# Patient Record
Sex: FEMALE | Race: WHITE | NOT HISPANIC OR LATINO | Employment: OTHER | URBAN - NONMETROPOLITAN AREA
[De-identification: names, ages, dates, MRNs, and addresses within clinical notes are randomized per-mention and may not be internally consistent; named-entity substitution may affect disease eponyms.]

---

## 2017-01-08 ENCOUNTER — APPOINTMENT (OUTPATIENT)
Dept: CARDIOLOGY | Facility: HOSPITAL | Age: 62
End: 2017-01-08
Attending: INTERNAL MEDICINE

## 2017-01-08 ENCOUNTER — HOSPITAL ENCOUNTER (INPATIENT)
Facility: HOSPITAL | Age: 62
LOS: 2 days | Discharge: HOME | End: 2017-01-10
Attending: INTERNAL MEDICINE | Admitting: INTERNAL MEDICINE

## 2017-01-08 DIAGNOSIS — I21.4 NON-STEMI (NON-ST ELEVATED MYOCARDIAL INFARCTION) (HCC): Primary | ICD-10-CM

## 2017-01-08 LAB
ALBUMIN SERPL-MCNC: 3.6 G/DL (ref 3.4–4.8)
ALBUMIN SERPL-MCNC: 3.6 G/DL (ref 3.4–4.8)
ALBUMIN/GLOB SERPL: 1.2 G/DL (ref 1.5–2.5)
ALBUMIN/GLOB SERPL: 1.3 G/DL (ref 1.5–2.5)
ALP SERPL-CCNC: 75 U/L (ref 46–116)
ALP SERPL-CCNC: 78 U/L (ref 46–116)
ALT SERPL W P-5'-P-CCNC: 16 U/L (ref 10–36)
ALT SERPL W P-5'-P-CCNC: 20 U/L (ref 10–36)
ANION GAP SERPL CALCULATED.3IONS-SCNC: 1.8 MMOL/L (ref 3.6–11.2)
ANION GAP SERPL CALCULATED.3IONS-SCNC: ABNORMAL MMOL/L (ref 3.6–11.2)
APTT PPP: 36.1 SECONDS (ref 24.4–31)
APTT PPP: 39.5 SECONDS (ref 24.4–31)
APTT PPP: 84.9 SECONDS (ref 24.4–31)
APTT PPP: 92.4 SECONDS (ref 24.4–31)
AST SERPL-CCNC: 24 U/L (ref 10–30)
AST SERPL-CCNC: 28 U/L (ref 10–30)
BASOPHILS # BLD AUTO: 0.03 10*3/MM3 (ref 0–0.3)
BASOPHILS NFR BLD AUTO: 0.3 % (ref 0–2)
BH CV ECHO MEAS - % IVS THICK: 34.6 %
BH CV ECHO MEAS - % LVPW THICK: 25 %
BH CV ECHO MEAS - ACS: 1.9 CM
BH CV ECHO MEAS - AO ROOT AREA (BSA CORRECTED): 1.7
BH CV ECHO MEAS - AO ROOT AREA: 7.9 CM^2
BH CV ECHO MEAS - AO ROOT DIAM: 3.2 CM
BH CV ECHO MEAS - BSA(HAYCOCK): 1.9 M^2
BH CV ECHO MEAS - BSA: 1.8 M^2
BH CV ECHO MEAS - BZI_BMI: 31.5 KILOGRAMS/M^2
BH CV ECHO MEAS - BZI_METRIC_HEIGHT: 160 CM
BH CV ECHO MEAS - BZI_METRIC_WEIGHT: 80.7 KG
BH CV ECHO MEAS - CONTRAST EF 4CH: 62 ML/M^2
BH CV ECHO MEAS - EDV(CUBED): 111.9 ML
BH CV ECHO MEAS - EDV(MOD-SP4): 50 ML
BH CV ECHO MEAS - EDV(TEICH): 108.5 ML
BH CV ECHO MEAS - EF(CUBED): 81.7 %
BH CV ECHO MEAS - EF(MOD-SP4): 62 %
BH CV ECHO MEAS - EF(TEICH): 74.3 %
BH CV ECHO MEAS - ESV(CUBED): 20.5 ML
BH CV ECHO MEAS - ESV(MOD-SP4): 19 ML
BH CV ECHO MEAS - ESV(TEICH): 27.9 ML
BH CV ECHO MEAS - FS: 43.2 %
BH CV ECHO MEAS - IVS/LVPW: 0.93
BH CV ECHO MEAS - IVSD: 1.1 CM
BH CV ECHO MEAS - IVSS: 1.5 CM
BH CV ECHO MEAS - LV DIASTOLIC VOL/BSA (35-75): 27.2 ML/M^2
BH CV ECHO MEAS - LV MASS(C)D: 213.3 GRAMS
BH CV ECHO MEAS - LV MASS(C)DI: 115.9 GRAMS/M^2
BH CV ECHO MEAS - LV MASS(C)S: 143.7 GRAMS
BH CV ECHO MEAS - LV MASS(C)SI: 78.1 GRAMS/M^2
BH CV ECHO MEAS - LV SYSTOLIC VOL/BSA (12-30): 10.3 ML/M^2
BH CV ECHO MEAS - LVIDD: 4.8 CM
BH CV ECHO MEAS - LVIDS: 2.7 CM
BH CV ECHO MEAS - LVLD AP4: 8.1 CM
BH CV ECHO MEAS - LVLS AP4: 7.7 CM
BH CV ECHO MEAS - LVOT AREA (M): 2.5 CM^2
BH CV ECHO MEAS - LVOT AREA: 2.6 CM^2
BH CV ECHO MEAS - LVOT DIAM: 1.8 CM
BH CV ECHO MEAS - LVPWD: 1.2 CM
BH CV ECHO MEAS - LVPWS: 1.5 CM
BH CV ECHO MEAS - MV A MAX VEL: 89.8 CM/SEC
BH CV ECHO MEAS - MV DEC SLOPE: 239.9 CM/SEC^2
BH CV ECHO MEAS - MV E MAX VEL: 72.6 CM/SEC
BH CV ECHO MEAS - MV E/A: 0.81
BH CV ECHO MEAS - MV P1/2T MAX VEL: 77 CM/SEC
BH CV ECHO MEAS - MV P1/2T: 94 MSEC
BH CV ECHO MEAS - MVA P1/2T LCG: 2.9 CM^2
BH CV ECHO MEAS - MVA(P1/2T): 2.3 CM^2
BH CV ECHO MEAS - RVDD: 0.87 CM
BH CV ECHO MEAS - SI(CUBED): 49.7 ML/M^2
BH CV ECHO MEAS - SI(MOD-SP4): 16.8 ML/M^2
BH CV ECHO MEAS - SI(TEICH): 43.8 ML/M^2
BH CV ECHO MEAS - SV(CUBED): 91.4 ML
BH CV ECHO MEAS - SV(MOD-SP4): 31 ML
BH CV ECHO MEAS - SV(TEICH): 80.6 ML
BILIRUB SERPL-MCNC: 0.2 MG/DL (ref 0.2–1.8)
BILIRUB SERPL-MCNC: 0.2 MG/DL (ref 0.2–1.8)
BNP SERPL-MCNC: 164 PG/ML (ref 0–100)
BUN BLD-MCNC: 14 MG/DL (ref 7–21)
BUN BLD-MCNC: 14 MG/DL (ref 7–21)
BUN/CREAT SERPL: 19.7 (ref 7–25)
BUN/CREAT SERPL: 20.3 (ref 7–25)
CALCIUM SPEC-SCNC: 9 MG/DL (ref 7.7–10)
CALCIUM SPEC-SCNC: 9 MG/DL (ref 7.7–10)
CHLORIDE SERPL-SCNC: 102 MMOL/L (ref 99–112)
CHLORIDE SERPL-SCNC: 104 MMOL/L (ref 99–112)
CHOLEST SERPL-MCNC: 251 MG/DL (ref 0–200)
CK MB SERPL-CCNC: 9.5 NG/ML (ref 0–5)
CK MB SERPL-RTO: 5.8 % (ref 0–3)
CK SERPL-CCNC: 163 U/L (ref 24–173)
CO2 SERPL-SCNC: 35.2 MMOL/L (ref 24.3–31.9)
CO2 SERPL-SCNC: >40 MMOL/L (ref 24.3–31.9)
CREAT BLD-MCNC: 0.69 MG/DL (ref 0.43–1.29)
CREAT BLD-MCNC: 0.71 MG/DL (ref 0.43–1.29)
DEPRECATED RDW RBC AUTO: 46 FL (ref 37–54)
EOSINOPHIL # BLD AUTO: 0.47 10*3/MM3 (ref 0–0.7)
EOSINOPHIL NFR BLD AUTO: 4.9 % (ref 0–5)
ERYTHROCYTE [DISTWIDTH] IN BLOOD BY AUTOMATED COUNT: 13.9 % (ref 11.5–14.5)
GFR SERPL CREATININE-BSD FRML MDRD: 84 ML/MIN/1.73
GFR SERPL CREATININE-BSD FRML MDRD: 86 ML/MIN/1.73
GLOBULIN UR ELPH-MCNC: 2.8 GM/DL
GLOBULIN UR ELPH-MCNC: 2.9 GM/DL
GLUCOSE BLD-MCNC: 298 MG/DL (ref 70–110)
GLUCOSE BLD-MCNC: 302 MG/DL (ref 70–110)
GLUCOSE BLDC GLUCOMTR-MCNC: 206 MG/DL (ref 70–130)
GLUCOSE BLDC GLUCOMTR-MCNC: 244 MG/DL (ref 70–130)
GLUCOSE BLDC GLUCOMTR-MCNC: 266 MG/DL (ref 70–130)
GLUCOSE BLDC GLUCOMTR-MCNC: 277 MG/DL (ref 70–130)
GLUCOSE BLDC GLUCOMTR-MCNC: 332 MG/DL (ref 70–130)
HCT VFR BLD AUTO: 40 % (ref 37–47)
HDLC SERPL-MCNC: 22 MG/DL (ref 60–100)
HGB BLD-MCNC: 13.2 G/DL (ref 12–16)
IMM GRANULOCYTES # BLD: 0.03 10*3/MM3 (ref 0–0.03)
IMM GRANULOCYTES NFR BLD: 0.3 % (ref 0–0.5)
INR PPP: 1.04 (ref 0.8–1.1)
LDLC SERPL CALC-MCNC: ABNORMAL MG/DL (ref 0–100)
LDLC/HDLC SERPL: ABNORMAL {RATIO}
LYMPHOCYTES # BLD AUTO: 4.15 10*3/MM3 (ref 1–3)
LYMPHOCYTES NFR BLD AUTO: 43.1 % (ref 21–51)
MCH RBC QN AUTO: 30.8 PG (ref 27–33)
MCHC RBC AUTO-ENTMCNC: 33 G/DL (ref 33–37)
MCV RBC AUTO: 93.2 FL (ref 80–94)
MONOCYTES # BLD AUTO: 0.6 10*3/MM3 (ref 0.1–0.9)
MONOCYTES NFR BLD AUTO: 6.2 % (ref 0–10)
MYOGLOBIN SERPL-MCNC: 47 NG/ML (ref 0–109)
NEUTROPHILS # BLD AUTO: 4.34 10*3/MM3 (ref 1.4–6.5)
NEUTROPHILS NFR BLD AUTO: 45.2 % (ref 30–70)
OSMOLALITY SERPL CALC.SUM OF ELEC: 292.8 MOSM/KG (ref 273–305)
OSMOLALITY SERPL CALC.SUM OF ELEC: 293 MOSM/KG (ref 273–305)
PLATELET # BLD AUTO: 265 10*3/MM3 (ref 130–400)
PMV BLD AUTO: 9.7 FL (ref 6–10)
POTASSIUM BLD-SCNC: 3.3 MMOL/L (ref 3.5–5.3)
POTASSIUM BLD-SCNC: 3.4 MMOL/L (ref 3.5–5.3)
PROT SERPL-MCNC: 6.4 G/DL (ref 6–8)
PROT SERPL-MCNC: 6.5 G/DL (ref 6–8)
PROTHROMBIN TIME: 11.7 SECONDS (ref 9.8–11.9)
RBC # BLD AUTO: 4.29 10*6/MM3 (ref 4.2–5.4)
SODIUM BLD-SCNC: 141 MMOL/L (ref 135–153)
SODIUM BLD-SCNC: 141 MMOL/L (ref 135–153)
TRIGL SERPL-MCNC: 475 MG/DL (ref 0–150)
TROPONIN I SERPL-MCNC: 1.94 NG/ML
TROPONIN I SERPL-MCNC: 2.74 NG/ML
TROPONIN I SERPL-MCNC: 5.42 NG/ML
VLDLC SERPL-MCNC: ABNORMAL MG/DL
WBC NRBC COR # BLD: 9.62 10*3/MM3 (ref 4.5–12.5)

## 2017-01-08 PROCEDURE — 83880 ASSAY OF NATRIURETIC PEPTIDE: CPT | Performed by: INTERNAL MEDICINE

## 2017-01-08 PROCEDURE — 84484 ASSAY OF TROPONIN QUANT: CPT | Performed by: INTERNAL MEDICINE

## 2017-01-08 PROCEDURE — 85025 COMPLETE CBC W/AUTO DIFF WBC: CPT | Performed by: INTERNAL MEDICINE

## 2017-01-08 PROCEDURE — 80061 LIPID PANEL: CPT | Performed by: INTERNAL MEDICINE

## 2017-01-08 PROCEDURE — 99222 1ST HOSP IP/OBS MODERATE 55: CPT | Performed by: INTERNAL MEDICINE

## 2017-01-08 PROCEDURE — 86141 C-REACTIVE PROTEIN HS: CPT | Performed by: INTERNAL MEDICINE

## 2017-01-08 PROCEDURE — 93005 ELECTROCARDIOGRAM TRACING: CPT | Performed by: INTERNAL MEDICINE

## 2017-01-08 PROCEDURE — 82962 GLUCOSE BLOOD TEST: CPT

## 2017-01-08 PROCEDURE — 83874 ASSAY OF MYOGLOBIN: CPT | Performed by: INTERNAL MEDICINE

## 2017-01-08 PROCEDURE — 85730 THROMBOPLASTIN TIME PARTIAL: CPT | Performed by: INTERNAL MEDICINE

## 2017-01-08 PROCEDURE — 82553 CREATINE MB FRACTION: CPT | Performed by: INTERNAL MEDICINE

## 2017-01-08 PROCEDURE — 82550 ASSAY OF CK (CPK): CPT | Performed by: INTERNAL MEDICINE

## 2017-01-08 PROCEDURE — 93306 TTE W/DOPPLER COMPLETE: CPT

## 2017-01-08 PROCEDURE — 25010000002 HEPARIN (PORCINE) PER 1000 UNITS: Performed by: INTERNAL MEDICINE

## 2017-01-08 PROCEDURE — 63710000001 INSULIN ASPART PER 5 UNITS: Performed by: INTERNAL MEDICINE

## 2017-01-08 PROCEDURE — 80053 COMPREHEN METABOLIC PANEL: CPT | Performed by: INTERNAL MEDICINE

## 2017-01-08 PROCEDURE — 85610 PROTHROMBIN TIME: CPT | Performed by: INTERNAL MEDICINE

## 2017-01-08 RX ORDER — FAMOTIDINE 20 MG/1
40 TABLET, FILM COATED ORAL NIGHTLY
Status: DISCONTINUED | OUTPATIENT
Start: 2017-01-08 | End: 2017-01-10 | Stop reason: HOSPADM

## 2017-01-08 RX ORDER — CLONIDINE HYDROCHLORIDE 0.2 MG/1
0.2 TABLET ORAL EVERY 6 HOURS SCHEDULED
Status: DISCONTINUED | OUTPATIENT
Start: 2017-01-08 | End: 2017-01-08 | Stop reason: ALTCHOICE

## 2017-01-08 RX ORDER — GLIMEPIRIDE 4 MG/1
4 TABLET ORAL NIGHTLY
COMMUNITY

## 2017-01-08 RX ORDER — HYDROXYZINE PAMOATE 25 MG/1
25 CAPSULE ORAL EVERY 6 HOURS PRN
COMMUNITY
End: 2017-01-10 | Stop reason: HOSPADM

## 2017-01-08 RX ORDER — LOSARTAN POTASSIUM 50 MG/1
100 TABLET ORAL DAILY
Status: DISCONTINUED | OUTPATIENT
Start: 2017-01-08 | End: 2017-01-10 | Stop reason: HOSPADM

## 2017-01-08 RX ORDER — GABAPENTIN 300 MG/1
300 CAPSULE ORAL 3 TIMES DAILY
COMMUNITY
End: 2020-09-10

## 2017-01-08 RX ORDER — GABAPENTIN 300 MG/1
300 CAPSULE ORAL 3 TIMES DAILY
Status: CANCELLED | OUTPATIENT
Start: 2017-01-08

## 2017-01-08 RX ORDER — ASPIRIN 325 MG
325 TABLET ORAL ONCE
Status: COMPLETED | OUTPATIENT
Start: 2017-01-09 | End: 2017-01-09

## 2017-01-08 RX ORDER — HEPARIN SODIUM 5000 [USP'U]/ML
30 INJECTION, SOLUTION INTRAVENOUS; SUBCUTANEOUS AS NEEDED
Status: DISCONTINUED | OUTPATIENT
Start: 2017-01-08 | End: 2017-01-09

## 2017-01-08 RX ORDER — CLOPIDOGREL BISULFATE 75 MG/1
600 TABLET ORAL ONCE
Status: COMPLETED | OUTPATIENT
Start: 2017-01-09 | End: 2017-01-09

## 2017-01-08 RX ORDER — ASPIRIN 81 MG/1
81 TABLET ORAL DAILY
Status: DISCONTINUED | OUTPATIENT
Start: 2017-01-08 | End: 2017-01-08 | Stop reason: SDUPTHER

## 2017-01-08 RX ORDER — POTASSIUM CHLORIDE 1.5 G/1.77G
40 POWDER, FOR SOLUTION ORAL AS NEEDED
Status: DISCONTINUED | OUTPATIENT
Start: 2017-01-08 | End: 2017-01-10 | Stop reason: HOSPADM

## 2017-01-08 RX ORDER — SODIUM CHLORIDE 0.9 % (FLUSH) 0.9 %
1-10 SYRINGE (ML) INJECTION AS NEEDED
Status: DISCONTINUED | OUTPATIENT
Start: 2017-01-08 | End: 2017-01-09 | Stop reason: HOSPADM

## 2017-01-08 RX ORDER — NICOTINE POLACRILEX 4 MG
15 LOZENGE BUCCAL AS NEEDED
Status: DISCONTINUED | OUTPATIENT
Start: 2017-01-08 | End: 2017-01-10 | Stop reason: HOSPADM

## 2017-01-08 RX ORDER — ATORVASTATIN CALCIUM 40 MG/1
40 TABLET, FILM COATED ORAL NIGHTLY
Status: CANCELLED | OUTPATIENT
Start: 2017-01-08

## 2017-01-08 RX ORDER — CLONIDINE HYDROCHLORIDE 0.1 MG/1
0.2 TABLET ORAL EVERY 6 HOURS PRN
Status: DISCONTINUED | OUTPATIENT
Start: 2017-01-08 | End: 2017-01-10 | Stop reason: HOSPADM

## 2017-01-08 RX ORDER — FAMOTIDINE 20 MG/1
20 TABLET, FILM COATED ORAL 2 TIMES DAILY
COMMUNITY
End: 2017-01-10 | Stop reason: HOSPADM

## 2017-01-08 RX ORDER — DEXTROSE MONOHYDRATE 25 G/50ML
25 INJECTION, SOLUTION INTRAVENOUS AS NEEDED
Status: DISCONTINUED | OUTPATIENT
Start: 2017-01-08 | End: 2017-01-10 | Stop reason: HOSPADM

## 2017-01-08 RX ORDER — GABAPENTIN 300 MG/1
300 CAPSULE ORAL EVERY 8 HOURS SCHEDULED
Status: DISCONTINUED | OUTPATIENT
Start: 2017-01-08 | End: 2017-01-08

## 2017-01-08 RX ORDER — POTASSIUM CHLORIDE 750 MG/1
40 CAPSULE, EXTENDED RELEASE ORAL AS NEEDED
Status: DISCONTINUED | OUTPATIENT
Start: 2017-01-08 | End: 2017-01-10 | Stop reason: HOSPADM

## 2017-01-08 RX ORDER — ROSUVASTATIN CALCIUM 20 MG/1
20 TABLET, COATED ORAL DAILY
COMMUNITY

## 2017-01-08 RX ORDER — GLIPIZIDE 5 MG/1
10 TABLET ORAL NIGHTLY
Status: DISCONTINUED | OUTPATIENT
Start: 2017-01-08 | End: 2017-01-10 | Stop reason: HOSPADM

## 2017-01-08 RX ORDER — AMLODIPINE BESYLATE 5 MG/1
5 TABLET ORAL
Status: DISCONTINUED | OUTPATIENT
Start: 2017-01-08 | End: 2017-01-10 | Stop reason: HOSPADM

## 2017-01-08 RX ORDER — ASPIRIN 325 MG
325 TABLET, DELAYED RELEASE (ENTERIC COATED) ORAL DAILY
Status: DISCONTINUED | OUTPATIENT
Start: 2017-01-09 | End: 2017-01-08

## 2017-01-08 RX ORDER — ATORVASTATIN CALCIUM 40 MG/1
40 TABLET, FILM COATED ORAL NIGHTLY
Status: DISCONTINUED | OUTPATIENT
Start: 2017-01-08 | End: 2017-01-10 | Stop reason: HOSPADM

## 2017-01-08 RX ORDER — POTASSIUM CHLORIDE 20 MEQ/1
40 TABLET, EXTENDED RELEASE ORAL EVERY 4 HOURS
Status: COMPLETED | OUTPATIENT
Start: 2017-01-08 | End: 2017-01-08

## 2017-01-08 RX ORDER — HEPARIN SODIUM 5000 [USP'U]/ML
60 INJECTION, SOLUTION INTRAVENOUS; SUBCUTANEOUS AS NEEDED
Status: DISCONTINUED | OUTPATIENT
Start: 2017-01-08 | End: 2017-01-09

## 2017-01-08 RX ORDER — HYDROXYZINE HYDROCHLORIDE 25 MG/1
25 TABLET, FILM COATED ORAL EVERY 6 HOURS PRN
Status: CANCELLED | OUTPATIENT
Start: 2017-01-08

## 2017-01-08 RX ORDER — CLOPIDOGREL BISULFATE 75 MG/1
75 TABLET ORAL DAILY
Status: DISCONTINUED | OUTPATIENT
Start: 2017-01-09 | End: 2017-01-08

## 2017-01-08 RX ORDER — SODIUM CHLORIDE 9 MG/ML
1-3 INJECTION, SOLUTION INTRAVENOUS CONTINUOUS
Status: DISCONTINUED | OUTPATIENT
Start: 2017-01-09 | End: 2017-01-10 | Stop reason: HOSPADM

## 2017-01-08 RX ORDER — HYDROXYZINE HYDROCHLORIDE 25 MG/1
25 TABLET, FILM COATED ORAL EVERY 6 HOURS PRN
Status: DISCONTINUED | OUTPATIENT
Start: 2017-01-08 | End: 2017-01-10 | Stop reason: HOSPADM

## 2017-01-08 RX ORDER — ASPIRIN 325 MG
325 TABLET ORAL ONCE
Status: DISCONTINUED | OUTPATIENT
Start: 2017-01-08 | End: 2017-01-08

## 2017-01-08 RX ORDER — LOSARTAN POTASSIUM 50 MG/1
100 TABLET ORAL DAILY
COMMUNITY
End: 2020-09-10

## 2017-01-08 RX ORDER — FENOFIBRIC ACID 135 MG/1
135 CAPSULE, DELAYED RELEASE ORAL DAILY
COMMUNITY
End: 2020-09-10

## 2017-01-08 RX ORDER — NICOTINE 21 MG/24HR
1 PATCH, TRANSDERMAL 24 HOURS TRANSDERMAL DAILY
Status: DISCONTINUED | OUTPATIENT
Start: 2017-01-08 | End: 2017-01-10 | Stop reason: SDUPTHER

## 2017-01-08 RX ORDER — CLOPIDOGREL BISULFATE 75 MG/1
75 TABLET ORAL DAILY
Status: DISCONTINUED | OUTPATIENT
Start: 2017-01-08 | End: 2017-01-08 | Stop reason: ALTCHOICE

## 2017-01-08 RX ORDER — CLOPIDOGREL BISULFATE 75 MG/1
600 TABLET ORAL ONCE
Status: DISCONTINUED | OUTPATIENT
Start: 2017-01-08 | End: 2017-01-08

## 2017-01-08 RX ORDER — DIPHENHYDRAMINE HCL 25 MG
25 CAPSULE ORAL ONCE
Status: COMPLETED | OUTPATIENT
Start: 2017-01-09 | End: 2017-01-09

## 2017-01-08 RX ORDER — GABAPENTIN 300 MG/1
300 CAPSULE ORAL EVERY 8 HOURS SCHEDULED
Status: DISCONTINUED | OUTPATIENT
Start: 2017-01-08 | End: 2017-01-10 | Stop reason: HOSPADM

## 2017-01-08 RX ORDER — HEPARIN SODIUM 5000 [USP'U]/ML
60 INJECTION, SOLUTION INTRAVENOUS; SUBCUTANEOUS ONCE
Status: COMPLETED | OUTPATIENT
Start: 2017-01-08 | End: 2017-01-08

## 2017-01-08 RX ORDER — CLOPIDOGREL BISULFATE 75 MG/1
75 TABLET ORAL DAILY
Status: DISCONTINUED | OUTPATIENT
Start: 2017-01-10 | End: 2017-01-09 | Stop reason: HOSPADM

## 2017-01-08 RX ORDER — ASPIRIN 325 MG
325 TABLET, DELAYED RELEASE (ENTERIC COATED) ORAL DAILY
Status: DISCONTINUED | OUTPATIENT
Start: 2017-01-10 | End: 2017-01-09 | Stop reason: HOSPADM

## 2017-01-08 RX ORDER — DIAZEPAM 5 MG/1
5 TABLET ORAL ONCE
Status: COMPLETED | OUTPATIENT
Start: 2017-01-09 | End: 2017-01-09

## 2017-01-08 RX ADMIN — CLONIDINE HYDROCHLORIDE 0.2 MG: 0.2 TABLET ORAL at 18:08

## 2017-01-08 RX ADMIN — NICOTINE 1 PATCH: 21 PATCH TRANSDERMAL at 12:53

## 2017-01-08 RX ADMIN — GABAPENTIN 300 MG: 300 CAPSULE ORAL at 13:00

## 2017-01-08 RX ADMIN — GABAPENTIN 300 MG: 300 CAPSULE ORAL at 04:05

## 2017-01-08 RX ADMIN — INSULIN ASPART 4 UNITS: 100 INJECTION, SOLUTION INTRAVENOUS; SUBCUTANEOUS at 20:24

## 2017-01-08 RX ADMIN — POTASSIUM CHLORIDE 40 MEQ: 1500 TABLET, EXTENDED RELEASE ORAL at 08:07

## 2017-01-08 RX ADMIN — HEPARIN SODIUM 4850 UNITS: 5000 INJECTION, SOLUTION INTRAVENOUS; SUBCUTANEOUS at 18:47

## 2017-01-08 RX ADMIN — INSULIN ASPART 6 UNITS: 100 INJECTION, SOLUTION INTRAVENOUS; SUBCUTANEOUS at 11:08

## 2017-01-08 RX ADMIN — CLOPIDOGREL BISULFATE 75 MG: 75 TABLET, FILM COATED ORAL at 08:07

## 2017-01-08 RX ADMIN — LINAGLIPTIN 5 MG: 5 TABLET, FILM COATED ORAL at 12:53

## 2017-01-08 RX ADMIN — ATORVASTATIN CALCIUM 40 MG: 40 TABLET, FILM COATED ORAL at 04:05

## 2017-01-08 RX ADMIN — FAMOTIDINE 40 MG: 20 TABLET, FILM COATED ORAL at 20:24

## 2017-01-08 RX ADMIN — HEPARIN SODIUM 12 UNITS/KG/HR: 10000 INJECTION, SOLUTION INTRAVENOUS at 02:48

## 2017-01-08 RX ADMIN — NITROGLYCERIN 1 INCH: 20 OINTMENT TOPICAL at 18:09

## 2017-01-08 RX ADMIN — ATORVASTATIN CALCIUM 40 MG: 40 TABLET, FILM COATED ORAL at 20:24

## 2017-01-08 RX ADMIN — INSULIN ASPART 4 UNITS: 100 INJECTION, SOLUTION INTRAVENOUS; SUBCUTANEOUS at 07:54

## 2017-01-08 RX ADMIN — CLONIDINE HYDROCHLORIDE 0.2 MG: 0.2 TABLET ORAL at 06:00

## 2017-01-08 RX ADMIN — ASPIRIN 81 MG: 81 TABLET ORAL at 08:07

## 2017-01-08 RX ADMIN — INSULIN ASPART 7 UNITS: 100 INJECTION, SOLUTION INTRAVENOUS; SUBCUTANEOUS at 18:05

## 2017-01-08 RX ADMIN — INSULIN ASPART 6 UNITS: 100 INJECTION, SOLUTION INTRAVENOUS; SUBCUTANEOUS at 02:47

## 2017-01-08 RX ADMIN — POTASSIUM CHLORIDE 40 MEQ: 1500 TABLET, EXTENDED RELEASE ORAL at 12:53

## 2017-01-08 RX ADMIN — HYDROXYZINE 25 MG: 25 TABLET, FILM COATED ORAL at 04:05

## 2017-01-08 RX ADMIN — AMLODIPINE BESYLATE 5 MG: 5 TABLET ORAL at 04:05

## 2017-01-08 RX ADMIN — GABAPENTIN 300 MG: 300 CAPSULE ORAL at 21:04

## 2017-01-08 RX ADMIN — NITROGLYCERIN 1 INCH: 20 OINTMENT TOPICAL at 06:00

## 2017-01-08 RX ADMIN — HEPARIN SODIUM 4850 UNITS: 5000 INJECTION, SOLUTION INTRAVENOUS; SUBCUTANEOUS at 02:48

## 2017-01-08 NOTE — Clinical Note
Third inflation of balloon - Max pressure = 8 raj. 3rd Inflation of balloon - Duration = 3 seconds.

## 2017-01-08 NOTE — NURSING NOTE
Heparin gtt started at 0248, PTT drawn at 0315. Level noted at 92. Pharmacist notified of short time between initial bolus and lab, instructed to follow protocol.

## 2017-01-08 NOTE — Clinical Note
Suture was used to secure the sheath post procedure. Transparent Dressing was used to secure the sheath post procedure.  Sheath Left Intact after the procedure.  Pressure Bag was used to stabalize the sheath post procedure.

## 2017-01-08 NOTE — PLAN OF CARE
Problem: Hyperglycemia, Persistent (Adult)  Goal: Signs and Symptoms of Listed Potential Problems Will be Absent or Manageable (Hyperglycemia, Persistent)  Outcome: Ongoing (interventions implemented as appropriate)    Problem: Patient Care Overview (Adult)  Goal: Plan of Care Review  Outcome: Ongoing (interventions implemented as appropriate)  Goal: Adult Individualization and Mutuality  Outcome: Ongoing (interventions implemented as appropriate)    Problem: Respiratory Insufficiency (Adult)  Goal: Identify Related Risk Factors and Signs and Symptoms  Outcome: Ongoing (interventions implemented as appropriate)  Goal: Acid/Base Balance  Outcome: Ongoing (interventions implemented as appropriate)  Goal: Effective Ventilation  Outcome: Ongoing (interventions implemented as appropriate)

## 2017-01-08 NOTE — Clinical Note
Second inflation of balloon - Max pressure = 14 raj. 2nd Inflation of balloon - Duration = 8 seconds.

## 2017-01-08 NOTE — Clinical Note
Prepped: groin. Prepped with: ChloraPrep. The site was clipped. The patient was draped in a sterile fashion.

## 2017-01-08 NOTE — CONSULTS
Chief complaint:  Chest pain    History of present illness:  Monae is a very pleasant 61-year-old woman who presents with a history of sudden onset of chest pain.  Her chest discomfort was initially in a stuttering pattern however became somewhat more persistent and she eventually presented to the hospital for further evaluation.  She was noted to have an initial normal troponin that subsequently increased into the abnormal range.  She is also noted to have a history of diabetes, hypertension, hyperlipidemia, a history of tobacco consumption, a family history of coronary artery disease, and history of peripheral vascular disease.  Given all of these findings interventional cardiology consultation was requested.    Her 14 point review of systems is negative as was otherwise mentioned in the history of present illness.    Past medical history:  Obesity  COPD  Non-ST elevation myocardial infarction  Peripheral vascular disease  Hypertension  Hyperlipidemia  Diabetes mellitus type 2  Tobacco abuse    Current medications:  Norvasc 5 mg doing  Aspirin 81 mg daily  Lipitor 40 mg daily  Clonidine 0.2 mg 4 times a day  Plavix 75 mg daily  Neurontin 300 mg 3 times a day  Glipizide 10 mg daily at bedtime  Insulin as directed  Losartan 100 mg daily  Linagliptin 5 mg daily    She has allergies to amoxicillin    Social history is notable for a 50-pack-year history of tobacco consumption which is active she does not consume excessive alcohol or use illicit drugs    Family history is notable for father with premature coronary artery disease    Current physical examination:  Blood pressure is 127/74 with a heart rate of 51 and an oxygen saturation of 92%  In general she is a obese him on no apparent distress, alert and oriented ×3.  HEENT exam reveals her oral mucosa to be normal.  She has no significant JVD or hepatojugular reflux.  She has no carotid bruits noted.  Chest is symmetric  Lungs reveal decreased air movement  bilaterally.  There is a prolonged expiratory phase.  There is mild diffuse wheezing noted.  There are no crackles noted.  Cardiac exam reveals an intact PMI with a regular rate and rhythm.  There is a normal S1 and S2.  There is no S3 or S4.  There are no murmurs rubs or bruits.  Abdominal exam reveals a soft belly which is nontender with normal bowel sounds and no hepatosplenomegaly.  Her extremities show no clubbing cyanosis or edema or peripheral pulses are diminished  Neurologic exam is intact  Muscle skeletal exam is normal.    Currently.  Data:  Serial cardiac enzymes reveal a peak troponin of 5 now 1.9  CMP is notable for an elevated glucose of 300 with a bicarbonate of 40.  She has a BUN of 14 and creatinine of 0.7  CBC is normal  EKG reveals sinus bradycardia with no acute or chronic ischemic changes noted.    Final impression and plan:  Overall it is my impression that Monae presents with multiple risk factors and risk equivalents for coronary artery disease.  Furthermore she has a relatively typical history of chest discomfort and now has abnormal cardiac enzymes.  Given all of the above she is at very high risk for having significant coronary artery disease and as such I feel that the best course of action is to proceed with cardiac catheterization.  I discussed the risks and benefits of doing so to include the risk of heart attack, stroke and death.  She understands all of these risks and agrees to proceed.

## 2017-01-08 NOTE — Clinical Note
Second inflation of balloon - Max pressure = 12 raj. 2nd Inflation of balloon - Duration = 5 seconds.

## 2017-01-08 NOTE — PLAN OF CARE
Problem: Cardiac Output, Decreased (Adult)  Goal: Identify Related Risk Factors and Signs and Symptoms  Outcome: Ongoing (interventions implemented as appropriate)  Goal: Adequate Cardiac Output/Effective Tissue Perfusion  Outcome: Ongoing (interventions implemented as appropriate)    Problem: Hyperglycemia, Persistent (Adult)  Goal: Signs and Symptoms of Listed Potential Problems Will be Absent or Manageable (Hyperglycemia, Persistent)  Outcome: Ongoing (interventions implemented as appropriate)    Problem: Patient Care Overview (Adult)  Goal: Plan of Care Review  Outcome: Ongoing (interventions implemented as appropriate)  Goal: Adult Individualization and Mutuality  Outcome: Ongoing (interventions implemented as appropriate)  Goal: Discharge Needs Assessment  Outcome: Ongoing (interventions implemented as appropriate)    Problem: Respiratory Insufficiency (Adult)  Goal: Identify Related Risk Factors and Signs and Symptoms  Outcome: Ongoing (interventions implemented as appropriate)  Goal: Acid/Base Balance  Outcome: Ongoing (interventions implemented as appropriate)  Goal: Effective Ventilation  Outcome: Ongoing (interventions implemented as appropriate)

## 2017-01-08 NOTE — PROGRESS NOTES
Discharge Planning Assessment   [facility]     Patient Name: Monae Chauhan  MRN: [id number]  Today's Date: 1/8/2017    Admit Date: 1/8/2017          Discharge Needs Assessment       01/08/17 1500    Living Environment    Lives With alone    Living Arrangements house    Provides Primary Care For no one    Able to Return to Prior Living Arrangements yes    Discharge Needs Assessment    Concerns To Be Addressed no discharge needs identified;denies needs/concerns at this time    Readmission Within The Last 30 Days no previous admission in last 30 days    Anticipated Changes Related to Illness none    Equipment Currently Used at Home none    Equipment Needed After Discharge none    Transportation Available car;family or friend will provide    Discharge Disposition still a patient            Discharge Plan       01/08/17 1501    Case Management/Social Work Plan    Plan Return home alone she is indep with ADLs and has no needs at this time.  CM will follow.    Patient/Family In Agreement With Plan yes        Discharge Placement     No information found                Demographic Summary       01/08/17 1456    Referral Information    Admission Type inpatient    Arrived From admitted as an inpatient;home or self-care   Admit in CCU with NSTEMI she is on heparin gtt nitrostat, lipitor, asa, plavix she is planned for cardiac cath    Referral Source admission list    Primary Care Physician Information    Name Zachary Sullivan            Functional Status       01/08/17 1457    Functional Status Current    Change in Functional Status Since Onset of Current Illness/Injury no    Functional Status Prior    Ambulation 0-->independent    Transferring 0-->independent    Toileting 0-->independent    Bathing 0-->independent    Dressing 0-->independent    Eating 0-->independent    Communication 0-->understands/communicates without difficulty    Swallowing 0-->swallows foods/liquids without difficulty    IADL    Medications independent    [facility] deneid any issues paying for medications.    Meal Preparation independent    Housekeeping independent    Laundry independent    Shopping independent    Oral Care independent    Activity Tolerance    Current Activity Limitations bed rest    Employment/Financial    Financial Concerns none            Psychosocial     None            Abuse/Neglect     None            Legal       01/08/17 1500    Legal    Legal Comments Denied having or need for POA/AD.            Substance Abuse     None            Patient Forms     None          Ela Neville RN

## 2017-01-08 NOTE — IP AVS SNAPSHOT
AFTER VISIT SUMMARY             Monae Chauhan           About your hospitalization     You were admitted on:  January 8, 2017 You last received care in the:  [facility]       Procedures & Surgeries      Procedure(s) (LRB):  Left Heart Cath (N/A)     1/8/2017 - 1/9/2017     Surgeon(s):  David Diane MD  -------------------      Medications    If you or your caregiver advised us that you are currently taking a medication and that medication is marked below as “Resume”, this simply indicates that we have reviewed those medications to make sure our new therapy recommendations do not interfere.  If you have concerns about medications other than those new ones which we are prescribing today, please consult the physician who prescribed them (or your primary physician).  Our review of your home medications is not meant to indicate that we are directing their use.             Your Medications      START taking these medications     aspirin 325 MG EC tablet   Take 1 tablet by mouth Daily.   Last time this was given:  1/10/2017  8:32 AM           aspirin  MG tablet   Take 1 tablet by mouth Daily.   Last time this was given:  1/10/2017  8:32 AM           atorvastatin 40 MG tablet   Take 1 tablet by mouth Daily.   Last time this was given:  1/9/2017  8:32 PM   Commonly known as:  LIPITOR           clopidogrel 75 MG tablet   Take 1 tablet by mouth Daily.   Last time this was given:  1/10/2017  8:31 AM   Commonly known as:  PLAVIX           clopidogrel 75 MG tablet   Take 1 tablet by mouth Daily.   Last time this was given:  1/10/2017  8:31 AM   Commonly known as:  PLAVIX           metoprolol tartrate 25 MG tablet   Take 0.5 tablets by mouth Every 12 (Twelve) Hours.   Last time this was given:  1/10/2017  8:32 AM   Commonly known as:  LOPRESSOR           metoprolol tartrate 25 MG tablet   Take 1/2 tablet by mouth 2 times daily.   Last time this was given:  1/10/2017  8:32 AM   Commonly  known as:  LOPRESSOR             CONTINUE taking these medications     fenofibric acid 135 MG capsule delayed-release delayed release capsule   Take 135 mg by mouth Daily.   Commonly known as:  TRILIPIX           gabapentin 300 MG capsule   Take 300 mg by mouth 3 (Three) Times a Day. Pt takes all doses of this med at once at hs   Last time this was given:  1/10/2017  6:18 AM   Commonly known as:  NEURONTIN           glimepiride 4 MG tablet   Take 4 mg by mouth Every Night.   Commonly known as:  AMARYL           JANUMET XR  MG tablet sustained-release 24 hour   Take 1 tablet by mouth 2 (Two) Times a Day. Pt takes all doses of this med at once at hs   Generic drug:  SITagliptin-MetFORMIN HCl ER           losartan 50 MG tablet   Take 100 mg by mouth Daily. Pt takes all doses of this med at once at hs   Last time this was given:  1/10/2017  8:30 AM   Commonly known as:  COZAAR           rosuvastatin 20 MG tablet   Take 20 mg by mouth Daily.   Commonly known as:  CRESTOR             STOP taking these medications     famotidine 20 MG tablet   Commonly known as:  PEPCID           hydrOXYzine 25 MG capsule   Commonly known as:  VISTARIL                Where to Get Your Medications      These medications were sent to [facility]  [address] [place] [id number]    Hours:  8AM-6PM Mon-Fri Phone:  [phone]     aspirin  MG tablet    atorvastatin 40 MG tablet    clopidogrel 75 MG tablet    metoprolol tartrate 25 MG tablet         You can get these medications from any pharmacy     Bring a paper prescription for each of these medications     aspirin 325 MG EC tablet    clopidogrel 75 MG tablet    metoprolol tartrate 25 MG tablet                  Your Medications      Your Medication List           Morning Noon Evening Bedtime As Needed    * aspirin 325 MG EC tablet   Take 1 tablet by mouth Daily.                                * aspirin  MG tablet   Take 1 tablet by mouth Daily.                                 atorvastatin 40 MG tablet   Take 1 tablet by mouth Daily.   Commonly known as:  LIPITOR                                   * clopidogrel 75 MG tablet   Take 1 tablet by mouth Daily.   Commonly known as:  PLAVIX                                * clopidogrel 75 MG tablet   Take 1 tablet by mouth Daily.   Commonly known as:  PLAVIX                                fenofibric acid 135 MG capsule delayed-release delayed release capsule   Take 135 mg by mouth Daily.   Commonly known as:  TRILIPIX                                   gabapentin 300 MG capsule   Take 300 mg by mouth 3 (Three) Times a Day. Pt takes all doses of this med at once at hs   Commonly known as:  NEURONTIN                                   glimepiride 4 MG tablet   Take 4 mg by mouth Every Night.   Commonly known as:  AMARYL                                   JANUMET XR  MG tablet sustained-release 24 hour   Take 1 tablet by mouth 2 (Two) Times a Day. Pt takes all doses of this med at once at hs   Generic drug:  SITagliptin-MetFORMIN HCl ER                                   losartan 50 MG tablet   Take 100 mg by mouth Daily. Pt takes all doses of this med at once at hs   Commonly known as:  COZAAR                                   * metoprolol tartrate 25 MG tablet   Take 0.5 tablets by mouth Every 12 (Twelve) Hours.   Commonly known as:  LOPRESSOR                                * metoprolol tartrate 25 MG tablet   Take 1/2 tablet by mouth 2 times daily.   Commonly known as:  LOPRESSOR                                rosuvastatin 20 MG tablet   Take 20 mg by mouth Daily.   Commonly known as:  CRESTOR                                   * Notice:  This list has 6 medication(s) that are the same as other medications prescribed for you. Read the directions carefully, and ask your doctor or other care provider to review them with you.             Instructions for After Discharge        Activity Instructions     Activity as Tolerated                  Diet Instructions     Diet: Regular, Cardiac; Thin Liquids, No Restrictions       Discharge Diet:   Regular  Cardiac      Fluid Consistency:  Thin Liquids, No Restrictions             Discharge References/Attachments     CARDIAC REHABILITATION (ENGLISH)    CORONARY ANGIOGRAM WITH STENT, CARE AFTER  (ENGLISH)    CARDIAC DIET (ENGLISH)       Follow-ups for After Discharge        Follow-up Information     Follow up with No Known Provider .    Contact information:    [facility] [id number]          Follow up with Cayla Serrano MD Follow up in 2 week(s).    Specialty:  Cardiology    Why:  Also DR Ward in 3 wks    Contact information:    [address]  [address]  [facility] [id number] [phone]        Referrals and Follow-ups to Schedule     Follow-Up    As directed    Dr Serrano in 2 wks    Dr Ward in 3 wks   Follow Up Details:  Dr Serrano           Additional information on Labs and Follow-ups:      Dr SERRANO  JAN. 24 @ 1:00 P.M. AND DR. WARD ON Tuesday JAN. 31, @ 8:50 A.M.              MyChart Signup     Our records indicate that you have declined [facility] [employer] signup. If you would like to sign up for [employer], please email [email or web address] or call [phone] to obtain an activation code.         Summary of Your Hospitalization        Reason for Hospitalization     Your primary diagnosis was:  Not on File    Your diagnoses also included:  Heart Attack      Care Providers     Provider Service Role Specialty    Cayla Serrano MD Cardiology Attending Provider Cardiology    David Diane MD Cardiology Consulting Physician  Cardiology      Your Allergies  Date Reviewed: 1/10/2017    Allergen Reactions    Amoxicillin Rash      Patient Belongings Returned     Document Return of Belongings Flowsheet     Were the patient bedside belongings sent home?   N/A   Belongings Retrieved from Security & Sent Home   N/A    Belongings Sent to Safe   --   Medications  "Retrieved from Pharmacy & Sent Home   N/A              More Information      Cardiac Rehabilitation  Cardiac rehabilitation is a medically supervised program that helps improve the health and well-being of people with heart problems. Cardiac rehabilitation includes exercise training, education, and counseling to help you get stronger and return to an active lifestyle. People who participate in cardiac rehabilitation programs get better faster and reduce future hospital stays.    [facility]  [address] [facility] [address]  [phone]    Cardiac rehabilitation programs can help when you have had the following conditions:  · Heart attack.  · Heart failure.  · Peripheral artery disease.  · Coronary artery disease.  · Angina.  · Lung or breathing problems.  Cardiac rehabilitation programs are also used when you have the following procedures:  · Coronary artery bypass graft surgery.  · Heart valve replacement.  · Heart stent placement.  · Heart transplant.  · Aneurysm repair.  CARDIAC REHABILITATION MAY HELP YOU:  · Reduce problems like chest pain and trouble breathing.  · Change risk factors that contribute to heart disease, such as:    Smoking.    High blood pressure.    High cholesterol.    Diabetes.    Being out of shape or not active.    Weighing more than 30% over your ideal weight.    Diet.  · Improve your mental outlook so you feel:    Less depressed or \"blue.\"    More hopeful.    Better about yourself.    More confident about taking care of yourself.  · Get support from health experts as well as other people with similar problems.  · Learn how to manage and understand your medicines.  · Teach your family about your condition and how to participate in your recovery.  WHAT HAPPENS IN CARDIAC REHABILITATION?  You will be assessed by a cardiac rehabilitation team. They will check your health history and do a physical exam. You may need blood tests, stress tests, and other evaluations. You may not " "start a cardiac rehabilitation program if:  · You develop angina with exercise or while at rest.  · You have severe heart failure that limits your activity.  · You have an abnormal heart rhythm at rest.  · You develop heart rhythm problems during exercise.  · You have high blood pressure that is not controlled.  The cardiac rehabilitation team works with you to make a plan based on your health and goals. Everyone is unique, so each program is customized and your program may change as you progress. Members of a typical cardiac rehabilitation team may include such health professionals as:  · Doctors.  · Nurses.  · Dietitians.  · Psychologists.  · Exercise specialists.  · Physical and occupational therapists.  A typical cardiac rehabilitation program is divided into phases. You advance from one phase to the next. Most cardiac rehabilitation sessions last for 60 minutes, 3 times a week.   Phase One starts while you are still in the hospital. You may start by walking in your room and then in the mishra. You may start some simple exercises with a therapist. Health care team members will give you information and ask you many questions. You may not be able to remember details, so have a family member or an advocate with you to help keep track of information.   Phase Two begins when you go home or to another facility. This phase may last 8 to 12 weeks. You will travel to a cardiac rehabilitation center or a place where it is offered. Typically, you gradually increase your activity while being closely watched by a nurse or therapist. Exercises may be a combination of strength or resistance training and \"cardio\" or aerobic movement on a treadmill or other machines. Your condition will determine how often and how long these sessions will last.   In phase two, you may learn how to cook healthy meals, control your blood sugar, and manage your medicines. You may need help with scheduling or planning how and when to take your " medicines. Use a timer, divided pill box, or follow a form to make taking your medicines easier. Use the method that works best for you. Some medicines should not be taken with certain foods. If you take more than one blood pressure medicine, you may need to stagger the times you take them. Taking all your blood pressure medicine at the same time may lower your blood pressure too much. If you have questions about your medicines, ask your health care provider questions until you understand.   Phase Three continues for the rest of your life. There will be less supervision. You may still participate in cardiac rehabilitation activities or become part of a group in your community. You may benefit from talking to other people about your experience if they are facing similar challenges.  How soon you drive, have sex, or return to work will depend on your condition. These decisions should be made by you and your health care provider. If you need help, ask for it. Find out where you can get the help you need. Ask questions until you get answers and understand.  SEEK IMMEDIATE MEDICAL CARE IF:   Get medical help at once if you experience any of the following symptoms:  · Severe chest discomfort, especially if the pain is crushing or pressure-like and spreads to the arms, back, neck, or jaw. Do not wait to see if the pain will go away.  · Weakness or numbness in your face, arms, or legs, especially on one side of the body; slurred speech; confusion; sudden severe headache or loss of vision (all symptoms of stroke).  · You have shortness of breath.  · You are sweating and feel sick to your stomach (nausea).  · You feel dizzy or faint.  · You experience profound tiredness (fatigue).  Call your local emergency service (911 in the [place] Do not drive yourself to the hospital.     This information is not intended to replace advice given to you by your health care provider. Make sure you discuss any questions you have with your  health care provider.     Document Released: 09/26/2009 Document Revised: 01/08/2016 Document Reviewed: 03/23/2012  [employer] Interactive Patient Education ©2016 [employer]          Coronary Angiogram With Stent, Care After  Refer to this sheet in the next few weeks. These instructions provide you with information about caring for yourself after your procedure. Your health care provider may also give you more specific instructions. Your treatment has been planned according to current medical practices, but problems sometimes occur. Call your health care provider if you have any problems or questions after your procedure.  WHAT TO EXPECT AFTER THE PROCEDURE   After your procedure, it is typical to have the following:  · Bruising at the catheter insertion site that usually fades within 1-2 weeks.  · Blood collecting in the tissue (hematoma) that may be painful to the touch. It should usually decrease in size and tenderness within 1-2 weeks.  HOME CARE INSTRUCTIONS  · Take medicines only as directed by your health care provider. Blood thinners may be prescribed after your procedure to improve blood flow through the stent.  · You may shower 24-48 hours after the procedure or as directed by your health care provider. Remove the bandage (dressing) and gently wash the catheter insertion site with plain soap and water. Pat the area dry with a clean towel. Do not rub the site, because this may cause bleeding.  · Do not take baths, swim, or use a hot tub until your health care provider approves.  · Check your catheter insertion site every day for redness, swelling, or drainage.  · Do not apply powder or lotion to the site.  · Do not lift over 10 lb (4.5 kg) for 5 days after your procedure or as directed by your health care provider.  · Ask your health care provider when it is okay to:    Return to work or school.    Resume usual physical activities or sports.    Resume sexual activity.  · Eat a heart-healthy diet. This  should include plenty of fresh fruits and vegetables. Meat should be lean cuts. Avoid the following types of food:    Food that is high in salt.    Canned or highly processed food.    Food that is high in saturated fat or sugar.    Fried food.  · Make any other lifestyle changes as recommended by your health care provider. These may include:    Not using any tobacco products, including cigarettes, chewing tobacco, or electronic cigarettes. If you need help quitting, ask your health care provider.    Managing your weight.    Getting regular exercise.    Managing your blood pressure.    Limiting your alcohol intake.    Managing other health problems, such as diabetes.  · If you need an MRI after your heart stent has been placed, be sure to tell the health care provider who orders the MRI that you have a heart stent.  · Keep all follow-up visits as directed by your health care provider. This is important.  SEEK MEDICAL CARE IF:  · You have a fever.  · You have chills.  · You have increased bleeding from the catheter insertion site. Hold pressure on the site.  SEEK IMMEDIATE MEDICAL CARE IF:  · You develop chest pain or shortness of breath, feel faint, or pass out.  · You have unusual pain at the catheter insertion site.  · You have redness, warmth, or swelling at the catheter insertion site.  · You have drainage (other than a small amount of blood on the dressing) from the catheter insertion site.  · The catheter insertion site is bleeding, and the bleeding does not stop after 30 minutes of holding steady pressure on the site.  · You develop bleeding from any other place, such as from your rectum. There may be bright red blood in your urine or stool, or it may appear as black, tarry stool.     This information is not intended to replace advice given to you by your health care provider. Make sure you discuss any questions you have with your health care provider.     Document Released: 07/07/2006 Document Revised:  01/08/2016 Document Reviewed: 05/12/2014  [employer] Interactive Patient Education ©2016 [employer]          Heart-Healthy Eating Plan  Many factors influence your heart health, including eating and exercise habits. Heart (coronary) risk increases with abnormal blood fat (lipid) levels. Heart-healthy meal planning includes limiting unhealthy fats, increasing healthy fats, and making other small dietary changes. This includes maintaining a healthy body weight to help keep lipid levels within a normal range.  WHAT IS MY PLAN?   Your health care provider recommends that you:  · Get no more than _________% of the total calories in your daily diet from fat.  · Limit your intake of saturated fat to less than _________% of your total calories each day.  · Limit the amount of cholesterol in your diet to less than _________ mg per day.  WHAT TYPES OF FAT SHOULD I CHOOSE?  · Choose healthy fats more often. Choose monounsaturated and polyunsaturated fats, such as olive oil and canola oil, flaxseeds, walnuts, almonds, and seeds.  · Eat more omega-3 fats. Good choices include salmon, mackerel, sardines, tuna, flaxseed oil, and ground flaxseeds. Aim to eat fish at least two times each week.  · Limit saturated fats. Saturated fats are primarily found in animal products, such as meats, butter, and cream. Plant sources of saturated fats include palm oil, palm kernel oil, and coconut oil.  · Avoid foods with partially hydrogenated oils in them. These contain trans fats. Examples of foods that contain trans fats are stick margarine, some tub margarines, cookies, crackers, and other baked goods.  WHAT GENERAL GUIDELINES DO I NEED TO FOLLOW?  · Check food labels carefully to identify foods with trans fats or high amounts of saturated fat.  · Fill one half of your plate with vegetables and green salads. Eat 4-5 servings of vegetables per day. A serving of vegetables equals 1 cup of raw leafy vegetables, ½ cup of raw or cooked cut-up  "vegetables, or ½ cup of vegetable juice.  · Fill one fourth of your plate with whole grains. Look for the word \"whole\" as the first word in the ingredient list.  · Fill one fourth of your plate with lean protein foods.  · Eat 4-5 servings of fruit per day. A serving of fruit equals one medium whole fruit, ¼ cup of dried fruit, ½ cup of fresh, frozen, or canned fruit, or ½ cup of 100% fruit juice.  · Eat more foods that contain soluble fiber. Examples of foods that contain this type of fiber are apples, broccoli, carrots, beans, peas, and barley. Aim to get 20-30 g of fiber per day.  · Eat more home-cooked food and less restaurant, buffet, and fast food.  · Limit or avoid alcohol.  · Limit foods that are high in starch and sugar.  · Avoid fried foods.  · Cook foods by using methods other than frying. Baking, boiling, grilling, and broiling are all great options. Other fat-reducing suggestions include:    Removing the skin from poultry.    Removing all visible fats from meats.    Skimming the fat off of stews, soups, and gravies before serving them.    Steaming vegetables in water or broth.  · Lose weight if you are overweight. Losing just 5-10% of your initial body weight can help your overall health and prevent diseases such as diabetes and heart disease.  · Increase your consumption of nuts, legumes, and seeds to 4-5 servings per week. One serving of dried beans or legumes equals ½ cup after being cooked, one serving of nuts equals 1½ ounces, and one serving of seeds equals ½ ounce or 1 tablespoon.  · You may need to monitor your salt (sodium) intake, especially if you have high blood pressure. Talk with your health care provider or dietitian to get more information about reducing sodium.  WHAT FOODS CAN I EAT?  Grains  Breads, including American, white, nolan, wheat, raisin, rye, oatmeal, and Italian. Tortillas that are neither fried nor made with lard or trans fat. Low-fat rolls, including hotdog and hamburger buns " and English muffins. Biscuits. Muffins. Waffles. Pancakes. Light popcorn. Whole-grain cereals. Flatbread. Sharon toast. Pretzels. Breadsticks. Rusks. Low-fat snacks and crackers, including oyster, saltine, matzo, edwin, animal, and rye. Rice and pasta, including brown rice and those that are made with whole wheat.  Vegetables  All vegetables.  Fruits  All fruits, but limit coconut.  Meats and Other Protein Sources  Lean, well-trimmed beef, veal, pork, and lamb. Chicken and turkey without skin. All fish and shellfish. Wild duck, rabbit, pheasant, and venison. Egg whites or low-cholesterol egg substitutes. Dried beans, peas, lentils, and tofu. Seeds and most nuts.  Dairy  Low-fat or nonfat cheeses, including ricotta, string, and mozzarella. Skim or 1% milk that is liquid, powdered, or evaporated. Buttermilk that is made with low-fat milk. Nonfat or low-fat yogurt.  Beverages  Mineral water. Diet carbonated beverages.  Sweets and Desserts  Sherbets and fruit ices. Honey, jam, marmalade, jelly, and syrups. Meringues and gelatins. Pure sugar candy, such as hard candy, jelly beans, gumdrops, mints, marshmallows, and small amounts of dark chocolate. Alo food cake.  Eat all sweets and desserts in moderation.  Fats and Oils  Nonhydrogenated (trans-free) margarines. Vegetable oils, including soybean, sesame, sunflower, olive, peanut, safflower, corn, canola, and cottonseed. Salad dressings or mayonnaise that are made with a vegetable oil. Limit added fats and oils that you use for cooking, baking, salads, and as spreads.  Other  Cocoa powder. Coffee and tea. All seasonings and condiments.  The items listed above may not be a complete list of recommended foods or beverages. Contact your dietitian for more options.  WHAT FOODS ARE NOT RECOMMENDED?  Grains  Breads that are made with saturated or trans fats, oils, or whole milk. Croissants. Butter rolls. Cheese breads. Sweet rolls. Donuts. Buttered popcorn. Chow mein noodles.  High-fat crackers, such as cheese or butter crackers.  Meats and Other Protein Sources  Fatty meats, such as hotdogs, short ribs, sausage, spareribs, shaikh, ribeye roast or steak, and mutton. High-fat deli meats, such as salami and bologna. Caviar. Domestic duck and goose. Organ meats, such as kidney, liver, sweetbreads, brains, gizzard, chitterlings, and heart.  Dairy  Cream, sour cream, cream cheese, and creamed cottage cheese. Whole milk cheeses, including blue (gonzalo), [place], Brie, Abe[place], American, Havarti, Swiss, cheddar, Camembert, and [place].  Whole or 2% milk that is liquid, evaporated, or condensed. Whole buttermilk. Cream sauce or high-fat cheese sauce. Yogurt that is made from whole milk.  Beverages  Regular sodas and drinks with added sugar.  Sweets and Desserts  Frosting. Pudding. Cookies. Cakes other than [place]ilene food cake. Candy that has milk chocolate or white chocolate, hydrogenated fat, butter, coconut, or unknown ingredients. Buttered syrups. Full-fat ice cream or ice cream drinks.  Fats and Oils  Gravy that has suet, meat fat, or shortening. Cocoa butter, hydrogenated oils, palm oil, coconut oil, palm kernel oil. These can often be found in baked products, candy, fried foods, nondairy creamers, and whipped toppings. Solid fats and shortenings, including shaikh fat, salt pork, lard, and butter. Nondairy cream substitutes, such as coffee creamers and sour cream substitutes. Salad dressings that are made of unknown oils, cheese, or sour cream.  The items listed above may not be a complete list of foods and beverages to avoid. Contact your dietitian for more information.     This information is not intended to replace advice given to you by your health care provider. Make sure you discuss any questions you have with your health care provider.     Document Released: 09/26/2009 Document Revised: 01/08/2016 Document Reviewed: 06/11/2015  [employer] Interactive Patient Education ©2016 [employer]         PREVENTING SURGICAL SITE INFECTIONS     Surgical Site Infections FAQs  What is a Surgical Site Infection (SSI)?  A surgical site infection is an infection that occurs after surgery in the part of the body where the surgery took place. Most patients who have surgery do not develop an infection. However, infections develop in about 1 to 3 out of every 100 patients who have surgery.  Some of the common symptoms of a surgical site infection are:  · Redness and pain around the area where you had surgery  · Drainage of cloudy fluid from your surgical wound  · Fever  Can SSIs be treated?  Yes. Most surgical site infections can be treated with antibiotics. The antibiotic given to you depends on the bacteria (germs) causing the infection. Sometimes patients with SSIs also need another surgery to treat the infection.  What are some of the things that hospitals are doing to prevent SSIs?  To prevent SSIs, doctors, nurses, and other healthcare providers:  · Clean their hands and arms up to their elbows with an antiseptic agent just before the surgery.  · Clean their hands with soap and water or an alcohol-based hand rub before and after caring for each patient.  · May remove some of your hair immediately before your surgery using electric clippers if the hair is in the same area where the procedure will occur. They should not shave you with a razor.  · Wear special hair covers, masks, gowns, and gloves during surgery to keep the surgery area clean.  · Give you antibiotics before your surgery starts. In most cases, you should get antibiotics within 60 minutes before the surgery starts and the antibiotics should be stopped within 24 hours after surgery.  · Clean the skin at the site of your surgery with a special soap that kills germs.  What can I do to help prevent SSIs?  Before your surgery:  · Tell your doctor about other medical problems you may have. Health problems such as allergies, diabetes, and obesity could  affect your surgery and your treatment.  · Quit smoking. Patients who smoke get more infections. Talk to your doctor about how you can quit before your surgery.  · Do not shave near where you will have surgery. Shaving with a razor can irritate your skin and make it easier to develop an infection.  At the time of your surgery:  · Speak up if someone tries to shave you with a razor before surgery. Ask why you need to be shaved and talk with your surgeon if you have any concerns.  · Ask if you will get antibiotics before surgery.  After your surgery:  · Make sure that your healthcare providers clean their hands before examining you, either with soap and water or an alcohol-based hand rub.    If you do not see your providers clean their hands, please ask them to do so.  · Family and friends who visit you should not touch the surgical wound or dressings.  · Family and friends should clean their hands with soap and water or an alcohol-based hand rub before and after visiting you. If you do not see them clean their hands, ask them to clean their hands.  What do I need to do when I go home from the hospital?  · Before you go home, your doctor or nurse should explain everything you need to know about taking care of your wound. Make sure you understand how to care for your wound before you leave the hospital.  · Always clean your hands before and after caring for your wound.  · Before you go home, make sure you know who to contact if you have questions or problems after you get home.  · If you have any symptoms of an infection, such as redness and pain at the surgery site, drainage, or fever, call your doctor immediately.  If you have additional questions, please ask your doctor or nurse.  Developed and co-sponsored by The Society for Healthcare Epidemiology of [place] (SHEA); Infectious Diseases Society of [place] (IDSA); American Hospital Association; Association for Professionals in Infection Control and Epidemiology  ([facility]); Centers for Disease Control and Prevention (CDC); and The Joint Commission.     This information is not intended to replace advice given to you by your health care provider. Make sure you discuss any questions you have with your health care provider.     Document Released: 12/23/2014 Document Revised: 01/08/2016 Document Reviewed: 03/02/2016  [employer] Interactive Patient Education ©2016 [employer]             SYMPTOMS OF A STROKE    Call 911 or have someone take you to the Emergency Department if you have any of the following:    · Sudden numbness or weakness of your face, arm or leg especially on one side of the body  · Sudden confusion, diffiiculty speaking or trouble understanding   · Changes in your vision or loss of sight in one eye  · Sudden severe headache with no known cause  · sudden dizziness, trouble walking, loss of balance or coordination    It is important to seek emergency care right away if you have further stroke symptoms. If you get emergency help quickly, the powerful clot-dissolving medicines can reduce the disabilities caused by a stroke.     For more information:    American Stroke Association  1-868-8-STROKE  www.strokeassociation.org           IF YOU SMOKE OR USE TOBACCO PLEASE READ THE FOLLOWING:    Why is smoking bad for me?  Smoking increases the risk of heart disease, lung disease, vascular disease, stroke, and cancer.     If you smoke, STOP!    If you would like more information on quitting smoking, please visit the [employer] website: [email or web address]   This link will provide additional resources including the QUIT line and the Beat the Pack support groups.     For more information:    American Cancer Society  [phone]    American Heart Association  9-033-420-2881               YOU ARE THE MOST IMPORTANT FACTOR IN YOUR RECOVERY.     Follow all instructions carefully.     I have reviewed my discharge instructions with  my nurse, including the following information, if applicable:     Information about my illness and diagnosis   Follow up appointments (including lab draws)   Wound Care   Equipment Needs   Medications (new and continuing) along with side effects   Preventative information such as vaccines and smoking cessations   Diet   Pain   I know when to contact my Doctor's office or seek emergency care      I want my nurse to describe the side effects of my medications: YES NO   If the answer is no, I understand the side effects of my medications: YES NO   My nurse described the side effects of my medications in a way that I could understand: YES NO   I have taken my personal belongings and my own medications with me at discharge: YES NO            I have received this information and my questions have been answered. I have discussed any concerns I see with this plan with the nurse or physician. I understand these instructions.    Signature of Patient or Responsible Person: _____________________________________    Date: _________________  Time: __________________    Signature of Healthcare Provider: _______________________________________  Date: _________________  Time: __________________

## 2017-01-08 NOTE — Clinical Note
H&P note has been confirmed for the patient. Procedural consent has been signed.  Staff has reviewed the patients labs. The patient has denied she is pregnant.

## 2017-01-08 NOTE — Clinical Note
Heparin flush 2u/mL, two 500 mL bags, opened and placed on back table  Heparin flush 2u/mL, 500 mL bag, opened for maniford setup.  Isovue 50 mL opened and placed on back table  Nitroglycerin 100 mcg/mL opened and placed on back table

## 2017-01-08 NOTE — Clinical Note
PATIENT CONTINUES TO COMPLAIN OF CHEST PAIN. MD AWARE AND IS SCRUBBING BACK IN TO TAKE EXTRA IMAGES.

## 2017-01-08 NOTE — H&P
Primary care provider Dr. Zachary Sullivan    Reason for admission'acute non-ST elevation MI    Chief complaint  chest pain      History of present illness:      61-year-old woman with no significant past cardiac illness and multiple cardiac risk factors presented to the emergency department at [facility] with history of chest pain of 2 days' duration.    Her chest pain is retrosternal, anterior, intense, intermittent and lasted longer than usual yesterday and it was more intense with radiation to the left shoulder and not associated with diaphoresis but associated with dyspnea.  ECG showed sinus bradycardia with no evidence of ischemia.  First set of troponin was 0.4 and the second set was 3.5.  She was transferred to this facility for further care.  Her troponin level increased to 5.2.    She has history of dyspnea on exertion functional class II.  No history of PND, orthopnea or leg swelling.  No history of palpitation, dizziness or syncope.    Cardiac risk factors-DM type II, hypertension, hyperlipidemia, smoking and family history of heart disease    History of peripheral arterial disease status post stent in left lower extremity done 2 years ago at [place], [place].  She denied history of claudication.      Review of Systems     Constitutional-tiredness  ENT-none  Cardiovascular-as above  Respiratory-shortness of breath  Endocrine-lipid disorder and diabetes  GI-reflux disease  Musculoskeletal-pain syndrome  Detailed review of father organ systems were done    Past medical history-DM type II, hypertension, hyperlipidemia, GERD    Past surgical history-gallbladder surgery, appendix surgery and stent to PAD in left lower extremity.    History  No past medical history on file., No past surgical history on file., No family history on file., Social History   Substance Use Topics   • Smoking status: Current Every Day Smoker     Packs/day: 2.00     Years: 40.00     Types:  "Cigarettes   • Smokeless tobacco: Never Used   • Alcohol use No   ,     Medication Review:      amLODIPine 5 mg Oral Q24H   aspirin 81 mg Oral Daily   atorvastatin 40 mg Oral Nightly   CloNIDine 0.2 mg Oral Q6H   clopidogrel 75 mg Oral Daily   famotidine 40 mg Oral Nightly   gabapentin 300 mg Oral Q8H   glipiZIDE 10 mg Oral Nightly   insulin aspart 0-9 Units Subcutaneous 4x Daily AC & at Bedtime   linagliptin 5 mg Oral Daily   losartan 100 mg Oral Daily   metoprolol tartrate 12.5 mg Oral Q12H   nicotine 1 patch Transdermal Q24H   nitroglycerin 1 inch Topical Q6H   potassium chloride 40 mEq Oral Q4H        Allergies:  Amoxicillin    Objective     Vital Sign Min/Max for last 24 hours  Temp  Min: 98.1 °F (36.7 °C)  Max: 98.5 °F (36.9 °C)   BP  Min: 95/51  Max: 180/93   Pulse  Min: 50  Max: 53   Resp  Min: 13  Max: 20   SpO2  Min: 93 %  Max: 100 %   Flow (L/min)  Min: 2  Max: 2   Weight  Min: 178 lb 12.8 oz (81.1 kg)  Max: 178 lb 12.8 oz (81.1 kg)     Flowsheet Rows         First Filed Value    Admission Height  63\" (160 cm) Documented at 01/08/2017 0120    Admission Weight  178 lb 12.8 oz (81.1 kg) Documented at 01/08/2017 0120             Physical Exam:     General Appearance:    Alert, cooperative, in no acute distress   Head:    Normocephalic, without obvious abnormality, atraumatic   Eyes:            Lids and lashes normal, conjunctivae and sclerae normal, no   icterus, no pallor, corneas clear, PERRLA   Ears:    Ears appear intact with no abnormalities noted   Throat:   No oral lesions, no thrush, oral mucosa moist   Neck:   No adenopathy, supple, trachea midline, no thyromegaly, no   carotid bruit, no JVD   Back:     No kyphosis present, no scoliosis present, no skin lesions,      erythema or scars, no tenderness to percussion or                   palpation,   range of motion normal   Lungs:     Clear to auscultation,respirations regular, even and                  unlabored    Heart:    Regular rhythm and " bradycardia, normal S1 and S2, no            murmur, no gallop, no rub, no click   Chest Wall:    No abnormalities observed   Abdomen:     Normal bowel sounds, no masses, no organomegaly, soft        non-tender, non-distended, no guarding, no rebound                tenderness   Rectal:     Deferred   Extremities:   Moves all extremities well, no edema, no cyanosis, no             redness   Pulses:   Pulses palpable and equal bilaterally   Skin:   No bleeding, bruising or rash   Lymph nodes:   No palpable adenopathy   Neurologic:   Cranial nerves 2 - 12 grossly intact, sensation intact, DTR       present and equal bilaterally       Telemetry  Sinus bradycardia.      ECG  Sinus bradycardia.  No evidence of ischemia or infarct      Labs    Results from last 7 days  Lab Units 01/08/17  0315   WBC 10*3/mm3 9.62   HEMOGLOBIN g/dL 13.2   HEMATOCRIT % 40.0   PLATELETS 10*3/mm3 265       Results from last 7 days  Lab Units 01/08/17  0315   SODIUM mmol/L 141  141   POTASSIUM mmol/L 3.3*  3.4*   CHLORIDE mmol/L 102  104   TOTAL CO2 mmol/L >40.0*  35.2*   BUN mg/dL 14  14   CREATININE mg/dL 0.69  0.71   CALCIUM mg/dL 9.0  9.0   GLUCOSE mg/dL 302*  298*       Results from last 7 days  Lab Units 01/08/17  0315   BILIRUBIN mg/dL 0.2  0.2   ALK PHOS U/L 75  78   AST (SGOT) U/L 28  24   ALT (SGPT) U/L 20  16           Results from last 7 days  Lab Units 01/08/17  0315   INR  1.04           Results from last 7 days  Lab Units 01/08/17  0757 01/08/17  0315   CK TOTAL U/L  --  163   TROPONIN I ng/mL 2.742* 5.415*   CK MB INDEX %  --  5.8*   MYOGLOBIN ng/mL  --  47.0             Imaging  Imaging Results (last 24 hours)     ** No results found for the last 24 hours. **            Assessment     1.  Acute non-ST elevation MI.  Patient presented with typical chest pain and elevated troponin I.  Peak troponin I level is 5.1.  ECG showed no evidence of ischemia.    2.  History of peripheral arterial disease.  Status post stent in  left lower extremity.  No history of claudication.    3.  DM type II    4.  Hypertension    5.  Hyperlipidemia    6.  Current every day smoker      Plan    Patient has been admitted to CCU.  Patient is being treated with aspirin plus Plavix, heparin per protocol, metoprolol, transdermal nitrate and atorvastatin.   Resume home medications and hold metformin.     Echocardiogram to evaluate left ventricular function and wall motion  consult Dr. Diane, intervention cardiologist for cardiac catheterization and possible PCI    cardiac risk factors modifications were discussed and patient was strongly advised to stop smoking.      I discussed the patients findings and my recommendations with patient       Cayla Serrano MD  01/08/17  11:10 AM       Cc: Dr Zachary Sullivan

## 2017-01-08 NOTE — Clinical Note
Fourth inflation of balloon - Max pressure = 12 raj. 4th Inflation of balloon - Duration = 10 seconds.

## 2017-01-08 NOTE — Clinical Note
Second inflation of balloon - Max pressure = 12 raj. 2nd Inflation of balloon - Duration = 8 seconds.

## 2017-01-08 NOTE — Clinical Note
Fourth inflation of balloon - Max pressure = 8 raj. 4th Inflation of balloon - Duration = 13 seconds.

## 2017-01-09 LAB
ACT BLD: 157 SECONDS (ref 82–152)
ANION GAP SERPL CALCULATED.3IONS-SCNC: 2.6 MMOL/L (ref 3.6–11.2)
APTT PPP: 58.8 SECONDS (ref 24.4–31)
APTT PPP: 65.8 SECONDS (ref 24.4–31)
BUN BLD-MCNC: 15 MG/DL (ref 7–21)
BUN/CREAT SERPL: 21.1 (ref 7–25)
CALCIUM SPEC-SCNC: 9.1 MG/DL (ref 7.7–10)
CHLORIDE SERPL-SCNC: 105 MMOL/L (ref 99–112)
CO2 SERPL-SCNC: 31.4 MMOL/L (ref 24.3–31.9)
CREAT BLD-MCNC: 0.71 MG/DL (ref 0.43–1.29)
CRP SERPL HS-MCNC: 2.73 MG/L (ref 0–3)
GFR SERPL CREATININE-BSD FRML MDRD: 84 ML/MIN/1.73
GLUCOSE BLD-MCNC: 304 MG/DL (ref 70–110)
GLUCOSE BLDC GLUCOMTR-MCNC: 186 MG/DL (ref 70–130)
GLUCOSE BLDC GLUCOMTR-MCNC: 229 MG/DL (ref 70–130)
GLUCOSE BLDC GLUCOMTR-MCNC: 249 MG/DL (ref 70–130)
GLUCOSE BLDC GLUCOMTR-MCNC: 255 MG/DL (ref 70–130)
OSMOLALITY SERPL CALC.SUM OF ELEC: 289.8 MOSM/KG (ref 273–305)
POTASSIUM BLD-SCNC: 4 MMOL/L (ref 3.5–5.3)
SODIUM BLD-SCNC: 139 MMOL/L (ref 135–153)

## 2017-01-09 PROCEDURE — 0 IOPAMIDOL PER 1 ML: Performed by: INTERNAL MEDICINE

## 2017-01-09 PROCEDURE — 25010000002 HYDRALAZINE PER 20 MG: Performed by: INTERNAL MEDICINE

## 2017-01-09 PROCEDURE — 25010000002 HEPARIN (PORCINE) PER 1000 UNITS: Performed by: INTERNAL MEDICINE

## 2017-01-09 PROCEDURE — 93454 CORONARY ARTERY ANGIO S&I: CPT | Performed by: INTERNAL MEDICINE

## 2017-01-09 PROCEDURE — B2101ZZ FLUOROSCOPY OF SINGLE CORONARY ARTERY USING LOW OSMOLAR CONTRAST: ICD-10-PCS | Performed by: INTERNAL MEDICINE

## 2017-01-09 PROCEDURE — C1769 GUIDE WIRE: HCPCS | Performed by: INTERNAL MEDICINE

## 2017-01-09 PROCEDURE — C1725 CATH, TRANSLUMIN NON-LASER: HCPCS | Performed by: INTERNAL MEDICINE

## 2017-01-09 PROCEDURE — 80048 BASIC METABOLIC PNL TOTAL CA: CPT | Performed by: INTERNAL MEDICINE

## 2017-01-09 PROCEDURE — 92928 PRQ TCAT PLMT NTRAC ST 1 LES: CPT | Performed by: INTERNAL MEDICINE

## 2017-01-09 PROCEDURE — 63710000001 INSULIN ASPART PER 5 UNITS: Performed by: INTERNAL MEDICINE

## 2017-01-09 PROCEDURE — 93005 ELECTROCARDIOGRAM TRACING: CPT | Performed by: INTERNAL MEDICINE

## 2017-01-09 PROCEDURE — C1894 INTRO/SHEATH, NON-LASER: HCPCS | Performed by: INTERNAL MEDICINE

## 2017-01-09 PROCEDURE — 85347 COAGULATION TIME ACTIVATED: CPT

## 2017-01-09 PROCEDURE — 85730 THROMBOPLASTIN TIME PARTIAL: CPT | Performed by: INTERNAL MEDICINE

## 2017-01-09 PROCEDURE — C1887 CATHETER, GUIDING: HCPCS | Performed by: INTERNAL MEDICINE

## 2017-01-09 PROCEDURE — 4A023N7 MEASUREMENT OF CARDIAC SAMPLING AND PRESSURE, LEFT HEART, PERCUTANEOUS APPROACH: ICD-10-PCS | Performed by: INTERNAL MEDICINE

## 2017-01-09 PROCEDURE — 94799 UNLISTED PULMONARY SVC/PX: CPT

## 2017-01-09 PROCEDURE — 63710000001 DIPHENHYDRAMINE PER 50 MG: Performed by: INTERNAL MEDICINE

## 2017-01-09 PROCEDURE — B2151ZZ FLUOROSCOPY OF LEFT HEART USING LOW OSMOLAR CONTRAST: ICD-10-PCS | Performed by: INTERNAL MEDICINE

## 2017-01-09 PROCEDURE — 25010000002 DIPHENHYDRAMINE PER 50 MG: Performed by: INTERNAL MEDICINE

## 2017-01-09 PROCEDURE — 027034Z DILATION OF CORONARY ARTERY, ONE ARTERY WITH DRUG-ELUTING INTRALUMINAL DEVICE, PERCUTANEOUS APPROACH: ICD-10-PCS | Performed by: INTERNAL MEDICINE

## 2017-01-09 PROCEDURE — C9600 PERC DRUG-EL COR STENT SING: HCPCS | Performed by: INTERNAL MEDICINE

## 2017-01-09 PROCEDURE — 82962 GLUCOSE BLOOD TEST: CPT

## 2017-01-09 PROCEDURE — 25010000002 MIDAZOLAM PER 1 MG: Performed by: INTERNAL MEDICINE

## 2017-01-09 PROCEDURE — C1874 STENT, COATED/COV W/DEL SYS: HCPCS | Performed by: INTERNAL MEDICINE

## 2017-01-09 PROCEDURE — 25010000002 FENTANYL CITRATE (PF) 100 MCG/2ML SOLUTION: Performed by: INTERNAL MEDICINE

## 2017-01-09 DEVICE — XIENCE ALPINE EVEROLIMUS ELUTING CORONARY STENT SYSTEM 2.25 MM X 23 MM / RAPID-EXCHANGE
Type: IMPLANTABLE DEVICE | Status: FUNCTIONAL
Brand: XIENCE ALPINE

## 2017-01-09 RX ORDER — NICOTINE 21 MG/24HR
1 PATCH, TRANSDERMAL 24 HOURS TRANSDERMAL DAILY
Status: DISCONTINUED | OUTPATIENT
Start: 2017-01-09 | End: 2017-01-10 | Stop reason: HOSPADM

## 2017-01-09 RX ORDER — HYDROCODONE BITARTRATE AND ACETAMINOPHEN 5; 325 MG/1; MG/1
1 TABLET ORAL EVERY 6 HOURS PRN
Status: DISCONTINUED | OUTPATIENT
Start: 2017-01-09 | End: 2017-01-10 | Stop reason: HOSPADM

## 2017-01-09 RX ORDER — HEPARIN SODIUM 1000 [USP'U]/ML
INJECTION, SOLUTION INTRAVENOUS; SUBCUTANEOUS AS NEEDED
Status: DISCONTINUED | OUTPATIENT
Start: 2017-01-09 | End: 2017-01-09 | Stop reason: HOSPADM

## 2017-01-09 RX ORDER — HYDROCODONE BITARTRATE AND ACETAMINOPHEN 5; 325 MG/1; MG/1
2 TABLET ORAL EVERY 6 HOURS PRN
Status: COMPLETED | OUTPATIENT
Start: 2017-01-09 | End: 2017-01-09

## 2017-01-09 RX ORDER — HYDRALAZINE HYDROCHLORIDE 20 MG/ML
10 INJECTION INTRAMUSCULAR; INTRAVENOUS ONCE
Status: COMPLETED | OUTPATIENT
Start: 2017-01-09 | End: 2017-01-09

## 2017-01-09 RX ORDER — DIPHENHYDRAMINE HYDROCHLORIDE 50 MG/ML
INJECTION INTRAMUSCULAR; INTRAVENOUS AS NEEDED
Status: DISCONTINUED | OUTPATIENT
Start: 2017-01-09 | End: 2017-01-09 | Stop reason: HOSPADM

## 2017-01-09 RX ORDER — FENTANYL CITRATE 50 UG/ML
INJECTION, SOLUTION INTRAMUSCULAR; INTRAVENOUS AS NEEDED
Status: DISCONTINUED | OUTPATIENT
Start: 2017-01-09 | End: 2017-01-09 | Stop reason: HOSPADM

## 2017-01-09 RX ORDER — SODIUM CHLORIDE 9 MG/ML
100 INJECTION, SOLUTION INTRAVENOUS CONTINUOUS
Status: DISCONTINUED | OUTPATIENT
Start: 2017-01-09 | End: 2017-01-10 | Stop reason: HOSPADM

## 2017-01-09 RX ORDER — NITROGLYCERIN 5 MG/ML
INJECTION, SOLUTION INTRAVENOUS AS NEEDED
Status: DISCONTINUED | OUTPATIENT
Start: 2017-01-09 | End: 2017-01-09 | Stop reason: HOSPADM

## 2017-01-09 RX ORDER — CLOPIDOGREL BISULFATE 75 MG/1
75 TABLET ORAL DAILY
Status: DISCONTINUED | OUTPATIENT
Start: 2017-01-09 | End: 2017-01-10 | Stop reason: HOSPADM

## 2017-01-09 RX ORDER — ASPIRIN 325 MG
325 TABLET, DELAYED RELEASE (ENTERIC COATED) ORAL DAILY
Status: DISCONTINUED | OUTPATIENT
Start: 2017-01-09 | End: 2017-01-10 | Stop reason: HOSPADM

## 2017-01-09 RX ORDER — MIDAZOLAM HYDROCHLORIDE 1 MG/ML
INJECTION INTRAMUSCULAR; INTRAVENOUS AS NEEDED
Status: DISCONTINUED | OUTPATIENT
Start: 2017-01-09 | End: 2017-01-09 | Stop reason: HOSPADM

## 2017-01-09 RX ORDER — LIDOCAINE HYDROCHLORIDE 20 MG/ML
INJECTION, SOLUTION INFILTRATION; PERINEURAL AS NEEDED
Status: DISCONTINUED | OUTPATIENT
Start: 2017-01-09 | End: 2017-01-09 | Stop reason: HOSPADM

## 2017-01-09 RX ORDER — TEMAZEPAM 15 MG/1
15 CAPSULE ORAL NIGHTLY PRN
Status: DISCONTINUED | OUTPATIENT
Start: 2017-01-09 | End: 2017-01-10 | Stop reason: HOSPADM

## 2017-01-09 RX ORDER — SODIUM CHLORIDE 9 MG/ML
INJECTION, SOLUTION INTRAVENOUS CONTINUOUS PRN
Status: DISCONTINUED | OUTPATIENT
Start: 2017-01-09 | End: 2017-01-09 | Stop reason: HOSPADM

## 2017-01-09 RX ADMIN — CLOPIDOGREL BISULFATE 600 MG: 75 TABLET, FILM COATED ORAL at 05:57

## 2017-01-09 RX ADMIN — GLIPIZIDE 10 MG: 5 TABLET ORAL at 20:33

## 2017-01-09 RX ADMIN — HYDROCODONE BITARTRATE AND ACETAMINOPHEN 2 TABLET: 5; 325 TABLET ORAL at 14:10

## 2017-01-09 RX ADMIN — NITROGLYCERIN 1 INCH: 20 OINTMENT TOPICAL at 23:43

## 2017-01-09 RX ADMIN — FAMOTIDINE 40 MG: 20 TABLET, FILM COATED ORAL at 22:22

## 2017-01-09 RX ADMIN — INSULIN ASPART 4 UNITS: 100 INJECTION, SOLUTION INTRAVENOUS; SUBCUTANEOUS at 17:56

## 2017-01-09 RX ADMIN — NITROGLYCERIN 1 INCH: 20 OINTMENT TOPICAL at 05:57

## 2017-01-09 RX ADMIN — ATORVASTATIN CALCIUM 40 MG: 40 TABLET, FILM COATED ORAL at 20:32

## 2017-01-09 RX ADMIN — INSULIN ASPART 6 UNITS: 100 INJECTION, SOLUTION INTRAVENOUS; SUBCUTANEOUS at 20:36

## 2017-01-09 RX ADMIN — NICOTINE 1 PATCH: 21 PATCH TRANSDERMAL at 14:05

## 2017-01-09 RX ADMIN — INSULIN ASPART 4 UNITS: 100 INJECTION, SOLUTION INTRAVENOUS; SUBCUTANEOUS at 07:53

## 2017-01-09 RX ADMIN — AMLODIPINE BESYLATE 5 MG: 5 TABLET ORAL at 13:55

## 2017-01-09 RX ADMIN — GABAPENTIN 300 MG: 300 CAPSULE ORAL at 05:57

## 2017-01-09 RX ADMIN — HYDRALAZINE HYDROCHLORIDE 10 MG: 20 INJECTION INTRAMUSCULAR; INTRAVENOUS at 15:40

## 2017-01-09 RX ADMIN — METOPROLOL TARTRATE 12.5 MG: 25 TABLET, FILM COATED ORAL at 20:32

## 2017-01-09 RX ADMIN — DIAZEPAM 5 MG: 5 TABLET ORAL at 11:48

## 2017-01-09 RX ADMIN — SODIUM CHLORIDE 100 ML/HR: 9 INJECTION, SOLUTION INTRAVENOUS at 13:58

## 2017-01-09 RX ADMIN — GABAPENTIN 300 MG: 300 CAPSULE ORAL at 22:22

## 2017-01-09 RX ADMIN — TEMAZEPAM 15 MG: 15 CAPSULE ORAL at 22:25

## 2017-01-09 RX ADMIN — NITROGLYCERIN 1 INCH: 20 OINTMENT TOPICAL at 17:56

## 2017-01-09 RX ADMIN — DIPHENHYDRAMINE HYDROCHLORIDE 25 MG: 25 CAPSULE ORAL at 11:47

## 2017-01-09 RX ADMIN — LOSARTAN POTASSIUM 100 MG: 50 TABLET, FILM COATED ORAL at 13:57

## 2017-01-09 RX ADMIN — HYDROCODONE BITARTRATE AND ACETAMINOPHEN 1 TABLET: 5; 325 TABLET ORAL at 20:33

## 2017-01-09 RX ADMIN — HEPARIN SODIUM 16 UNITS/KG/HR: 10000 INJECTION, SOLUTION INTRAVENOUS at 03:14

## 2017-01-09 RX ADMIN — ASPIRIN 325 MG: 325 TABLET ORAL at 05:58

## 2017-01-09 NOTE — PROGRESS NOTES
LOS: 1 day   Patient Care Team:  No Known Provider as PCP - General    Chief Complaint:Monae zimmer 61 y.o. female presents with a non-ST elevation myocardial infarction.       Interval History: She has not had further chest pain.      Objective   Vital Signs  Temp:  [97.7 °F (36.5 °C)-98.5 °F (36.9 °C)] 98.2 °F (36.8 °C)  Heart Rate:  [47-66] 66  Resp:  [12-21] 20  BP: (113-182)/(58-93) 147/74  Arterial Line BP: (177-186)/(64-70) 178/64    Intake/Output Summary (Last 24 hours) at 01/09/17 1730  Last data filed at 01/09/17 1305   Gross per 24 hour   Intake 251.67 ml   Output    300 ml   Net -48.33 ml       Comfortable NAD  PERRL, conjunctiva clear  Neck supple, no JVD or thyromegaly appreciated  S1/S2 RRR, no m/r/g  Lungs CTA B, normal effort  Abdomen S/NT/ND (+) BS, no HSM appreciated  Extremities warm, no clubbing, cyanosis, or edema  Normal gait  No visible or palpable skin lesions  A/Ox4, mood and affect appropriate  Site of cardiac catheterization is well-healed.    Results Review:        Results from last 7 days  Lab Units 01/09/17  0246 01/08/17  0315   SODIUM mmol/L 139 141  141   POTASSIUM mmol/L 4.0 3.3*  3.4*   CHLORIDE mmol/L 105 102  104   TOTAL CO2 mmol/L 31.4 >40.0*  35.2*   BUN mg/dL 15 14  14   CREATININE mg/dL 0.71 0.69  0.71   GLUCOSE mg/dL 304* 302*  298*   CALCIUM mg/dL 9.1 9.0  9.0       Results from last 7 days  Lab Units 01/08/17  1318 01/08/17  0757 01/08/17  0315   CK TOTAL U/L  --   --  163   TROPONIN I ng/mL 1.944* 2.742* 5.415*   CK MB INDEX %  --   --  5.8*       Results from last 7 days  Lab Units 01/08/17  0315   WBC 10*3/mm3 9.62   HEMOGLOBIN g/dL 13.2   HEMATOCRIT % 40.0   PLATELETS 10*3/mm3 265       Results from last 7 days  Lab Units 01/09/17  1002 01/09/17  0408 01/08/17  2219  01/08/17  0315   INR   --   --   --   --  1.04   APTT seconds 58.8* 65.8* 84.9*  < > 92.4*   < > = values in this interval not displayed.    Results from last 7 days  Lab Units  01/08/17  0315   CHOLESTEROL mg/dL 251*           Results from last 7 days  Lab Units 01/08/17  0315   CHOLESTEROL mg/dL 251*   TRIGLYCERIDES mg/dL 475*   HDL CHOL mg/dL 22*       I reviewed the patient's new clinical results.  I personally viewed and interpreted the patient's EKG/Telemetry data        Medication Review:     amLODIPine 5 mg Oral Q24H   aspirin 325 mg Oral Daily   atorvastatin 40 mg Oral Nightly   clopidogrel 75 mg Oral Daily   famotidine 40 mg Oral Nightly   gabapentin 300 mg Oral Q8H   glipiZIDE 10 mg Oral Nightly   insulin aspart 0-9 Units Subcutaneous 4x Daily AC & at Bedtime   linagliptin 5 mg Oral Daily   losartan 100 mg Oral Daily   metoprolol tartrate 25 mg Oral Q12H   nicotine 1 patch Transdermal Daily   nicotine 1 patch Transdermal Daily   nitroglycerin 1 inch Topical Q6H         sodium chloride 1-3 mL/kg/hr    sodium chloride 100 mL/hr Last Rate: 100 mL/hr (01/09/17 1358)       Active Problems:    Non-STEMI (non-ST elevated myocardial infarction)      Assessment/Plan   Coronary artery disease  Monae underwent cardiac catheterization today which demonstrated critical disease involving a large obtuse marginal system and moderate to severe disease involving the posterior descending artery without much distal territory.  As such, she underwent interventional therapy to the obtuse marginal system which was uncomplicated.  It is felt best at this time that her PDA territory be treated medically.  If she continues to have angina as an outpatient it would not be unreasonable to consider percutaneous revascularization to this vessel.    David Diane MD  01/09/17  5:30 PM

## 2017-01-09 NOTE — PROGRESS NOTES
"Reason for follow-up:  61-year-old woman has been admitted with acute non-ST elevation MI    Subjective:  Patient is chest pain-free now.  Shortness of breath-stable.  No other complaints.      Medication Review:     amLODIPine 5 mg Oral Q24H   [START ON 1/10/2017] aspirin 325 mg Oral Daily   atorvastatin 40 mg Oral Nightly   [START ON 1/10/2017] clopidogrel 75 mg Oral Daily   diazePAM 5 mg Oral Once   diphenhydrAMINE 25 mg Oral Once   famotidine 40 mg Oral Nightly   gabapentin 300 mg Oral Q8H   glipiZIDE 10 mg Oral Nightly   insulin aspart 0-9 Units Subcutaneous 4x Daily AC & at Bedtime   linagliptin 5 mg Oral Daily   losartan 100 mg Oral Daily   metoprolol tartrate 12.5 mg Oral Q12H   nicotine 1 patch Transdermal Daily   nitroglycerin 1 inch Topical Q6H         Vital Sign Min/Max for last 24 hours  Temp  Min: 97.8 °F (36.6 °C)  Max: 98.5 °F (36.9 °C)   BP  Min: 101/51  Max: 158/80   Pulse  Min: 47  Max: 63   Resp  Min: 13  Max: 21   SpO2  Min: 93 %  Max: 98 %   Flow (L/min)  Min: 2  Max: 2   No Data Recorded     Flowsheet Rows         First Filed Value    Admission Height  63\" (160 cm) Documented at 01/08/2017 0120    Admission Weight  178 lb 12.8 oz (81.1 kg) Documented at 01/08/2017 0120          Physical Exam:     Gen.-alert and oriented.  No distresss  HEENT-normal  Neck-no JVD  Cardia-normal heart sounds are heard.  Regular rhythm.  No murmurs  Lungs-clear    Abdomen-normal  Extremity-no pedal edema  neuro--no deficit       Telemetry-sinus bradycardia.  Heart rate 45-50 bpm     ECG-sinus bradycardia.  No evidence of ischemia.      Labs    Results from last 7 days  Lab Units 01/08/17  0315   WBC 10*3/mm3 9.62   HEMOGLOBIN g/dL 13.2   HEMATOCRIT % 40.0   PLATELETS 10*3/mm3 265       Results from last 7 days  Lab Units 01/09/17  0246 01/08/17  0315   SODIUM mmol/L 139 141  141   POTASSIUM mmol/L 4.0 3.3*  3.4*   CHLORIDE mmol/L 105 102  104   TOTAL CO2 mmol/L 31.4 >40.0*  35.2*   BUN mg/dL 15 14  14 "   CREATININE mg/dL 0.71 0.69  0.71   CALCIUM mg/dL 9.1 9.0  9.0   GLUCOSE mg/dL 304* 302*  298*       Results from last 7 days  Lab Units 01/08/17  0315   BILIRUBIN mg/dL 0.2  0.2   ALK PHOS U/L 75  78   AST (SGOT) U/L 28  24   ALT (SGPT) U/L 20  16           Results from last 7 days  Lab Units 01/08/17  0315   INR  1.04           Results from last 7 days  Lab Units 01/08/17  1318 01/08/17  0757 01/08/17  0315   CK TOTAL U/L  --   --  163   TROPONIN I ng/mL 1.944* 2.742* 5.415*   CK MB INDEX %  --   --  5.8*   MYOGLOBIN ng/mL  --   --  47.0       Echocardiogram-    Left ventricular wall thickness is consistent with mild concentric hypertrophy.  · Left ventricular function is normal.  · Estimated EF appears to be in the range of 66 - 70%  · All left ventricular wall segments contract normally.  · Left ventricular diastolic dysfunction (grade I) consistent with impaired relaxation.  · All valves appear normal in structure and function.  · Pericardium: There is no evidence of pericardial effusion.  ·       Assessment  1.  Acute non-ST elevation MI.  Now chest pain-free.  Peak troponin I level was 5.  ECG showed no evidence of ischemia.  LVEF and wall motion are normal.  2.  DM type II  3.  Hypertension-controlled  4.  Sinus bradycardia.  Possibly due to metoprolol.  Asymptomatic  5.  Hyperlipidemia  6.  Current every day smoker    Plan    Continue aspirin plus Plavix, heparin IV, atorvastatin and metoprolol if heart rate is more than 60 bpm  Scheduled for cardiac catheterization and possible intervention today by Dr. Diane.  Appreciated Dr. Diane's input.  DM type II is mayonnaise with insulin Januvia.  Metformin is on hold because of plan cardiac catheterization.  Discussed with the patient about her condition and plan of care.    .    Cayla Serrano MD  01/09/17  9:27 AM

## 2017-01-09 NOTE — NURSING NOTE
Time In 1415 Time Out 1420      Order received for Cardiac Rehab Consultation.     CR staff will follow up with patient      Information discussed with: Patient        Educated on: Benefits of Exercise,  Educated on Cardiac Rehab and Program Protocol, Brochure and/or educational material provided, Contact information given and Teach Back Verified      Comments: Patient still groggy. We discussed the program and I left information.. I will also try to get back over there today if possible.  I am leaving a note for Lucrecia Sepulveda RN to go see her tomorrow or before discharge if possible. We will do follow up after discharge with her also.      Thank you for the referral. Please contact the Cardiac Rehab Dept. (ext. [phone]) with any further questions or concerns.

## 2017-01-10 VITALS
RESPIRATION RATE: 21 BRPM | DIASTOLIC BLOOD PRESSURE: 83 MMHG | BODY MASS INDEX: 31.65 KG/M2 | HEART RATE: 57 BPM | SYSTOLIC BLOOD PRESSURE: 136 MMHG | WEIGHT: 178.63 LBS | OXYGEN SATURATION: 93 % | TEMPERATURE: 98.4 F | HEIGHT: 63 IN

## 2017-01-10 LAB
ACT BLD: 173 SECONDS (ref 82–152)
ACT BLD: 193 SECONDS (ref 82–152)
ACT BLD: 224 SECONDS (ref 82–152)
GLUCOSE BLDC GLUCOMTR-MCNC: 173 MG/DL (ref 70–130)
GLUCOSE BLDC GLUCOMTR-MCNC: 272 MG/DL (ref 70–130)

## 2017-01-10 PROCEDURE — 99232 SBSQ HOSP IP/OBS MODERATE 35: CPT | Performed by: INTERNAL MEDICINE

## 2017-01-10 PROCEDURE — 82962 GLUCOSE BLOOD TEST: CPT

## 2017-01-10 PROCEDURE — 63710000001 INSULIN ASPART PER 5 UNITS: Performed by: INTERNAL MEDICINE

## 2017-01-10 PROCEDURE — 94799 UNLISTED PULMONARY SVC/PX: CPT

## 2017-01-10 RX ORDER — CLOPIDOGREL BISULFATE 75 MG/1
75 TABLET ORAL DAILY
Qty: 30 TABLET | Refills: 2 | Status: SHIPPED | OUTPATIENT
Start: 2017-01-10 | End: 2020-09-10 | Stop reason: SDUPTHER

## 2017-01-10 RX ADMIN — METOPROLOL TARTRATE 12.5 MG: 25 TABLET, FILM COATED ORAL at 08:32

## 2017-01-10 RX ADMIN — LINAGLIPTIN 5 MG: 5 TABLET, FILM COATED ORAL at 08:32

## 2017-01-10 RX ADMIN — HYDROCODONE BITARTRATE AND ACETAMINOPHEN 1 TABLET: 5; 325 TABLET ORAL at 08:45

## 2017-01-10 RX ADMIN — LOSARTAN POTASSIUM 100 MG: 50 TABLET, FILM COATED ORAL at 08:30

## 2017-01-10 RX ADMIN — ASPIRIN 325 MG: 325 TABLET, COATED ORAL at 08:32

## 2017-01-10 RX ADMIN — GABAPENTIN 300 MG: 300 CAPSULE ORAL at 06:18

## 2017-01-10 RX ADMIN — NITROGLYCERIN 1 INCH: 20 OINTMENT TOPICAL at 06:18

## 2017-01-10 RX ADMIN — AMLODIPINE BESYLATE 5 MG: 5 TABLET ORAL at 08:32

## 2017-01-10 RX ADMIN — NICOTINE 1 PATCH: 21 PATCH TRANSDERMAL at 09:00

## 2017-01-10 RX ADMIN — CLOPIDOGREL BISULFATE 75 MG: 75 TABLET, FILM COATED ORAL at 08:31

## 2017-01-10 RX ADMIN — CLONIDINE HYDROCHLORIDE 0.2 MG: 0.2 TABLET ORAL at 08:31

## 2017-01-10 RX ADMIN — INSULIN ASPART 2 UNITS: 100 INJECTION, SOLUTION INTRAVENOUS; SUBCUTANEOUS at 07:42

## 2017-01-10 NOTE — PLAN OF CARE
Problem: Cardiac Output, Decreased (Adult)  Goal: Identify Related Risk Factors and Signs and Symptoms    01/08/17 0137   Cardiac Output, Decreased   Cardiac Output, Decreased: Related Risk Factors disease process   Signs and Symptoms (Cardiac Output Decreased) angina       Goal: Adequate Cardiac Output/Effective Tissue Perfusion  Outcome: Ongoing (interventions implemented as appropriate)    01/08/17 0137   Cardiac Output, Decreased (Adult)   Adequate Cardiac Output/Effective Tissue Perfusion making progress toward outcome         Problem: Hyperglycemia, Persistent (Adult)  Goal: Signs and Symptoms of Listed Potential Problems Will be Absent or Manageable (Hyperglycemia, Persistent)  Outcome: Ongoing (interventions implemented as appropriate)    01/08/17 0137 01/08/17 0917   Hyperglycemia, Persistent   Problems Assessed (Hyperglycemia) --  all   Problems Present (Hyperglycemia) none --          Problem: Patient Care Overview (Adult)  Goal: Plan of Care Review  Outcome: Ongoing (interventions implemented as appropriate)    01/08/17 0137 01/09/17 1900   Coping/Psychosocial Response Interventions   Plan Of Care Reviewed With --  patient   Patient Care Overview   Progress progress toward functional goals as expected --        Goal: Adult Individualization and Mutuality  Outcome: Ongoing (interventions implemented as appropriate)  Goal: Discharge Needs Assessment  Outcome: Ongoing (interventions implemented as appropriate)    01/08/17 1500 01/09/17 0847   Discharge Needs Assessment   Concerns To Be Addressed --  basic needs concerns   Readmission Within The Last 30 Days no previous admission in last 30 days --    Equipment Needed After Discharge none --    Discharge Disposition still a patient --    Self-Care   Equipment Currently Used at Home none --    Living Environment   Transportation Available car;family or friend will provide --    Current Health   Anticipated Changes Related to Illness none --          Problem:  Respiratory Insufficiency (Adult)  Goal: Identify Related Risk Factors and Signs and Symptoms  Outcome: Ongoing (interventions implemented as appropriate)    01/08/17 0137   Respiratory Insufficiency   Related Risk Factors (Respiratory Insufficiency) smoking       Goal: Acid/Base Balance  Outcome: Ongoing (interventions implemented as appropriate)    01/08/17 0137   Respiratory Insufficiency (Adult)   Acid/Base Balance making progress toward outcome       Goal: Effective Ventilation  Outcome: Ongoing (interventions implemented as appropriate)    01/08/17 0137   Respiratory Insufficiency (Adult)   Effective Ventilation making progress toward outcome

## 2017-01-10 NOTE — PROGRESS NOTES
Discharge Planning Assessment   [facility]     Patient Name: Monae Chauhan  MRN: [id number]  Today's Date: 1/10/2017    Admit Date: 1/8/2017          Discharge Needs Assessment     None            Discharge Plan       01/10/17 1246    Final Note    Final Note Being discharged to home today she denies any needs at this time.        Discharge Placement     No information found        Expected Discharge Date and Time     Expected Discharge Date Expected Discharge Time    [place] 10, 2017               Demographic Summary     None            Functional Status     None            Psychosocial     None            Abuse/Neglect     None            Legal     None            Substance Abuse     None            Patient Forms     None          Ela Neville RN

## 2017-01-10 NOTE — PROGRESS NOTES
LOS: 2 days   Patient Care Team:  No Known Provider as PCP - General    Chief Complaint:Monae zimmer 61 y.o. female presents with a non-ST elevation myocardial infarction.       Interval History: She has not had further chest pain.      Objective   Vital Signs  Temp:  [97.7 °F (36.5 °C)-98.5 °F (36.9 °C)] 98.5 °F (36.9 °C)  Heart Rate:  [48-75] 59  Resp:  [12-22] 21  BP: (124-182)/(61-99) 139/80  Arterial Line BP: (177-186)/(64-70) 178/64    Intake/Output Summary (Last 24 hours) at 01/10/17 1154  Last data filed at 01/10/17 0820   Gross per 24 hour   Intake 1566.67 ml   Output   2775 ml   Net -1208.33 ml       Comfortable NAD  PERRL, conjunctiva clear  Neck supple, no JVD or thyromegaly appreciated  S1/S2 RRR, no m/r/g  Lungs CTA B, normal effort  Abdomen S/NT/ND (+) BS, no HSM appreciated  Extremities warm, no clubbing, cyanosis, or edema  Normal gait  No visible or palpable skin lesions  A/Ox4, mood and affect appropriate  Site of cardiac catheterization is well-healed.    Results Review:        Results from last 7 days  Lab Units 01/09/17  0246 01/08/17  0315   SODIUM mmol/L 139 141  141   POTASSIUM mmol/L 4.0 3.3*  3.4*   CHLORIDE mmol/L 105 102  104   TOTAL CO2 mmol/L 31.4 >40.0*  35.2*   BUN mg/dL 15 14  14   CREATININE mg/dL 0.71 0.69  0.71   GLUCOSE mg/dL 304* 302*  298*   CALCIUM mg/dL 9.1 9.0  9.0       Results from last 7 days  Lab Units 01/08/17  1318 01/08/17  0757 01/08/17  0315   CK TOTAL U/L  --   --  163   TROPONIN I ng/mL 1.944* 2.742* 5.415*   CK MB INDEX %  --   --  5.8*       Results from last 7 days  Lab Units 01/08/17  0315   WBC 10*3/mm3 9.62   HEMOGLOBIN g/dL 13.2   HEMATOCRIT % 40.0   PLATELETS 10*3/mm3 265       Results from last 7 days  Lab Units 01/09/17  1002 01/09/17  0408 01/08/17  2219  01/08/17  0315   INR   --   --   --   --  1.04   APTT seconds 58.8* 65.8* 84.9*  < > 92.4*   < > = values in this interval not displayed.    Results from last 7 days  Lab Units  01/08/17  0315   CHOLESTEROL mg/dL 251*           Results from last 7 days  Lab Units 01/08/17  0315   CHOLESTEROL mg/dL 251*   TRIGLYCERIDES mg/dL 475*   HDL CHOL mg/dL 22*       I reviewed the patient's new clinical results.  I personally viewed and interpreted the patient's EKG/Telemetry data        Medication Review:     amLODIPine 5 mg Oral Q24H   aspirin 325 mg Oral Daily   atorvastatin 40 mg Oral Nightly   clopidogrel 75 mg Oral Daily   famotidine 40 mg Oral Nightly   gabapentin 300 mg Oral Q8H   glipiZIDE 10 mg Oral Nightly   insulin aspart 0-9 Units Subcutaneous 4x Daily AC & at Bedtime   linagliptin 5 mg Oral Daily   losartan 100 mg Oral Daily   metoprolol tartrate 12.5 mg Oral Q12H   nicotine 1 patch Transdermal Daily   nitroglycerin 1 inch Topical Q6H         sodium chloride 1-3 mL/kg/hr Last Rate: Stopped (01/09/17 2346)   sodium chloride 100 mL/hr Last Rate: Stopped (01/09/17 2345)       Active Problems:    Non-STEMI (non-ST elevated myocardial infarction)      Assessment/Plan   Coronary artery disease  Monae underwent cardiac catheterization today which demonstrated critical disease involving a large obtuse marginal system and moderate to severe disease involving the posterior descending artery without much distal territory.  As such, she underwent interventional therapy to the obtuse marginal system which was uncomplicated.  It is felt best at this time that her PDA territory be treated medically.  If she continues to have angina as an outpatient it would not be unreasonable to consider percutaneous revascularization to this vessel.  She appears to be stable for discharge today.    David Diane MD  01/10/17  11:54 AM

## 2018-09-27 ENCOUNTER — TRANSCRIBE ORDERS (OUTPATIENT)
Dept: ADMINISTRATIVE | Facility: HOSPITAL | Age: 63
End: 2018-09-27

## 2018-09-27 DIAGNOSIS — G44.52 NEW DAILY PERSISTENT HEADACHE: Primary | ICD-10-CM

## 2018-10-29 PROBLEM — E66.811 CLASS 1 OBESITY IN ADULT: Status: ACTIVE | Noted: 2018-10-29

## 2018-10-29 PROBLEM — I73.9 PAD (PERIPHERAL ARTERY DISEASE) (HCC): Status: ACTIVE | Noted: 2018-10-29

## 2018-10-29 PROBLEM — I21.4 NON-STEMI (NON-ST ELEVATED MYOCARDIAL INFARCTION) (HCC): Status: RESOLVED | Noted: 2017-01-08 | Resolved: 2018-10-29

## 2018-10-29 PROBLEM — E66.9 CLASS 1 OBESITY IN ADULT: Status: ACTIVE | Noted: 2018-10-29

## 2018-10-29 PROBLEM — E78.2 MIXED HYPERLIPIDEMIA: Status: ACTIVE | Noted: 2018-10-29

## 2018-10-29 PROBLEM — E11.9 TYPE 2 DIABETES MELLITUS (HCC): Status: ACTIVE | Noted: 2018-10-29

## 2018-10-29 PROBLEM — R06.09 DOE (DYSPNEA ON EXERTION): Status: ACTIVE | Noted: 2018-10-29

## 2018-10-29 PROBLEM — I10 ESSENTIAL HYPERTENSION: Status: ACTIVE | Noted: 2018-10-29

## 2018-10-29 PROBLEM — Z72.0 TOBACCO ABUSE: Status: ACTIVE | Noted: 2018-10-29

## 2018-10-29 PROBLEM — I25.10 CAD (CORONARY ARTERY DISEASE): Status: ACTIVE | Noted: 2018-10-29

## 2019-02-18 ENCOUNTER — HOSPITAL ENCOUNTER (OUTPATIENT)
Dept: MAMMOGRAPHY | Facility: HOSPITAL | Age: 64
Discharge: HOME | End: 2019-02-18
Admitting: FAMILY MEDICINE

## 2019-02-18 ENCOUNTER — HOSPITAL ENCOUNTER (OUTPATIENT)
Dept: BONE DENSITY | Facility: HOSPITAL | Age: 64
Discharge: HOME | End: 2019-02-18

## 2019-02-18 ENCOUNTER — APPOINTMENT (OUTPATIENT)
Dept: MAMMOGRAPHY | Facility: HOSPITAL | Age: 64
End: 2019-02-18

## 2019-02-18 DIAGNOSIS — N95.9 MENOPAUSAL PROBLEM: ICD-10-CM

## 2019-02-18 DIAGNOSIS — Z12.39 SCREENING BREAST EXAMINATION: ICD-10-CM

## 2019-02-18 PROCEDURE — 77063 BREAST TOMOSYNTHESIS BI: CPT | Performed by: RADIOLOGY

## 2019-02-18 PROCEDURE — 77080 DXA BONE DENSITY AXIAL: CPT | Performed by: RADIOLOGY

## 2019-02-18 PROCEDURE — 77067 SCR MAMMO BI INCL CAD: CPT

## 2019-02-18 PROCEDURE — 77067 SCR MAMMO BI INCL CAD: CPT | Performed by: RADIOLOGY

## 2019-02-18 PROCEDURE — 77063 BREAST TOMOSYNTHESIS BI: CPT

## 2019-02-18 PROCEDURE — 77080 DXA BONE DENSITY AXIAL: CPT

## 2019-02-28 ENCOUNTER — HOSPITAL ENCOUNTER (OUTPATIENT)
Dept: MAMMOGRAPHY | Facility: HOSPITAL | Age: 64
Discharge: HOME | End: 2019-02-28
Admitting: RADIOLOGY

## 2019-02-28 DIAGNOSIS — R92.8 ABNORMAL MAMMOGRAM: ICD-10-CM

## 2019-02-28 PROCEDURE — 77065 DX MAMMO INCL CAD UNI: CPT

## 2019-02-28 PROCEDURE — 77065 DX MAMMO INCL CAD UNI: CPT | Performed by: RADIOLOGY

## 2019-07-17 ENCOUNTER — TELEPHONE (OUTPATIENT)
Dept: MAMMOGRAPHY | Facility: HOSPITAL | Age: 64
End: 2019-07-17

## 2020-05-11 ENCOUNTER — TRANSCRIBE ORDERS (OUTPATIENT)
Dept: ADMINISTRATIVE | Facility: HOSPITAL | Age: 65
End: 2020-05-11

## 2020-05-11 DIAGNOSIS — R10.33 UMBILICAL PAIN: Primary | ICD-10-CM

## 2020-05-12 ENCOUNTER — HOSPITAL ENCOUNTER (OUTPATIENT)
Dept: CT IMAGING | Facility: HOSPITAL | Age: 65
Discharge: HOME | End: 2020-05-12
Admitting: FAMILY MEDICINE

## 2020-05-12 DIAGNOSIS — R10.33 UMBILICAL PAIN: ICD-10-CM

## 2020-05-12 LAB — CREAT BLDA-MCNC: 0.9 MG/DL (ref 0.6–1.3)

## 2020-05-12 PROCEDURE — 74178 CT ABD&PLV WO CNTR FLWD CNTR: CPT | Performed by: RADIOLOGY

## 2020-05-12 PROCEDURE — 0 IOVERSOL 68 % SOLUTION: Performed by: FAMILY MEDICINE

## 2020-05-12 PROCEDURE — 82565 ASSAY OF CREATININE: CPT

## 2020-05-12 PROCEDURE — 74178 CT ABD&PLV WO CNTR FLWD CNTR: CPT

## 2020-05-12 RX ADMIN — IOVERSOL 100 ML: 678 INJECTION INTRA-ARTERIAL; INTRAVENOUS at 08:49

## 2020-05-13 ENCOUNTER — OFFICE VISIT (OUTPATIENT)
Dept: CARDIAC SURGERY | Facility: CLINIC | Age: 65
End: 2020-05-13

## 2020-05-13 VITALS
HEIGHT: 63 IN | BODY MASS INDEX: 30.83 KG/M2 | WEIGHT: 174 LBS | HEART RATE: 62 BPM | TEMPERATURE: 97.1 F | SYSTOLIC BLOOD PRESSURE: 150 MMHG | DIASTOLIC BLOOD PRESSURE: 80 MMHG | OXYGEN SATURATION: 97 %

## 2020-05-13 DIAGNOSIS — I73.9 PAD (PERIPHERAL ARTERY DISEASE) (HCC): Primary | ICD-10-CM

## 2020-05-13 DIAGNOSIS — I70.223 ATHEROSCLEROSIS OF NATIVE ARTERIES OF EXTREMITIES WITH REST PAIN, BILATERAL LEGS (HCC): ICD-10-CM

## 2020-05-13 PROBLEM — I99.8 ISCHEMIC REST PAIN OF LOWER EXTREMITY: Status: ACTIVE | Noted: 2020-05-13

## 2020-05-13 PROBLEM — M79.606 ISCHEMIC REST PAIN OF LOWER EXTREMITY: Status: ACTIVE | Noted: 2020-05-13

## 2020-05-13 PROCEDURE — 93923 UPR/LXTR ART STDY 3+ LVLS: CPT | Performed by: THORACIC SURGERY (CARDIOTHORACIC VASCULAR SURGERY)

## 2020-05-13 PROCEDURE — 99203 OFFICE O/P NEW LOW 30 MIN: CPT | Performed by: THORACIC SURGERY (CARDIOTHORACIC VASCULAR SURGERY)

## 2020-05-13 RX ORDER — TIZANIDINE 4 MG/1
4 TABLET ORAL NIGHTLY PRN
COMMUNITY

## 2020-05-13 RX ORDER — HYDROCODONE BITARTRATE AND ACETAMINOPHEN 7.5; 325 MG/1; MG/1
TABLET ORAL AS NEEDED
COMMUNITY
Start: 2020-05-08

## 2020-05-13 RX ORDER — EMPAGLIFLOZIN 25 MG/1
25 TABLET, FILM COATED ORAL DAILY
COMMUNITY
Start: 2020-05-07

## 2020-05-13 RX ORDER — HYDROXYZINE PAMOATE 25 MG/1
CAPSULE ORAL AS NEEDED
COMMUNITY
Start: 2020-05-07

## 2020-05-13 RX ORDER — GABAPENTIN 800 MG/1
TABLET ORAL 3 TIMES DAILY
COMMUNITY
Start: 2020-05-07

## 2020-05-13 NOTE — PROGRESS NOTES
05/13/2020  Patient Information  Monae ANAYA  [facility] [id number]   1955  'PCP/Referring Physician'  Zachary Sullivan MD  [phone]  Zachary Sullivan MD  [phone]  Chief Complaint   Patient presents with   • Consult     NP per Dr. Zachary Sullivan for PVD-Complains of Abdominal Pain Above Umbilical Area       History of Present Illness: 64-year-old  female with a history of hypertension, hyperlipidemia, diabetes mellitus, coronary artery disease status post stenting and peripheral vascular disease status post stenting by Dr. Lester in [place] who presents with bilateral lower extremity pain.  For approximately 5 years, the patient notes worsening left greater than right lower extremity pain from the hip down to the feet.  This pain is a constant aching sensation that is exacerbated with ambulating approximately 100 feet and alleviated with rest.  She denies any nocturnal pain or lower extremity wounds.  The patient had a previous left lower extremity stent placed by Dr. Lester in [place] approximately 5 years ago.  She also notes 2 weeks of waxing and waning dull epigastric abdominal pain that has no alleviating or exacerbating factors.  She denies any pain with oral intake, weight loss, melena or hematochezia.  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       Patient Active Problem List   Diagnosis   • CAD (coronary artery disease)   • Essential hypertension   • Mixed hyperlipidemia   • Type 2 diabetes mellitus (CMS/HCC)   • Class 1 obesity in adult   • PAD (peripheral artery disease) (CMS/HCC)   • Tobacco abuse   • BRIGGS (dyspnea on exertion)     Past Medical History:   Diagnosis Date   • Arthritis    • Coronary artery disease    • Depression    • Diabetes mellitus (CMS/HCC)    • GERD (gastroesophageal reflux disease)    • Hyperlipidemia     • Hypertension    • Myocardial infarction (CMS/HCC)    • Peripheral vascular disease (CMS/HCC)    • Psoriasis      Past Surgical History:   Procedure Laterality Date   • APPENDECTOMY     • CARDIAC CATHETERIZATION N/A 1/9/2017    Procedure: Left Heart Cath;  Surgeon: David Diane MD;  Location: [facility] CATH INVASIVE LOCATION;  Service:    • CORONARY STENT PLACEMENT     • GALLBLADDER SURGERY     • TUBAL ABDOMINAL LIGATION     • VASCULAR SURGERY Left     Lower Extremity [facility]ascular Stent-[facility]       Current Outpatient Medications:   •  aspirin  MG EC tablet, Take 1 tablet by mouth Daily., Disp: 30 tablet, Rfl: 2  •  aspirin  MG tablet, Take 1 tablet by mouth Daily., Disp: 30 tablet, Rfl: 11  •  clopidogrel (PLAVIX) 75 MG tablet, Take 1 tablet by mouth Daily., Disp: 30 tablet, Rfl: 11  •  gabapentin (NEURONTIN) 800 MG tablet, 3 (Three) Times a Day., Disp: , Rfl:   •  HYDROcodone-acetaminophen (NORCO) 7.5-325 MG per tablet, As Needed., Disp: , Rfl:   •  hydrOXYzine pamoate (VISTARIL) 25 MG capsule, As Needed., Disp: , Rfl:   •  JARDIANCE 25 MG tablet, Daily., Disp: , Rfl:   •  metoprolol tartrate (LOPRESSOR) 25 MG tablet, Take 1/2 tablet by mouth 2 times daily., Disp: 30 tablet, Rfl: 11  •  rosuvastatin (CRESTOR) 20 MG tablet, Take 20 mg by mouth Daily., Disp: , Rfl:   •  tiZANidine (ZANAFLEX) 4 MG tablet, Take 4 mg by mouth At Night As Needed for Muscle Spasms., Disp: , Rfl:   •  atorvastatin (LIPITOR) 40 MG tablet, Take 1 tablet by mouth Daily., Disp: 30 tablet, Rfl: 11  •  clopidogrel (PLAVIX) 75 MG tablet, Take 1 tablet by mouth Daily., Disp: 30 tablet, Rfl: 2  •  fenofibric acid (TRILIPIX) 135 MG capsule delayed-release delayed release capsule, Take 135 mg by mouth Daily., Disp: , Rfl:   •  gabapentin (NEURONTIN) 300 MG capsule, Take 300 mg by mouth 3 (Three) Times a Day. Pt takes all doses of this med at once at hs, Disp: , Rfl:   •  glimepiride (AMARYL) 4 MG tablet, Take 4 mg by mouth Every Night.,  Disp: , Rfl:   •  losartan (COZAAR) 50 MG tablet, Take 100 mg by mouth Daily. Pt takes all doses of this med at once at hs, Disp: , Rfl:   •  metoprolol tartrate (LOPRESSOR) 25 MG tablet, Take 0.5 tablets by mouth Every 12 (Twelve) Hours., Disp: 30 tablet, Rfl: 2  •  SITagliptin-MetFORMIN HCl ER (JANUMET XR)  MG tablet sustained-release 24 hour, Take 1 tablet by mouth 2 (Two) Times a Day. Pt takes all doses of this med at once at hs, Disp: , Rfl:   No current facility-administered medications for this visit.   Allergies   Allergen Reactions   • Amoxicillin Rash   • Penicillins Rash     Social History     Socioeconomic History   • Marital status:      Spouse name: Not on file   • Number of children: 2   • Years of education: Not on file   • Highest education level: Not on file   Occupational History   • Occupation:      Comment: Disabled-Psoriasis   Tobacco Use   • Smoking status: Current Every Day Smoker     Packs/day: 3.00     Years: 40.00     Pack years: 120.00     Types: Cigarettes   • Smokeless tobacco: Never Used   • Tobacco comment: 2-4 PPD   Substance and Sexual Activity   • Alcohol use: No   • Drug use: No   Social History Narrative    Lives in [place].      Family History   Problem Relation Age of Onset   • Breast cancer Maternal Grandmother 70   • Cancer Mother    • Diabetes Mother    • Cancer Father    • Diabetes Father      Review of Systems   Constitution: Negative for chills, fever, malaise/fatigue, night sweats and weight loss.   HENT: Negative for hearing loss, odynophagia and sore throat.    Cardiovascular: Positive for claudication. Negative for chest pain, dyspnea on exertion, leg swelling, orthopnea and palpitations.   Respiratory: Positive for wheezing. Negative for cough and hemoptysis.    Endocrine: Negative for cold intolerance, heat intolerance, polydipsia, polyphagia and polyuria.   Hematologic/Lymphatic: Bruises/bleeds easily.   Skin: Negative for  "itching and rash.   Musculoskeletal: Positive for arthritis, back pain and joint pain. Negative for joint swelling and myalgias.   Gastrointestinal: Positive for abdominal pain. Negative for constipation, diarrhea, hematemesis, hematochezia, melena, nausea and vomiting.   Genitourinary: Negative for dysuria, frequency and hematuria.   Neurological: Positive for light-headedness and loss of balance. Negative for focal weakness, headaches, numbness and seizures.   Psychiatric/Behavioral: Positive for depression. Negative for suicidal ideas.   All other systems reviewed and are negative.    Vitals:    05/13/20 0834   BP: 150/80   BP Location: Left arm   Patient Position: Sitting   Pulse: 62   Temp: 97.1 °F (36.2 °C)   SpO2: 97%   Weight: 78.9 kg (174 lb)   Height: 160 cm (63\")      Physical Exam   Constitutional: She is oriented to person, place, and time. She appears well-developed and well-nourished. No distress.    female who appears stated age.  She is present with a mask and has the smell of tobacco.   HENT:   Head: Normocephalic and atraumatic.   Eyes: Conjunctivae are normal. No scleral icterus.   Neck: Normal range of motion. No JVD present. Carotid bruit is not present. No tracheal deviation present.   Cardiovascular: Normal rate, regular rhythm and normal heart sounds. Exam reveals no gallop and no friction rub.   No murmur heard.  Pulses:       Femoral pulses are 0 on the right side, and 0 on the left side.       Popliteal pulses are 0 on the right side, and 0 on the left side.        Dorsalis pedis pulses are 0 on the right side, and 0 on the left side.        Posterior tibial pulses are 0 on the right side, and 0 on the left side.   Pulmonary/Chest: Effort normal and breath sounds normal. No stridor. No respiratory distress. She has no wheezes. She has no rales.   Abdominal: Soft. She exhibits no distension and no mass. There is no tenderness. There is no rebound and no guarding. "   Musculoskeletal: Normal range of motion. She exhibits no edema.   Neurological: She is alert and oriented to person, place, and time.   Intact pedal motor function and sensation.   Skin: Skin is warm and dry. No rash noted. She is not diaphoretic. No erythema.   No pedal erythema or ulcerations.   Psychiatric: She has a normal mood and affect. Her behavior is normal. Judgment and thought content normal.       Labs/Imaging:  -CT of the abdomen and pelvis with and without contrast performed 5/12/2020, personally reviewed, demonstrates severe bilateral common femoral artery stenosis.  There is scattered aortic and iliac artery calcifications.  Sigmoid diverticulosis is present along with a distended fluid-filled small bowel near the bladder.  Calcifications are seen in the proximal celiac and superior mesenteric artery ostium.  The superior mesenteric artery has mid vessel calcifications and moderate stenosis.    Assessment/Plan:  64-year-old  female with a history of hypertension, hyperlipidemia, diabetes mellitus, coronary artery disease status post stenting and peripheral vascular disease status post stenting by Dr. Lester in [place] who presents with bilateral lower extremity pain.  The patient has ischemic rest pain secondary to known peripheral vascular disease.  An JUNIOR was performed in the office today that revealed right JUNIOR of 0.67 a left JUNIOR of 0.47.  I recommended a CTA of the aorta with lower extremity runoff to completely evaluate her vasculature and aid in operative planning.  I will obtain her operative reports from [place] to know the extent of stenting that was previously performed.  I discussed with the patient the importance of smoking cessation in the setting of her extensive vascular disease.  I will have the patient follow-up in clinic after this study has been performed to discuss these results.  Continue lifelong aspirin, plavix, statin and beta blocker.      Patient Active Problem  List   Diagnosis   • CAD (coronary artery disease)   • Essential hypertension   • Mixed hyperlipidemia   • Type 2 diabetes mellitus (CMS/HCC)   • Class 1 obesity in adult   • PAD (peripheral artery disease) (CMS/[facility])   • Tobacco abuse   • BRIGGS (dyspnea on exertion)

## 2020-06-08 ENCOUNTER — HOSPITAL ENCOUNTER (OUTPATIENT)
Dept: CT IMAGING | Facility: HOSPITAL | Age: 65
Discharge: HOME | End: 2020-06-08
Admitting: THORACIC SURGERY (CARDIOTHORACIC VASCULAR SURGERY)

## 2020-06-08 PROCEDURE — 0 IOVERSOL 68 % SOLUTION: Performed by: THORACIC SURGERY (CARDIOTHORACIC VASCULAR SURGERY)

## 2020-06-08 PROCEDURE — 75635 CT ANGIO ABDOMINAL ARTERIES: CPT

## 2020-06-08 PROCEDURE — 75635 CT ANGIO ABDOMINAL ARTERIES: CPT | Performed by: RADIOLOGY

## 2020-06-08 RX ADMIN — IOVERSOL 100 ML: 678 INJECTION INTRA-ARTERIAL; INTRAVENOUS at 08:46

## 2020-06-17 ENCOUNTER — OFFICE VISIT (OUTPATIENT)
Dept: CARDIAC SURGERY | Facility: CLINIC | Age: 65
End: 2020-06-17

## 2020-06-17 VITALS
WEIGHT: 170 LBS | HEART RATE: 65 BPM | TEMPERATURE: 98.6 F | HEIGHT: 63 IN | BODY MASS INDEX: 30.12 KG/M2 | SYSTOLIC BLOOD PRESSURE: 124 MMHG | OXYGEN SATURATION: 99 % | DIASTOLIC BLOOD PRESSURE: 62 MMHG

## 2020-06-17 DIAGNOSIS — I73.9 PAD (PERIPHERAL ARTERY DISEASE) (HCC): Primary | ICD-10-CM

## 2020-06-17 PROCEDURE — 99212 OFFICE O/P EST SF 10 MIN: CPT | Performed by: PHYSICIAN ASSISTANT

## 2020-06-17 NOTE — PROGRESS NOTES
06/17/2020  Patient Information  Monae Braun S ARI ANAYA  [facility] [address]   1955  'PCP/Referring Physician'  Zachary Sullivan MD  [phone]  No ref. provider found    Chief Complaint   Patient presents with   • Follow-up     Follow up after CTA  ABD/Aorta. Pt states she has bilateral leg pain when she walks. They feel heavy and achy.       History of Present Illness:  Patient is a 64-year-old  female with a history of hypertension, dyslipidemia, diabetes mellitus and peripheral arterial disease status post previous percutaneous intervention performed by Dr. Lester in 2015.  Patient was most recently evaluated in our office on 5/13/2020 and reported bilateral lower extremity pain worse in the left lower extremity.  Currently, the patient is complaining of bilateral claudication however denies rest pain and states that has not changed since her most recent visit.  Patient did undergo a CT angios abdominal aorta with runoff which revealed a right common femoral artery door to the right SFA which was occluded with single-vessel runoff to the ankle and the left common femoral and left superficial femoral arteries with several areas of occlusion.  Today in office the patient is very aggravated at the current state of affairs.  She feels she has been waiting in the office too long to be evaluated and she did not recognize Dr. Baker as he entered the office thinking she had seen another provider in [place].  She was adamant that she had seen some without glasses and was much shorter in stature.  She also repeatedly voiced her displeasure regarding her care provided by Dr. Lester.  She indicates that she was informed she had a previous bypass procedure performed on her left lower extremity however the operative note revealed catheter-based intervention with atherectomy and angioplasty.    Patient  Active Problem List   Diagnosis   • CAD (coronary artery disease)   • Essential hypertension   • Mixed hyperlipidemia   • Type 2 diabetes mellitus (CMS/HCC)   • Class 1 obesity in adult   • PAD (peripheral artery disease) (CMS/HCC)   • Tobacco abuse   • BRIGGS (dyspnea on exertion)   • Atherosclerosis of native arteries of extremities with rest pain, bilateral legs (CMS/HCC)      Past Medical History:   Diagnosis Date   • Arthritis    • Coronary artery disease    • Depression    • Diabetes mellitus (CMS/HCC)    • GERD (gastroesophageal reflux disease)    • Hyperlipidemia    • Hypertension    • Myocardial infarction (CMS/HCC)    • Peripheral vascular disease (CMS/HCC)    • Psoriasis      Past Surgical History:   Procedure Laterality Date   • APPENDECTOMY     • CARDIAC CATHETERIZATION N/A 1/9/2017    Procedure: Left Heart Cath;  Surgeon: David Diane MD;  Location: [facility] INVASIVE LOCATION;  Service:    • CORONARY STENT PLACEMENT     • GALLBLADDER SURGERY     • TUBAL ABDOMINAL LIGATION     • VASCULAR SURGERY Left     Lower Extremity Vascular Stent-[facility]       Current Outpatient Medications:   •  aspirin  MG EC tablet, Take 1 tablet by mouth Daily., Disp: 30 tablet, Rfl: 2  •  aspirin  MG tablet, Take 1 tablet by mouth Daily., Disp: 30 tablet, Rfl: 11  •  atorvastatin (LIPITOR) 40 MG tablet, Take 1 tablet by mouth Daily., Disp: 30 tablet, Rfl: 11  •  clopidogrel (PLAVIX) 75 MG tablet, Take 1 tablet by mouth Daily., Disp: 30 tablet, Rfl: 2  •  clopidogrel (PLAVIX) 75 MG tablet, Take 1 tablet by mouth Daily., Disp: 30 tablet, Rfl: 11  •  fenofibric acid (TRILIPIX) 135 MG capsule delayed-release delayed release capsule, Take 135 mg by mouth Daily., Disp: , Rfl:   •  gabapentin (NEURONTIN) 300 MG capsule, Take 300 mg by mouth 3 (Three) Times a Day. Pt takes all doses of this med at once at hs, Disp: , Rfl:   •  gabapentin (NEURONTIN) 800 MG tablet, 3 (Three) Times a Day., Disp: , Rfl:   •  glimepiride  (AMARYL) 4 MG tablet, Take 4 mg by mouth Every Night., Disp: , Rfl:   •  HYDROcodone-acetaminophen (NORCO) 7.5-325 MG per tablet, As Needed., Disp: , Rfl:   •  hydrOXYzine pamoate (VISTARIL) 25 MG capsule, As Needed., Disp: , Rfl:   •  JARDIANCE 25 MG tablet, Daily., Disp: , Rfl:   •  losartan (COZAAR) 50 MG tablet, Take 100 mg by mouth Daily. Pt takes all doses of this med at once at hs, Disp: , Rfl:   •  metoprolol tartrate (LOPRESSOR) 25 MG tablet, Take 0.5 tablets by mouth Every 12 (Twelve) Hours., Disp: 30 tablet, Rfl: 2  •  metoprolol tartrate (LOPRESSOR) 25 MG tablet, Take 1/2 tablet by mouth 2 times daily., Disp: 30 tablet, Rfl: 11  •  rosuvastatin (CRESTOR) 20 MG tablet, Take 20 mg by mouth Daily., Disp: , Rfl:   •  SITagliptin-MetFORMIN HCl ER (JANUMET XR)  MG tablet sustained-release 24 hour, Take 1 tablet by mouth 2 (Two) Times a Day. Pt takes all doses of this med at once at hs, Disp: , Rfl:   •  tiZANidine (ZANAFLEX) 4 MG tablet, Take 4 mg by mouth At Night As Needed for Muscle Spasms., Disp: , Rfl:   Allergies   Allergen Reactions   • Amoxicillin Rash   • Penicillins Rash     Social History     Socioeconomic History   • Marital status:      Spouse name: Not on file   • Number of children: 2   • Years of education: Not on file   • Highest education level: Not on file   Occupational History   • Occupation:      Comment: Disabled-Psoriasis   Tobacco Use   • Smoking status: Current Every Day Smoker     Packs/day: 4.00     Years: 40.00     Pack years: 160.00     Types: Cigarettes   • Smokeless tobacco: Never Used   • Tobacco comment: 2-4 PPD   Substance and Sexual Activity   • Alcohol use: No   • Drug use: No   Social History Narrative    Lives in [place].      Family History   Problem Relation Age of Onset   • Breast cancer Maternal Grandmother 70   • Cancer Mother    • Diabetes Mother    • Cancer Father    • Diabetes Father      Review of Systems   Constitution:  "Negative for chills, fever, malaise/fatigue, night sweats and weight loss.   HENT: Negative for congestion, hearing loss, nosebleeds and odynophagia.    Cardiovascular: Positive for claudication. Negative for chest pain, dyspnea on exertion, leg swelling, orthopnea, palpitations and syncope.   Respiratory: Negative for cough, hemoptysis, shortness of breath and wheezing.    Endocrine: Negative for cold intolerance, heat intolerance, polydipsia, polyphagia and polyuria.   Hematologic/Lymphatic: Bruises/bleeds easily.   Skin: Positive for color change. Negative for itching, poor wound healing and rash.   Musculoskeletal: Positive for back pain, falls and joint pain. Negative for arthritis, joint swelling and myalgias.   Gastrointestinal: Negative for abdominal pain, constipation, diarrhea, hematemesis, melena, nausea and vomiting.   Genitourinary: Negative for dysuria, frequency, hematuria, nocturia and urgency.   Neurological: Positive for loss of balance. Negative for dizziness, light-headedness and numbness.   Psychiatric/Behavioral: Negative for depression and suicidal ideas. The patient is not nervous/anxious.    Allergic/Immunologic: Negative for environmental allergies and HIV exposure.     Vitals:    20 1136   BP: 124/62   Pulse: 65   Temp: 98.6 °F (37 °C)   TempSrc: Temporal   SpO2: 99%   Weight: 77.1 kg (170 lb)   Height: 160 cm (63\")      Physical Exam  Gen: Well-developed well-nourished no acute distress  Pulm: Clear to auscultation bilaterally no wheezes, rales or rhonchi.  Respirations even and unlabored.  CV: Regular rate and rhythm no murmurs, rubs or gallops.  Abd: Soft, nondistended with mild periumbilical discomfort  Ext: Warm good color perfused with no bilateral lower extremity edema 1+ dorsalis pedis pulses bilaterally  Int: No clubbing or cyanosis no lesions or rashes appreciated    Labs/Imagin2020 CT Angio abdominal aorta with runoff:  IMPRESSION:  1. Occlusion of the right common " femoral and most of the right  superficial femoral artery with reconstitution of the popliteal artery  which is narrowed. Single vessel runoff to the right ankle.  2. Multifocal plaque throughout the left common femoral and superficial  femoral arteries with segmental areas of occlusion. The popliteal artery  is patent but narrowed and there is 3-vessel runoff to the left ankle.      Assessment/Plan:  In regards to the patient's severe peripheral arterial disease, she would benefit from a right femoral-popliteal bypass with reverse saphenous vein graft and possible angioplasty.  This procedure was offered to be performed for the patient in [place] however she voiced her displeasure that it could not be performed here in [place].  She stated she would like to discuss this with her primary care provider and will notify us if she would allow us to perform the aforementioned surgery.  Regarding her abdominal pain, the patient needs to be evaluated by a gastroenterologist for work-up of her abdominal pain and possible colonoscopy. Efforts were made to arrange a referral to gastroenterology however she declined this gesture and indicated she would once again rely upon the services of her primary care provider.  We will see her on an as needed basis.      Patient Active Problem List   Diagnosis   • CAD (coronary artery disease)   • Essential hypertension   • Mixed hyperlipidemia   • Type 2 diabetes mellitus (CMS/HCC)   • Class 1 obesity in adult   • PAD (peripheral artery disease) (CMS/HCC)   • Tobacco abuse   • BRIGGS (dyspnea on exertion)   • Atherosclerosis of native arteries of extremities with rest pain, bilateral legs (CMS/HCC)

## 2020-06-23 DIAGNOSIS — I73.9 PAD (PERIPHERAL ARTERY DISEASE) (HCC): Primary | ICD-10-CM

## 2020-06-30 ENCOUNTER — TELEPHONE (OUTPATIENT)
Dept: CARDIAC SURGERY | Facility: CLINIC | Age: 65
End: 2020-06-30

## 2020-06-30 NOTE — TELEPHONE ENCOUNTER
"Monae was seen by Dr. Baker and our PA, Oliver Tellez on 06/17 for PAD. Monae had some confusion at that appointment-please refer to that office note for more detail. Per Dr. Baker-he wanted to see her back in the office with a Family member prior to surgery, this is to make sure that everyone understood exactly what procedure he is performing. I called Monae this morning to relay this information. She became very aggravated and wanted an explanation on why he needed to see her again. I explained the reasoning listed above. She stated that she is \"Fully Aware\" of what she is having done but when I asked her to tell me what Dr. Baker will be performing, she stated \"well you are the  you already know.\" I politely told her \"yes I do know but we are trying to make sure that you understand this procedure.\" She repeated that about 2 times but would not give me any information on the procedure. I offered her an apt for 07/01 and reiterated that a Family member would need to come with her to that apt. She stated that no she could not come to an apt on 07/01 due to transportation and that her family members work. She went on to say how big of an \"inconvenience\" we are causing and this was \"riducouls\" of Dr. Baker to ask of her. She then stated that she has already arranged transportation for her PAT & Covid apt on 07/13 and could see Dr. Baker then. Unfortunately Dr. Baker will be in surgery that day so he would not be able to see her, this frustrated her more and again stated that we are making this an \"inconvenience\" for her. After speaking with my , I offered her an apt on 07/08 and again reiterated that a Family member would need to come with her and if no one comes with her Dr. Baker would have to cancel her surgery on 07/16. She agreed to see Dr. Baker on 07/08 and then stated (exact words) \"I will be there with a family member even if I have to adopt someone and this will be " "the last surgery Dr. Baker does on me or anyone else in my Family.\" she then hung up the phone. I have replayed this information to Dr. Baker and my .   "

## 2020-07-08 ENCOUNTER — PREP FOR SURGERY (OUTPATIENT)
Dept: OTHER | Facility: HOSPITAL | Age: 65
End: 2020-07-08

## 2020-07-08 ENCOUNTER — OFFICE VISIT (OUTPATIENT)
Dept: CARDIAC SURGERY | Facility: CLINIC | Age: 65
End: 2020-07-08

## 2020-07-08 VITALS
HEART RATE: 64 BPM | BODY MASS INDEX: 30.65 KG/M2 | DIASTOLIC BLOOD PRESSURE: 92 MMHG | TEMPERATURE: 97.9 F | SYSTOLIC BLOOD PRESSURE: 146 MMHG | OXYGEN SATURATION: 96 % | HEIGHT: 63 IN | WEIGHT: 173 LBS

## 2020-07-08 DIAGNOSIS — I73.9 PAD (PERIPHERAL ARTERY DISEASE) (HCC): Primary | ICD-10-CM

## 2020-07-08 PROCEDURE — 99214 OFFICE O/P EST MOD 30 MIN: CPT | Performed by: THORACIC SURGERY (CARDIOTHORACIC VASCULAR SURGERY)

## 2020-07-08 RX ORDER — CHLORHEXIDINE GLUCONATE 500 MG/1
1 CLOTH TOPICAL EVERY 12 HOURS PRN
Status: CANCELLED | OUTPATIENT
Start: 2020-07-13

## 2020-07-08 NOTE — PROGRESS NOTES
07/08/2020  Patient Information  Monae Braun S ARI ANAYA  [facility] [address]   1955  'PCP/Referring Physician'  Zachary Sullivan MD  [phone]  No ref. provider found    Chief Complaint   Patient presents with   • Follow-up     F/u for Peripheral vascular disease to discuss surgery. Pt states that she has bilateral lower leg pain, even short walking distance hurts her legs, also complains of middle to lowerback pain.        History of Present Illness: 64-year-old  female with a history of hypertension, hyperlipidemia, diabetes mellitus, active tobacco abuse, coronary artery disease status post stenting and peripheral vascular disease status post stenting by Dr. Lester in [place] who presents with bilateral lower extremity pain.  The patient notes left greater than right pain that is present from the hips to the feet.  This pain is exacerbated with ambulating short distances of approximately 100 feet and alleviated with rest.  She denies any nocturnal pain or lower extremity wounds.  During this encounter, the patient is with her son and agrees that I am the same provider as her last visit, but not her visit on 5/13/2020.        Patient Active Problem List   Diagnosis   • CAD (coronary artery disease)   • Essential hypertension   • Mixed hyperlipidemia   • Type 2 diabetes mellitus (CMS/HCC)   • Class 1 obesity in adult   • PAD (peripheral artery disease) (CMS/HCC)   • Tobacco abuse   • BRIGGS (dyspnea on exertion)   • Atherosclerosis of native arteries of extremities with rest pain, bilateral legs (CMS/HCC)      Past Medical History:   Diagnosis Date   • Arthritis    • Coronary artery disease    • Depression    • Diabetes mellitus (CMS/HCC)    • GERD (gastroesophageal reflux disease)    • Hyperlipidemia    • Hypertension    • Myocardial infarction (CMS/HCC)    • Peripheral vascular disease  (CMS/HCC)    • Psoriasis      Past Surgical History:   Procedure Laterality Date   • APPENDECTOMY     • CARDIAC CATHETERIZATION N/A 1/9/2017    Procedure: Left Heart Cath;  Surgeon: David Diane MD;  Location: [facility] CATH INVASIVE LOCATION;  Service:    • CORONARY STENT PLACEMENT     • GALLBLADDER SURGERY     • TUBAL ABDOMINAL LIGATION     • VASCULAR SURGERY Left     [facility] Extremity [facility]       Current Outpatient Medications:   •  aspirin  MG EC tablet, Take 1 tablet by mouth Daily., Disp: 30 tablet, Rfl: 2  •  aspirin  MG tablet, Take 1 tablet by mouth Daily., Disp: 30 tablet, Rfl: 11  •  atorvastatin (LIPITOR) 40 MG tablet, Take 1 tablet by mouth Daily., Disp: 30 tablet, Rfl: 11  •  clopidogrel (PLAVIX) 75 MG tablet, Take 1 tablet by mouth Daily., Disp: 30 tablet, Rfl: 2  •  clopidogrel (PLAVIX) 75 MG tablet, Take 1 tablet by mouth Daily., Disp: 30 tablet, Rfl: 11  •  fenofibric acid (TRILIPIX) 135 MG capsule delayed-release delayed release capsule, Take 135 mg by mouth Daily., Disp: , Rfl:   •  gabapentin (NEURONTIN) 300 MG capsule, Take 300 mg by mouth 3 (Three) Times a Day. Pt takes all doses of this med at once at hs, Disp: , Rfl:   •  gabapentin (NEURONTIN) 800 MG tablet, 3 (Three) Times a Day., Disp: , Rfl:   •  glimepiride (AMARYL) 4 MG tablet, Take 4 mg by mouth Every Night., Disp: , Rfl:   •  HYDROcodone-acetaminophen (NORCO) 7.5-325 MG per tablet, As Needed., Disp: , Rfl:   •  hydrOXYzine pamoate (VISTARIL) 25 MG capsule, As Needed., Disp: , Rfl:   •  JARDIANCE 25 MG tablet, Daily., Disp: , Rfl:   •  losartan (COZAAR) 50 MG tablet, Take 100 mg by mouth Daily. Pt takes all doses of this med at once at hs, Disp: , Rfl:   •  metoprolol tartrate (LOPRESSOR) 25 MG tablet, Take 0.5 tablets by mouth Every 12 (Twelve) Hours., Disp: 30 tablet, Rfl: 2  •  metoprolol tartrate (LOPRESSOR) 25 MG tablet, Take 1/2 tablet by mouth 2 times daily., Disp: 30 tablet, Rfl: 11  •  rosuvastatin  (CRESTOR) 20 MG tablet, Take 20 mg by mouth Daily., Disp: , Rfl:   •  SITagliptin-MetFORMIN HCl ER (JANUMET XR)  MG tablet sustained-release 24 hour, Take 1 tablet by mouth 2 (Two) Times a Day. Pt takes all doses of this med at once at hs, Disp: , Rfl:   •  tiZANidine (ZANAFLEX) 4 MG tablet, Take 4 mg by mouth At Night As Needed for Muscle Spasms., Disp: , Rfl:   Allergies   Allergen Reactions   • Amoxicillin Rash   • Penicillins Rash     Social History     Socioeconomic History   • Marital status:      Spouse name: Not on file   • Number of children: 2   • Years of education: Not on file   • Highest education level: Not on file   Occupational History   • Occupation:      Comment: Disabled-Psoriasis   Tobacco Use   • Smoking status: Current Every Day Smoker     Packs/day: 4.00     Years: 40.00     Pack years: 160.00     Types: Cigarettes   • Smokeless tobacco: Never Used   • Tobacco comment: 2-4 PPD   Substance and Sexual Activity   • Alcohol use: No   • Drug use: No   Social History Narrative    Lives in [place].      Family History   Problem Relation Age of Onset   • Breast cancer Maternal Grandmother 70   • Cancer Mother    • Diabetes Mother    • Cancer Father    • Diabetes Father      Review of Systems   Constitution: Negative for chills, fever, malaise/fatigue, night sweats and weight loss.   HENT: Negative for congestion, hearing loss, nosebleeds and odynophagia.    Cardiovascular: Positive for claudication. Negative for chest pain, dyspnea on exertion, leg swelling, orthopnea, palpitations and syncope.   Respiratory: Negative for cough, hemoptysis, shortness of breath and wheezing.    Endocrine: Negative for cold intolerance, heat intolerance, polydipsia, polyphagia and polyuria.   Hematologic/Lymphatic: Bruises/bleeds easily.   Skin: Positive for color change (bilateral toes on both feet). Negative for itching, poor wound healing and rash.   Musculoskeletal: Positive for  "falls. Negative for arthritis, back pain, joint pain, joint swelling and myalgias.   Gastrointestinal: Negative for abdominal pain, constipation, diarrhea, hematemesis, melena, nausea and vomiting.   Genitourinary: Negative for dysuria, frequency, hematuria, nocturia and urgency.   Neurological: Positive for loss of balance. Negative for dizziness, light-headedness and numbness.   Psychiatric/Behavioral: Positive for depression. Negative for suicidal ideas. The patient is not nervous/anxious.    Allergic/Immunologic: Negative for environmental allergies and HIV exposure.     Vitals:    07/08/20 0906   BP: 146/92   Pulse: 64   Temp: 97.9 °F (36.6 °C)   SpO2: 96%   Weight: 78.5 kg (173 lb)   Height: 160 cm (63\")      Physical Exam   Constitutional: She is oriented to person, place, and time. She appears well-developed and well-nourished. No distress.    female who appears stated age and is present with her son   HENT:   Head: Normocephalic and atraumatic.   Eyes: Conjunctivae are normal. No scleral icterus.   Neck: Normal range of motion. No JVD present. Carotid bruit is not present. No tracheal deviation present.   Cardiovascular: Normal rate, regular rhythm and normal heart sounds. Exam reveals no gallop and no friction rub.   No murmur heard.  Pulses:       Femoral pulses are 0 on the right side, and 0 on the left side.       Popliteal pulses are 0 on the right side, and 0 on the left side.        Dorsalis pedis pulses are 0 on the right side, and 0 on the left side.        Posterior tibial pulses are 0 on the right side, and 0 on the left side.   Bilateral dorsalis pedis and posterior tibial doppler signals are present.   Pulmonary/Chest: Effort normal and breath sounds normal. No stridor. No respiratory distress. She has no wheezes. She has no rales.   Abdominal: Soft. She exhibits no distension and no mass. There is no tenderness. There is no rebound and no guarding.   Musculoskeletal: Normal range of " motion. She exhibits no edema.   Neurological: She is alert and oriented to person, place, and time.   Intact pedal motor function and sensation.   Skin: Skin is warm and dry. No rash noted. She is not diaphoretic. No erythema.   No pedal erythema or ulcerations.   Psychiatric: She has a normal mood and affect. Her behavior is normal. Judgment and thought content normal.       Labs/Imaging:  -CTA of the aorta with lower extremity runoff performed 6/8/2020, personally reviewed, demonstrates occlusion of the right common femoral, right superficial femoral artery and proximal popliteal artery.  The popliteal artery reconstitutes and there is single-vessel runoff to the right ankle.  The left lower extremity has common femoral, superficial femoral and popliteal artery disease with scattered areas of occlusions.  The right tibial vessels have scattered disease.    Assessment/Plan:  64-year-old  female with a history of hypertension, hyperlipidemia, diabetes mellitus, active tobacco abuse, coronary artery disease status post stenting and peripheral vascular disease status post stenting by Dr. Lester in [place] who presents with bilateral lower extremity pain.  I discussed options with the patient including a continued structured walking program versus infrainguinal bypass.  The patient did not wish to continue with simple ambulation given that her activity is significantly limited.  She wished to proceed with a left femoral to below-knee popliteal bypass and endoscopic vein harvest.  The risks and benefits of surgery were discussed with the patient including pain, bleeding, infection, graft thrombosis, loss of lower extremity, myocardial infarction and death.  The patient understood these risks and wished to proceed with surgery.  The patient has not been evaluated by cardiology since her cardiac stent was placed by Dr. Diane.  I recommended a cardiology evaluation prior to surgery for clearance.  My office  will arrange this evaluation.  We discussed the importance of smoking cessation in the setting of peripheral vascular disease and she is currently decreasing her tobacco use and transitioning to vapor.      Patient Active Problem List   Diagnosis   • CAD (coronary artery disease)   • Essential hypertension   • Mixed hyperlipidemia   • Type 2 diabetes mellitus (CMS/HCC)   • Class 1 obesity in adult   • PAD (peripheral artery disease) (CMS/HCC)   • Tobacco abuse   • BRIGGS (dyspnea on exertion)   • Atherosclerosis of native arteries of extremities with rest pain, bilateral legs (CMS/HCC)

## 2020-07-13 ENCOUNTER — APPOINTMENT (OUTPATIENT)
Dept: PREADMISSION TESTING | Facility: HOSPITAL | Age: 65
End: 2020-07-13

## 2020-07-22 ENCOUNTER — OFFICE VISIT (OUTPATIENT)
Dept: CARDIOLOGY | Facility: CLINIC | Age: 65
End: 2020-07-22

## 2020-07-22 VITALS
DIASTOLIC BLOOD PRESSURE: 70 MMHG | HEART RATE: 71 BPM | BODY MASS INDEX: 30.41 KG/M2 | RESPIRATION RATE: 16 BRPM | HEIGHT: 63 IN | SYSTOLIC BLOOD PRESSURE: 104 MMHG | TEMPERATURE: 99 F | WEIGHT: 171.6 LBS

## 2020-07-22 DIAGNOSIS — E11.9 TYPE 2 DIABETES MELLITUS WITHOUT COMPLICATION, WITHOUT LONG-TERM CURRENT USE OF INSULIN (HCC): ICD-10-CM

## 2020-07-22 DIAGNOSIS — E78.5 DYSLIPIDEMIA: ICD-10-CM

## 2020-07-22 DIAGNOSIS — I10 ESSENTIAL HYPERTENSION: ICD-10-CM

## 2020-07-22 DIAGNOSIS — I25.10 ASCVD (ARTERIOSCLEROTIC CARDIOVASCULAR DISEASE): ICD-10-CM

## 2020-07-22 DIAGNOSIS — I73.9 PERIPHERAL ARTERIAL DISEASE (HCC): ICD-10-CM

## 2020-07-22 DIAGNOSIS — Z01.810 PREOPERATIVE CARDIOVASCULAR EXAMINATION: Primary | ICD-10-CM

## 2020-07-22 DIAGNOSIS — Z72.0 TOBACCO ABUSE: ICD-10-CM

## 2020-07-22 PROCEDURE — 99204 OFFICE O/P NEW MOD 45 MIN: CPT | Performed by: INTERNAL MEDICINE

## 2020-07-22 PROCEDURE — 93000 ELECTROCARDIOGRAM COMPLETE: CPT | Performed by: INTERNAL MEDICINE

## 2020-07-22 NOTE — PROGRESS NOTES
Juan Baker MD  Monae Chauhan  1955 07/22/2020    Patient Active Problem List   Diagnosis   • CAD (coronary artery disease)   • Essential hypertension   • Mixed hyperlipidemia   • Type 2 diabetes mellitus (CMS/HCC)   • Class 1 obesity in adult   • PAD (peripheral artery disease) (CMS/HCC)   • Tobacco abuse   • BRIGGS (dyspnea on exertion)   • Atherosclerosis of native arteries of extremities with rest pain, bilateral legs (CMS/[facility])        Dear Juan Baker MD:    Subjective     Monae Chauhan is a 64 y.o. female with the problems as listed above, presents    Chief complaint: Preoperative cardiac evaluation and cardiac clearance prior to undergoing left femoropopliteal bypass graft surgery for her PAD.    History of Present Illness: Ms. Chauhan is a pleasant 64-year-old  female with history of non-ST elevation myocardial infarction, followed by stenting of the obtuse marginal branch of the left circumflex coronary artery in January 2017.  She apparently has done well since then from cardiac standpoint.  However she has developed significant peripheral arterial disease involving both lower extremities associated with significant claudication with ambulation for short distances of 100 feet.  He was recently evaluated by Dr. Baker for this and was noted to have occlusions involving both femoral arteries.  She is expected to undergo left femoropopliteal bypass grafting by Dr. Baker and has been referred to us for further cardiac evaluation and cardiac clearance prior to undergoing the surgery.  On further questioning Ms. Chauhan denies any complaints of chest pains recently.  She has chronic dyspnea with moderate exertion with no PND or orthopnea.  She denies any pedal edema.  She has long history of smoking and has been smoking 4 packs a day until recently.    Allergies   Allergen Reactions   • Amoxicillin Rash   • Penicillins Rash   :      Current Outpatient Medications:   •  aspirin   MG EC tablet, Take 1 tablet by mouth Daily., Disp: 30 tablet, Rfl: 2  •  clopidogrel (PLAVIX) 75 MG tablet, Take 1 tablet by mouth Daily., Disp: 30 tablet, Rfl: 11  •  gabapentin (NEURONTIN) 800 MG tablet, 3 (Three) Times a Day., Disp: , Rfl:   •  glimepiride (AMARYL) 4 MG tablet, Take 4 mg by mouth Every Night., Disp: , Rfl:   •  HYDROcodone-acetaminophen (NORCO) 7.5-325 MG per tablet, As Needed., Disp: , Rfl:   •  hydrOXYzine pamoate (VISTARIL) 25 MG capsule, As Needed., Disp: , Rfl:   •  JARDIANCE 25 MG tablet, Daily., Disp: , Rfl:   •  metoprolol tartrate (LOPRESSOR) 25 MG tablet, Take 0.5 tablets by mouth Every 12 (Twelve) Hours., Disp: 30 tablet, Rfl: 2  •  rosuvastatin (CRESTOR) 20 MG tablet, Take 20 mg by mouth Daily., Disp: , Rfl:   •  SITagliptin-MetFORMIN HCl ER (JANUMET XR)  MG tablet sustained-release 24 hour, Take 1 tablet by mouth 2 (Two) Times a Day. Pt takes all doses of this med at once at hs, Disp: , Rfl:   •  tiZANidine (ZANAFLEX) 4 MG tablet, Take 4 mg by mouth At Night As Needed for Muscle Spasms., Disp: , Rfl:   •  aspirin  MG tablet, Take 1 tablet by mouth Daily., Disp: 30 tablet, Rfl: 11  •  atorvastatin (LIPITOR) 40 MG tablet, Take 1 tablet by mouth Daily., Disp: 30 tablet, Rfl: 11  •  clopidogrel (PLAVIX) 75 MG tablet, Take 1 tablet by mouth Daily., Disp: 30 tablet, Rfl: 2  •  fenofibric acid (TRILIPIX) 135 MG capsule delayed-release delayed release capsule, Take 135 mg by mouth Daily., Disp: , Rfl:   •  gabapentin (NEURONTIN) 300 MG capsule, Take 300 mg by mouth 3 (Three) Times a Day. Pt takes all doses of this med at once at hs, Disp: , Rfl:   •  losartan (COZAAR) 50 MG tablet, Take 100 mg by mouth Daily. Pt takes all doses of this med at once at hs, Disp: , Rfl:   •  metoprolol tartrate (LOPRESSOR) 25 MG tablet, Take 1/2 tablet by mouth 2 times daily., Disp: 30 tablet, Rfl: 11    Past Medical History:   Diagnosis Date   • Arthritis    • Coronary artery disease    •  Depression    • Diabetes mellitus (CMS/HCC)    • GERD (gastroesophageal reflux disease)    • Hyperlipidemia    • Hypertension    • Myocardial infarction (CMS/HCC)    • Peripheral vascular disease (CMS/HCC)    • Psoriasis      Past Surgical History:   Procedure Laterality Date   • APPENDECTOMY     • CARDIAC CATHETERIZATION N/A 1/9/2017    Procedure: Left Heart Cath;  Surgeon: David Diane MD;  Location: [facility] CATH INVASIVE LOCATION;  Service:    • CORONARY STENT PLACEMENT     • GALLBLADDER SURGERY     • TUBAL ABDOMINAL LIGATION     • VASCULAR SURGERY Left     Lower Extremity Vascular Stent-[facility]     Family History   Problem Relation Age of Onset   • Breast cancer Maternal Grandmother 70   • Cancer Mother    • Diabetes Mother    • Cancer Father    • Diabetes Father      Social History     Tobacco Use   • Smoking status: Current Every Day Smoker     Packs/day: 4.00     Years: 40.00     Pack years: 160.00     Types: Cigarettes   • Smokeless tobacco: Never Used   • Tobacco comment: 2-4 PPD   Substance Use Topics   • Alcohol use: No   • Drug use: No       Review of Systems   Constitution: Negative for chills, diaphoresis and fever.   Cardiovascular: Negative for chest pain, leg swelling, orthopnea, palpitations and paroxysmal nocturnal dyspnea.   Respiratory: Negative for cough, hemoptysis and shortness of breath.    Endocrine: Positive for cold intolerance and heat intolerance.        Diabetes  Skin becoming dryer   Hematologic/Lymphatic: Does not bruise/bleed easily.   Skin: Negative for rash.        Slow to heal  Bruising tendency   Musculoskeletal: Positive for back pain. Negative for myalgias.        Diff walking   Gastrointestinal: Negative for abdominal pain, constipation, diarrhea, nausea and vomiting.   Genitourinary: Negative for dysuria and hematuria.   Neurological: Positive for light-headedness. Negative for dizziness, focal weakness and numbness.       Objective   Blood pressure 104/70, pulse 71,  "temperature 99 °F (37.2 °C), temperature source Temporal, resp. rate 16, height 160 cm (62.99\"), weight 77.8 kg (171 lb 9.6 oz).  Body mass index is 30.41 kg/m².    Physical Exam   Constitutional: She is oriented to person, place, and time. She appears well-developed and well-nourished.   HENT:   Mouth/Throat: Oropharynx is clear and moist.   Eyes: Pupils are equal, round, and reactive to light. EOM are normal.   Neck: Neck supple. No JVD present. No tracheal deviation present. No thyromegaly present.   Cardiovascular: Normal rate, regular rhythm, S1 normal and S2 normal. Exam reveals no gallop and no friction rub.   No murmur heard.  Pulses:       Carotid pulses are 2+ on the right side, and 2+ on the left side.       Dorsalis pedis pulses are 1+ on the right side, and 1+ on the left side.        Posterior tibial pulses are 1+ on the right side, and 1+ on the left side.   Pulmonary/Chest: Effort normal and breath sounds normal.   Abdominal: Soft. Bowel sounds are normal. She exhibits no mass. There is no tenderness.   Musculoskeletal: Normal range of motion. She exhibits no edema.   Lymphadenopathy:     She has no cervical adenopathy.   Neurological: She is alert and oriented to person, place, and time.   Skin: Skin is warm and dry. No rash noted.   Psychiatric: She has a normal mood and affect.     Cardiac catheterization and coronary intervention in January 2017:  Findings in detail:  Left Main coronary artery:  The left main coronary artery is a large vessel which trifurcates into the LAD and ramus intermedius branch and the circumflex artery.  The left main coronary artery is free of atherosclerotic disease.     Left anterior descending artery:  Left anterior descending artery is a large vessel which is noted to have significant calcification in his proximal and midsegment.  It gives rise to 3 small to moderate size diagonal branches.  Just distal to the second diagonal branch there is a hazy area of " approximately 50% stenosis however on orthogonal views this does not appear to be hemodynamically significant.     Circumflex artery:  The circumflex artery is a large vessel which gives rise to one large branching obtuse marginal vessel and then 2 smaller posterior lateral branches.  The first obtuse marginal branch is noted to have a diffuse 80% lesion in its proximal segment.  This is felt to be the culprit lesion.  Postintervention this is reduced to 0% residual stenosis without evidence of dissection and TIANA-3 flow in the distal vascular bed.  There is TIANA-3 flow preintervention.  The lesion length is approximately 23 mm.  The vessel diameter is approximately 2.5 mm     Right coronary artery:  The right coronary artery is large vessel which gives rise to the posterior descending artery and several posterior lateral branches.  There is a 70% lesion noted in the proximal portion of the PDA.  There is a subsequent 50% lesion noted in the distal portion of the PDA.     Final impression and plan:  Overall it is my impression at Monae is undergone successful percutaneous revascularization of the obtuse marginal system she will continue risk factor modification as appropriate.      Monae Chauhan   Echo Complete w/Doppler and Color Flow   Order# [id number]   Reading physician:   Cayla Serrano MD Ordering physician:   Cayla Serrano MD Study date: 17   Patient Information     Patient Name  Monae Chauhan MRN  [id number] Sex  Female  (Age)  1955 (64 y.o.)   Interpretation Summary     · Left ventricular wall thickness is consistent with mild concentric hypertrophy.  · Left ventricular function is normal.  · Estimated EF appears to be in the range of 66 - 70%  · All left ventricular wall segments contract normally.  · Left ventricular diastolic dysfunction (grade I) consistent with impaired relaxation.  · All valves appear normal in structure and function.  · Pericardium: There is no evidence  of pericardial effusion.       Lab Results   Component Value Date     01/09/2017    K 4.0 01/09/2017     01/09/2017    CO2 31.4 01/09/2017    BUN 15 01/09/2017    CREATININE 0.90 05/12/2020    GLUCOSE 304 (H) 01/09/2017    CALCIUM 9.1 01/09/2017    AST 28 01/08/2017    AST 24 01/08/2017    ALT 20 01/08/2017    ALT 16 01/08/2017    ALKPHOS 75 01/08/2017    ALKPHOS 78 01/08/2017     Lab Results   Component Value Date    CKTOTAL 163 01/08/2017     Lab Results   Component Value Date    WBC 9.62 01/08/2017    HGB 13.2 01/08/2017    HCT 40.0 01/08/2017     01/08/2017     Lab Results   Component Value Date    INR 1.04 01/08/2017     No results found for: MG  Lab Results   Component Value Date    TRIG 475 (H) 01/08/2017    HDL 22 (L) 01/08/2017    LDL  01/08/2017      Comment:      Unable to calculate      Lab Results   Component Value Date    .0 (H) 01/08/2017       During this visit the following were done:  Labs Reviewed [x]    Radiology Reports Reviewed [x]    Referring Provider Records Reviewed [x]           ECG 12 Lead  Date/Time: 7/22/2020 3:07 PM  Performed by: Yaniv Johnson MD  Authorized by: Yaniv Johnson MD   Comparison: compared with previous ECG from 1/9/2017  Comparison to previous ECG: Patient had some ST-T wave changes suspicious for lateral myocardial ischemia on the previous EKG which were not seen on this EKG.  Rhythm: sinus rhythm  Conduction: conduction normal  ST Segments: ST segments normal  T Waves: T waves normal    Clinical impression: normal ECG            Assessment/Plan :   Diagnosis Plan   1. Preoperative cardiovascular examination  ECG 12 Lead    Stress Test With Myocardial Perfusion (1 Day)   2. Peripheral arterial disease involving both lower extremities.  Stress Test With Myocardial Perfusion (1 Day)   3. ASCVD (arteriosclerotic cardiovascular disease), status post PCI in January 2017.     4. Type 2 diabetes mellitus without complication, without long-term  current use of insulin (CMS/[facility])     5. Essential hypertension     6. Dyslipidemia     7. Tobacco abuse          Recommendations:  Orders Placed This Encounter   Procedures   • Stress Test With Myocardial Perfusion (1 Day)   • ECG 12 Lead      1. We will evaluate her further with a Lexiscan sestamibi study and if this reveals no evidence of significant myocardial ischemia then will clear her for the vascular surgery as needed.  2. I have also encouraged her strongly to quit smoking.  She said she has tried nicotine patches and Chantix in the past without any success.  She said she is going to try to do it on her own.  3. Continue with aspirin, clopidogrel and rosuvastatin at current doses.    Return in about 2 weeks (around 8/5/2020).    As always, I appreciate very much the opportunity to participate in the cardiovascular care of your patients.      With Best Regards,    Yaniv Johnson MD, [facility]    Dragon disclaimer:  Much of this encounter note is an electronic transcription/translation of spoken language to printed text. The electronic translation of spoken language may permit erroneous, or at times, nonsensical words or phrases to be inadvertently transcribed; Although I have reviewed the note for such errors, some may still exist.

## 2020-07-31 ENCOUNTER — TELEPHONE (OUTPATIENT)
Dept: CARDIAC SURGERY | Facility: CLINIC | Age: 65
End: 2020-07-31

## 2020-07-31 NOTE — TELEPHONE ENCOUNTER
Pt needs cardiac clearance prior to her surgery with Dr. Baker. She saw Olivia on 07/22/2020 and is scheduled for a stress test on 08/20 & then has a f/u apt with SHERYL Hill on 08/26. After we have obtained cardiac clearance, I will contact her to schedule her surgery.

## 2020-08-20 ENCOUNTER — HOSPITAL ENCOUNTER (OUTPATIENT)
Dept: NUCLEAR MEDICINE | Facility: HOSPITAL | Age: 65
Discharge: HOME | End: 2020-08-20

## 2020-08-20 ENCOUNTER — HOSPITAL ENCOUNTER (OUTPATIENT)
Dept: CARDIOLOGY | Facility: HOSPITAL | Age: 65
Discharge: HOME | End: 2020-08-20

## 2020-08-20 DIAGNOSIS — I73.9 PERIPHERAL ARTERIAL DISEASE (HCC): ICD-10-CM

## 2020-08-20 DIAGNOSIS — I25.10 ASCVD (ARTERIOSCLEROTIC CARDIOVASCULAR DISEASE): ICD-10-CM

## 2020-08-20 DIAGNOSIS — Z01.810 PREOPERATIVE CARDIOVASCULAR EXAMINATION: ICD-10-CM

## 2020-08-20 LAB
BH CV NUCLEAR PRIOR STUDY: 3
BH CV STRESS BP STAGE 1: NORMAL
BH CV STRESS BP STAGE 2: NORMAL
BH CV STRESS COMMENTS STAGE 1: NORMAL
BH CV STRESS COMMENTS STAGE 2: NORMAL
BH CV STRESS DOSE REGADENOSON STAGE 1: 0.4
BH CV STRESS DURATION MIN STAGE 1: 0
BH CV STRESS DURATION MIN STAGE 2: 4
BH CV STRESS DURATION SEC STAGE 1: 10
BH CV STRESS DURATION SEC STAGE 2: 0
BH CV STRESS HR STAGE 1: 73
BH CV STRESS HR STAGE 2: 77
BH CV STRESS PROTOCOL 1: NORMAL
BH CV STRESS RECOVERY BP: NORMAL MMHG
BH CV STRESS RECOVERY HR: 76 BPM
BH CV STRESS STAGE 1: 1
BH CV STRESS STAGE 2: 2
MAXIMAL PREDICTED HEART RATE: 156 BPM
PERCENT MAX PREDICTED HR: 49.36 %
STRESS BASELINE BP: NORMAL MMHG
STRESS BASELINE HR: 52 BPM
STRESS PERCENT HR: 58 %
STRESS POST PEAK BP: NORMAL MMHG
STRESS POST PEAK HR: 77 BPM
STRESS TARGET HR: 133 BPM

## 2020-08-20 PROCEDURE — 0 TECHNETIUM SESTAMIBI: Performed by: INTERNAL MEDICINE

## 2020-08-20 PROCEDURE — A9500 TC99M SESTAMIBI: HCPCS | Performed by: INTERNAL MEDICINE

## 2020-08-20 PROCEDURE — 78452 HT MUSCLE IMAGE SPECT MULT: CPT

## 2020-08-20 PROCEDURE — 93017 CV STRESS TEST TRACING ONLY: CPT

## 2020-08-20 PROCEDURE — 25010000002 REGADENOSON 0.4 MG/5ML SOLUTION: Performed by: INTERNAL MEDICINE

## 2020-08-20 PROCEDURE — 93018 CV STRESS TEST I&R ONLY: CPT | Performed by: INTERNAL MEDICINE

## 2020-08-20 PROCEDURE — 78452 HT MUSCLE IMAGE SPECT MULT: CPT | Performed by: INTERNAL MEDICINE

## 2020-08-20 RX ADMIN — TECHNETIUM TC 99M SESTAMIBI 1 DOSE: 1 INJECTION INTRAVENOUS at 07:50

## 2020-08-20 RX ADMIN — TECHNETIUM TC 99M SESTAMIBI 1 DOSE: 1 INJECTION INTRAVENOUS at 09:16

## 2020-08-20 RX ADMIN — REGADENOSON 0.4 MG: 0.08 INJECTION, SOLUTION INTRAVENOUS at 09:16

## 2020-09-14 ENCOUNTER — TELEPHONE (OUTPATIENT)
Dept: CARDIAC SURGERY | Facility: CLINIC | Age: 65
End: 2020-09-14

## 2020-09-14 NOTE — TELEPHONE ENCOUNTER
She did see Cardiology for clearance and had her stress test, but has not been able to FU with Cardiology in the office.  They have R/S her several times, and when she did go in for the appointment, he was running late, and she was not able to wait.  She is not scheduled to get back in with him until the end of October.  Can you look at her stress results and proceed with surgery or does she need to see the cardiologist back first?

## 2020-09-15 NOTE — TELEPHONE ENCOUNTER
9/15/2020-Jone communicating with patient and [facility] regarding clearance and surgery.

## 2020-09-22 ENCOUNTER — TELEPHONE (OUTPATIENT)
Dept: CARDIAC SURGERY | Facility: CLINIC | Age: 65
End: 2020-09-22

## 2020-09-22 NOTE — TELEPHONE ENCOUNTER
"I relayed this information to Monae-she stated that she \"was not going to have surgery with Dr. Baker then\".  She refuses to go back to Dr. Johnson office and we can not obtain cardiac clearance any other way. She knows to call our office if she changes her mind.       ----- Message from Juan Baker MD sent at 9/16/2020  5:42 PM EDT -----  Not sure what the patient wants here.  We are trying to be safe and ensure she is cleared from a cardiac standpoint.  I cannot control other offices that are creating her frustrations.    ----- Message -----  From: Jone Starks  Sent: 9/15/2020   9:45 AM EDT  To: MD Dr. Olivia Phillips, in regards to the message that Alva sent you yesterday on this pt for the cardiac clearance. I called Dr. Johnson office this morning to see if we could get the clearance prior to her apt on 10/15. The lady I s/w told me \"No, that Dr. Johnson would not give clearance on her till she is seen at her apt.\" I called the pt and relayed this information to her and she stated that she will not be returning to see Dr. Johnson, \"they have CX her apt 3 times now and she is not wasting her gas back to his office.\" Her exact words before she hung up the phone was \"if Dr. Baker can not corrdinate this with Dr. Johnson without her having to go back to her f/u on 09/15 to get the clearance, then she was not having surgery.\" Please Advise.          "

## 2021-02-11 ENCOUNTER — IMMUNIZATION (OUTPATIENT)
Dept: VACCINE CLINIC | Facility: HOSPITAL | Age: 66
End: 2021-02-11

## 2021-02-11 PROCEDURE — 91300 HC SARSCOV02 VAC 30MCG/0.3ML IM: CPT | Performed by: INTERNAL MEDICINE

## 2021-02-11 PROCEDURE — 0001A: CPT | Performed by: INTERNAL MEDICINE

## 2021-03-04 ENCOUNTER — IMMUNIZATION (OUTPATIENT)
Dept: VACCINE CLINIC | Facility: HOSPITAL | Age: 66
End: 2021-03-04

## 2021-03-04 PROCEDURE — 0002A: CPT | Performed by: INTERNAL MEDICINE

## 2021-03-04 PROCEDURE — 91300 HC SARSCOV02 VAC 30MCG/0.3ML IM: CPT | Performed by: INTERNAL MEDICINE

## 2021-03-25 ENCOUNTER — OFFICE VISIT (OUTPATIENT)
Dept: UROLOGY | Facility: CLINIC | Age: 66
End: 2021-03-25

## 2021-03-25 VITALS — WEIGHT: 169.31 LBS | BODY MASS INDEX: 30 KG/M2 | HEIGHT: 63 IN | TEMPERATURE: 98.1 F

## 2021-03-25 DIAGNOSIS — R33.9 INCOMPLETE BLADDER EMPTYING: ICD-10-CM

## 2021-03-25 DIAGNOSIS — N32.81 DETRUSOR INSTABILITY: ICD-10-CM

## 2021-03-25 DIAGNOSIS — R32 URINARY INCONTINENCE, UNSPECIFIED TYPE: Primary | ICD-10-CM

## 2021-03-25 LAB
BILIRUB BLD-MCNC: NEGATIVE MG/DL
CLARITY, POC: CLEAR
COLOR UR: YELLOW
GLUCOSE UR STRIP-MCNC: ABNORMAL MG/DL
KETONES UR QL: NEGATIVE
LEUKOCYTE EST, POC: NEGATIVE
NITRITE UR-MCNC: NEGATIVE MG/ML
PH UR: 6 [PH] (ref 5–8)
PROT UR STRIP-MCNC: NEGATIVE MG/DL
RBC # UR STRIP: NEGATIVE /UL
SP GR UR: 1.01 (ref 1–1.03)
UROBILINOGEN UR QL: NORMAL

## 2021-03-25 PROCEDURE — 81003 URINALYSIS AUTO W/O SCOPE: CPT | Performed by: UROLOGY

## 2021-03-25 PROCEDURE — 99204 OFFICE O/P NEW MOD 45 MIN: CPT | Performed by: UROLOGY

## 2021-03-25 PROCEDURE — 51798 US URINE CAPACITY MEASURE: CPT | Performed by: UROLOGY

## 2021-03-25 NOTE — PROGRESS NOTES
Chief Complaint:          Chief Complaint   Patient presents with   • Urinary Incontinence       HPI:   65 y.o. female is a new patient referred with frequency and urgency no stress incontinence does not wear pads postvoid residuals 59 cc urine 3+ positive for sugar never been treated has a mild cystocele normal bowel movements no menses  2 para 2 not sexually active.  I Juliana initiate therapy with an anticholinergic and see her back in 1 month    Past Medical History:        Past Medical History:   Diagnosis Date   • Arthritis    • Coronary artery disease    • Depression    • Diabetes mellitus (CMS/[facility])    • GERD (gastroesophageal reflux disease)    • Hyperlipidemia    • Hypertension    • Myocardial infarction (CMS/[facility])    • Peripheral vascular disease (CMS/[facility])    • Psoriasis          Current Meds:     Current Outpatient Medications   Medication Sig Dispense Refill   • albuterol (PROVENTIL) (2.5 MG/3ML) 0.083% nebulizer solution Take 2.5 mg by nebulization Every 4 (Four) Hours As Needed for Wheezing.     • aspirin  MG tablet Take 1 tablet by mouth Daily. 30 tablet 11   • clopidogrel (PLAVIX) 75 MG tablet Take 1 tablet by mouth Daily. 30 tablet 11   • gabapentin (NEURONTIN) 800 MG tablet 3 (Three) Times a Day.     • glimepiride (AMARYL) 4 MG tablet Take 4 mg by mouth Every Night.     • HYDROcodone-acetaminophen (NORCO) 7.5-325 MG per tablet As Needed.     • hydrOXYzine pamoate (VISTARIL) 25 MG capsule As Needed.     • JARDIANCE 25 MG tablet Daily.     • metoprolol tartrate (LOPRESSOR) 25 MG tablet Take 0.5 tablets by mouth Every 12 (Twelve) Hours. 30 tablet 2   • rosuvastatin (CRESTOR) 20 MG tablet Take 20 mg by mouth Daily.     • SITagliptin-MetFORMIN HCl ER (JANUMET XR)  MG tablet sustained-release 24 hour Take 1 tablet by mouth 2 (Two) Times a Day. Pt takes all doses of this med at once at hs     • tiZANidine (ZANAFLEX) 4 MG tablet Take 4 mg by mouth At Night As Needed for Muscle Spasms.        No current facility-administered medications for this visit.        Allergies:      Allergies   Allergen Reactions   • Amoxicillin Rash   • Penicillins Rash        Past Surgical History:     Past Surgical History:   Procedure Laterality Date   • APPENDECTOMY     • CARDIAC CATHETERIZATION N/A 1/9/2017    Procedure: Left Heart Cath;  Surgeon: David Diane MD;  Location: [facility] CATH INVASIVE LOCATION;  Service:    • CORONARY STENT PLACEMENT     • GALLBLADDER SURGERY     • TUBAL ABDOMINAL LIGATION     • VASCULAR SURGERY Left     Lower Extremity Vascular Stent-[facility]         Social History:     Social History     Socioeconomic History   • Marital status:      Spouse name: Not on file   • Number of children: 2   • Years of education: Not on file   • Highest education level: Not on file   Tobacco Use   • Smoking status: Current Every Day Smoker     Packs/day: 4.00     Years: 40.00     Pack years: 160.00     Types: Cigarettes   • Smokeless tobacco: Never Used   • Tobacco comment: 2-4 PPD   Substance and Sexual Activity   • Alcohol use: No   • Drug use: No       Family History:     Family History   Problem Relation Age of Onset   • Breast cancer Maternal Grandmother 70   • Cancer Mother    • Diabetes Mother    • Cancer Father    • Diabetes Father        Review of Systems:     Review of Systems   Constitutional: Negative.  Negative for activity change, appetite change, chills, diaphoresis, fatigue and unexpected weight change.   HENT: Negative for congestion, dental problem, drooling, ear discharge, ear pain, facial swelling, hearing loss, mouth sores, nosebleeds, postnasal drip, rhinorrhea, sinus pressure, sneezing, sore throat, tinnitus, trouble swallowing and voice change.    Eyes: Negative.  Negative for photophobia, pain, discharge, redness, itching and visual disturbance.   Respiratory: Negative.  Negative for apnea, cough, choking, chest tightness, shortness of breath, wheezing and stridor.     Cardiovascular: Negative.  Negative for chest pain, palpitations and leg swelling.   Gastrointestinal: Negative.  Negative for abdominal distention, abdominal pain, anal bleeding, blood in stool, constipation, diarrhea, nausea, rectal pain and vomiting.   Endocrine: Negative.  Negative for cold intolerance, heat intolerance, polydipsia, polyphagia and polyuria.   Musculoskeletal: Negative.  Negative for arthralgias, back pain, gait problem, joint swelling, myalgias, neck pain and neck stiffness.   Skin: Negative.  Negative for color change, pallor, rash and wound.   Allergic/Immunologic: Negative.  Negative for environmental allergies, food allergies and immunocompromised state.   Neurological: Negative.  Negative for dizziness, tremors, seizures, syncope, facial asymmetry, speech difficulty, weakness, light-headedness, numbness and headaches.   Hematological: Negative.  Negative for adenopathy. Does not bruise/bleed easily.   Psychiatric/Behavioral: Negative for agitation, behavioral problems, confusion, decreased concentration, dysphoric mood, hallucinations, self-injury, sleep disturbance and suicidal ideas. The patient is not nervous/anxious and is not hyperactive.    All other systems reviewed and are negative.      Physical Exam:     Physical Exam  Constitutional:       Appearance: She is well-developed.   HENT:      Head: Normocephalic and atraumatic.      Right Ear: External ear normal.      Left Ear: External ear normal.   Eyes:      Conjunctiva/sclera: Conjunctivae normal.      Pupils: Pupils are equal, round, and reactive to light.   Cardiovascular:      Rate and Rhythm: Normal rate and regular rhythm.      Heart sounds: Normal heart sounds.   Pulmonary:      Effort: Pulmonary effort is normal.      Breath sounds: Normal breath sounds.   Abdominal:      General: Bowel sounds are normal. There is no distension.      Palpations: Abdomen is soft. There is no mass.      Tenderness: There is no abdominal  tenderness. There is no guarding or rebound.   Genitourinary:     Vagina: No vaginal discharge.      Comments: Soft nontender abdomen with no organomegaly, rigidity, guarding or tenderness.  Normal vaginal orifice.  No evidence of prolapse no palpable masses.  No significant perineal body abnormalities and a normal external anus.  Neurologic exam is nonfocal.    Musculoskeletal:         General: Normal range of motion.   Skin:     General: Skin is warm and dry.   Neurological:      Mental Status: She is alert.      Deep Tendon Reflexes: Reflexes are normal and symmetric.   Psychiatric:         Behavior: Behavior normal.         Thought Content: Thought content normal.         Judgment: Judgment normal.         I have reviewed the following portions of the patient's history: allergies, current medications, past family history, past medical history, past social history, past surgical history, problem list and ROS and confirm it's accurate.      Procedure:       Assessment/Plan:   Detrusor instability-patient has been diagnosed with detrusor instability which is in irritative bladder symptomatology most likely related to factors such as intake of bladder irritants, postinfectious irritation, prolapse, with a very large differential diagnosis.  The mainstay of treatment has been tight cholinergics which basically caused the bladder to have decreased contractility.  We have discussed the side effects of these treatments including dry mouth, double vision, and increasing constipation.  Urinary incontinence:  Patient was diagnosed with urinary incontinence.  We discussed treatable and non-treatable causes of both stress and urge urinary incontinence.  With regards to stress urinary incontinence we discussed its relationship to childbirth and pelvic health.  We discussed the grading of stress incontinence with trying to quantitate the number of pads used.  We talked about leaking urine with laughing, lifting, coughing, and  sexual intercourse.  Talked about the urge component and the concept of mixed incontinence where upon the stress treatable at the urge may exist and that 50% of the time the urge will resolve with treatment of the stress incontinence.  We talked with the diagnostic workup including a postvoid residual urine, urine and even a simple cystometrogram.  I discussed the findings that may be neurologically related including commonly seen with multiple sclerosis, Parkinson's disease, and stroke.  I talked about the various therapeutic options including anticholinergics, beta 3 agonists, and alpha blockade if there is a component of obstruction.  I discussed the side effects of anti-cholinergic including dry mouth, double vision etc.  Anticholinergic medication-we are recommending the use of an anticholinergic.  We discussed the class of drugs.  We discussed the older drug such as oxybutynin with his increased spectrum of side effects such as dry mouth, dry eyes constipation versus the newer medications with lower side effects but more difficult to obtain via insurance and much more expensive finally the newest class of drugs which is Myrbetriq which has a very favorable side effect profile but unfortunately is prohibitively expensive and oftentimes requires precertification of which may be unsuccessful in many other cases      Patient reports that she is  currently experiencing any symptoms of urinary incontinence.      Patient's Body mass index is 30 kg/m². BMI is above normal parameters. Recommendations include: educational material.              This document has been electronically signed by MARCK XAVIER MD March 25, 2021 09:55 EDT

## 2021-03-26 PROBLEM — N32.81 DETRUSOR INSTABILITY: Status: ACTIVE | Noted: 2021-03-26

## 2021-05-07 DIAGNOSIS — I70.213 ATHEROSCLEROSIS OF NATIVE ARTERY OF BOTH LOWER EXTREMITIES WITH INTERMITTENT CLAUDICATION (HCC): Primary | ICD-10-CM

## 2021-05-24 ENCOUNTER — HOSPITAL ENCOUNTER (OUTPATIENT)
Dept: CT IMAGING | Facility: HOSPITAL | Age: 66
Discharge: HOME | End: 2021-05-24
Admitting: NURSE PRACTITIONER

## 2021-05-24 DIAGNOSIS — I70.213 ATHEROSCLEROSIS OF NATIVE ARTERY OF BOTH LOWER EXTREMITIES WITH INTERMITTENT CLAUDICATION (HCC): ICD-10-CM

## 2021-05-24 LAB — CREAT BLDA-MCNC: 0.8 MG/DL (ref 0.6–1.3)

## 2021-05-24 PROCEDURE — 75635 CT ANGIO ABDOMINAL ARTERIES: CPT | Performed by: RADIOLOGY

## 2021-05-24 PROCEDURE — 82565 ASSAY OF CREATININE: CPT

## 2021-05-24 PROCEDURE — 75635 CT ANGIO ABDOMINAL ARTERIES: CPT

## 2021-05-24 PROCEDURE — 25010000002 IOPAMIDOL 61 % SOLUTION: Performed by: NURSE PRACTITIONER

## 2021-05-24 RX ADMIN — IOPAMIDOL 100 ML: 612 INJECTION, SOLUTION INTRAVENOUS at 11:05

## 2021-07-01 ENCOUNTER — OFFICE VISIT (OUTPATIENT)
Dept: UROLOGY | Facility: CLINIC | Age: 66
End: 2021-07-01

## 2021-07-01 VITALS — BODY MASS INDEX: 30 KG/M2 | WEIGHT: 169.31 LBS | HEIGHT: 63 IN

## 2021-07-01 DIAGNOSIS — R32 URINARY INCONTINENCE, UNSPECIFIED TYPE: Primary | ICD-10-CM

## 2021-07-01 DIAGNOSIS — N32.81 DETRUSOR INSTABILITY: ICD-10-CM

## 2021-07-01 LAB
BILIRUB BLD-MCNC: NEGATIVE MG/DL
CLARITY, POC: CLEAR
COLOR UR: YELLOW
GLUCOSE UR STRIP-MCNC: ABNORMAL MG/DL
KETONES UR QL: NEGATIVE
LEUKOCYTE EST, POC: NEGATIVE
NITRITE UR-MCNC: NEGATIVE MG/ML
PH UR: 6 [PH] (ref 5–8)
PROT UR STRIP-MCNC: ABNORMAL MG/DL
RBC # UR STRIP: NEGATIVE /UL
SP GR UR: 1.02 (ref 1–1.03)
UROBILINOGEN UR QL: NORMAL

## 2021-07-01 PROCEDURE — 99213 OFFICE O/P EST LOW 20 MIN: CPT | Performed by: UROLOGY

## 2021-07-01 PROCEDURE — 81003 URINALYSIS AUTO W/O SCOPE: CPT | Performed by: UROLOGY

## 2021-07-01 NOTE — PROGRESS NOTES
Chief Complaint:          Chief Complaint   Patient presents with   • Urinary Incontinence       HPI:   65 y.o. female returns today with urinary incontinence.  Her physician took her off the Myrbetriq secondary to presumed dizziness.  She continues with leakage she is desirous of more definitive solution such as Botox I will set her up for lower tract investigation      Past Medical History:        Past Medical History:   Diagnosis Date   • Arthritis    • Coronary artery disease    • Depression    • Diabetes mellitus (CMS/[facility])    • GERD (gastroesophageal reflux disease)    • Hyperlipidemia    • Hypertension    • Myocardial infarction (CMS/[facility])    • Peripheral vascular disease (CMS/[facility])    • Psoriasis          Current Meds:     Current Outpatient Medications   Medication Sig Dispense Refill   • albuterol (PROVENTIL) (2.5 MG/3ML) 0.083% nebulizer solution Take 2.5 mg by nebulization Every 4 (Four) Hours As Needed for Wheezing.     • aspirin  MG tablet Take 1 tablet by mouth Daily. 30 tablet 11   • clopidogrel (PLAVIX) 75 MG tablet Take 1 tablet by mouth Daily. 30 tablet 11   • gabapentin (NEURONTIN) 800 MG tablet 3 (Three) Times a Day.     • glimepiride (AMARYL) 4 MG tablet Take 4 mg by mouth Every Night.     • HYDROcodone-acetaminophen (NORCO) 7.5-325 MG per tablet As Needed.     • hydrOXYzine pamoate (VISTARIL) 25 MG capsule As Needed.     • JARDIANCE 25 MG tablet Daily.     • metoprolol tartrate (LOPRESSOR) 25 MG tablet Take 0.5 tablets by mouth Every 12 (Twelve) Hours. 30 tablet 2   • rosuvastatin (CRESTOR) 20 MG tablet Take 20 mg by mouth Daily.     • SITagliptin-MetFORMIN HCl ER (JANUMET XR)  MG tablet sustained-release 24 hour Take 1 tablet by mouth 2 (Two) Times a Day. Pt takes all doses of this med at once at hs     • tiZANidine (ZANAFLEX) 4 MG tablet Take 4 mg by mouth At Night As Needed for Muscle Spasms.       No current facility-administered medications for this visit.        Allergies:       Allergies   Allergen Reactions   • Amoxicillin Rash   • Penicillins Rash        Past Surgical History:     Past Surgical History:   Procedure Laterality Date   • APPENDECTOMY     • CARDIAC CATHETERIZATION N/A 1/9/2017    Procedure: Left Heart Cath;  Surgeon: David Diane MD;  Location: [facility] CATH INVASIVE LOCATION;  Service:    • CORONARY STENT PLACEMENT     • GALLBLADDER SURGERY     • TUBAL ABDOMINAL LIGATION     • VASCULAR SURGERY Left     Lower Extremity V[facility] Stent-[facility]         Social History:     Social History     Socioeconomic History   • Marital status:      Spouse name: Not on file   • Number of children: 2   • Years of education: Not on file   • Highest education level: Not on file   Tobacco Use   • Smoking status: Current Every Day Smoker     Packs/day: 4.00     Years: 40.00     Pack years: 160.00     Types: Cigarettes   • Smokeless tobacco: Never Used   • Tobacco comment: 2-4 PPD   Substance and Sexual Activity   • Alcohol use: No   • Drug use: No       Family History:     Family History   Problem Relation Age of Onset   • Breast cancer Maternal Grandmother 70   • Cancer Mother    • Diabetes Mother    • Cancer Father    • Diabetes Father        Review of Systems:     Review of Systems   Constitutional: Negative.  Negative for activity change, appetite change, chills, diaphoresis, fatigue and unexpected weight change.   HENT: Negative for congestion, dental problem, drooling, ear discharge, ear pain, facial swelling, hearing loss, mouth sores, nosebleeds, postnasal drip, rhinorrhea, sinus pressure, sneezing, sore throat, tinnitus, trouble swallowing and voice change.    Eyes: Negative.  Negative for photophobia, pain, discharge, redness, itching and visual disturbance.   Respiratory: Negative.  Negative for apnea, cough, choking, chest tightness, shortness of breath, wheezing and stridor.    Cardiovascular: Negative.  Negative for chest pain, palpitations and leg swelling.    Gastrointestinal: Negative.  Negative for abdominal distention, abdominal pain, anal bleeding, blood in stool, constipation, diarrhea, nausea, rectal pain and vomiting.   Endocrine: Negative.  Negative for cold intolerance, heat intolerance, polydipsia, polyphagia and polyuria.   Genitourinary: Positive for frequency and urgency.   Musculoskeletal: Negative.  Negative for arthralgias, back pain, gait problem, joint swelling, myalgias, neck pain and neck stiffness.   Skin: Negative.  Negative for color change, pallor, rash and wound.   Allergic/Immunologic: Negative.  Negative for environmental allergies, food allergies and immunocompromised state.   Neurological: Negative.  Negative for dizziness, tremors, seizures, syncope, facial asymmetry, speech difficulty, weakness, light-headedness, numbness and headaches.   Hematological: Negative.  Negative for adenopathy. Does not bruise/bleed easily.   Psychiatric/Behavioral: Negative for agitation, behavioral problems, confusion, decreased concentration, dysphoric mood, hallucinations, self-injury, sleep disturbance and suicidal ideas. The patient is not nervous/anxious and is not hyperactive.    All other systems reviewed and are negative.      Physical Exam:     Physical Exam  Constitutional:       Appearance: She is well-developed.   HENT:      Head: Normocephalic and atraumatic.      Right Ear: External ear normal.      Left Ear: External ear normal.   Eyes:      Conjunctiva/sclera: Conjunctivae normal.      Pupils: Pupils are equal, round, and reactive to light.   Cardiovascular:      Rate and Rhythm: Normal rate and regular rhythm.      Heart sounds: Normal heart sounds.   Pulmonary:      Effort: Pulmonary effort is normal.      Breath sounds: Normal breath sounds.   Abdominal:      General: Bowel sounds are normal. There is no distension.      Palpations: Abdomen is soft. There is no mass.      Tenderness: There is no abdominal tenderness. There is no guarding or  rebound.   Genitourinary:     Vagina: No vaginal discharge.   Musculoskeletal:         General: Normal range of motion.   Skin:     General: Skin is warm and dry.   Neurological:      Mental Status: She is alert.      Deep Tendon Reflexes: Reflexes are normal and symmetric.   Psychiatric:         Behavior: Behavior normal.         Thought Content: Thought content normal.         Judgment: Judgment normal.         I have reviewed the following portions of the patient's history: allergies, current medications, past family history, past medical history, past social history, past surgical history, problem list and ROS and confirm it's accurate.      Procedure:       Assessment/Plan:   Detrusor instability-patient has been diagnosed with detrusor instability which is in irritative bladder symptomatology most likely related to factors such as intake of bladder irritants, postinfectious irritation, prolapse, with a very large differential diagnosis.  The mainstay of treatment has been tight cholinergics which basically caused the bladder to have decreased contractility.  We have discussed the side effects of these treatments including dry mouth, double vision, and increasing constipation.  She has failed treatment with Myrbetriq I will set her up for lower tract investigation      Patient reports that she is not currently experiencing any symptoms of urinary incontinence.                    This document has been electronically signed by MARCK XAVIER MD July 1, 2021 08:09 EDT

## 2021-07-14 ENCOUNTER — OFFICE VISIT (OUTPATIENT)
Dept: CARDIAC SURGERY | Facility: CLINIC | Age: 66
End: 2021-07-14

## 2021-07-14 VITALS
HEIGHT: 63 IN | DIASTOLIC BLOOD PRESSURE: 74 MMHG | BODY MASS INDEX: 30.83 KG/M2 | OXYGEN SATURATION: 97 % | TEMPERATURE: 98.3 F | HEART RATE: 65 BPM | WEIGHT: 174 LBS | SYSTOLIC BLOOD PRESSURE: 124 MMHG

## 2021-07-14 DIAGNOSIS — I73.9 PAD (PERIPHERAL ARTERY DISEASE) (HCC): Primary | ICD-10-CM

## 2021-07-14 PROCEDURE — 99214 OFFICE O/P EST MOD 30 MIN: CPT | Performed by: NURSE PRACTITIONER

## 2021-07-14 RX ORDER — LINAGLIPTIN 5 MG/1
5 TABLET, FILM COATED ORAL DAILY
COMMUNITY
Start: 2021-06-21

## 2021-07-14 NOTE — PROGRESS NOTES
[facility] Follow Up Note     Date of Encounter: 07/14/2021     MRN Number: [id number]  Name: Monae Chauhan  Phone Number: [phone]     Referred By: Zachary Sullivan MD  PCP: Zachary Sullivan MD    Chief Complaint:    Chief Complaint   Patient presents with   • Follow-up     Referred back by Dr. Zachary Sullivan for Femoral artery stenosis. Pt states that she can't walk any further then 30 feet without having to stop and rest. Severe bilateral  pain in the upper buttocks and the calves ache as well. Denies SOB and fatigue       Subjective      History of Present Illness:    Monae Chauhan is a 65 y.o. female current smoker with hx of HTN, HLD on statin therapy, non-insulin dependent DM, CAD s/p stenting, PAD s/p remote stenting by a Dr Lester who was seen by Dr Jackson in 2020 for intermittent claudication symptoms with CTA demonstrating significant BLE disease. During her last visit with Dr Baker 7/2020 infrainguinal bypass/left femoral to below-knee poplitela bypass with EVH was discussed but required cardiac clearance. She had Lexiscan nuclear stress test with Dr Johnson but failed to follow-up with provider on three separate occasion. She saw a different cardiologist Dr Briggs in [place] on 3/11/21 and obtained clearance. She also saw another vascular surgeon in [place] Dr Kwok who suggested moving forward with intervention. It is unclear why she has not scheduled anything. She is here today because she is ready to move forward with a surgery. She states her leg claudication symptoms have progressed since she was seen last and she can now barely make it from one room in her house to another without her legs and hips locking up. No one side worse than the other. She has no hx of delayed wound healing or BLE wounds/ulcers. Is compliant on DAPT. She declined walking down the hallway to her exam room and requested a wheelchair.     Review of  Systems:  Review of Systems   Constitutional: Positive for malaise/fatigue.   Cardiovascular: Positive for dyspnea on exertion.   Hematologic/Lymphatic: Bruises/bleeds easily.   Musculoskeletal: Positive for joint pain and muscle cramps (calf muscles ache and cramp when walking greater then 30m feet).       I have reviewed the following portions of the patient's history: allergies, current medications, past family history, past medical history, past social history, past surgical history and problem list and confirm it's accurate.    Allergies:  Allergies   Allergen Reactions   • Amoxicillin Rash   • Penicillins Rash       Medications:      Current Outpatient Medications:   •  albuterol (PROVENTIL) (2.5 MG/3ML) 0.083% nebulizer solution, Take 2.5 mg by nebulization Every 4 (Four) Hours As Needed for Wheezing., Disp: , Rfl:   •  aspirin  MG tablet, Take 1 tablet by mouth Daily., Disp: 30 tablet, Rfl: 11  •  clopidogrel (PLAVIX) 75 MG tablet, Take 1 tablet by mouth Daily., Disp: 30 tablet, Rfl: 11  •  gabapentin (NEURONTIN) 800 MG tablet, 3 (Three) Times a Day., Disp: , Rfl:   •  glimepiride (AMARYL) 4 MG tablet, Take 4 mg by mouth Every Night., Disp: , Rfl:   •  HYDROcodone-acetaminophen (NORCO) 7.5-325 MG per tablet, As Needed., Disp: , Rfl:   •  hydrOXYzine pamoate (VISTARIL) 25 MG capsule, As Needed., Disp: , Rfl:   •  JARDIANCE 25 MG tablet, Daily., Disp: , Rfl:   •  metoprolol tartrate (LOPRESSOR) 25 MG tablet, Take 0.5 tablets by mouth Every 12 (Twelve) Hours., Disp: 30 tablet, Rfl: 2  •  rosuvastatin (CRESTOR) 20 MG tablet, Take 20 mg by mouth Daily., Disp: , Rfl:   •  tiZANidine (ZANAFLEX) 4 MG tablet, Take 4 mg by mouth At Night As Needed for Muscle Spasms., Disp: , Rfl:   •  Tradjenta 5 MG tablet tablet, Take 5 mg by mouth Daily., Disp: , Rfl:     History:   Past Medical History:   Diagnosis Date   • Arthritis    • Coronary artery disease    • Depression    • Diabetes mellitus (CMS/HCC)    • GERD  "(gastroesophageal reflux disease)    • Hyperlipidemia    • Hypertension    • Myocardial infarction (CMS/HCC)    • Peripheral vascular disease (CMS/HCC)    • Psoriasis        Past Surgical History:   Procedure Laterality Date   • APPENDECTOMY     • CARDIAC CATHETERIZATION N/A 2017    Procedure: Left Heart Cath;  Surgeon: David Diane MD;  Location: [facility] CATH INVASIVE LOCATION;  Service:    • CORONARY STENT PLACEMENT     • GALLBLADDER SURGERY     • TUBAL ABDOMINAL LIGATION     • VASCULAR SURGERY Left     Lower Extremity [facility] St[facility]       Social History     Socioeconomic History   • Marital status:      Spouse name: Not on file   • Number of children: 2   • Years of education: Not on file   • Highest education level: Not on file   Tobacco Use   • Smoking status: Former Smoker     Packs/day: 3.00     Years: 40.00     Pack years: 120.00     Types: Cigarettes     Quit date: 2021     Years since quittin.0   • Smokeless tobacco: Never Used   • Tobacco comment: 2-4 PPD off and on for the last 40 years   Vaping Use   • Vaping Use: Every day   • Substances: Nicotine, Flavoring   • Devices: Refillable tank   Substance and Sexual Activity   • Alcohol use: No   • Drug use: No        Family History   Problem Relation Age of Onset   • Breast cancer Maternal Grandmother 70   • Cancer Mother    • Diabetes Mother    • Cancer Father    • Diabetes Father        Objective     Physical Exam:  Vitals:    21 1154   BP: 124/74   Pulse: 65   Temp: 98.3 °F (36.8 °C)   SpO2: 97%   Weight: 78.9 kg (174 lb)   Height: 160 cm (63\")      Body mass index is 30.82 kg/m².    Physical Exam  Vitals and nursing note reviewed.   Constitutional:       Appearance: Normal appearance.   HENT:      Head: Normocephalic and atraumatic.   Eyes:      Pupils: Pupils are equal, round, and reactive to light.   Cardiovascular:      Rate and Rhythm: Normal rate and regular rhythm.      Pulses: No decreased pulses.           " Dorsalis pedis pulses are detected w/ Doppler on the right side and detected w/ Doppler on the left side.        Posterior tibial pulses are detected w/ Doppler on the right side and detected w/ Doppler on the left side.      Heart sounds: Normal heart sounds, S1 normal and S2 normal. No murmur heard.     Pulmonary:      Effort: Pulmonary effort is normal.      Breath sounds: Normal breath sounds.   Abdominal:      Palpations: Abdomen is soft.   Musculoskeletal:         General: Normal range of motion.      Cervical back: Neck supple.      Right lower leg: No edema.      Left lower leg: No edema.   Feet:      Right foot:      Skin integrity: No ulcer, skin breakdown, callus or dry skin.      Left foot:      Skin integrity: No ulcer, skin breakdown, callus or dry skin.   Skin:     General: Skin is warm and dry.      Capillary Refill: Capillary refill takes less than 2 seconds.      Comments: Toes: pink, warm, without delayed cap refill   Neurological:      General: No focal deficit present.      Mental Status: She is alert and oriented to person, place, and time. Mental status is at baseline.   Psychiatric:         Mood and Affect: Mood normal.         Behavior: Behavior normal.         Imaging/Labs:  CT Angio Abdominal Aorta Bilateral Iliofem Runoff-Result Date: 5/24/2021  1.  Again there is occlusion of the right common femoral and right superficial femoral with some reconstitution noted at level of the popliteal artery. 2.  The left superficial femoral artery shows multifocal severe stenosis and persistent left distal superficial femoral artery occlusion. 3.  Popliteal artery on the left shows reconstitution.  This report was finalized on 5/24/2021 10:40 AM by Dr. Shaun Haile MD.      Imaging personally reviewed with Dr. Bonner with significant bilateral disease.    Assessment / Plan      Assessment / Plan:  Diagnoses and all orders for this visit:    1. PAD (peripheral artery disease) (CMS/[facility])  (Primary)    Monae Chauhan is a 65 y.o. female current smoker with hx of HTN, HLD on statin therapy, non-insulin dependent DM, CAD s/p stenting, PAD s/p remote stenting by a Dr Lester who was seen by Dr Jackson in 2020 for intermittent claudication symptoms with CTA demonstrating significant BLE disease. During her last visit with Dr Baker 7/2020 infrainguinal bypass/left femoral to below-knee poplitela bypass with EVH was discussed but required cardiac clearance. She had Lexiscan nuclear stress test with Dr Johnson but failed to follow-up with provider on three separate occasion. She saw a different cardiologist Dr Briggs in [place] on 3/11/21 and obtained clearance. She also saw another vascular surgeon in [place] Dr Kwok who suggested moving forward with intervention. It is unclear why she has not scheduled anything. She is here today because she is ready to move forward with a surgery. She states her leg claudication symptoms have progressed since she was seen last and she can now barely make it from one room in her house to another without her legs and hips locking up. No one side worse than the other. She has no hx of delayed wound healing or BLE wounds/ulcers. Is compliant on DAPT. She declined walking down the hallway to her exam room and requested a wheelchair. She has bilateral distal dopplerable pulses with toes pink, warm, and dry. New CT imaging reviewed in detail with Dr Baker. There is known occlusive disease to her right femoral as well as scattered disease in her left. She has extensive tibial disease which would cause concern for out flow and continued graft patency if she were to move forward with bypass. At this time Dr Baker is not recommending surgical intervention for fear of early graft failure. We would recommend strict medical management with smoking cessation, continued DAPT, and a structured walking regiment. We discussed this in detail and Dr Baker was very honest with  patient regarding the severity of her leg disease and risk for limb loss without compliance. Education provided in regards to safe foot care and wear. Patient verbalized understanding and has no further questions. We will plan to see her back in 6 months.     Patient Education:  A structured walking regiment is used to improve the circulation or blood flow in your legs. Recognize that walking may hurt at first - and that is good. In fact, the goal is to walk at a pace that causes mild or moderate pain or tightness in your legs. Pace yourself, stop to rest for a few minutes, but then resume walking. This discomfort triggers your body to improve your circulation. Repeat several times: Walk at a pace that causes mild or moderate leg pain, then rest, and keep going. Over time, you may find you are able to walk longer with less pain. That is a sign that your blood vessels are recovering. It may take months, so be patient and persistent.     Follow Up:   Return in about 6 months (around 1/14/2022).   Or sooner for any further concerns or worsening sign and symptoms. If unable to reach us in the office please dial 911 or go to the nearest emergency department.      Sonia Kasper APRHEMANT  [facility] Cardi[facility]    Time Spent: I spent 45+ minutes caring for Monae on this date of service. This time includes time spent by me in the following activities: preparing for the visit, reviewing tests, obtaining and/or reviewing a separately obtained history, performing a medically appropriate examination and/or evaluation, counseling and educating the patient/family/caregiver, referring and communicating with other health care professionals, documenting information in the medical record and independently interpreting results and communicating that information with the patient/family/caregiver.

## 2021-07-23 DIAGNOSIS — Z00.6 EXAMINATION FOR NORMAL COMPARISON FOR CLINICAL RESEARCH: Primary | ICD-10-CM

## 2021-09-09 ENCOUNTER — PROCEDURE VISIT (OUTPATIENT)
Dept: UROLOGY | Facility: CLINIC | Age: 66
End: 2021-09-09

## 2021-09-09 VITALS — HEIGHT: 63 IN | WEIGHT: 174 LBS | BODY MASS INDEX: 30.83 KG/M2

## 2021-09-09 DIAGNOSIS — N32.81 DETRUSOR INSTABILITY: ICD-10-CM

## 2021-09-09 DIAGNOSIS — Z48.816 AFTERCARE FOLLOWING SURGERY OF THE GENITOURINARY SYSTEM: Primary | ICD-10-CM

## 2021-09-09 PROCEDURE — 99214 OFFICE O/P EST MOD 30 MIN: CPT | Performed by: UROLOGY

## 2021-09-09 PROCEDURE — 96372 THER/PROPH/DIAG INJ SC/IM: CPT | Performed by: UROLOGY

## 2021-09-09 PROCEDURE — 51725 SIMPLE CYSTOMETROGRAM: CPT | Performed by: UROLOGY

## 2021-09-09 PROCEDURE — 52285 CYSTOSCOPY AND TREATMENT: CPT | Performed by: UROLOGY

## 2021-09-09 RX ORDER — GENTAMICIN SULFATE 40 MG/ML
80 INJECTION, SOLUTION INTRAMUSCULAR; INTRAVENOUS ONCE
Status: COMPLETED | OUTPATIENT
Start: 2021-09-09 | End: 2021-09-09

## 2021-09-09 RX ADMIN — GENTAMICIN SULFATE 80 MG: 40 INJECTION, SOLUTION INTRAMUSCULAR; INTRAVENOUS at 11:06

## 2021-09-09 NOTE — PROGRESS NOTES
Chief Complaint:          Chief Complaint   Patient presents with   • Urinary Incontinence     cysto        HPI:   66 y.o. female referred for evaluation of incontinence.  He has severe frequency and urgency.  She had a reaction to Myrbetriq.  She is here for cystoscopy and a limited urodynamic investigation to see if she is a candidate for Botox.      Past Medical History:        Past Medical History:   Diagnosis Date   • Arthritis    • Coronary artery disease    • Depression    • Diabetes mellitus (CMS/[facility])    • GERD (gastroesophageal reflux disease)    • Hyperlipidemia    • Hypertension    • Myocardial infarction (CMS/[facility])    • Peripheral vascular disease (CMS/HCC)    • Psoriasis          Current Meds:     Current Outpatient Medications   Medication Sig Dispense Refill   • albuterol (PROVENTIL) (2.5 MG/3ML) 0.083% nebulizer solution Take 2.5 mg by nebulization Every 4 (Four) Hours As Needed for Wheezing.     • aspirin  MG tablet Take 1 tablet by mouth Daily. 30 tablet 11   • clopidogrel (PLAVIX) 75 MG tablet Take 1 tablet by mouth Daily. 30 tablet 11   • gabapentin (NEURONTIN) 800 MG tablet 3 (Three) Times a Day.     • glimepiride (AMARYL) 4 MG tablet Take 4 mg by mouth Every Night.     • HYDROcodone-acetaminophen (NORCO) 7.5-325 MG per tablet As Needed.     • hydrOXYzine pamoate (VISTARIL) 25 MG capsule As Needed.     • JARDIANCE 25 MG tablet Daily.     • metoprolol tartrate (LOPRESSOR) 25 MG tablet Take 0.5 tablets by mouth Every 12 (Twelve) Hours. 30 tablet 2   • rosuvastatin (CRESTOR) 20 MG tablet Take 20 mg by mouth Daily.     • tiZANidine (ZANAFLEX) 4 MG tablet Take 4 mg by mouth At Night As Needed for Muscle Spasms.     • Tradjenta 5 MG tablet tablet Take 5 mg by mouth Daily.       No current facility-administered medications for this visit.        Allergies:      Allergies   Allergen Reactions   • Amoxicillin Rash   • Penicillins Rash        Past Surgical History:     Past Surgical History:    Procedure Laterality Date   • APPENDECTOMY     • CARDIAC CATHETERIZATION N/A 2017    Procedure: Left Heart Cath;  Surgeon: David Diane MD;  Location: [facility] CATH INVASIVE LOCATION;  Service:    • CORONARY STENT PLACEMENT     • GALLBLADDER SURGERY     • TUBAL ABDOMINAL LIGATION     • VASCULAR SURGERY Left     Lower Extrem[facility]         Social History:     Social History     Socioeconomic History   • Marital status:      Spouse name: Not on file   • Number of children: 2   • Years of education: Not on file   • Highest education level: Not on file   Tobacco Use   • Smoking status: Former Smoker     Packs/day: 3.00     Years: 40.00     Pack years: 120.00     Types: Cigarettes     Quit date: 2021     Years since quittin.2   • Smokeless tobacco: Never Used   • Tobacco comment: 2-4 PPD off and on for the last 40 years   Vaping Use   • Vaping Use: Every day   • Substances: Nicotine, Flavoring   • Devices: Refillable tank   Substance and Sexual Activity   • Alcohol use: No   • Drug use: No       Family History:     Family History   Problem Relation Age of Onset   • Breast cancer Maternal Grandmother 70   • Cancer Mother    • Diabetes Mother    • Cancer Father    • Diabetes Father        Review of Systems:     Review of Systems   Constitutional: Negative.  Negative for activity change, appetite change, chills, diaphoresis, fatigue and unexpected weight change.   HENT: Negative for congestion, dental problem, drooling, ear discharge, ear pain, facial swelling, hearing loss, mouth sores, nosebleeds, postnasal drip, rhinorrhea, sinus pressure, sneezing, sore throat, tinnitus, trouble swallowing and voice change.    Eyes: Negative.  Negative for photophobia, pain, discharge, redness, itching and visual disturbance.   Respiratory: Negative.  Negative for apnea, cough, choking, chest tightness, shortness of breath, wheezing and stridor.    Cardiovascular: Negative.  Negative for chest  pain, palpitations and leg swelling.   Gastrointestinal: Negative.  Negative for abdominal distention, abdominal pain, anal bleeding, blood in stool, constipation, diarrhea, nausea, rectal pain and vomiting.   Endocrine: Negative.  Negative for cold intolerance, heat intolerance, polydipsia, polyphagia and polyuria.   Genitourinary: Positive for difficulty urinating, frequency, pelvic pain and urgency.   Musculoskeletal: Negative.  Negative for arthralgias, back pain, gait problem, joint swelling, myalgias, neck pain and neck stiffness.   Skin: Negative.  Negative for color change, pallor, rash and wound.   Allergic/Immunologic: Negative.  Negative for environmental allergies, food allergies and immunocompromised state.   Neurological: Negative.  Negative for dizziness, tremors, seizures, syncope, facial asymmetry, speech difficulty, weakness, light-headedness, numbness and headaches.   Hematological: Negative.  Negative for adenopathy. Does not bruise/bleed easily.   Psychiatric/Behavioral: Negative for agitation, behavioral problems, confusion, decreased concentration, dysphoric mood, hallucinations, self-injury, sleep disturbance and suicidal ideas. The patient is not nervous/anxious and is not hyperactive.    All other systems reviewed and are negative.      Physical Exam:     Physical Exam  Constitutional:       Appearance: She is well-developed.   HENT:      Head: Normocephalic and atraumatic.      Right Ear: External ear normal.      Left Ear: External ear normal.   Eyes:      Conjunctiva/sclera: Conjunctivae normal.      Pupils: Pupils are equal, round, and reactive to light.   Cardiovascular:      Rate and Rhythm: Normal rate and regular rhythm.      Heart sounds: Normal heart sounds.   Pulmonary:      Effort: Pulmonary effort is normal.      Breath sounds: Normal breath sounds.   Abdominal:      General: Bowel sounds are normal. There is no distension.      Palpations: Abdomen is soft. There is no mass.       Tenderness: There is no abdominal tenderness. There is no guarding or rebound.   Genitourinary:     General: Normal vulva.      Vagina: No vaginal discharge.      Comments: Very tight meatus no prolapse no stress incontinence with Valsalva  Musculoskeletal:         General: Normal range of motion.   Skin:     General: Skin is warm and dry.   Neurological:      Mental Status: She is alert.      Deep Tendon Reflexes: Reflexes are normal and symmetric.   Psychiatric:         Behavior: Behavior normal.         Thought Content: Thought content normal.         Judgment: Judgment normal.         I have reviewed the following portions of the patient's history: allergies, current medications, past family history, past medical history, past social history, past surgical history, problem list and ROS and confirm it's accurate.      Procedure:   Cystoscopy and urethral dilation-after an appropriate informed consent the patient was brought to the procedure suite.  The urethra was gently anesthetized with 10 cc of 2% viscous Xylocaine jelly.  After an adequate period of topical anesthesia I went ahead and advanced the cystoscope.  There was an obvious stenosis present.  The bladder was unremarkable there were no stones, tumors, foreign bodies or abnormalities.  The ureteric orifice easily normal in position and configuration.  The scope was removed and the urethra was gently anesthetized and dilated with [place] sounds from 16-20 Polish sequentially without complication the patient was given gentamicin as prophylaxis with 80 mg.  Simple urodynamics-following a careful discussion of the risks and benefits of simple urodynamics.  This includes a 2% risk of a urinary tract infection.  The patient's brought the operative suite after appropriate endoscopy and prep and drape in a sterile fashion using the spinal manometric technique for simple cystometrogram at 50 cc/m first sensation was noted at 25  cc.  Patient developed  fullness at 300  cc.  There were no  detrusor contractions.   There is no evidence of other abnormalities during this investigation.    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  The mainstay of treatment has been tight cholinergics which basically caused the bladder to have decreased contractility.  We have discussed the side effects of these treatments including dry mouth, double vision, and increasing constipation.  She has failed treatment with anticholinergics because of cardiovascular side effects.  Botox-patient wishes to proceed with a Botox injection in the bladder.  We discussed the efficacy of this treatment.  We discussed the effects on voiding.  I explained that there is an approximate 6% risk of retention with 100 units of Botox injected in the posterior bladder in a submucosal fashion.  We talked about the need for intermittent clean catheterization if urinary retention were to follow.  We discussed the fact that it lasts up to 6 months.  The, we discussed the increasing risk of retention with higher doses.  We discussed the efficacy of this treatment and neurogenic bladders particularly detrusor hyperreflexia and reviewed the appropriate literature in this regard.  Patient wishes to proceed.      Patient reports that she is  currently experiencing any symptoms of urinary incontinence.            This document has been electronically signed by MARCK XAVIER MD September 9, 2021 10:31 EDT

## 2021-10-01 DIAGNOSIS — N32.81 DETRUSOR INSTABILITY: Primary | ICD-10-CM

## 2021-10-01 RX ORDER — GENTAMICIN SULFATE 80 MG/100ML
80 INJECTION, SOLUTION INTRAVENOUS ONCE
Status: CANCELLED | OUTPATIENT
Start: 2021-10-01 | End: 2021-10-01

## 2021-10-08 ENCOUNTER — APPOINTMENT (OUTPATIENT)
Dept: PREADMISSION TESTING | Facility: HOSPITAL | Age: 66
End: 2021-10-08

## 2021-10-13 DIAGNOSIS — N32.81 DETRUSOR INSTABILITY: Primary | ICD-10-CM

## 2021-10-18 ENCOUNTER — LAB (OUTPATIENT)
Dept: LAB | Facility: HOSPITAL | Age: 66
End: 2021-10-18

## 2021-10-18 ENCOUNTER — PRE-ADMISSION TESTING (OUTPATIENT)
Dept: PREADMISSION TESTING | Facility: HOSPITAL | Age: 66
End: 2021-10-18

## 2021-10-18 DIAGNOSIS — N32.81 DETRUSOR INSTABILITY: ICD-10-CM

## 2021-10-18 LAB
ANION GAP SERPL CALCULATED.3IONS-SCNC: 10.7 MMOL/L (ref 5–15)
BUN SERPL-MCNC: 11 MG/DL (ref 8–23)
BUN/CREAT SERPL: 13.9 (ref 7–25)
CALCIUM SPEC-SCNC: 9.2 MG/DL (ref 8.6–10.5)
CHLORIDE SERPL-SCNC: 98 MMOL/L (ref 98–107)
CO2 SERPL-SCNC: 27.3 MMOL/L (ref 22–29)
CREAT SERPL-MCNC: 0.79 MG/DL (ref 0.57–1)
DEPRECATED RDW RBC AUTO: 47.8 FL (ref 37–54)
ERYTHROCYTE [DISTWIDTH] IN BLOOD BY AUTOMATED COUNT: 14 % (ref 12.3–15.4)
GFR SERPL CREATININE-BSD FRML MDRD: 73 ML/MIN/1.73
GLUCOSE SERPL-MCNC: 422 MG/DL (ref 65–99)
HCT VFR BLD AUTO: 45.9 % (ref 34–46.6)
HGB BLD-MCNC: 15 G/DL (ref 12–15.9)
MCH RBC QN AUTO: 30.4 PG (ref 26.6–33)
MCHC RBC AUTO-ENTMCNC: 32.7 G/DL (ref 31.5–35.7)
MCV RBC AUTO: 93.1 FL (ref 79–97)
PLATELET # BLD AUTO: 241 10*3/MM3 (ref 140–450)
PMV BLD AUTO: 10.1 FL (ref 6–12)
POTASSIUM SERPL-SCNC: 3.8 MMOL/L (ref 3.5–5.2)
QT INTERVAL: 448 MS
QTC INTERVAL: 462 MS
RBC # BLD AUTO: 4.93 10*6/MM3 (ref 3.77–5.28)
SARS-COV-2 RNA PNL SPEC NAA+PROBE: NOT DETECTED
SODIUM SERPL-SCNC: 136 MMOL/L (ref 136–145)
WBC # BLD AUTO: 7.17 10*3/MM3 (ref 3.4–10.8)

## 2021-10-18 PROCEDURE — 85027 COMPLETE CBC AUTOMATED: CPT

## 2021-10-18 PROCEDURE — 36415 COLL VENOUS BLD VENIPUNCTURE: CPT

## 2021-10-18 PROCEDURE — 80048 BASIC METABOLIC PNL TOTAL CA: CPT

## 2021-10-18 PROCEDURE — U0004 COV-19 TEST NON-CDC HGH THRU: HCPCS | Performed by: UROLOGY

## 2021-10-18 PROCEDURE — 93010 ELECTROCARDIOGRAM REPORT: CPT | Performed by: INTERNAL MEDICINE

## 2021-10-18 PROCEDURE — 93005 ELECTROCARDIOGRAM TRACING: CPT

## 2021-10-18 PROCEDURE — C9803 HOPD COVID-19 SPEC COLLECT: HCPCS | Performed by: UROLOGY

## 2021-10-18 NOTE — DISCHARGE INSTRUCTIONS
TAKE the following medications the morning of surgery:    All heart or blood pressure medications    Please discontinue all blood thinners and anticoagulants (except aspirin) prior to surgery as per your surgeon and cardiologist instructions.  Aspirin may be continued up to the day prior to surgery.    HOLD all diabetic medications the morning of surgery as order by physician.    Arrival time for surgery on 10/20/21 is 0830 am per Dr. Robles's office.    A RESPONSIBLE PERSON MUST REMAIN IN THE WAITING ROOM DURING YOUR PROCEDURE AND A RESPONSIBLE  MUST BE AVAILABLE UPON YOUR DISCHARGE.    General Instructions:  • Do NOT eat or drink after midnight 10/19/21 which includes water, mints, or gum.  • You may brush your teeth. Dental appliances that are removable must be taken out day of surgery.  • Do NOT smoke, chew tobacco, or drink alcohol within 24 hours prior to surgery.  • Bring medications in original bottles, any inhalers and if applicable your C-PAP/BI-PAP machine  • Bring any papers given to you in the doctor’s office  • Wear clean, comfortable clothes and socks  • Do NOT wear contact lenses or make-up or dark nail polish.  Bring a case for your glasses if applicable.  • Bring crutches or walker if applicable  • Leave all other valuables and jewelry at home  • If you were given a blood bank armband, continue to wear it until discharged.    Preventing a Surgical Site Infection:  • Shower the night before surgery (unless instructed otherwise) using a fresh bar of anti-bacterial soap (such as Dial) and clean washcloth.  Dry with a clean towel and dress in clean clothing.  • For 2 to 3 days before surgery, avoid shaving with a razor near where you will have surgery because the razor can irritate skin and make it easier to develop an infection.  Ask your surgeon if you will be receiving antibiotics prior to surgery.  • Make sure you, your family, and all healthcare providers clean their hands with soap and  water or an alcohol-based hand  before caring for you or your wound.  • If at all possible, quit smoking as many days before surgery as you can.    Day of Surgery:  Upon arrival, a pre-op nurse and anesthesiologist will review your health history, obtain vital signs, and answer questions you may have.  The only belongings needed at this time will be your home medications and if applicable you C-PAP/BI-PAP machine.  If you are staying overnight, your family can leave the rest of your belongings in the car and bring them to your room later.  A pre-op nurse will start an IV and you may receive medication in preparation for surgery.  Due to patient privacy and limited space, only one member of your family will be able to accompany you in the pre-op area.  While you are in surgery your family should notify the waiting room  if they leave the waiting room area and provide a contact number.  Please be aware that surgery does come with discomfort.  We want to make every effort to control your discomfort so please discuss any uncontrolled symptoms with your nurse.  Your doctor will most likely have prescribed pain medications.  If you are going home after surgery you will receive individualized written care instructions before being discharged.  A responsible adult must drive you to and from the hospital on the day of surgery and stay with you for 24 hours.  If you are staying overnight following surgery, you will be transported to your hospital room following the recovery period.

## 2021-10-20 ENCOUNTER — APPOINTMENT (OUTPATIENT)
Dept: GENERAL RADIOLOGY | Facility: HOSPITAL | Age: 66
End: 2021-10-20

## 2021-10-20 ENCOUNTER — ANESTHESIA (OUTPATIENT)
Dept: PERIOP | Facility: HOSPITAL | Age: 66
End: 2021-10-20

## 2021-10-20 ENCOUNTER — HOSPITAL ENCOUNTER (OUTPATIENT)
Facility: HOSPITAL | Age: 66
Setting detail: HOSPITAL OUTPATIENT SURGERY
Discharge: HOME | End: 2021-10-20
Attending: UROLOGY | Admitting: UROLOGY

## 2021-10-20 ENCOUNTER — ANESTHESIA EVENT (OUTPATIENT)
Dept: PERIOP | Facility: HOSPITAL | Age: 66
End: 2021-10-20

## 2021-10-20 VITALS
SYSTOLIC BLOOD PRESSURE: 121 MMHG | TEMPERATURE: 97.9 F | RESPIRATION RATE: 18 BRPM | BODY MASS INDEX: 30.69 KG/M2 | OXYGEN SATURATION: 96 % | DIASTOLIC BLOOD PRESSURE: 65 MMHG | HEIGHT: 63 IN | WEIGHT: 173.2 LBS | HEART RATE: 62 BPM

## 2021-10-20 DIAGNOSIS — N32.81 DETRUSOR INSTABILITY: ICD-10-CM

## 2021-10-20 PROCEDURE — 25010000002 GENTAMICIN PER 80 MG: Performed by: UROLOGY

## 2021-10-20 PROCEDURE — 25010000002 ONABOTULINUMTOXINA 100 UNITS RECONSTITUTED SOLUTION: Performed by: UROLOGY

## 2021-10-20 PROCEDURE — 25010000002 FENTANYL CITRATE (PF) 50 MCG/ML SOLUTION: Performed by: NURSE ANESTHETIST, CERTIFIED REGISTERED

## 2021-10-20 PROCEDURE — 25010000002 ONDANSETRON PER 1 MG: Performed by: NURSE ANESTHETIST, CERTIFIED REGISTERED

## 2021-10-20 PROCEDURE — 25010000002 MIDAZOLAM PER 1 MG: Performed by: NURSE ANESTHETIST, CERTIFIED REGISTERED

## 2021-10-20 PROCEDURE — 25010000002 PROPOFOL 10 MG/ML EMULSION: Performed by: NURSE ANESTHETIST, CERTIFIED REGISTERED

## 2021-10-20 PROCEDURE — S0260 H&P FOR SURGERY: HCPCS | Performed by: UROLOGY

## 2021-10-20 PROCEDURE — 52287 CYSTOSCOPY CHEMODENERVATION: CPT | Performed by: UROLOGY

## 2021-10-20 RX ORDER — FENTANYL CITRATE 50 UG/ML
50 INJECTION, SOLUTION INTRAMUSCULAR; INTRAVENOUS
Status: DISCONTINUED | OUTPATIENT
Start: 2021-10-20 | End: 2021-10-20 | Stop reason: HOSPADM

## 2021-10-20 RX ORDER — GENTAMICIN SULFATE 80 MG/100ML
80 INJECTION, SOLUTION INTRAVENOUS ONCE
Status: COMPLETED | OUTPATIENT
Start: 2021-10-20 | End: 2021-10-20

## 2021-10-20 RX ORDER — FAMOTIDINE 10 MG/ML
INJECTION, SOLUTION INTRAVENOUS AS NEEDED
Status: DISCONTINUED | OUTPATIENT
Start: 2021-10-20 | End: 2021-10-20 | Stop reason: SURG

## 2021-10-20 RX ORDER — IPRATROPIUM BROMIDE AND ALBUTEROL SULFATE 2.5; .5 MG/3ML; MG/3ML
3 SOLUTION RESPIRATORY (INHALATION) ONCE AS NEEDED
Status: DISCONTINUED | OUTPATIENT
Start: 2021-10-20 | End: 2021-10-20 | Stop reason: HOSPADM

## 2021-10-20 RX ORDER — MIDAZOLAM HYDROCHLORIDE 1 MG/ML
0.5 INJECTION INTRAMUSCULAR; INTRAVENOUS
Status: DISCONTINUED | OUTPATIENT
Start: 2021-10-20 | End: 2021-10-20 | Stop reason: HOSPADM

## 2021-10-20 RX ORDER — SODIUM CHLORIDE 0.9 % (FLUSH) 0.9 %
10 SYRINGE (ML) INJECTION AS NEEDED
Status: DISCONTINUED | OUTPATIENT
Start: 2021-10-20 | End: 2021-10-20 | Stop reason: HOSPADM

## 2021-10-20 RX ORDER — PROPOFOL 10 MG/ML
VIAL (ML) INTRAVENOUS CONTINUOUS PRN
Status: DISCONTINUED | OUTPATIENT
Start: 2021-10-20 | End: 2021-10-20 | Stop reason: SURG

## 2021-10-20 RX ORDER — MIDAZOLAM HYDROCHLORIDE 1 MG/ML
INJECTION INTRAMUSCULAR; INTRAVENOUS AS NEEDED
Status: DISCONTINUED | OUTPATIENT
Start: 2021-10-20 | End: 2021-10-20 | Stop reason: SURG

## 2021-10-20 RX ORDER — SODIUM CHLORIDE, SODIUM LACTATE, POTASSIUM CHLORIDE, CALCIUM CHLORIDE 600; 310; 30; 20 MG/100ML; MG/100ML; MG/100ML; MG/100ML
INJECTION, SOLUTION INTRAVENOUS CONTINUOUS PRN
Status: DISCONTINUED | OUTPATIENT
Start: 2021-10-20 | End: 2021-10-20 | Stop reason: SURG

## 2021-10-20 RX ORDER — MAGNESIUM HYDROXIDE 1200 MG/15ML
LIQUID ORAL AS NEEDED
Status: DISCONTINUED | OUTPATIENT
Start: 2021-10-20 | End: 2021-10-20 | Stop reason: HOSPADM

## 2021-10-20 RX ORDER — ONDANSETRON 2 MG/ML
INJECTION INTRAMUSCULAR; INTRAVENOUS AS NEEDED
Status: DISCONTINUED | OUTPATIENT
Start: 2021-10-20 | End: 2021-10-20 | Stop reason: SURG

## 2021-10-20 RX ORDER — FENTANYL CITRATE 50 UG/ML
INJECTION, SOLUTION INTRAMUSCULAR; INTRAVENOUS AS NEEDED
Status: DISCONTINUED | OUTPATIENT
Start: 2021-10-20 | End: 2021-10-20 | Stop reason: SURG

## 2021-10-20 RX ORDER — SODIUM CHLORIDE 0.9 % (FLUSH) 0.9 %
10 SYRINGE (ML) INJECTION EVERY 12 HOURS SCHEDULED
Status: DISCONTINUED | OUTPATIENT
Start: 2021-10-20 | End: 2021-10-20 | Stop reason: HOSPADM

## 2021-10-20 RX ORDER — SODIUM CHLORIDE, SODIUM LACTATE, POTASSIUM CHLORIDE, CALCIUM CHLORIDE 600; 310; 30; 20 MG/100ML; MG/100ML; MG/100ML; MG/100ML
125 INJECTION, SOLUTION INTRAVENOUS ONCE
Status: COMPLETED | OUTPATIENT
Start: 2021-10-20 | End: 2021-10-20

## 2021-10-20 RX ORDER — ASPIRIN 325 MG
325 TABLET, DELAYED RELEASE (ENTERIC COATED) ORAL EVERY 6 HOURS PRN
COMMUNITY

## 2021-10-20 RX ORDER — ONDANSETRON 2 MG/ML
4 INJECTION INTRAMUSCULAR; INTRAVENOUS AS NEEDED
Status: DISCONTINUED | OUTPATIENT
Start: 2021-10-20 | End: 2021-10-20 | Stop reason: HOSPADM

## 2021-10-20 RX ORDER — MEPERIDINE HYDROCHLORIDE 25 MG/ML
12.5 INJECTION INTRAMUSCULAR; INTRAVENOUS; SUBCUTANEOUS
Status: DISCONTINUED | OUTPATIENT
Start: 2021-10-20 | End: 2021-10-20 | Stop reason: HOSPADM

## 2021-10-20 RX ADMIN — ONDANSETRON 4 MG: 2 INJECTION INTRAMUSCULAR; INTRAVENOUS at 09:39

## 2021-10-20 RX ADMIN — PROPOFOL 100 MCG/KG/MIN: 10 INJECTION, EMULSION INTRAVENOUS at 09:44

## 2021-10-20 RX ADMIN — SODIUM CHLORIDE, POTASSIUM CHLORIDE, SODIUM LACTATE AND CALCIUM CHLORIDE: 600; 310; 30; 20 INJECTION, SOLUTION INTRAVENOUS at 09:39

## 2021-10-20 RX ADMIN — FAMOTIDINE 20 MG: 10 INJECTION INTRAVENOUS at 09:39

## 2021-10-20 RX ADMIN — FENTANYL CITRATE 100 MCG: 50 INJECTION INTRAMUSCULAR; INTRAVENOUS at 09:39

## 2021-10-20 RX ADMIN — GENTAMICIN SULFATE 80 MG: 80 INJECTION, SOLUTION INTRAVENOUS at 09:45

## 2021-10-20 RX ADMIN — MIDAZOLAM 2 MG: 1 INJECTION INTRAMUSCULAR; INTRAVENOUS at 09:39

## 2021-10-20 RX ADMIN — SODIUM CHLORIDE, POTASSIUM CHLORIDE, SODIUM LACTATE AND CALCIUM CHLORIDE 125 ML/HR: 600; 310; 30; 20 INJECTION, SOLUTION INTRAVENOUS at 09:04

## 2021-10-20 NOTE — ANESTHESIA PREPROCEDURE EVALUATION
Anesthesia Evaluation     Patient summary reviewed and Nursing notes reviewed                Airway   Mallampati: II  TM distance: >3 FB  Neck ROM: full  No difficulty expected  Dental    (+) upper dentures, edentulous and lower dentures    Pulmonary     breath sounds clear to auscultation  (+) pneumonia , shortness of breath,   Cardiovascular   Exercise tolerance: good (4-7 METS)    Rhythm: regular  Rate: normal    (+) hypertension, past MI , CAD, cardiac stents (january 2017) more than 12 months ago BRIGGS, PVD, hyperlipidemia,       Neuro/Psych  (+) psychiatric history,     GI/Hepatic/Renal/Endo    (+) obesity, morbid obesity, GERD,  diabetes mellitus,     Musculoskeletal     Abdominal     Abdomen: soft.   Substance History      OB/GYN          Other   arthritis,                    Anesthesia Plan    ASA 3     general     intravenous induction     Anesthetic plan, all risks, benefits, and alternatives have been provided, discussed and informed consent has been obtained with: patient.    Plan discussed with CRNA.

## 2021-10-20 NOTE — OP NOTE
CYSTOSCOPY BOTOX INJECTION OF BLADDER  Procedure Note    Monae Chauhan  10/20/2021    Pre-op Diagnosis:   Detrusor instability [N32.81]    Post-op Diagnosis:     Post-Op Diagnosis Codes:     * Detrusor instability [N32.81]    Procedure/CPT® Codes:  65-year-old white female with refractory detrusor instability for Botox.  Botox-patient wishes to proceed with a Botox injection in the bladder.  We discussed the efficacy of this treatment.  We discussed the effects on voiding.  I explained that there is an approximate 6% risk of retention with 100 units of Botox injected in the posterior bladder in a submucosal fashion.  We talked about the need for intermittent clean catheterization if urinary retention were to follow.  We discussed the fact that it lasts up to 6 months.  The, we discussed the increasing risk of retention with higher doses.  We discussed the efficacy of this treatment and neurogenic bladders particularly detrusor hyperreflexia and reviewed the appropriate literature in this regard.  Patient wishes to proceed.  Following an informed consent prepped and draped in a low dorsolithotomy position use the 21 [place] cystoscope she had a normal bladder injected a total of 100 units of Botox diluted in 10 cc posterior long 10 different locations and submucosal blebs this was accomplished without difficulty she was awake alert and returned to recovery    Procedure(s):  Cystoscopy Botox injection of bladder-100 units    Surgeon(s):  Say Robles MD    Anesthesia: see anesthesia record    Staff:   Circulator: Taylor Goodwin RN  Scrub Person: Carol Evans Kathy  Documenter: Martin Liriano RN    Estimated Blood Loss: none  Urine Voided: * No values recorded between 10/20/2021  9:38 AM and 10/20/2021  9:56 AM *    Specimens:                None      Drains: None    Findings: Cystoscopically normal bladder    Blood: N/A    Complications: None    Grafts and Implants:  None    Say Robles MD     Date: 10/20/2021  Time: 10:06 EDT

## 2021-10-20 NOTE — ANESTHESIA POSTPROCEDURE EVALUATION
Patient: Monae Chauhan    Procedure Summary     Date: 10/20/21 Room / Location: [facility] OR  /  [facility] OR    Anesthesia Start: 0938 Anesthesia Stop: 0956    Procedure: Cystoscopy Botox injection of bladder (N/A ) Diagnosis:       Detrusor instability      (Detrusor instability [N32.81])    Surgeons: Say Robles MD Provider: Orlin Moe MD    Anesthesia Type: general ASA Status: 3          Anesthesia Type: general    Vitals  Vitals Value Taken Time   /62 10/20/21 1012   Temp 98.5 °F (36.9 °C) 10/20/21 0957   Pulse 57 10/20/21 1012   Resp 16 10/20/21 1012   SpO2 97 % 10/20/21 1012           Post Anesthesia Care and Evaluation    Patient location during evaluation: PACU  Patient participation: complete - patient participated  Level of consciousness: awake  Pain score: 0  Pain management: adequate  Airway patency: patent  Anesthetic complications: No anesthetic complications  PONV Status: none  Cardiovascular status: hemodynamically stable  Respiratory status: nasal cannula  Hydration status: acceptable

## 2021-10-20 NOTE — H&P
Chief Complaint   Patient presents with    Urinary Incontinence       cysto          HPI:   66 y.o. female referred for evaluation of incontinence.  He has severe frequency and urgency.  She had a reaction to Myrbetriq.  She is here for cystoscopy and a limited urodynamic investigation to see if she is a candidate for Botox.        Past Medical History:         Medical History        Past Medical History:   Diagnosis Date    Arthritis      Coronary artery disease      Depression      Diabetes mellitus (CMS/HCC)      GERD (gastroesophageal reflux disease)      Hyperlipidemia      Hypertension      Myocardial infarction (CMS/HCC)      Peripheral vascular disease (CMS/HCC)      Psoriasis                 Current Meds:      Current Medications          Current Outpatient Medications   Medication Sig Dispense Refill    albuterol (PROVENTIL) (2.5 MG/3ML) 0.083% nebulizer solution Take 2.5 mg by nebulization Every 4 (Four) Hours As Needed for Wheezing.        aspirin  MG tablet Take 1 tablet by mouth Daily. 30 tablet 11    clopidogrel (PLAVIX) 75 MG tablet Take 1 tablet by mouth Daily. 30 tablet 11    gabapentin (NEURONTIN) 800 MG tablet 3 (Three) Times a Day.        glimepiride (AMARYL) 4 MG tablet Take 4 mg by mouth Every Night.        HYDROcodone-acetaminophen (NORCO) 7.5-325 MG per tablet As Needed.        hydrOXYzine pamoate (VISTARIL) 25 MG capsule As Needed.        JARDIANCE 25 MG tablet Daily.        metoprolol tartrate (LOPRESSOR) 25 MG tablet Take 0.5 tablets by mouth Every 12 (Twelve) Hours. 30 tablet 2    rosuvastatin (CRESTOR) 20 MG tablet Take 20 mg by mouth Daily.        tiZANidine (ZANAFLEX) 4 MG tablet Take 4 mg by mouth At Night As Needed for Muscle Spasms.        Tradjenta 5 MG tablet tablet Take 5 mg by mouth Daily.          No current facility-administered medications for this visit.            Allergies:            Allergies   Allergen Reactions    Amoxicillin Rash    Penicillins Rash          Past Surgical History:      Surgical History         Past Surgical History:   Procedure Laterality Date    APPENDECTOMY        CARDIAC CATHETERIZATION N/A 2017     Procedure: Left Heart Cath;  Surgeon: David Diane MD;  Location: [facility] CATH INVASIVE LOCATION;  Service:     CORONARY STENT PLACEMENT        GALLBLADDER SURGERY        TUBAL ABDOMINAL LIGATION        VASCULAR SURGERY Left       Lower Extremity Vascular Stent-[facility]               Social History:      Social History   Social History            Socioeconomic History    Marital status:        Spouse name: Not on file    Number of children: 2    Years of education: Not on file    Highest education level: Not on file   Tobacco Use    Smoking status: Former Smoker       Packs/day: 3.00       Years: 40.00       Pack years: 120.00       Types: Cigarettes       Quit date: 2021       Years since quittin.2    Smokeless tobacco: Never Used    Tobacco comment: 2-4 PPD off and on for the last 40 years   Vaping Use    Vaping Use: Every day    Substances: Nicotine, Flavoring    Devices: Refillable tank   Substance and Sexual Activity    Alcohol use: No    Drug use: No            Family History:            Family History   Problem Relation Age of Onset    Breast cancer Maternal Grandmother 70    Cancer Mother      Diabetes Mother      Cancer Father      Diabetes Father           Review of Systems:      Review of Systems   Constitutional: Negative.  Negative for activity change, appetite change, chills, diaphoresis, fatigue and unexpected weight change.   HENT: Negative for congestion, dental problem, drooling, ear discharge, ear pain, facial swelling, hearing loss, mouth sores, nosebleeds, postnasal drip, rhinorrhea, sinus pressure, sneezing, sore throat, tinnitus, trouble swallowing and voice change.    Eyes: Negative.  Negative for photophobia, pain, discharge, redness, itching and visual disturbance.   Respiratory: Negative.  Negative for  apnea, cough, choking, chest tightness, shortness of breath, wheezing and stridor.    Cardiovascular: Negative.  Negative for chest pain, palpitations and leg swelling.   Gastrointestinal: Negative.  Negative for abdominal distention, abdominal pain, anal bleeding, blood in stool, constipation, diarrhea, nausea, rectal pain and vomiting.   Endocrine: Negative.  Negative for cold intolerance, heat intolerance, polydipsia, polyphagia and polyuria.   Genitourinary: Positive for difficulty urinating, frequency, pelvic pain and urgency.   Musculoskeletal: Negative.  Negative for arthralgias, back pain, gait problem, joint swelling, myalgias, neck pain and neck stiffness.   Skin: Negative.  Negative for color change, pallor, rash and wound.   Allergic/Immunologic: Negative.  Negative for environmental allergies, food allergies and immunocompromised state.   Neurological: Negative.  Negative for dizziness, tremors, seizures, syncope, facial asymmetry, speech difficulty, weakness, light-headedness, numbness and headaches.   Hematological: Negative.  Negative for adenopathy. Does not bruise/bleed easily.   Psychiatric/Behavioral: Negative for agitation, behavioral problems, confusion, decreased concentration, dysphoric mood, hallucinations, self-injury, sleep disturbance and suicidal ideas. The patient is not nervous/anxious and is not hyperactive.    All other systems reviewed and are negative.        Physical Exam:      Physical Exam  Constitutional:       Appearance: She is well-developed.   HENT:      Head: Normocephalic and atraumatic.      Right Ear: External ear normal.      Left Ear: External ear normal.   Eyes:      Conjunctiva/sclera: Conjunctivae normal.      Pupils: Pupils are equal, round, and reactive to light.   Cardiovascular:      Rate and Rhythm: Normal rate and regular rhythm.      Heart sounds: Normal heart sounds.   Pulmonary:      Effort: Pulmonary effort is normal.      Breath sounds: Normal breath  sounds.   Abdominal:      General: Bowel sounds are normal. There is no distension.      Palpations: Abdomen is soft. There is no mass.      Tenderness: There is no abdominal tenderness. There is no guarding or rebound.   Genitourinary:     General: Normal vulva.      Vagina: No vaginal discharge.      Comments: Very tight meatus no prolapse no stress incontinence with Valsalva  Musculoskeletal:         General: Normal range of motion.   Skin:     General: Skin is warm and dry.   Neurological:      Mental Status: She is alert.      Deep Tendon Reflexes: Reflexes are normal and symmetric.   Psychiatric:         Behavior: Behavior normal.         Thought Content: Thought content normal.         Judgment: Judgment normal.            I have reviewed the following portions of the patient's history: allergies, current medications, past family history, past medical history, past social history, past surgical history, problem list and ROS and confirm it's accurate.        Procedure:   Cystoscopy and urethral dilation-after an appropriate informed consent the patient was brought to the procedure suite.  The urethra was gently anesthetized with 10 cc of 2% viscous Xylocaine jelly.  After an adequate period of topical anesthesia I went ahead and advanced the cystoscope.  There was an obvious stenosis present.  The bladder was unremarkable there were no stones, tumors, foreign bodies or abnormalities.  The ureteric orifice easily normal in position and configuration.  The scope was removed and the urethra was gently anesthetized and dilated with [place] sounds from 16-20 Swedish sequentially without complication the patient was given gentamicin as prophylaxis with 80 mg.  Simple urodynamics-following a careful discussion of the risks and benefits of simple urodynamics.  This includes a 2% risk of a urinary tract infection.  The patient's brought the operative suite after appropriate endoscopy and prep and drape in a sterile  fashion using the spinal manometric technique for simple cystometrogram at 50 cc/m first sensation was noted at 25  cc.  Patient developed fullness at 300  cc.  There were no  detrusor contractions.   There is no evidence of other abnormalities during this investigation.     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  The mainstay of treatment has been anticholinergics which basically caused the bladder to have decreased contractility.  We have discussed the side effects of these treatments including dry mouth, double vision, and increasing constipation.  She has failed treatment with anticholinergics because of cardiovascular side effects.  Botox-patient wishes to proceed with a Botox injection in the bladder.  We discussed the efficacy of this treatment.  We discussed the effects on voiding.  I explained that there is an approximate 6% risk of retention with 100 units of Botox injected in the posterior bladder in a submucosal fashion.  We talked about the need for intermittent clean catheterization if urinary retention were to follow.  We discussed the fact that it lasts up to 6 months.  The, we discussed the increasing risk of retention with higher doses.  We discussed the efficacy of this treatment and neurogenic bladders particularly detrusor hyperreflexia and reviewed the appropriate literature in this regard.  Patient wishes to proceed.        Patient reports that she is  currently experiencing symptoms of urinary incontinence.

## 2021-10-26 ENCOUNTER — IMMUNIZATION (OUTPATIENT)
Dept: VACCINE CLINIC | Facility: HOSPITAL | Age: 66
End: 2021-10-26

## 2021-10-26 PROCEDURE — 0004A ADM SARSCOV2 30MCG/0.3ML BOOSTER: CPT | Performed by: INTERNAL MEDICINE

## 2021-10-26 PROCEDURE — 91300 HC SARSCOV02 VAC 30MCG/0.3ML IM: CPT | Performed by: INTERNAL MEDICINE

## 2022-02-16 ENCOUNTER — TELEPHONE (OUTPATIENT)
Dept: CARDIAC SURGERY | Facility: CLINIC | Age: 67
End: 2022-02-16

## 2022-02-16 NOTE — TELEPHONE ENCOUNTER
S/W PT ABOUT MISSED APPT.  SHE REFUSED TO R/S STATING THAT DR BERMEO WASN'T GOING TO DO SURGERY SO SHE WASN'T GOING TO COME IN TO SEE HIM

## 2022-06-09 ENCOUNTER — OFFICE VISIT (OUTPATIENT)
Dept: UROLOGY | Facility: CLINIC | Age: 67
End: 2022-06-09

## 2022-06-09 VITALS — BODY MASS INDEX: 30.83 KG/M2 | WEIGHT: 174 LBS | HEIGHT: 63 IN

## 2022-06-09 DIAGNOSIS — R32 URINARY INCONTINENCE, UNSPECIFIED TYPE: Primary | ICD-10-CM

## 2022-06-09 DIAGNOSIS — R33.9 INCOMPLETE EMPTYING OF BLADDER: ICD-10-CM

## 2022-06-09 LAB
BILIRUB BLD-MCNC: ABNORMAL MG/DL
CLARITY, POC: ABNORMAL
COLOR UR: YELLOW
EXPIRATION DATE: ABNORMAL
GLUCOSE UR STRIP-MCNC: ABNORMAL MG/DL
KETONES UR QL: NEGATIVE
LEUKOCYTE EST, POC: ABNORMAL
Lab: ABNORMAL
NITRITE UR-MCNC: NEGATIVE MG/ML
PH UR: 5.5 [PH] (ref 5–8)
PROT UR STRIP-MCNC: ABNORMAL MG/DL
RBC # UR STRIP: ABNORMAL /UL
SP GR UR: 1.02 (ref 1–1.03)
UROBILINOGEN UR QL: NORMAL

## 2022-06-09 PROCEDURE — 81003 URINALYSIS AUTO W/O SCOPE: CPT | Performed by: UROLOGY

## 2022-06-09 PROCEDURE — 99213 OFFICE O/P EST LOW 20 MIN: CPT | Performed by: UROLOGY

## 2022-06-09 RX ORDER — NITROFURANTOIN 25; 75 MG/1; MG/1
100 CAPSULE ORAL 2 TIMES DAILY
Qty: 42 CAPSULE | Refills: 3 | Status: SHIPPED | OUTPATIENT
Start: 2022-06-09

## 2022-06-09 NOTE — PROGRESS NOTES
Chief Complaint:          Chief Complaint   Patient presents with   • Groin Pain     Follow up        HPI:   66 y.o. female that follows up with dysuria and pain in her vagina when she urinates she has a positive urine.  Her exam is unremarkable she gets up 1 time at night Botox helped a lot I have a culture pending and a follow-up with her based on this.    Past Medical History:        Past Medical History:   Diagnosis Date   • Arthritis    • Coronary artery disease    • Depression    • Diabetes mellitus (HCC)    • Elevated cholesterol    • Full dentures    • GERD (gastroesophageal reflux disease)    • Hyperlipidemia    • Hypertension    • Myocardial infarction (HCC)    • Peripheral vascular disease (HCC)    • Psoriasis          Current Meds:     Current Outpatient Medications   Medication Sig Dispense Refill   • albuterol (PROVENTIL) (2.5 MG/3ML) 0.083% nebulizer solution Take 2.5 mg by nebulization Every 4 (Four) Hours As Needed for Wheezing.     • aspirin  MG tablet Take 325 mg by mouth Every 6 (Six) Hours As Needed.     • clopidogrel (PLAVIX) 75 MG tablet Take 1 tablet by mouth Daily. (Patient taking differently: Take 75 mg by mouth Daily. OFFICE TOLD PATIENT NOT TO HOLD) 30 tablet 11   • gabapentin (NEURONTIN) 800 MG tablet 3 (Three) Times a Day.     • glimepiride (AMARYL) 4 MG tablet Take 4 mg by mouth Every Night.     • HYDROcodone-acetaminophen (NORCO) 7.5-325 MG per tablet As Needed.     • hydrOXYzine pamoate (VISTARIL) 25 MG capsule As Needed.     • JARDIANCE 25 MG tablet 25 mg Daily.     • metoprolol tartrate (LOPRESSOR) 25 MG tablet Take 0.5 tablets by mouth Every 12 (Twelve) Hours. 30 tablet 2   • rosuvastatin (CRESTOR) 20 MG tablet Take 20 mg by mouth Daily.     • tiZANidine (ZANAFLEX) 4 MG tablet Take 4 mg by mouth At Night As Needed for Muscle Spasms.     • Tradjenta 5 MG tablet tablet Take 5 mg by mouth Daily.       No current facility-administered medications for this visit.         Allergies:      Allergies   Allergen Reactions   • Amoxicillin Rash   • Penicillins Rash        Past Surgical History:     Past Surgical History:   Procedure Laterality Date   • APPENDECTOMY     • CARDIAC CATHETERIZATION N/A 2017    Procedure: Left Heart Cath;  Surgeon: David Diane MD;  Location: [facility] CATH INVASIVE LOCATION;  Service:    • CORONARY STENT PLACEMENT     • CYSTOSCOPY BOTOX INJECTION OF BLADDER N/A 10/20/2021    Procedure: Cystoscopy Botox injection of bladder;  Surgeon: Say Robles MD;  Location: [facility] OR;  Service: Urology;  Laterality: N/A;   • GALLBLADDER SURGERY     • TUBAL ABDOMINAL LIGATION     • VASCULAR SURGERY Left     Lower Extremity Vascular Stent-[facility]         Social History:     Social History     Socioeconomic History   • Marital status:    • Number of children: 2   Tobacco Use   • Smoking status: Current Every Day Smoker     Packs/day: 1.00     Years: 40.00     Pack years: 40.00     Types: Cigarettes     Last attempt to quit: 2021     Years since quittin.9   • Smokeless tobacco: Never Used   • Tobacco comment: 2-4 PPD off and on for the last 40 years   Vaping Use   • Vaping Use: Former   • Substances: Nicotine, Flavoring   • Devices: Refillable tank   Substance and Sexual Activity   • Alcohol use: No   • Drug use: No       Family History:     Family History   Problem Relation Age of Onset   • Breast cancer Maternal Grandmother 70   • Cancer Mother    • Diabetes Mother    • Cancer Father    • Diabetes Father        Review of Systems:     Review of Systems   Constitutional: Negative.  Negative for activity change, appetite change, chills, diaphoresis, fatigue and unexpected weight change.   HENT: Negative for congestion, dental problem, drooling, ear discharge, ear pain, facial swelling, hearing loss, mouth sores, nosebleeds, postnasal drip, rhinorrhea, sinus pressure, sneezing, sore throat, tinnitus, trouble swallowing and voice change.    Eyes:  Negative.  Negative for photophobia, pain, discharge, redness, itching and visual disturbance.   Respiratory: Negative.  Negative for apnea, cough, choking, chest tightness, shortness of breath, wheezing and stridor.    Cardiovascular: Negative.  Negative for chest pain, palpitations and leg swelling.   Gastrointestinal: Negative.  Negative for abdominal distention, abdominal pain, anal bleeding, blood in stool, constipation, diarrhea, nausea, rectal pain and vomiting.   Endocrine: Negative.  Negative for cold intolerance, heat intolerance, polydipsia, polyphagia and polyuria.   Musculoskeletal: Negative.  Negative for arthralgias, back pain, gait problem, joint swelling, myalgias, neck pain and neck stiffness.   Skin: Negative.  Negative for color change, pallor, rash and wound.   Allergic/Immunologic: Negative.  Negative for environmental allergies, food allergies and immunocompromised state.   Neurological: Negative.  Negative for dizziness, tremors, seizures, syncope, facial asymmetry, speech difficulty, weakness, light-headedness, numbness and headaches.   Hematological: Negative.  Negative for adenopathy. Does not bruise/bleed easily.   Psychiatric/Behavioral: Negative for agitation, behavioral problems, confusion, decreased concentration, dysphoric mood, hallucinations, self-injury, sleep disturbance and suicidal ideas. The patient is not nervous/anxious and is not hyperactive.    All other systems reviewed and are negative.      Physical Exam:     Physical Exam  Constitutional:       Appearance: She is well-developed.   HENT:      Head: Normocephalic and atraumatic.      Right Ear: External ear normal.      Left Ear: External ear normal.   Eyes:      Conjunctiva/sclera: Conjunctivae normal.      Pupils: Pupils are equal, round, and reactive to light.   Cardiovascular:      Rate and Rhythm: Normal rate and regular rhythm.      Heart sounds: Normal heart sounds.   Pulmonary:      Effort: Pulmonary effort is  normal.      Breath sounds: Normal breath sounds.   Abdominal:      General: Bowel sounds are normal. There is no distension.      Palpations: Abdomen is soft. There is no mass.      Tenderness: There is no abdominal tenderness. There is no guarding or rebound.   Genitourinary:     General: Normal vulva.      Vagina: No vaginal discharge.      Rectum: Normal.   Musculoskeletal:         General: Normal range of motion.   Skin:     General: Skin is warm and dry.   Neurological:      Mental Status: She is alert.      Deep Tendon Reflexes: Reflexes are normal and symmetric.   Psychiatric:         Behavior: Behavior normal.         Thought Content: Thought content normal.         Judgment: Judgment normal.         I have reviewed the following portions of the patient's history: Allergies, current medications, past family history, past medical history, past social history, past surgical history, problem list, and ROS and confirm it is accurate.      Procedure:       Assessment/Plan:   Recurrent urinary tract infections-patient has been referred and diagnosed with recurrent urinary tract infections.  We discussed the types of organisms that are found in the urinary tract indicating that the vast majority are results of the patient's own gastrointestinal vandana.  We discussed how many of the antibiotics that are utilized can actually exacerbate these infections by creating resistant organisms and there is only very few antibiotics that are concentrated in the urine and do not affect the rectal reservoir nor cause recurrent yeast vaginitis.  We discussed the risk factors for recurrent infections being intercourse in younger patients and atrophic changes in older patients.  We discussed the symptoms that are found including pain, pressure, burning, frequency, urgency, suprapubic pain, and painful intercourse.  I discussed upper tract symptoms including fevers and chills and indicated the workup would be much more aggressive if  the patient were to present with recurrent infections in the face of upper tract symptomatology such as fever.  I discussed the history of vesicoureteral reflux in young patients and finally chronic renal scarring as a result of such.  I recommend concomitant probiotics with treatment with antibiotics to protect the rectal reservoir including over-the-counter yogurt preparations to cecile oral pills containing the appropriate probiotics.      Patient reports that she is not currently experiencing any symptoms of urinary incontinence.      's              This document has been electronically signed by MARCK XAVIER MD June 9, 2022 10:17 EDT

## 2022-06-13 PROBLEM — R32 URINARY INCONTINENCE: Status: ACTIVE | Noted: 2022-06-13

## 2022-06-13 RX ORDER — FLUCONAZOLE 100 MG/1
100 TABLET ORAL DAILY
Qty: 3 TABLET | Refills: 0 | Status: SHIPPED | OUTPATIENT
Start: 2022-06-13 | End: 2022-06-16

## 2022-06-16 ENCOUNTER — TELEPHONE (OUTPATIENT)
Dept: UROLOGY | Facility: CLINIC | Age: 67
End: 2022-06-16

## 2022-06-16 NOTE — TELEPHONE ENCOUNTER
Routing to Maurice with results on her desk.    Dr Robles sent her over some diflucan and I tried to call her but her vm was no set up yet.    I let Maurice know that the patient was leaving the practice that she was unhappy that I did not reach her but she does not have voicemail and it is documented that I called her on 6/16

## 2022-06-16 NOTE — TELEPHONE ENCOUNTER
Patient called wanting someone to call her back with urine results and the culture and medications, patient wants someone to call her today and she is still having symptoms

## 2022-06-20 ENCOUNTER — TELEPHONE (OUTPATIENT)
Dept: UROLOGY | Facility: CLINIC | Age: 67
End: 2022-06-20

## 2022-06-20 NOTE — TELEPHONE ENCOUNTER
I called the pt and let her know that our manager was not here today and that I would be happy to help her. I told her I did try to call her on the 16th with the results fo the special UTI test and that all she had was vaginitis and we sent her some diflucan in and I could not reach her and couldn't leave a vm because it was not set up. She said that she went to another doctor to get this taken care of.

## 2022-06-20 NOTE — TELEPHONE ENCOUNTER
Hub staff attempted to follow warm transfer process and was unsuccessful     Caller: PT        Best call back number: [phone]    Patient is needing: RECEIVED A CALL FROM PT. SHE IS VERY UPSET AND NEEDS THE PRACTICE MANAGER TO CALL HER BACK

## 2022-06-21 ENCOUNTER — TELEPHONE (OUTPATIENT)
Dept: UROLOGY | Facility: CLINIC | Age: 67
End: 2022-06-21

## 2022-06-21 DIAGNOSIS — N76.0 GARDNERELLA VAGINALIS INFECTION: Primary | ICD-10-CM

## 2022-06-21 DIAGNOSIS — N76.0 BACTERIAL VAGINOSIS: ICD-10-CM

## 2022-06-21 DIAGNOSIS — B96.89 BACTERIAL VAGINOSIS: ICD-10-CM

## 2022-06-21 DIAGNOSIS — B37.9 CANDIDA GLABRATA INFECTION: ICD-10-CM

## 2022-06-21 DIAGNOSIS — B96.89 GARDNERELLA VAGINALIS INFECTION: Primary | ICD-10-CM

## 2022-06-21 RX ORDER — DOXYCYCLINE HYCLATE 100 MG/1
100 CAPSULE ORAL DAILY
Qty: 7 CAPSULE | Refills: 0 | Status: SHIPPED | OUTPATIENT
Start: 2022-06-21 | End: 2022-06-28

## 2022-06-21 RX ORDER — METRONIDAZOLE 7.5 MG/G
GEL VAGINAL NIGHTLY
Qty: 70 G | Refills: 0 | Status: SHIPPED | OUTPATIENT
Start: 2022-06-21

## 2022-06-21 NOTE — TELEPHONE ENCOUNTER
"Called patient to check on her post clinic visit, and to discuss urine culture results which are positive for \"    Gardnerella vaginalis greater than 100,000 cells/mL, and Candida glabrata greater than 100,000 cells/mL at this time.  Unable to leave any message, patient's voicemail not set up.      Sent doxycycline 100 mg p.o. twice daily x7 days to her notes professional pharmacy [place], and metronidazole intravagina cream also use x1 nightly as directed.    Follow-up in clinic as discussed Dr. Robles on 07/07/2022  "

## 2022-09-29 ENCOUNTER — TRANSCRIBE ORDERS (OUTPATIENT)
Dept: ADMINISTRATIVE | Facility: HOSPITAL | Age: 67
End: 2022-09-29

## 2022-09-29 DIAGNOSIS — I73.9 CLAUDICATION, INTERMITTENT: ICD-10-CM

## 2022-09-29 DIAGNOSIS — I73.9 PERIPHERAL VASCULAR DISEASE, UNSPECIFIED: Primary | ICD-10-CM

## 2022-11-18 ENCOUNTER — HOSPITAL ENCOUNTER (EMERGENCY)
Facility: HOSPITAL | Age: 67
Discharge: HOME | End: 2022-11-18
Attending: EMERGENCY MEDICINE | Admitting: EMERGENCY MEDICINE

## 2022-11-18 ENCOUNTER — APPOINTMENT (OUTPATIENT)
Dept: ULTRASOUND IMAGING | Facility: HOSPITAL | Age: 67
End: 2022-11-18

## 2022-11-18 VITALS
BODY MASS INDEX: 30.65 KG/M2 | HEART RATE: 81 BPM | OXYGEN SATURATION: 98 % | RESPIRATION RATE: 19 BRPM | SYSTOLIC BLOOD PRESSURE: 144 MMHG | WEIGHT: 173 LBS | HEIGHT: 63 IN | DIASTOLIC BLOOD PRESSURE: 75 MMHG | TEMPERATURE: 98.8 F

## 2022-11-18 DIAGNOSIS — T14.8XXA HEMATOMA: Primary | ICD-10-CM

## 2022-11-18 LAB
ALBUMIN SERPL-MCNC: 2.38 G/DL (ref 3.5–5.2)
ALBUMIN/GLOB SERPL: 0.7 G/DL
ALP SERPL-CCNC: 104 U/L (ref 39–117)
ALT SERPL W P-5'-P-CCNC: 27 U/L (ref 1–33)
ANION GAP SERPL CALCULATED.3IONS-SCNC: 9 MMOL/L (ref 5–15)
ANISOCYTOSIS BLD QL: ABNORMAL
AST SERPL-CCNC: 21 U/L (ref 1–32)
BILIRUB SERPL-MCNC: 0.3 MG/DL (ref 0–1.2)
BUN SERPL-MCNC: 6 MG/DL (ref 8–23)
BUN/CREAT SERPL: 7.6 (ref 7–25)
CALCIUM SPEC-SCNC: 8.2 MG/DL (ref 8.6–10.5)
CHLORIDE SERPL-SCNC: 99 MMOL/L (ref 98–107)
CO2 SERPL-SCNC: 27 MMOL/L (ref 22–29)
CREAT SERPL-MCNC: 0.79 MG/DL (ref 0.57–1)
DEPRECATED RDW RBC AUTO: 57.8 FL (ref 37–54)
EGFRCR SERPLBLD CKD-EPI 2021: 82.1 ML/MIN/1.73
EOSINOPHIL # BLD MANUAL: 0.31 10*3/MM3 (ref 0–0.4)
EOSINOPHIL NFR BLD MANUAL: 2 % (ref 0.3–6.2)
ERYTHROCYTE [DISTWIDTH] IN BLOOD BY AUTOMATED COUNT: 17.5 % (ref 12.3–15.4)
GLOBULIN UR ELPH-MCNC: 3.4 GM/DL
GLUCOSE SERPL-MCNC: 363 MG/DL (ref 65–99)
HCT VFR BLD AUTO: 25 % (ref 34–46.6)
HGB BLD-MCNC: 7.8 G/DL (ref 12–15.9)
HOLD SPECIMEN: NORMAL
HOLD SPECIMEN: NORMAL
HYPOCHROMIA BLD QL: ABNORMAL
LYMPHOCYTES # BLD MANUAL: 1.89 10*3/MM3 (ref 0.7–3.1)
LYMPHOCYTES NFR BLD MANUAL: 4 % (ref 5–12)
MCH RBC QN AUTO: 30.1 PG (ref 26.6–33)
MCHC RBC AUTO-ENTMCNC: 31.2 G/DL (ref 31.5–35.7)
MCV RBC AUTO: 96.5 FL (ref 79–97)
MONOCYTES # BLD: 0.63 10*3/MM3 (ref 0.1–0.9)
NEUTROPHILS # BLD AUTO: 12.89 10*3/MM3 (ref 1.7–7)
NEUTROPHILS NFR BLD MANUAL: 79 % (ref 42.7–76)
NEUTS BAND NFR BLD MANUAL: 3 % (ref 0–5)
PLAT MORPH BLD: NORMAL
PLATELET # BLD AUTO: 345 10*3/MM3 (ref 140–450)
PMV BLD AUTO: 9.4 FL (ref 6–12)
POLYCHROMASIA BLD QL SMEAR: ABNORMAL
POTASSIUM SERPL-SCNC: 3.9 MMOL/L (ref 3.5–5.2)
PROT SERPL-MCNC: 5.8 G/DL (ref 6–8.5)
RBC # BLD AUTO: 2.59 10*6/MM3 (ref 3.77–5.28)
SCAN SLIDE: NORMAL
SODIUM SERPL-SCNC: 135 MMOL/L (ref 136–145)
VARIANT LYMPHS NFR BLD MANUAL: 12 % (ref 19.6–45.3)
WBC NRBC COR # BLD: 15.72 10*3/MM3 (ref 3.4–10.8)
WHOLE BLOOD HOLD COAG: NORMAL
WHOLE BLOOD HOLD SPECIMEN: NORMAL

## 2022-11-18 PROCEDURE — 85007 BL SMEAR W/DIFF WBC COUNT: CPT | Performed by: PHYSICIAN ASSISTANT

## 2022-11-18 PROCEDURE — 93926 LOWER EXTREMITY STUDY: CPT | Performed by: RADIOLOGY

## 2022-11-18 PROCEDURE — 36415 COLL VENOUS BLD VENIPUNCTURE: CPT

## 2022-11-18 PROCEDURE — 80053 COMPREHEN METABOLIC PANEL: CPT | Performed by: PHYSICIAN ASSISTANT

## 2022-11-18 PROCEDURE — 99282 EMERGENCY DEPT VISIT SF MDM: CPT

## 2022-11-18 PROCEDURE — 85025 COMPLETE CBC W/AUTO DIFF WBC: CPT | Performed by: PHYSICIAN ASSISTANT

## 2022-11-18 PROCEDURE — 93926 LOWER EXTREMITY STUDY: CPT

## 2022-11-18 NOTE — ED PROVIDER NOTES
"Subjective   History of Present Illness  66 yo female patient presents to the ED for evaluation of a post op complication. Patient had vascular surgery yesterday at  with Dr. Hardin. When changing dressings this morning it was noted that there was a \"knot\" at the left incision site. No fevers. They contacted the office and recommended she come to the ED to have arterial doppler to rule out a pseudoaneurysm.      History provided by:  Patient   used: No        Review of Systems   Constitutional: Negative.    HENT: Negative.    Eyes: Negative.    Respiratory: Negative.    Cardiovascular: Negative.    Gastrointestinal: Negative.    Endocrine: Negative.    Genitourinary: Negative.    Musculoskeletal: Negative.    Skin: Positive for wound.   Allergic/Immunologic: Negative.    Neurological: Negative.    Hematological: Negative.    Psychiatric/Behavioral: Negative.        Past Medical History:   Diagnosis Date   • Arthritis    • Coronary artery disease    • Depression    • Diabetes mellitus (HCC)    • Elevated cholesterol    • Full dentures    • GERD (gastroesophageal reflux disease)    • Hyperlipidemia    • Hypertension    • Myocardial infarction (HCC)    • Peripheral vascular disease (HCC)    • Psoriasis        Allergies   Allergen Reactions   • Amoxicillin Rash   • Penicillins Rash       Past Surgical History:   Procedure Laterality Date   • APPENDECTOMY     • CARDIAC CATHETERIZATION N/A 1/9/2017    Procedure: Left Heart Cath;  Surgeon: David Diane MD;  Location: [facility] CATH INVASIVE LOCATION;  Service:    • CORONARY STENT PLACEMENT     • CYSTOSCOPY BOTOX INJECTION OF BLADDER N/A 10/20/2021    Procedure: Cystoscopy Botox injection of bladder;  Surgeon: Say Robles MD;  Location: [facility] OR;  Service: Urology;  Laterality: N/A;   • GALLBLADDER SURGERY     • TUBAL ABDOMINAL LIGATION     • VASCULAR SURGERY Left     Lower Extremity Vascular Stent-[facility]       Family History   Problem " Relation Age of Onset   • Breast cancer Maternal Grandmother 70   • Cancer Mother    • Diabetes Mother    • Cancer Father    • Diabetes Father        Social History     Socioeconomic History   • Marital status:    • Number of children: 2   Tobacco Use   • Smoking status: Every Day     Packs/day: 1.00     Years: 40.00     Pack years: 40.00     Types: Cigarettes     Last attempt to quit: 2021     Years since quittin.4   • Smokeless tobacco: Never   • Tobacco comments:     2-4 PPD off and on for the last 40 years   Vaping Use   • Vaping Use: Former   • Substances: Nicotine, Flavoring   • Devices: Refillable tank   Substance and Sexual Activity   • Alcohol use: No   • Drug use: No           Objective   Physical Exam  Vitals and nursing note reviewed.   Constitutional:       Appearance: Normal appearance. She is normal weight.   HENT:      Head: Normocephalic and atraumatic.      Right Ear: External ear normal.      Left Ear: External ear normal.      Nose: Nose normal.      Mouth/Throat:      Mouth: Mucous membranes are moist.      Pharynx: Oropharynx is clear.   Eyes:      Extraocular Movements: Extraocular movements intact.      Conjunctiva/sclera: Conjunctivae normal.      Pupils: Pupils are equal, round, and reactive to light.   Cardiovascular:      Rate and Rhythm: Normal rate and regular rhythm.      Pulses: Normal pulses.      Heart sounds: Normal heart sounds.   Pulmonary:      Effort: Pulmonary effort is normal.      Breath sounds: Normal breath sounds.   Abdominal:      General: Abdomen is flat. Bowel sounds are normal.      Palpations: Abdomen is soft.   Musculoskeletal:         General: Normal range of motion.      Cervical back: Normal range of motion and neck supple.   Skin:     General: Skin is warm and dry.      Capillary Refill: Capillary refill takes less than 2 seconds.          Neurological:      General: No focal deficit present.      Mental Status: She is alert and oriented to  person, place, and time. Mental status is at baseline.   Psychiatric:         Mood and Affect: Mood normal.         Behavior: Behavior normal.         Thought Content: Thought content normal.         Judgment: Judgment normal.         Procedures           ED Course  ED Course as of 11/18/22 1357   Fri Nov 18, 2022   1352 US Arterial Doppler Lower Extremity Left     IMPRESSION:  Soft tissue swelling in region of recent left inguinal incision. There  is hypoechoic collection measuring about 2.7 cm that could represent  hematoma. Impression.     This report was finalized on 11/18/2022 1:32 PM by Dr. Shaun Haile MD.           Specimen Collected: 11/18/22 13:30 EST Last Resulted: 11/18/22 13:32 EST           [ML]   1354 Patient will f/u with vascular surgeon. Discussed sx and red flags that would warrant return to the ED.  Vital stable and WNL.  [ML]      ED Course User Index  [ML] Bertha Becerril PA                                           MDM  Number of Diagnoses or Management Options  Risk of Complications, Morbidity, and/or Mortality  Presenting problems: low  Diagnostic procedures: low  Management options: low    Patient Progress  Patient progress: improved      Final diagnoses:   Hematoma       ED Disposition  ED Disposition     ED Disposition   Discharge    Condition   Stable    Comment   --             Zachary Sullivan MD  [address] [facility] [address] [phone]    Schedule an appointment as soon as possible for a visit in 1 day           Medication List      Changed    clopidogrel 75 MG tablet  Commonly known as: PLAVIX  Take 1 tablet by mouth Daily.  What changed: additional instructions             Bertha Becerril PA  11/18/22 [id number]

## 2023-02-28 ENCOUNTER — HOSPITAL ENCOUNTER (OUTPATIENT)
Dept: WOUND CARE | Facility: HOSPITAL | Age: 68
Discharge: HOME | End: 2023-02-28
Admitting: NURSE PRACTITIONER
Payer: MEDICARE

## 2023-02-28 VITALS
BODY MASS INDEX: 28.17 KG/M2 | WEIGHT: 159 LBS | RESPIRATION RATE: 18 BRPM | DIASTOLIC BLOOD PRESSURE: 81 MMHG | HEART RATE: 62 BPM | SYSTOLIC BLOOD PRESSURE: 188 MMHG | TEMPERATURE: 98.3 F

## 2023-02-28 DIAGNOSIS — T81.89XA NONHEALING SURGICAL WOUND, INITIAL ENCOUNTER: ICD-10-CM

## 2023-02-28 DIAGNOSIS — T14.8XXA OPEN WOUND: Primary | ICD-10-CM

## 2023-02-28 LAB
ALBUMIN SERPL-MCNC: 3.4 G/DL (ref 3.5–5.2)
ALBUMIN/GLOB SERPL: 0.9 G/DL
ALP SERPL-CCNC: 72 U/L (ref 39–117)
ALT SERPL W P-5'-P-CCNC: 10 U/L (ref 1–33)
ANION GAP SERPL CALCULATED.3IONS-SCNC: 6.7 MMOL/L (ref 5–15)
AST SERPL-CCNC: 16 U/L (ref 1–32)
BASOPHILS # BLD AUTO: 0.04 10*3/MM3 (ref 0–0.2)
BASOPHILS NFR BLD AUTO: 0.7 % (ref 0–1.5)
BILIRUB SERPL-MCNC: 0.2 MG/DL (ref 0–1.2)
BUN SERPL-MCNC: 9 MG/DL (ref 8–23)
BUN/CREAT SERPL: 12.2 (ref 7–25)
CALCIUM SPEC-SCNC: 9.4 MG/DL (ref 8.6–10.5)
CHLORIDE SERPL-SCNC: 103 MMOL/L (ref 98–107)
CO2 SERPL-SCNC: 29.3 MMOL/L (ref 22–29)
CREAT SERPL-MCNC: 0.74 MG/DL (ref 0.57–1)
CRP SERPL-MCNC: <0.3 MG/DL (ref 0–0.5)
DEPRECATED RDW RBC AUTO: 47.7 FL (ref 37–54)
EGFRCR SERPLBLD CKD-EPI 2021: 88.8 ML/MIN/1.73
EOSINOPHIL # BLD AUTO: 0.17 10*3/MM3 (ref 0–0.4)
EOSINOPHIL NFR BLD AUTO: 3.2 % (ref 0.3–6.2)
ERYTHROCYTE [DISTWIDTH] IN BLOOD BY AUTOMATED COUNT: 14 % (ref 12.3–15.4)
ERYTHROCYTE [SEDIMENTATION RATE] IN BLOOD: 37 MM/HR (ref 0–30)
GLOBULIN UR ELPH-MCNC: 3.8 GM/DL
GLUCOSE SERPL-MCNC: 114 MG/DL (ref 65–99)
HCT VFR BLD AUTO: 41 % (ref 34–46.6)
HGB BLD-MCNC: 12.7 G/DL (ref 12–15.9)
IMM GRANULOCYTES # BLD AUTO: 0 10*3/MM3 (ref 0–0.05)
IMM GRANULOCYTES NFR BLD AUTO: 0 % (ref 0–0.5)
LYMPHOCYTES # BLD AUTO: 2.75 10*3/MM3 (ref 0.7–3.1)
LYMPHOCYTES NFR BLD AUTO: 51.2 % (ref 19.6–45.3)
MCH RBC QN AUTO: 28.7 PG (ref 26.6–33)
MCHC RBC AUTO-ENTMCNC: 31 G/DL (ref 31.5–35.7)
MCV RBC AUTO: 92.6 FL (ref 79–97)
MONOCYTES # BLD AUTO: 0.32 10*3/MM3 (ref 0.1–0.9)
MONOCYTES NFR BLD AUTO: 6 % (ref 5–12)
NEUTROPHILS NFR BLD AUTO: 2.09 10*3/MM3 (ref 1.7–7)
NEUTROPHILS NFR BLD AUTO: 38.9 % (ref 42.7–76)
NRBC BLD AUTO-RTO: 0 /100 WBC (ref 0–0.2)
PLATELET # BLD AUTO: 318 10*3/MM3 (ref 140–450)
PMV BLD AUTO: 8.6 FL (ref 6–12)
POTASSIUM SERPL-SCNC: 4.2 MMOL/L (ref 3.5–5.2)
PROT SERPL-MCNC: 7.2 G/DL (ref 6–8.5)
RBC # BLD AUTO: 4.43 10*6/MM3 (ref 3.77–5.28)
SODIUM SERPL-SCNC: 139 MMOL/L (ref 136–145)
WBC NRBC COR # BLD: 5.37 10*3/MM3 (ref 3.4–10.8)

## 2023-02-28 PROCEDURE — 97602 WOUND(S) CARE NON-SELECTIVE: CPT

## 2023-02-28 PROCEDURE — 86140 C-REACTIVE PROTEIN: CPT | Performed by: NURSE PRACTITIONER

## 2023-02-28 PROCEDURE — 36415 COLL VENOUS BLD VENIPUNCTURE: CPT

## 2023-02-28 PROCEDURE — 80053 COMPREHEN METABOLIC PANEL: CPT | Performed by: NURSE PRACTITIONER

## 2023-02-28 PROCEDURE — 85652 RBC SED RATE AUTOMATED: CPT | Performed by: NURSE PRACTITIONER

## 2023-02-28 PROCEDURE — 85025 COMPLETE CBC W/AUTO DIFF WBC: CPT | Performed by: NURSE PRACTITIONER

## 2023-02-28 PROCEDURE — 84134 ASSAY OF PREALBUMIN: CPT | Performed by: NURSE PRACTITIONER

## 2023-02-28 PROCEDURE — G0463 HOSPITAL OUTPT CLINIC VISIT: HCPCS

## 2023-02-28 RX ORDER — NYSTATIN 100000 [USP'U]/G
1 POWDER TOPICAL ONCE
Status: CANCELLED | OUTPATIENT
Start: 2023-02-28 | End: 2023-02-28

## 2023-02-28 RX ORDER — SODIUM HYPOCHLORITE 2.5 MG/ML
1 SOLUTION TOPICAL AS NEEDED
OUTPATIENT
Start: 2023-02-28

## 2023-02-28 RX ORDER — LIDOCAINE HYDROCHLORIDE 20 MG/ML
10 INJECTION, SOLUTION INFILTRATION; PERINEURAL ONCE
Status: CANCELLED | OUTPATIENT
Start: 2023-02-28 | End: 2023-02-28

## 2023-02-28 RX ORDER — SODIUM HYPOCHLORITE 1.25 MG/ML
1 SOLUTION TOPICAL AS NEEDED
Status: CANCELLED | OUTPATIENT
Start: 2023-02-28

## 2023-02-28 RX ORDER — SODIUM HYPOCHLORITE 2.5 MG/ML
1 SOLUTION TOPICAL AS NEEDED
Status: CANCELLED | OUTPATIENT
Start: 2023-02-28

## 2023-02-28 RX ORDER — DIAPER,BRIEF,INFANT-TODD,DISP
1 EACH MISCELLANEOUS ONCE
Status: CANCELLED | OUTPATIENT
Start: 2023-02-28 | End: 2023-02-28

## 2023-02-28 RX ORDER — LIDOCAINE HYDROCHLORIDE 20 MG/ML
6 JELLY TOPICAL ONCE
Status: CANCELLED | OUTPATIENT
Start: 2023-02-28 | End: 2023-02-28

## 2023-02-28 RX ORDER — CASTOR OIL AND BALSAM, PERU 788; 87 MG/G; MG/G
1 OINTMENT TOPICAL AS NEEDED
Status: CANCELLED | OUTPATIENT
Start: 2023-02-28

## 2023-02-28 RX ORDER — LIDOCAINE HYDROCHLORIDE 20 MG/ML
10 INJECTION, SOLUTION INFILTRATION; PERINEURAL ONCE
OUTPATIENT
Start: 2023-02-28 | End: 2023-02-28

## 2023-02-28 RX ORDER — LIDOCAINE HYDROCHLORIDE AND EPINEPHRINE BITARTRATE 20; .01 MG/ML; MG/ML
10 INJECTION, SOLUTION SUBCUTANEOUS ONCE
OUTPATIENT
Start: 2023-02-28 | End: 2023-02-28

## 2023-02-28 RX ORDER — LIDOCAINE HYDROCHLORIDE 20 MG/ML
JELLY TOPICAL AS NEEDED
Status: CANCELLED | OUTPATIENT
Start: 2023-02-28

## 2023-02-28 RX ORDER — SODIUM HYPOCHLORITE 1.25 MG/ML
1 SOLUTION TOPICAL AS NEEDED
OUTPATIENT
Start: 2023-02-28

## 2023-02-28 RX ORDER — LIDOCAINE HYDROCHLORIDE 20 MG/ML
JELLY TOPICAL AS NEEDED
OUTPATIENT
Start: 2023-02-28

## 2023-02-28 RX ORDER — CASTOR OIL AND BALSAM, PERU 788; 87 MG/G; MG/G
1 OINTMENT TOPICAL AS NEEDED
OUTPATIENT
Start: 2023-02-28

## 2023-02-28 RX ORDER — LIDOCAINE HYDROCHLORIDE 20 MG/ML
6 JELLY TOPICAL ONCE
Status: COMPLETED | OUTPATIENT
Start: 2023-02-28 | End: 2023-02-28

## 2023-02-28 RX ORDER — LIDOCAINE HYDROCHLORIDE AND EPINEPHRINE BITARTRATE 20; .01 MG/ML; MG/ML
10 INJECTION, SOLUTION SUBCUTANEOUS ONCE
Status: CANCELLED | OUTPATIENT
Start: 2023-02-28 | End: 2023-02-28

## 2023-02-28 RX ORDER — DIAPER,BRIEF,INFANT-TODD,DISP
1 EACH MISCELLANEOUS ONCE
OUTPATIENT
Start: 2023-02-28 | End: 2023-02-28

## 2023-02-28 RX ADMIN — LIDOCAINE HYDROCHLORIDE 6 ML: 20 JELLY TOPICAL at 13:56

## 2023-03-01 LAB — PREALB SERPL-MCNC: 17.9 MG/DL (ref 20–40)

## 2023-03-07 ENCOUNTER — HOSPITAL ENCOUNTER (OUTPATIENT)
Dept: WOUND CARE | Facility: HOSPITAL | Age: 68
Discharge: HOME | End: 2023-03-07
Admitting: NURSE PRACTITIONER
Payer: MEDICARE

## 2023-03-07 VITALS
HEART RATE: 66 BPM | RESPIRATION RATE: 17 BRPM | DIASTOLIC BLOOD PRESSURE: 64 MMHG | SYSTOLIC BLOOD PRESSURE: 148 MMHG | TEMPERATURE: 98.3 F

## 2023-03-07 DIAGNOSIS — T81.89XA NONHEALING SURGICAL WOUND, INITIAL ENCOUNTER: Primary | ICD-10-CM

## 2023-03-07 DIAGNOSIS — T14.8XXA OPEN WOUND: ICD-10-CM

## 2023-03-07 PROCEDURE — A9270 NON-COVERED ITEM OR SERVICE: HCPCS | Performed by: NURSE PRACTITIONER

## 2023-03-07 PROCEDURE — 97602 WOUND(S) CARE NON-SELECTIVE: CPT

## 2023-03-07 PROCEDURE — 63710000001 NYSTATIN 100000 UNIT/GM POWDER 15 G BOTTLE: Performed by: NURSE PRACTITIONER

## 2023-03-07 RX ORDER — LIDOCAINE HYDROCHLORIDE 20 MG/ML
6 JELLY TOPICAL ONCE
OUTPATIENT
Start: 2023-03-07 | End: 2023-03-07

## 2023-03-07 RX ORDER — NYSTATIN 100000 [USP'U]/G
1 POWDER TOPICAL ONCE
OUTPATIENT
Start: 2023-03-07 | End: 2023-03-07

## 2023-03-07 RX ORDER — LIDOCAINE HYDROCHLORIDE 20 MG/ML
10 INJECTION, SOLUTION INFILTRATION; PERINEURAL ONCE
OUTPATIENT
Start: 2023-03-07 | End: 2023-03-07

## 2023-03-07 RX ORDER — LIDOCAINE HYDROCHLORIDE 20 MG/ML
6 JELLY TOPICAL ONCE
Status: COMPLETED | OUTPATIENT
Start: 2023-03-07 | End: 2023-03-07

## 2023-03-07 RX ORDER — SODIUM HYPOCHLORITE 2.5 MG/ML
1 SOLUTION TOPICAL AS NEEDED
OUTPATIENT
Start: 2023-03-07

## 2023-03-07 RX ORDER — LIDOCAINE HYDROCHLORIDE AND EPINEPHRINE BITARTRATE 20; .01 MG/ML; MG/ML
10 INJECTION, SOLUTION SUBCUTANEOUS ONCE
OUTPATIENT
Start: 2023-03-07 | End: 2023-03-07

## 2023-03-07 RX ORDER — SODIUM HYPOCHLORITE 1.25 MG/ML
1 SOLUTION TOPICAL AS NEEDED
OUTPATIENT
Start: 2023-03-07

## 2023-03-07 RX ORDER — DIAPER,BRIEF,INFANT-TODD,DISP
1 EACH MISCELLANEOUS ONCE
OUTPATIENT
Start: 2023-03-07 | End: 2023-03-07

## 2023-03-07 RX ORDER — CASTOR OIL AND BALSAM, PERU 788; 87 MG/G; MG/G
1 OINTMENT TOPICAL AS NEEDED
OUTPATIENT
Start: 2023-03-07

## 2023-03-07 RX ORDER — NYSTATIN 100000 [USP'U]/G
1 POWDER TOPICAL ONCE
Status: COMPLETED | OUTPATIENT
Start: 2023-03-07 | End: 2023-03-07

## 2023-03-07 RX ORDER — LIDOCAINE HYDROCHLORIDE 20 MG/ML
JELLY TOPICAL AS NEEDED
OUTPATIENT
Start: 2023-03-07

## 2023-03-07 RX ADMIN — LIDOCAINE HYDROCHLORIDE 6 ML: 20 JELLY TOPICAL at 13:01

## 2023-03-07 RX ADMIN — NYSTATIN 1 APPLICATION: 100000 POWDER TOPICAL at 13:02

## 2023-03-08 NOTE — PROGRESS NOTES
Wound Clinic Note  Patient Identification:  Name:  Monae Chauhan  Age:  67 y.o.  Sex:  female  :  1955  MRN:  [id number]   Visit Number:  [id number]  Primary Care Physician:  Zachary Sullivan MD     Subjective     Chief complaint:     Multiple wound to abdomen, and right thigh    History of presenting illness:     Patient is a 67 y.o. female with past medical history significant for diabetes, PVD and former smoker quit in . Presented today for evaluation of wounds to abdomen, and right upper thigh and right medial thigh. 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 She has multiple debridements to the area. She has sutures in place to the right thigh and abdominal wounds. These were removed 5 from the right thigh and 5 to the right abdominal wound. Wound base red and moist to all wounds. No evidence of cellulitis. Reports she has been applying a wet-to dry dressing to the area. Denies any fever or chills. Mild to moderate pain to the area.     ---------------------------------------------------------------------------------------------------------------------   Review of Systems   Constitutional: Negative for chills and fever.   HENT: Negative for congestion and rhinorrhea.    Eyes: Negative for pain and redness.   Respiratory: Negative for cough and shortness of breath.    Cardiovascular: Negative for chest pain and leg swelling.   Gastrointestinal: Negative for nausea and vomiting.   Genitourinary: Negative for difficulty urinating.   Musculoskeletal: Positive for back pain and gait problem.   Skin: Positive for wound.   Neurological: Negative for dizziness and weakness.   Hematological: Does not bruise/bleed easily.   Psychiatric/Behavioral: Negative for confusion. The patient is not nervous/anxious.       ---------------------------------------------------------------------------------------------------------------------   Past  Medical History:   Diagnosis Date   • Arthritis    • Coronary artery disease    • Depression    • Diabetes mellitus (HCC)    • Elevated cholesterol    • Full dentures    • GERD (gastroesophageal reflux disease)    • Hyperlipidemia    • Hypertension    • Myocardial infarction (HCC)    • Peripheral vascular disease (HCC)    • Psoriasis      Past Surgical History:   Procedure Laterality Date   • APPENDECTOMY     • CARDIAC CATHETERIZATION N/A 2017    Procedure: Left Heart Cath;  Surgeon: David Diane MD;  Location: [facility] CATH INVASIVE LOCATION;  Service:    • CORONARY STENT PLACEMENT     • CYSTOSCOPY BOTOX INJECTION OF BLADDER N/A 10/20/2021    Procedure: Cystoscopy Botox injection of bladder;  Surgeon: Say Robles MD;  Location: [facility] OR;  Service: Urology;  Laterality: N/A;   • GALLBLADDER SURGERY     • TUBAL ABDOMINAL LIGATION     • VASCULAR SURGERY Left     Lower Extremity Vascular Stent-[facility]     Family History   Problem Relation Age of Onset   • Breast cancer Maternal Grandmother 70   • Cancer Mother    • Diabetes Mother    • Cancer Father    • Diabetes Father      Social History     Socioeconomic History   • Marital status:    • Number of children: 2   Tobacco Use   • Smoking status: Former     Packs/day: 1.00     Years: 40.00     Pack years: 40.00     Types: Cigarettes     Quit date: 2021     Years since quittin.7   • Smokeless tobacco: Never   • Tobacco comments:     2-4 PPD off and on for the last 40 years   Vaping Use   • Vaping Use: Former   • Substances: Nicotine, Flavoring   • Devices: Refillable tank   Substance and Sexual Activity   • Alcohol use: No   • Drug use: No   • Sexual activity: Defer     ---------------------------------------------------------------------------------------------------------------------   Allergies:  Amoxicillin and  "Penicillins  ---------------------------------------------------------------------------------------------------------------------  Objective     ---------------------------------------------------------------------------------------------------------------------   Vital Signs:  BP (!) 188/81   Pulse 62   Temp 98.3 °F (36.8 °C) (Infrared)   Resp 18   Wt 72.1 kg (159 lb)   BMI 28.17 kg/m²   Estimated body mass index is 28.17 kg/m² as calculated from the following:    Height as of 11/18/22: 160 cm (63\").    Weight as of this encounter: 72.1 kg (159 lb).  Body mass index is 28.17 kg/m².  Wt Readings from Last 3 Encounters:   02/28/23 72.1 kg (159 lb)   11/18/22 78.5 kg (173 lb)   06/09/22 78.9 kg (174 lb)       ---------------------------------------------------------------------------------------------------------------------     Physical Exam  Constitutional:       Appearance: Normal appearance.   HENT:      Head: Normocephalic and atraumatic.      Nose: Nose normal.      Mouth/Throat:      Mouth: Mucous membranes are moist.   Eyes:      Pupils: Pupils are equal, round, and reactive to light.   Cardiovascular:      Rate and Rhythm: Normal rate.   Pulmonary:      Effort: Pulmonary effort is normal.   Abdominal:      General: Abdomen is flat.      Palpations: Abdomen is soft.   Musculoskeletal:         General: Normal range of motion.   Skin:     General: Skin is warm and dry.      Capillary Refill: Capillary refill takes less than 2 seconds.   Neurological:      General: No focal deficit present.      Mental Status: She is alert and oriented to person, place, and time.   Psychiatric:         Mood and Affect: Mood normal.         Behavior: Behavior normal.       Wound Assessment:   02/28/23 1242   Wound 02/28/23 1242 Right hip   Placement Date/Time: 02/28/23 1242   Side: Right  Location: hip   Wound Image    Dressing Appearance moist drainage;intact   Closure Adhesive bandage   Base moist;pink;yellow   Periwound " blanchable;dry;pink   Periwound Temperature warm   Periwound Skin Turgor soft   Edges irregular   Wound Length (cm) 0.6 cm   Wound Width (cm) 2.2 cm   Wound Depth (cm) 0.4 cm   Wound Surface Area (cm^2) 1.32 cm^2   Wound Volume (cm^3) 0.528 cm^3   Drainage Characteristics/Odor serosanguineous   Drainage Amount moderate   Care, Wound cleansed with;sterile normal saline  (honey gel)   Dressing Care dressing applied  (island dressing)     Wound Goal (s):Closure, Epithelization, Free of infection and No further symptoms  Assessment & Plan      Patient Active Problem List    Diagnosis    • Non-healing surgical wound [T81.89XA]  -Sutures removed from right upper thigh, and left abdominal wounds Total 10 sutures   - Honeygel to base and secure with adhesive dressing   -Keep area clean and dry   -Recommend yara protein diet 120g/day along with vitamin C 2000mg/day, vitamin A 5000 Units/day, vitamin D3 5000 Units/day, zinc 50mg/day to help promote wound healing   -Labs ordered: CBC, CMP, CRP, sed rate, prealbumin, and hemoglobain A1C to assess inflammatory markers and nutritional status as this both and negatively impact wound healing if not properly treated.     • Diabetes mellitus (HCC) [E11.9]  -Recommend adequate glycemic control to help promote wound healing  -Poor glycemic  Control increases risk of infection, prolonged wound healing, loss of limb and premature death     • Class 1 obesity in adult [E66.9]  -An obese person?is at greater risk for wound infection and dehiscence or evisceration.    • PAD (peripheral artery disease) (HCC) [I73.9]  -Currently following with vascular surgeon   -Can negatively impact wound progression     • Tobacco abuse [Z72.0]  -Tissue perfusion is lower in users of tobacco and other forms of nicotine. Reduced tissue perfusion strongly inhibits wound healing, increases risk of infection, and dramatically increases risk of limb loss.      Suture Removal  The right upper thigh and left  abdominal wound was cleansed with betadine, sutures were d/c'd (total of 10: 5 from each wound) with forceps and suture cutting scissors, adhesive dressing placed    Clinical Impression:Moderate Complexity    Follow-up: 1 week    DENIS Nguyen   [facility]  02/28/2023  2240

## 2023-03-09 PROBLEM — T81.89XA NON-HEALING SURGICAL WOUND: Status: ACTIVE | Noted: 2023-03-09

## 2023-03-14 NOTE — PROGRESS NOTES
Wound Clinic Note  Patient Identification:  Name:  Monae Chauhan  Age:  67 y.o.  Sex:  female  :  1955  MRN:  [id number]   Visit Number:  [id number]  Primary Care Physician:  Zachary Sullivan MD     Subjective     Chief complaint:     Multiple wound to abdomen, and right thigh    History of presenting illness:     Patient is a 67 y.o. female with past medical history significant for diabetes, PVD and former smoker quit in . Presented today for evaluation of wounds to abdomen, and right upper thigh and right medial thigh. 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 She has multiple debridements to the area. She has sutures in place to the right thigh and abdominal wounds. These were removed 5 from the right thigh and 5 to the right abdominal wound. Wound base red and moist to all wounds. No evidence of cellulitis. Reports she has been applying a wet-to dry dressing to the area. Denies any fever or chills. Mild to moderate pain to the area.    Interval History:  2023: Seen in clinic today for follow-up to multiple wounds to abdomen, and right thigh. Areas are improving. Increase in granulation tissue. Denies any fever or chills. No new issues or concerns. Tolerating current treatment without complications. Reports glucose levels are controlled.  ---------------------------------------------------------------------------------------------------------------------   Review of Systems   Constitutional: Negative for chills and fever.   HENT: Negative for congestion and rhinorrhea.    Respiratory: Negative for cough and shortness of breath.    Cardiovascular: Negative for chest pain and leg swelling.   Gastrointestinal: Negative for nausea and vomiting.   Musculoskeletal: Positive for back pain and gait problem.   Skin: Positive for wound.   Hematological: Does not bruise/bleed easily.       ---------------------------------------------------------------------------------------------------------------------   Past Medical History:   Diagnosis Date   • Arthritis    • Coronary artery disease    • Depression    • Diabetes mellitus (HCC)    • Elevated cholesterol    • Full dentures    • GERD (gastroesophageal reflux disease)    • Hyperlipidemia    • Hypertension    • Myocardial infarction (HCC)    • Peripheral vascular disease (HCC)    • Psoriasis      Past Surgical History:   Procedure Laterality Date   • APPENDECTOMY     • CARDIAC CATHETERIZATION N/A 2017    Procedure: Left Heart Cath;  Surgeon: David Diane MD;  Location: [facility] CATH INVASIVE LOCATION;  Service:    • CORONARY STENT PLACEMENT     • CYSTOSCOPY BOTOX INJECTION OF BLADDER N/A 10/20/2021    Procedure: Cystoscopy Botox injection of bladder;  Surgeon: Say Robles MD;  Location: [facility] OR;  Service: Urology;  Laterality: N/A;   • GALLBLADDER SURGERY     • TUBAL ABDOMINAL LIGATION     • VASCULAR SURGERY Left     Lower Extremity [facility] Stent-[facility]     Family History   Problem Relation Age of Onset   • Breast cancer Maternal Grandmother 70   • Cancer Mother    • Diabetes Mother    • Cancer Father    • Diabetes Father      Social History     Socioeconomic History   • Marital status:    • Number of children: 2   Tobacco Use   • Smoking status: Former     Packs/day: 1.00     Years: 40.00     Pack years: 40.00     Types: Cigarettes     Quit date: 2021     Years since quittin.7   • Smokeless tobacco: Never   • Tobacco comments:     2-4 PPD off and on for the last 40 years   Vaping Use   • Vaping Use: Former   • Substances: Nicotine, Flavoring   • Devices: Refillable tank   Substance and Sexual Activity   • Alcohol use: No   • Drug use: No   • Sexual activity: Defer     ---------------------------------------------------------------------------------------------------------------------   Allergies:  Amoxicillin and  "Penicillins  ---------------------------------------------------------------------------------------------------------------------  Objective     ---------------------------------------------------------------------------------------------------------------------   Vital Signs:  /64   Pulse 66   Temp 98.3 °F (36.8 °C) (Infrared)   Resp 17   Estimated body mass index is 28.17 kg/m² as calculated from the following:    Height as of 11/18/22: 160 cm (63\").    Weight as of 2/28/23: 72.1 kg (159 lb).  There is no height or weight on file to calculate BMI.  Wt Readings from Last 3 Encounters:   02/28/23 72.1 kg (159 lb)   11/18/22 78.5 kg (173 lb)   06/09/22 78.9 kg (174 lb)       ---------------------------------------------------------------------------------------------------------------------     Physical Exam  Constitutional:       Appearance: Normal appearance.   HENT:      Head: Normocephalic and atraumatic.      Nose: Nose normal.      Mouth/Throat:      Mouth: Mucous membranes are moist.   Eyes:      Pupils: Pupils are equal, round, and reactive to light.   Cardiovascular:      Rate and Rhythm: Normal rate.   Pulmonary:      Effort: Pulmonary effort is normal.   Abdominal:      General: Abdomen is flat.      Palpations: Abdomen is soft.   Musculoskeletal:         General: Normal range of motion.   Skin:     General: Skin is warm and dry.      Capillary Refill: Capillary refill takes less than 2 seconds.   Neurological:      General: No focal deficit present.      Mental Status: She is alert and oriented to person, place, and time.   Psychiatric:         Mood and Affect: Mood normal.         Behavior: Behavior normal.       Wound Assessment:   03/07/23 1242   Wound 02/28/23 1242 Left hip   Placement Date/Time: 02/28/23 1242   Side: Left  Location: hip   Dressing Appearance intact;moist drainage   Closure Adhesive bandage   Base non-blanchable;moist;pink;red   Periwound intact;blanchable;dry;pink "   Periwound Temperature warm   Periwound Skin Turgor soft   Edges irregular   Wound Length (cm) 0.3 cm   Wound Width (cm) 1 cm   Wound Depth (cm) 0.2 cm   Wound Surface Area (cm^2) 0.3 cm^2   Wound Volume (cm^3) 0.06 cm^3   Tunneling [Depth (cm)/Location] 0   Undermining [Depth (cm)/Location] 0   Drainage Characteristics/Odor serosanguineous   Drainage Amount moderate   Care, Wound cleansed with;sterile normal saline  (honey gel)   Dressing Care dressing applied  (2 inch island dressing)   Wound 02/28/23 1242 Right hip   Placement Date/Time: 02/28/23 1242   Side: Right  Location: hip   Wound Image    Dressing Appearance intact;moist drainage   Closure Adhesive bandage   Base non-blanchable;moist;red   Periwound intact;blanchable;dry;pink   Periwound Temperature warm   Periwound Skin Turgor soft   Edges irregular   Wound Length (cm) 0.5 cm   Wound Width (cm) 1.5 cm   Wound Depth (cm) 0.3 cm   Wound Surface Area (cm^2) 0.75 cm^2   Wound Volume (cm^3) 0.225 cm^3   Tunneling [Depth (cm)/Location] 0   Undermining [Depth (cm)/Location] 0   Drainage Characteristics/Odor serosanguineous   Drainage Amount moderate   Care, Wound cleansed with;sterile normal saline  (honey gel)   Dressing Care dressing applied  (2 inch island dressing)   Wound 02/28/23 1245 Right thigh   Placement Date/Time: 02/28/23 1245   Side: Right  Location: thigh   Dressing Appearance intact;moist drainage   Closure Adhesive bandage   Base non-blanchable;moist;red;pink   Periwound intact;blanchable;dry;pink   Periwound Temperature warm   Periwound Skin Turgor soft   Edges irregular   Tunneling [Depth (cm)/Location] 0   Undermining [Depth (cm)/Location] 0   Drainage Characteristics/Odor serosanguineous   Drainage Amount moderate   Care, Wound cleansed with;sterile normal saline  (honey gel)   Dressing Care dressing applied  (2 inch island dressing)     Wound Goal (s):Closure, Epithelization, Free of infection and No further symptoms  Assessment & Plan       Patient Active Problem List    Diagnosis    • Non-healing surgical wound [T81.89XA]  - Honeygel to base and secure with adhesive dressing   -Keep area clean and dry   -Recommend yara protein diet 120g/day along with vitamin C 2000mg/day, vitamin A 5000 Units/day, vitamin D3 5000 Units/day, zinc 50mg/day to help promote wound healing   -Labs ordered: CBC, CMP, CRP, sed rate, prealbumin, and hemoglobain A1C to assess inflammatory markers and nutritional status as this both and negatively impact wound healing if not properly treated.     • Diabetes mellitus (HCC) [E11.9]  -Recommend adequate glycemic control to help promote wound healing  -Poor glycemic  Control increases risk of infection, prolonged wound healing, loss of limb and premature death     • Class 1 obesity in adult [E66.9]  -An obese person?is at greater risk for wound infection and dehiscence or evisceration.    • PAD (peripheral artery disease) (HCC) [I73.9]  -Currently following with vascular surgeon   -Can negatively impact wound progression     • Tobacco abuse [Z72.0]  -Tissue perfusion is lower in users of tobacco and other forms of nicotine. Reduced tissue perfusion strongly inhibits wound healing, increases risk of infection, and dramatically increases risk of limb loss.      Clinical Impression:Moderate Complexity    Follow-up: 1 week    DENIS Nguyen   [facility]  03/07/2023  1230

## 2023-03-15 ENCOUNTER — HOSPITAL ENCOUNTER (OUTPATIENT)
Dept: WOUND CARE | Facility: HOSPITAL | Age: 68
Discharge: HOME | End: 2023-03-15
Admitting: NURSE PRACTITIONER
Payer: MEDICARE

## 2023-03-15 VITALS
SYSTOLIC BLOOD PRESSURE: 160 MMHG | TEMPERATURE: 98.3 F | HEART RATE: 67 BPM | DIASTOLIC BLOOD PRESSURE: 64 MMHG | RESPIRATION RATE: 18 BRPM

## 2023-03-15 DIAGNOSIS — T81.89XA NONHEALING SURGICAL WOUND, INITIAL ENCOUNTER: Primary | ICD-10-CM

## 2023-03-15 PROCEDURE — 87186 SC STD MICRODIL/AGAR DIL: CPT | Performed by: NURSE PRACTITIONER

## 2023-03-15 PROCEDURE — 87205 SMEAR GRAM STAIN: CPT | Performed by: NURSE PRACTITIONER

## 2023-03-15 PROCEDURE — 87070 CULTURE OTHR SPECIMN AEROBIC: CPT | Performed by: NURSE PRACTITIONER

## 2023-03-15 PROCEDURE — 87147 CULTURE TYPE IMMUNOLOGIC: CPT | Performed by: NURSE PRACTITIONER

## 2023-03-15 PROCEDURE — 97602 WOUND(S) CARE NON-SELECTIVE: CPT

## 2023-03-18 LAB
BACTERIA SPEC AEROBE CULT: ABNORMAL
GRAM STN SPEC: ABNORMAL
GRAM STN SPEC: ABNORMAL

## 2023-03-21 RX ORDER — SULFAMETHOXAZOLE AND TRIMETHOPRIM 800; 160 MG/1; MG/1
1 TABLET ORAL 2 TIMES DAILY
Qty: 14 TABLET | Refills: 0 | Status: SHIPPED | OUTPATIENT
Start: 2023-03-21 | End: 2023-03-28

## 2023-03-23 NOTE — PROGRESS NOTES
Wound Clinic Note  Patient Identification:  Name:  Monae Chauhan  Age:  67 y.o.  Sex:  female  :  1955  MRN:  [id number]   Visit Number:  [id number]  Primary Care Physician:  Zachary Sullivan MD     Subjective     Chief complaint:     Multiple wound to abdomen, and right thigh    History of presenting illness:     Patient is a 67 y.o. female with past medical history significant for diabetes, PVD and former smoker quit in . Presented today for evaluation of wounds to abdomen, and right upper thigh and right medial thigh. 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 She has multiple debridements to the area. She has sutures in place to the right thigh and abdominal wounds. These were removed 5 from the right thigh and 5 to the right abdominal wound. Wound base red and moist to all wounds. No evidence of cellulitis. Reports she has been applying a wet-to dry dressing to the area. Denies any fever or chills. Mild to moderate pain to the area.    Interval History:  2023: Seen in clinic today for follow-up to multiple wounds to abdomen, and right thigh. Areas are improving. Increase in granulation tissue. Denies any fever or chills. No new issues or concerns. Tolerating current treatment without complications. Reports glucose levels are controlled.    03/15/2023: Seen in clinic today for follow-up to multiple wounds to abdomen, and right thigh. Wounds with significant improvement. Small open areas. Denies any fever or chills. No new complications. Reports compliance with treatment regimen.   ---------------------------------------------------------------------------------------------------------------------   Review of Systems   Constitutional: Negative for chills and fever.   HENT: Negative for congestion and rhinorrhea.    Respiratory: Negative for cough and shortness of breath.    Cardiovascular: Negative for chest pain and leg  swelling.   Gastrointestinal: Negative for nausea and vomiting.   Musculoskeletal: Positive for back pain and gait problem.   Skin: Positive for wound.   Hematological: Does not bruise/bleed easily.      ---------------------------------------------------------------------------------------------------------------------   Past Medical History:   Diagnosis Date   • Arthritis    • Coronary artery disease    • Depression    • Diabetes mellitus (HCC)    • Elevated cholesterol    • Full dentures    • GERD (gastroesophageal reflux disease)    • Hyperlipidemia    • Hypertension    • Myocardial infarction (HCC)    • Peripheral vascular disease (HCC)    • Psoriasis      Past Surgical History:   Procedure Laterality Date   • APPENDECTOMY     • CARDIAC CATHETERIZATION N/A 2017    Procedure: Left Heart Cath;  Surgeon: David Diane MD;  Location: [facility] CATH INVASIVE LOCATION;  Service:    • CORONARY STENT PLACEMENT     • CYSTOSCOPY BOTOX INJECTION OF BLADDER N/A 10/20/2021    Procedure: Cystoscopy Botox injection of bladder;  Surgeon: Say Robles MD;  Location: [facility] OR;  Service: Urology;  Laterality: N/A;   • GALLBLADDER SURGERY     • TUBAL ABDOMINAL LIGATION     • VASCULAR SURGERY Left     Lower Extremity Vascular Stent-[facility]     Family History   Problem Relation Age of Onset   • Breast cancer Maternal Grandmother 70   • Cancer Mother    • Diabetes Mother    • Cancer Father    • Diabetes Father      Social History     Socioeconomic History   • Marital status:    • Number of children: 2   Tobacco Use   • Smoking status: Former     Packs/day: 1.00     Years: 40.00     Pack years: 40.00     Types: Cigarettes     Quit date: 2021     Years since quittin.7   • Smokeless tobacco: Never   • Tobacco comments:     2-4 PPD off and on for the last 40 years   Vaping Use   • Vaping Use: Former   • Substances: Nicotine, Flavoring   • Devices: Refillable tank   Substance and Sexual Activity   • Alcohol  "use: No   • Drug use: No   • Sexual activity: Defer     ---------------------------------------------------------------------------------------------------------------------   Allergies:  Amoxicillin and Penicillins  ---------------------------------------------------------------------------------------------------------------------  Objective     ---------------------------------------------------------------------------------------------------------------------   Vital Signs:  /64   Pulse 67   Temp 98.3 °F (36.8 °C) (Infrared)   Resp 18   Estimated body mass index is 28.17 kg/m² as calculated from the following:    Height as of 11/18/22: 160 cm (63\").    Weight as of 2/28/23: 72.1 kg (159 lb).  There is no height or weight on file to calculate BMI.  Wt Readings from Last 3 Encounters:   02/28/23 72.1 kg (159 lb)   11/18/22 78.5 kg (173 lb)   06/09/22 78.9 kg (174 lb)       ---------------------------------------------------------------------------------------------------------------------     Physical Exam  Constitutional:       Appearance: Normal appearance.   HENT:      Head: Normocephalic and atraumatic.      Nose: Nose normal.      Mouth/Throat:      Mouth: Mucous membranes are moist.   Eyes:      Pupils: Pupils are equal, round, and reactive to light.   Cardiovascular:      Rate and Rhythm: Normal rate.   Pulmonary:      Effort: Pulmonary effort is normal.   Abdominal:      General: Abdomen is flat.      Palpations: Abdomen is soft.   Musculoskeletal:         General: Normal range of motion.   Skin:     General: Skin is warm and dry.      Capillary Refill: Capillary refill takes less than 2 seconds.   Neurological:      General: No focal deficit present.      Mental Status: She is alert and oriented to person, place, and time.   Psychiatric:         Mood and Affect: Mood normal.         Behavior: Behavior normal.       Wound Assessment:   03/15/23 1343   Wound 02/28/23 1242 Right hip   Placement " Date/Time: 02/28/23 1242   Side: Right  Location: hip   Wound Image    Dressing Appearance intact;moist drainage   Closure Adhesive bandage   Base moist;pink   Periwound moist;pink   Periwound Temperature warm   Periwound Skin Turgor soft   Edges irregular   Wound Length (cm) 0.2 cm   Wound Width (cm) 0.5 cm   Wound Depth (cm) 0.2 cm   Wound Surface Area (cm^2) 0.1 cm^2   Wound Volume (cm^3) 0.02 cm^3   Drainage Characteristics/Odor creamy   Drainage Amount small   Care, Wound cleansed with;sterile normal saline  (honey gel)   Dressing Care dressing applied  (island dressing)   Wound 02/28/23 1242 Left hip   Placement Date/Time: 02/28/23 1242   Side: Left  Location: hip   Wound Image    Dressing Appearance intact;moist drainage   Closure Adhesive bandage   Base moist;pink;red   Periwound moist;pink   Periwound Temperature warm   Periwound Skin Turgor soft   Edges irregular   Wound Length (cm) 0.2 cm   Wound Width (cm) 0.3 cm   Wound Depth (cm) 0.1 cm   Wound Surface Area (cm^2) 0.06 cm^2   Wound Volume (cm^3) 0.006 cm^3   Drainage Characteristics/Odor serosanguineous   Drainage Amount moderate   Care, Wound cleansed with;sterile normal saline  (wound culture; honey gel)   Dressing Care dressing applied  (island dressing)   Wound 02/28/23 1245 Right thigh   Placement Date/Time: 02/28/23 1245   Side: Right  Location: thigh   Wound Image    Dressing Appearance intact;moist drainage   Closure Adhesive bandage   Base moist;pink   Periwound blanchable;dry;pink   Periwound Temperature warm   Periwound Skin Turgor soft   Edges irregular   Wound Length (cm) 0.6 cm   Wound Width (cm) 0.4 cm   Wound Depth (cm) 0.4 cm   Wound Surface Area (cm^2) 0.24 cm^2   Wound Volume (cm^3) 0.096 cm^3   Drainage Characteristics/Odor serosanguineous   Drainage Amount moderate   Care, Wound cleansed with;sterile normal saline  (honey gel 1/4 in)   Dressing Care dressing applied  (mepilex)     Wound Goal (s):Closure, Epithelization, Free of  infection and No further symptoms  Assessment & Plan      Patient Active Problem List    Diagnosis    • Non-healing surgical wound [T81.89XA]  - Honeygel to base and secure with adhesive dressing   -Keep area clean and dry   -Recommend yara protein diet 120g/day along with vitamin C 2000mg/day, vitamin A 5000 Units/day, vitamin D3 5000 Units/day, zinc 50mg/day to help promote wound healing   -Labs ordered: CBC, CMP, CRP, sed rate, prealbumin, and hemoglobain A1C to assess inflammatory markers and nutritional status as this both and negatively impact wound healing if not properly treated.     • Diabetes mellitus (HCC) [E11.9]  -Recommend adequate glycemic control to help promote wound healing  -Poor glycemic  Control increases risk of infection, prolonged wound healing, loss of limb and premature death     • Class 1 obesity in adult [E66.9]  -An obese person?is at greater risk for wound infection and dehiscence or evisceration.    • PAD (peripheral artery disease) (HCC) [I73.9]  -Currently following with vascular surgeon   -Can negatively impact wound progression     • Tobacco abuse [Z72.0]  -Tissue perfusion is lower in users of tobacco and other forms of nicotine. Reduced tissue perfusion strongly inhibits wound healing, increases risk of infection, and dramatically increases risk of limb loss.      Clinical Impression:Moderate Complexity    Follow-up: 1 week    DENIS Nguyen   [facility]  03/15/2023  2515

## 2023-05-19 ENCOUNTER — HOSPITAL ENCOUNTER (OUTPATIENT)
Dept: WOUND CARE | Facility: HOSPITAL | Age: 68
Discharge: HOME | End: 2023-05-19
Payer: MEDICARE

## 2023-05-19 VITALS
RESPIRATION RATE: 17 BRPM | TEMPERATURE: 98.5 F | SYSTOLIC BLOOD PRESSURE: 123 MMHG | HEART RATE: 78 BPM | DIASTOLIC BLOOD PRESSURE: 78 MMHG

## 2023-05-19 DIAGNOSIS — T14.8XXA OPEN WOUND: Primary | ICD-10-CM

## 2023-05-19 PROCEDURE — 97602 WOUND(S) CARE NON-SELECTIVE: CPT

## 2023-05-19 RX ORDER — LIDOCAINE HYDROCHLORIDE AND EPINEPHRINE BITARTRATE 20; .01 MG/ML; MG/ML
10 INJECTION, SOLUTION SUBCUTANEOUS ONCE
OUTPATIENT
Start: 2023-05-19 | End: 2023-05-19

## 2023-05-19 RX ORDER — LIDOCAINE HYDROCHLORIDE 20 MG/ML
10 INJECTION, SOLUTION INFILTRATION; PERINEURAL ONCE
OUTPATIENT
Start: 2023-05-19 | End: 2023-05-19

## 2023-05-19 RX ORDER — NYSTATIN 100000 [USP'U]/G
1 POWDER TOPICAL ONCE
OUTPATIENT
Start: 2023-05-19 | End: 2023-05-19

## 2023-05-19 RX ORDER — SODIUM HYPOCHLORITE 2.5 MG/ML
1 SOLUTION TOPICAL AS NEEDED
OUTPATIENT
Start: 2023-05-19

## 2023-05-19 RX ORDER — CASTOR OIL AND BALSAM, PERU 788; 87 MG/G; MG/G
1 OINTMENT TOPICAL AS NEEDED
OUTPATIENT
Start: 2023-05-19

## 2023-05-19 RX ORDER — SODIUM HYPOCHLORITE 1.25 MG/ML
1 SOLUTION TOPICAL AS NEEDED
OUTPATIENT
Start: 2023-05-19

## 2023-05-19 RX ORDER — LIDOCAINE HYDROCHLORIDE 20 MG/ML
6 JELLY TOPICAL ONCE
OUTPATIENT
Start: 2023-05-19 | End: 2023-05-19

## 2023-05-19 RX ORDER — LIDOCAINE HYDROCHLORIDE 20 MG/ML
JELLY TOPICAL AS NEEDED
OUTPATIENT
Start: 2023-05-19

## 2023-05-19 RX ORDER — DIAPER,BRIEF,INFANT-TODD,DISP
1 EACH MISCELLANEOUS ONCE
OUTPATIENT
Start: 2023-05-19 | End: 2023-05-19

## 2023-05-19 RX ORDER — LIDOCAINE HYDROCHLORIDE 20 MG/ML
6 JELLY TOPICAL ONCE
Status: DISCONTINUED | OUTPATIENT
Start: 2023-05-19 | End: 2023-05-20 | Stop reason: HOSPADM

## 2023-05-28 NOTE — PROGRESS NOTES
Wound Clinic Note  Patient Identification:  Name:  Monae Chauhan  Age:  67 y.o.  Sex:  female  :  1955  MRN:  [id number]   Visit Number:  [id number]  Primary Care Physician:  Zachary Sullivan MD     Subjective     Chief complaint:     Multiple wound to abdomen, and right thigh    History of presenting illness:     Patient is a 67 y.o. female with past medical history significant for diabetes, PVD and former smoker quit in . Presented today for evaluation of wounds to abdomen, and right upper thigh and right medial thigh. 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 She has multiple debridements to the area. She has sutures in place to the right thigh and abdominal wounds. These were removed 5 from the right thigh and 5 to the right abdominal wound. Wound base red and moist to all wounds. No evidence of cellulitis. Reports she has been applying a wet-to dry dressing to the area. Denies any fever or chills. Mild to moderate pain to the area.    Interval History:  2023: Seen in clinic today for follow-up to multiple wounds to abdomen, and right thigh. Areas are improving. Increase in granulation tissue. Denies any fever or chills. No new issues or concerns. Tolerating current treatment without complications. Reports glucose levels are controlled.    03/15/2023: Seen in clinic today for follow-up to multiple wounds to abdomen, and right thigh. Wounds with significant improvement. Small open areas. Denies any fever or chills. No new complications. Reports compliance with treatment regimen.   ---------------------------------------------------------------------------------------------------------------------   Review of Systems   Constitutional: Negative for chills and fever.   HENT: Negative for congestion and rhinorrhea.    Respiratory: Negative for cough and shortness of breath.    Cardiovascular: Negative for chest pain and leg  swelling.   Gastrointestinal: Negative for nausea and vomiting.   Musculoskeletal: Positive for back pain and gait problem.   Skin: Positive for wound.   Hematological: Does not bruise/bleed easily.      ---------------------------------------------------------------------------------------------------------------------   Past Medical History:   Diagnosis Date   • Arthritis    • Coronary artery disease    • Depression    • Diabetes mellitus    • Elevated cholesterol    • Full dentures    • GERD (gastroesophageal reflux disease)    • Hyperlipidemia    • Hypertension    • Myocardial infarction    • Peripheral vascular disease    • Psoriasis      Past Surgical History:   Procedure Laterality Date   • APPENDECTOMY     • CARDIAC CATHETERIZATION N/A 2017    Procedure: Left Heart Cath;  Surgeon: David Diane MD;  Location: [facility] CATH INVASIVE LOCATION;  Service:    • CORONARY STENT PLACEMENT     • CYSTOSCOPY BOTOX INJECTION OF BLADDER N/A 10/20/2021    Procedure: Cystoscopy Botox injection of bladder;  Surgeon: Say Robles MD;  Location: [facility] OR;  Service: Urology;  Laterality: N/A;   • GALLBLADDER SURGERY     • TUBAL ABDOMINAL LIGATION     • VASCULAR SURGERY Left     Lower Extremity V[facility] Stent-[facility]     Family History   Problem Relation Age of Onset   • Breast cancer Maternal Grandmother 70   • Cancer Mother    • Diabetes Mother    • Cancer Father    • Diabetes Father      Social History     Socioeconomic History   • Marital status:    • Number of children: 2   Tobacco Use   • Smoking status: Former     Packs/day: 1.00     Years: 40.00     Pack years: 40.00     Types: Cigarettes     Quit date: 2021     Years since quittin.9   • Smokeless tobacco: Never   • Tobacco comments:     2-4 PPD off and on for the last 40 years   Vaping Use   • Vaping Use: Former   • Substances: Nicotine, Flavoring   • Devices: Refillable tank   Substance and Sexual Activity   • Alcohol use: No   • Drug  "use: No   • Sexual activity: Defer     ---------------------------------------------------------------------------------------------------------------------   Allergies:  Amoxicillin and Penicillins  ---------------------------------------------------------------------------------------------------------------------  Objective     ---------------------------------------------------------------------------------------------------------------------   Vital Signs:  /78   Pulse 78   Temp 98.5 °F (36.9 °C) (Infrared)   Resp 17   Estimated body mass index is 28.17 kg/m² as calculated from the following:    Height as of 11/18/22: 160 cm (63\").    Weight as of 2/28/23: 72.1 kg (159 lb).  There is no height or weight on file to calculate BMI.  Wt Readings from Last 3 Encounters:   02/28/23 72.1 kg (159 lb)   11/18/22 78.5 kg (173 lb)   06/09/22 78.9 kg (174 lb)       ---------------------------------------------------------------------------------------------------------------------     Physical Exam  Constitutional:       Appearance: Normal appearance.   HENT:      Head: Normocephalic and atraumatic.      Nose: Nose normal.      Mouth/Throat:      Mouth: Mucous membranes are moist.   Eyes:      Pupils: Pupils are equal, round, and reactive to light.   Cardiovascular:      Rate and Rhythm: Normal rate.   Pulmonary:      Effort: Pulmonary effort is normal.   Abdominal:      General: Abdomen is flat.      Palpations: Abdomen is soft.   Musculoskeletal:         General: Normal range of motion.   Skin:     General: Skin is warm and dry.      Capillary Refill: Capillary refill takes less than 2 seconds.   Neurological:      General: No focal deficit present.      Mental Status: She is alert and oriented to person, place, and time.   Psychiatric:         Mood and Affect: Mood normal.         Behavior: Behavior normal.     Wound Assessment:     05/19/23 1300   Pain/Comfort/Sleep   Preferred Pain Scale number (Numeric " Rating Pain Scale)   Pain Goal/Acceptable Level 0   (0-10) Pain Rating: Rest 4   (0-10) Pain Rating: Activity 4   Pain Location hip;groin   Pain Side/Orientation bilateral   Coping/Psychosocial   Observed Emotional State calm;cooperative   Verbalized Emotional State acceptance   Trust Relationship/Rapport care explained   Cognitive   Cognitive/Neuro/Behavioral WDL WDL   Skin   Skin WDL X;characteristics   Skin Temperature warm   Skin Moisture dry   Skin Elasticity slow return to original state   Skin Integrity wound   Additional Documentation Wound (LDA)   Adan Risk Assessment   Sensory Perception 3-->slightly limited   Moisture 3-->occasionally moist   Activity 3-->walks occasionally   Mobility 3-->slightly limited   Nutrition 3-->adequate   Friction and Shear 3-->no apparent problem   Adan Score 18   Wound 02/28/23 1245 Right thigh   Placement Date/Time: 02/28/23 1245   Side: Right  Location: thigh   Wound Image    Dressing Appearance intact;moist drainage   Closure Adhesive bandage   Base non-blanchable;moist;pink;red;yellow   Periwound intact;blanchable;dry;pink   Periwound Temperature warm   Periwound Skin Turgor soft   Edges irregular   Wound Length (cm) 0.6 cm   Wound Width (cm) 0.4 cm   Wound Depth (cm) 0.3 cm   Wound Surface Area (cm^2) 0.24 cm^2   Wound Volume (cm^3) 0.072 cm^3   Tunneling [Depth (cm)/Location] 0   Undermining [Depth (cm)/Location] 0   Drainage Characteristics/Odor serosanguineous   Drainage Amount small   Care, Wound cleansed with;sterile normal saline;dressing removed  (honey)   Dressing Care dressing applied  (mepilex)   Wound 02/28/23 1242 Right hip   Placement Date/Time: 02/28/23 1242   Side: Right  Location: hip   Dressing Appearance open to air   Closure Open to air   Base non-blanchable;moist;pink;red   Periwound intact;blanchable;dry;pink   Periwound Temperature warm   Periwound Skin Turgor soft   Edges irregular   Wound Length (cm) 0.2 cm   Wound Width (cm) 0.5 cm   Wound  Depth (cm) 0.1 cm   Wound Surface Area (cm^2) 0.1 cm^2   Wound Volume (cm^3) 0.01 cm^3   Tunneling [Depth (cm)/Location] 0   Undermining [Depth (cm)/Location] 0   Drainage Characteristics/Odor serosanguineous   Drainage Amount small   Care, Wound cleansed with;sterile normal saline  (honey)   Dressing Care dressing applied  (mepilex)   Musculoskeletal   Musculoskeletal WDL X;mobility   General Mobility mildly impaired   Functional Screen (every 3 days/change)   Ambulation 1 - assistive equipment   Transferring 1 - assistive equipment   Communication 0 - understands/communicates without difficulty   Nutrition   Nutrition Risk Screen no indicators present   Safety   Safety WDL WDL   [facility] High Risk Falls Assessment (If Fall score is >/=13, add the Fall Risk CPG to the care plan)    Fallen in past 6 months 0--> No   Mental Status 0--> no mental status change   Elimination 0--> No elimination issues   Mobility 2--> Requires assistance- transfer, walker, etc.   Medications 1--> Insulin/ Oral hypoglycemic   Nurses' Clinical Judgement 5   Total Fall Risk Score 9   Safety Management   Safety Promotion/Fall Prevention safety round/check completed     Wound Goal (s):Closure, Epithelization, Free of infection and No further symptoms  Assessment & Plan      Patient Active Problem List    Diagnosis    • Non-healing surgical wound [T81.89XA]  - Honeygel to base and secure with adhesive dressing   -Keep area clean and dry   -Recommend yara protein diet 120g/day along with vitamin C 2000mg/day, vitamin A 5000 Units/day, vitamin D3 5000 Units/day, zinc 50mg/day to help promote wound healing   -Labs ordered: CBC, CMP, CRP, sed rate, prealbumin, and hemoglobain A1C to assess inflammatory markers and nutritional status as this both and negatively impact wound healing if not properly treated.     • Diabetes mellitus (HCC) [E11.9]  -Recommend adequate glycemic control to help promote wound healing  -Poor glycemic  Control  increases risk of infection, prolonged wound healing, loss of limb and premature death     • Class 1 obesity in adult [E66.9]  -An obese person?is at greater risk for wound infection and dehiscence or evisceration.    • PAD (peripheral artery disease) (HCC) [I73.9]  -Currently following with vascular surgeon   -Can negatively impact wound progression     • Tobacco abuse [Z72.0]  -Tissue perfusion is lower in users of tobacco and other forms of nicotine. Reduced tissue perfusion strongly inhibits wound healing, increases risk of infection, and dramatically increases risk of limb loss.      Clinical Impression:Low Complexity    Follow-up: 1 week    DENIS Nguyen   [facility]  05/19/2023  2346

## 2023-09-06 ENCOUNTER — TRANSCRIBE ORDERS (OUTPATIENT)
Dept: ADMINISTRATIVE | Facility: HOSPITAL | Age: 68
End: 2023-09-06
Payer: MEDICARE

## 2023-09-06 DIAGNOSIS — E11.628 BULLOSIS DIABETICORUM: Primary | ICD-10-CM

## 2023-09-06 DIAGNOSIS — L03.119 CELLULITIS OF FOOT: ICD-10-CM

## 2023-09-11 ENCOUNTER — HOSPITAL ENCOUNTER (OUTPATIENT)
Dept: MRI IMAGING | Facility: HOSPITAL | Age: 68
Discharge: HOME | End: 2023-09-11
Admitting: FAMILY MEDICINE
Payer: MEDICARE

## 2023-09-11 DIAGNOSIS — E11.628 BULLOSIS DIABETICORUM: ICD-10-CM

## 2023-09-11 DIAGNOSIS — L03.119 CELLULITIS OF FOOT: ICD-10-CM

## 2023-09-11 PROCEDURE — 73718 MRI LOWER EXTREMITY W/O DYE: CPT

## 2023-09-14 ENCOUNTER — OFFICE VISIT (OUTPATIENT)
Dept: SURGERY | Facility: CLINIC | Age: 68
End: 2023-09-14
Payer: MEDICARE

## 2023-09-14 DIAGNOSIS — I73.9 PAD (PERIPHERAL ARTERY DISEASE): ICD-10-CM

## 2023-09-14 DIAGNOSIS — T14.8XXA OPEN WOUND: ICD-10-CM

## 2023-09-14 DIAGNOSIS — F17.200 SMOKER: Primary | ICD-10-CM

## 2023-09-14 PROCEDURE — 1159F MED LIST DOCD IN RCRD: CPT | Performed by: SURGERY

## 2023-09-14 PROCEDURE — 99203 OFFICE O/P NEW LOW 30 MIN: CPT | Performed by: SURGERY

## 2023-09-14 PROCEDURE — 1160F RVW MEDS BY RX/DR IN RCRD: CPT | Performed by: SURGERY

## 2023-09-14 RX ORDER — DICYCLOMINE HCL 20 MG
20 TABLET ORAL EVERY 6 HOURS
COMMUNITY

## 2023-09-14 RX ORDER — CARVEDILOL 3.12 MG/1
3.12 TABLET ORAL 2 TIMES DAILY WITH MEALS
COMMUNITY

## 2023-09-14 RX ORDER — DULAGLUTIDE 0.75 MG/.5ML
INJECTION, SOLUTION SUBCUTANEOUS
COMMUNITY

## 2023-09-14 RX ORDER — LOSARTAN POTASSIUM 50 MG/1
50 TABLET ORAL DAILY
COMMUNITY

## 2023-09-14 NOTE — PROGRESS NOTES
Subjective   Monae Chauhan is a 68 y.o. female.     Chief Complaint: left foot wound, PAD, smoker    History of Present Illness She is a 67 yo smoker who refuses to stop, and has PAD. She apparently has had more than one angioplasty/stent and was recommended for another but refused. She had develop a red spot on the dorsal lateral mid left foot several months ago that has evolved in to a 1 cm diameter ulcer. She has been on antibiotics but it has not improved. She had a MRI that is read as possible osteomyelitis.     The following portions of the patient's history were reviewed and updated as appropriate: current medications, past family history, past medical history, past social history, past surgical history and problem list.    Review of Systems    Objective   Physical Exam she has very slow capilary refill and diminished pulses. She is tender around the 1 cm ulcer that goes down to exposed tendon. No pus at this time.    Past Medical History:   Diagnosis Date    Arthritis     Coronary artery disease     Depression     Diabetes mellitus     Elevated cholesterol     Full dentures     GERD (gastroesophageal reflux disease)     Hyperlipidemia     Hypertension     Myocardial infarction     Peripheral vascular disease     Psoriasis        Family History   Problem Relation Age of Onset    Breast cancer Maternal Grandmother 70    Cancer Mother     Diabetes Mother     Cancer Father     Diabetes Father        Social History     Tobacco Use    Smoking status: Former     Packs/day: 1.00     Years: 40.00     Pack years: 40.00     Types: Cigarettes     Quit date: 2021     Years since quittin.2    Smokeless tobacco: Never    Tobacco comments:     2-4 PPD off and on for the last 40 years   Vaping Use    Vaping Use: Former    Substances: Nicotine, Flavoring    Devices: Refillable tank   Substance Use Topics    Alcohol use: No    Drug use: No       Past Surgical History:   Procedure Laterality Date    APPENDECTOMY       CARDIAC CATHETERIZATION N/A 01/09/2017    Procedure: Left Heart Cath;  Surgeon: David Diane MD;  Location:  [facility] CATH INVASIVE LOCATION;  Service:     CORONARY STENT PLACEMENT      CYSTOSCOPY BOTOX INJECTION OF BLADDER N/A 10/20/2021    Procedure: Cystoscopy Botox injection of bladder;  Surgeon: Say Robles MD;  Location:  [facility] OR;  Service: Urology;  Laterality: N/A;    FOOT SURGERY Left     GALLBLADDER SURGERY      LEG SURGERY Left     TUBAL ABDOMINAL LIGATION      VASCULAR SURGERY Left     Lower Extremity [facility] Stent-[facility]       Current Outpatient Medications   Medication Instructions    albuterol (PROVENTIL) 2.5 mg, Nebulization, Every 4 Hours PRN    apixaban (ELIQUIS) 5 mg, Oral, 2 Times Daily    aspirin 325 mg, Oral, Every 6 Hours PRN    carvedilol (COREG) 3.125 mg, Oral, 2 Times Daily With Meals    clopidogrel (PLAVIX) 75 mg, Oral, Daily    dicyclomine (BENTYL) 20 mg, Oral, Every 6 Hours    Dulaglutide (Trulicity) 0.75 MG/0.5ML solution pen-injector Subcutaneous    gabapentin (NEURONTIN) 800 MG tablet 3 Times Daily    glimepiride (AMARYL) 4 mg, Oral, Nightly    HYDROcodone-acetaminophen (NORCO) 7.5-325 MG per tablet As Needed    hydrOXYzine pamoate (VISTARIL) 25 MG capsule As Needed    Jardiance 25 mg, Daily    losartan (COZAAR) 50 mg, Oral, Daily    metoprolol tartrate (LOPRESSOR) 12.5 mg, Oral, Every 12 Hours Scheduled    metroNIDAZOLE (METROGEL) 0.75 % vaginal gel Vaginal, Nightly    nitrofurantoin (macrocrystal-monohydrate) (MACROBID) 100 mg, Oral, 2 Times Daily    rosuvastatin (CRESTOR) 20 mg, Oral, Daily    tiZANidine (ZANAFLEX) 4 mg, Oral, Nightly PRN    Tradjenta 5 mg, Oral, Daily         Assessment & Plan   Diagnoses and all orders for this visit:    1. Smoker (Primary)    2. Open wound    3. PAD (peripheral artery disease)    Her ulcer is from ischemia and is not likely to heal particularly since she won't stop smoking. I explained to her that debridement would just make the  wound larger and would not help it heal. She seems very convinced stopping smoking will not help. Long term antibiotics could be tried and she could go to infectious disease to see about that but she refused that as well. At this time there is nothing that general surgery can offer.

## 2024-01-18 ENCOUNTER — TRANSCRIBE ORDERS (OUTPATIENT)
Dept: WOUND CARE | Facility: HOSPITAL | Age: 69
End: 2024-01-18
Payer: MEDICARE

## 2024-01-18 DIAGNOSIS — L97.522 NON-PRESSURE CHRONIC ULCER OF OTHER PART OF LEFT FOOT WITH FAT LAYER EXPOSED: Primary | ICD-10-CM

## 2024-01-26 ENCOUNTER — HOSPITAL ENCOUNTER (OUTPATIENT)
Dept: ULTRASOUND IMAGING | Facility: HOSPITAL | Age: 69
Discharge: HOME | End: 2024-01-26
Payer: MEDICARE

## 2024-01-26 ENCOUNTER — HOSPITAL ENCOUNTER (OUTPATIENT)
Dept: GENERAL RADIOLOGY | Facility: HOSPITAL | Age: 69
Discharge: HOME | End: 2024-01-26
Payer: MEDICARE

## 2024-01-26 ENCOUNTER — HOSPITAL ENCOUNTER (OUTPATIENT)
Dept: WOUND CARE | Facility: HOSPITAL | Age: 69
Discharge: HOME | End: 2024-01-26
Payer: MEDICARE

## 2024-01-26 VITALS
DIASTOLIC BLOOD PRESSURE: 78 MMHG | HEIGHT: 63 IN | TEMPERATURE: 97.8 F | BODY MASS INDEX: 27.46 KG/M2 | SYSTOLIC BLOOD PRESSURE: 183 MMHG | RESPIRATION RATE: 20 BRPM | HEART RATE: 68 BPM | WEIGHT: 155 LBS

## 2024-01-26 DIAGNOSIS — I73.9 PAD (PERIPHERAL ARTERY DISEASE): ICD-10-CM

## 2024-01-26 DIAGNOSIS — T14.8XXA OPEN WOUND: Primary | ICD-10-CM

## 2024-01-26 PROBLEM — R06.09 DOE (DYSPNEA ON EXERTION): Status: RESOLVED | Noted: 2018-10-29 | Resolved: 2024-01-26

## 2024-01-26 PROBLEM — T81.89XA NON-HEALING SURGICAL WOUND: Status: RESOLVED | Noted: 2023-03-09 | Resolved: 2024-01-26

## 2024-01-26 PROBLEM — R32 URINARY INCONTINENCE: Status: RESOLVED | Noted: 2022-06-13 | Resolved: 2024-01-26

## 2024-01-26 PROBLEM — E66.9 CLASS 1 OBESITY IN ADULT: Status: RESOLVED | Noted: 2018-10-29 | Resolved: 2024-01-26

## 2024-01-26 PROBLEM — E66.811 CLASS 1 OBESITY IN ADULT: Status: RESOLVED | Noted: 2018-10-29 | Resolved: 2024-01-26

## 2024-01-26 LAB
ALBUMIN SERPL-MCNC: 3.1 G/DL (ref 3.5–5.2)
ALBUMIN/GLOB SERPL: 0.8 G/DL
ALP SERPL-CCNC: 94 U/L (ref 39–117)
ALT SERPL W P-5'-P-CCNC: 10 U/L (ref 1–33)
ANION GAP SERPL CALCULATED.3IONS-SCNC: 6.7 MMOL/L (ref 5–15)
AST SERPL-CCNC: 12 U/L (ref 1–32)
BASOPHILS # BLD AUTO: 0.04 10*3/MM3 (ref 0–0.2)
BASOPHILS NFR BLD AUTO: 0.5 % (ref 0–1.5)
BILIRUB SERPL-MCNC: 0.2 MG/DL (ref 0–1.2)
BUN SERPL-MCNC: 13 MG/DL (ref 8–23)
BUN/CREAT SERPL: 14.1 (ref 7–25)
CALCIUM SPEC-SCNC: 8.7 MG/DL (ref 8.6–10.5)
CHLORIDE SERPL-SCNC: 105 MMOL/L (ref 98–107)
CO2 SERPL-SCNC: 28.3 MMOL/L (ref 22–29)
CREAT SERPL-MCNC: 0.92 MG/DL (ref 0.57–1)
CRP SERPL-MCNC: <0.3 MG/DL (ref 0–0.5)
DEPRECATED RDW RBC AUTO: 64.7 FL (ref 37–54)
EGFRCR SERPLBLD CKD-EPI 2021: 68 ML/MIN/1.73
EOSINOPHIL # BLD AUTO: 0.15 10*3/MM3 (ref 0–0.4)
EOSINOPHIL NFR BLD AUTO: 2 % (ref 0.3–6.2)
ERYTHROCYTE [DISTWIDTH] IN BLOOD BY AUTOMATED COUNT: 20.1 % (ref 12.3–15.4)
ERYTHROCYTE [SEDIMENTATION RATE] IN BLOOD: 48 MM/HR (ref 0–30)
GLOBULIN UR ELPH-MCNC: 3.7 GM/DL
GLUCOSE SERPL-MCNC: 257 MG/DL (ref 65–99)
HBA1C MFR BLD: 7.7 % (ref 4.8–5.6)
HCT VFR BLD AUTO: 35 % (ref 34–46.6)
HGB BLD-MCNC: 10.9 G/DL (ref 12–15.9)
IMM GRANULOCYTES # BLD AUTO: 0.02 10*3/MM3 (ref 0–0.05)
IMM GRANULOCYTES NFR BLD AUTO: 0.3 % (ref 0–0.5)
LYMPHOCYTES # BLD AUTO: 2.04 10*3/MM3 (ref 0.7–3.1)
LYMPHOCYTES NFR BLD AUTO: 26.6 % (ref 19.6–45.3)
MCH RBC QN AUTO: 27.1 PG (ref 26.6–33)
MCHC RBC AUTO-ENTMCNC: 31.1 G/DL (ref 31.5–35.7)
MCV RBC AUTO: 87.1 FL (ref 79–97)
MONOCYTES # BLD AUTO: 0.59 10*3/MM3 (ref 0.1–0.9)
MONOCYTES NFR BLD AUTO: 7.7 % (ref 5–12)
NEUTROPHILS NFR BLD AUTO: 4.82 10*3/MM3 (ref 1.7–7)
NEUTROPHILS NFR BLD AUTO: 62.9 % (ref 42.7–76)
NRBC BLD AUTO-RTO: 0 /100 WBC (ref 0–0.2)
PLATELET # BLD AUTO: 350 10*3/MM3 (ref 140–450)
PMV BLD AUTO: 9.1 FL (ref 6–12)
POTASSIUM SERPL-SCNC: 4 MMOL/L (ref 3.5–5.2)
PREALB SERPL-MCNC: 14.5 MG/DL (ref 20–40)
PROT SERPL-MCNC: 6.8 G/DL (ref 6–8.5)
RBC # BLD AUTO: 4.02 10*6/MM3 (ref 3.77–5.28)
SODIUM SERPL-SCNC: 140 MMOL/L (ref 136–145)
WBC NRBC COR # BLD AUTO: 7.66 10*3/MM3 (ref 3.4–10.8)

## 2024-01-26 PROCEDURE — 63710000001 SODIUM HYPOCHLORITE 0.25 % SOLUTION 473 ML BOTTLE: Performed by: NURSE PRACTITIONER

## 2024-01-26 PROCEDURE — 36415 COLL VENOUS BLD VENIPUNCTURE: CPT

## 2024-01-26 PROCEDURE — A9270 NON-COVERED ITEM OR SERVICE: HCPCS | Performed by: NURSE PRACTITIONER

## 2024-01-26 PROCEDURE — 86140 C-REACTIVE PROTEIN: CPT | Performed by: NURSE PRACTITIONER

## 2024-01-26 PROCEDURE — 83036 HEMOGLOBIN GLYCOSYLATED A1C: CPT | Performed by: NURSE PRACTITIONER

## 2024-01-26 PROCEDURE — 80053 COMPREHEN METABOLIC PANEL: CPT | Performed by: NURSE PRACTITIONER

## 2024-01-26 PROCEDURE — 97602 WOUND(S) CARE NON-SELECTIVE: CPT

## 2024-01-26 PROCEDURE — 63710000001 COLLAGENASE 250 UNIT/GM OINTMENT 30 G TUBE: Performed by: NURSE PRACTITIONER

## 2024-01-26 PROCEDURE — 93922 UPR/L XTREMITY ART 2 LEVELS: CPT | Performed by: RADIOLOGY

## 2024-01-26 PROCEDURE — 84134 ASSAY OF PREALBUMIN: CPT | Performed by: NURSE PRACTITIONER

## 2024-01-26 PROCEDURE — 85025 COMPLETE CBC W/AUTO DIFF WBC: CPT | Performed by: NURSE PRACTITIONER

## 2024-01-26 PROCEDURE — 73630 X-RAY EXAM OF FOOT: CPT

## 2024-01-26 PROCEDURE — 93922 UPR/L XTREMITY ART 2 LEVELS: CPT

## 2024-01-26 PROCEDURE — 85652 RBC SED RATE AUTOMATED: CPT | Performed by: NURSE PRACTITIONER

## 2024-01-26 PROCEDURE — 73630 X-RAY EXAM OF FOOT: CPT | Performed by: RADIOLOGY

## 2024-01-26 RX ORDER — NYSTATIN 100000 [USP'U]/G
1 POWDER TOPICAL ONCE
OUTPATIENT
Start: 2024-01-26 | End: 2024-01-26

## 2024-01-26 RX ORDER — LIDOCAINE HYDROCHLORIDE 20 MG/ML
6 JELLY TOPICAL ONCE
Status: COMPLETED | OUTPATIENT
Start: 2024-01-26 | End: 2024-01-26

## 2024-01-26 RX ORDER — LIDOCAINE HYDROCHLORIDE 20 MG/ML
6 JELLY TOPICAL ONCE
Status: CANCELLED | OUTPATIENT
Start: 2024-01-26 | End: 2024-01-26

## 2024-01-26 RX ORDER — SODIUM HYPOCHLORITE 2.5 MG/ML
1 SOLUTION TOPICAL AS NEEDED
OUTPATIENT
Start: 2024-01-26

## 2024-01-26 RX ORDER — LIDOCAINE HYDROCHLORIDE AND EPINEPHRINE BITARTRATE 20; .01 MG/ML; MG/ML
10 INJECTION, SOLUTION SUBCUTANEOUS ONCE
OUTPATIENT
Start: 2024-01-26 | End: 2024-01-26

## 2024-01-26 RX ORDER — DIAPER,BRIEF,INFANT-TODD,DISP
1 EACH MISCELLANEOUS ONCE
OUTPATIENT
Start: 2024-01-26 | End: 2024-01-26

## 2024-01-26 RX ORDER — CASTOR OIL AND BALSAM, PERU 788; 87 MG/G; MG/G
1 OINTMENT TOPICAL AS NEEDED
OUTPATIENT
Start: 2024-01-26

## 2024-01-26 RX ORDER — SODIUM HYPOCHLORITE 2.5 MG/ML
1 SOLUTION TOPICAL AS NEEDED
Status: DISCONTINUED | OUTPATIENT
Start: 2024-01-26 | End: 2024-01-27 | Stop reason: HOSPADM

## 2024-01-26 RX ORDER — LIDOCAINE HYDROCHLORIDE 20 MG/ML
JELLY TOPICAL AS NEEDED
OUTPATIENT
Start: 2024-01-26

## 2024-01-26 RX ORDER — SODIUM HYPOCHLORITE 1.25 MG/ML
1 SOLUTION TOPICAL AS NEEDED
OUTPATIENT
Start: 2024-01-26

## 2024-01-26 RX ORDER — LIDOCAINE HYDROCHLORIDE 20 MG/ML
10 INJECTION, SOLUTION INFILTRATION; PERINEURAL ONCE
OUTPATIENT
Start: 2024-01-26 | End: 2024-01-26

## 2024-01-26 RX ADMIN — SODIUM HYPOCHLORITE 473 ML: 2.5 SOLUTION TOPICAL at 11:33

## 2024-01-26 RX ADMIN — COLLAGENASE SANTYL 1 APPLICATION: 250 OINTMENT TOPICAL at 11:33

## 2024-01-26 RX ADMIN — LIDOCAINE HYDROCHLORIDE 6 ML: 20 JELLY TOPICAL at 08:35

## 2024-01-26 NOTE — PROGRESS NOTES
Wound Clinic Note  Patient Identification:  Name:  Monae Chauhan  Age:  68 y.o.  Sex:  female  :  1955  MRN:  [id number]   Visit Number:  [id number]  Primary Care Physician:  Zachary Sullivan MD     Subjective     Chief complaint:     Multiple wound to abdomen, and right thigh    History of presenting illness:     Patient is a 68 y.o. female with past medical history significant for diabetes, PVD and former smoker quit in . Presented today for evaluation of wounds to abdomen, and right upper thigh and right medial thigh. 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 She has multiple debridements to the area. She has sutures in place to the right thigh and abdominal wounds. These were removed 5 from the right thigh and 5 to the right abdominal wound. Wound base red and moist to all wounds. No evidence of cellulitis. Reports she has been applying a wet-to dry dressing to the area. Denies any fever or chills. Mild to moderate pain to the area.    Interval History:  2023: Seen in clinic today for follow-up to multiple wounds to abdomen, and right thigh. Areas are improving. Increase in granulation tissue. Denies any fever or chills. No new issues or concerns. Tolerating current treatment without complications. Reports glucose levels are controlled.    03/15/2023: Seen in clinic today for follow-up to multiple wounds to abdomen, and right thigh. Wounds with significant improvement. Small open areas. Denies any fever or chills. No new complications. Reports compliance with treatment regimen.     2024: Seen in clinic today for follow-up to multiple wounds to abdomen, and right thigh. Wounds with significant improvement. Small open areas. Denies any fever or chills. No new complications. Reports compliance with treatment regimen.    ---------------------------------------------------------------------------------------------------------------------   Review of Systems   Constitutional:  Negative for chills and fever.   HENT:  Negative for congestion and rhinorrhea.    Respiratory:  Negative for cough and shortness of breath.    Cardiovascular:  Negative for chest pain and leg swelling.   Gastrointestinal:  Negative for nausea and vomiting.   Musculoskeletal:  Positive for back pain and gait problem.   Skin:  Positive for wound.   Hematological:  Does not bruise/bleed easily.      ---------------------------------------------------------------------------------------------------------------------   Past Medical History:   Diagnosis Date    Arthritis     Coronary artery disease     Depression     Diabetes mellitus     Elevated cholesterol     Full dentures     GERD (gastroesophageal reflux disease)     Hyperlipidemia     Hypertension     Myocardial infarction     Peripheral vascular disease     Psoriasis      Past Surgical History:   Procedure Laterality Date    APPENDECTOMY      CARDIAC CATHETERIZATION N/A 01/09/2017    Procedure: Left Heart Cath;  Surgeon: David Diane MD;  Location: [facility] CATH INVASIVE LOCATION;  Service:     CORONARY STENT PLACEMENT      CYSTOSCOPY BOTOX INJECTION OF BLADDER N/A 10/20/2021    Procedure: Cystoscopy Botox injection of bladder;  Surgeon: Say Robles MD;  Location: [facility] OR;  Service: Urology;  Laterality: N/A;    FOOT SURGERY Left     GALLBLADDER SURGERY      LEG SURGERY Left     TUBAL ABDOMINAL LIGATION      VASCULAR SURGERY Left     Lower Extremity Vascular Stent-[facility]     Family History   Problem Relation Age of Onset    Breast cancer Maternal Grandmother 70    Cancer Mother     Diabetes Mother     Cancer Father     Diabetes Father      Social History     Socioeconomic History    Marital status:     Number of children: 2   Tobacco Use    Smoking status: Former     Packs/day: 1.00     " Years: 40.00     Additional pack years: 0.00     Total pack years: 40.00     Types: Cigarettes     Quit date: 2021     Years since quittin.5    Smokeless tobacco: Never    Tobacco comments:     2-4 PPD off and on for the last 40 years   Vaping Use    Vaping Use: Former    Substances: Nicotine, Flavoring    Devices: Refillable tank   Substance and Sexual Activity    Alcohol use: No    Drug use: No    Sexual activity: Defer     ---------------------------------------------------------------------------------------------------------------------   Allergies:  Amoxicillin and Penicillins  ---------------------------------------------------------------------------------------------------------------------  Objective     ---------------------------------------------------------------------------------------------------------------------   Vital Signs:  BP (!) 183/78   Pulse 68   Temp 97.8 °F (36.6 °C)   Resp 20   Ht 160 cm (63\")   Wt 70.3 kg (155 lb)   BMI 27.46 kg/m²   Estimated body mass index is 27.46 kg/m² as calculated from the following:    Height as of this encounter: 160 cm (63\").    Weight as of this encounter: 70.3 kg (155 lb).  Body mass index is 27.46 kg/m².  Wt Readings from Last 3 Encounters:   24 70.3 kg (155 lb)   23 72.1 kg (159 lb)   22 78.5 kg (173 lb)       ---------------------------------------------------------------------------------------------------------------------     Physical Exam  Constitutional:       Appearance: Normal appearance.   HENT:      Head: Normocephalic and atraumatic.      Nose: Nose normal.      Mouth/Throat:      Mouth: Mucous membranes are moist.   Eyes:      Pupils: Pupils are equal, round, and reactive to light.   Cardiovascular:      Rate and Rhythm: Normal rate.   Pulmonary:      Effort: Pulmonary effort is normal.   Abdominal:      General: Abdomen is flat.      Palpations: Abdomen is soft.   Musculoskeletal:         General: Normal range " of motion.   Skin:     General: Skin is warm and dry.      Capillary Refill: Capillary refill takes less than 2 seconds.   Neurological:      General: No focal deficit present.      Mental Status: She is alert and oriented to person, place, and time.   Psychiatric:         Mood and Affect: Mood normal.         Behavior: Behavior normal.     Wound Assessment:     05/19/23 1300   Pain/Comfort/Sleep   Preferred Pain Scale number (Numeric Rating Pain Scale)   Pain Goal/Acceptable Level 0   (0-10) Pain Rating: Rest 4   (0-10) Pain Rating: Activity 4   Pain Location hip;groin   Pain Side/Orientation bilateral   Coping/Psychosocial   Observed Emotional State calm;cooperative   Verbalized Emotional State acceptance   Trust Relationship/Rapport care explained   Cognitive   Cognitive/Neuro/Behavioral WDL WDL   Skin   Skin WDL X;characteristics   Skin Temperature warm   Skin Moisture dry   Skin Elasticity slow return to original state   Skin Integrity wound   Additional Documentation Wound (LDA)   Adan Risk Assessment   Sensory Perception 3-->slightly limited   Moisture 3-->occasionally moist   Activity 3-->walks occasionally   Mobility 3-->slightly limited   Nutrition 3-->adequate   Friction and Shear 3-->no apparent problem   Adan Score 18   Wound 02/28/23 1245 Right thigh   Placement Date/Time: 02/28/23 1245   Side: Right  Location: thigh   Wound Image    Dressing Appearance intact;moist drainage   Closure Adhesive bandage   Base non-blanchable;moist;pink;red;yellow   Periwound intact;blanchable;dry;pink   Periwound Temperature warm   Periwound Skin Turgor soft   Edges irregular   Wound Length (cm) 0.6 cm   Wound Width (cm) 0.4 cm   Wound Depth (cm) 0.3 cm   Wound Surface Area (cm^2) 0.24 cm^2   Wound Volume (cm^3) 0.072 cm^3   Tunneling [Depth (cm)/Location] 0   Undermining [Depth (cm)/Location] 0   Drainage Characteristics/Odor serosanguineous   Drainage Amount small   Care, Wound cleansed with;sterile normal  saline;dressing removed  (honey)   Dressing Care dressing applied  (mepilex)   Wound 02/28/23 1242 Right hip   Placement Date/Time: 02/28/23 1242   Side: Right  Location: hip   Dressing Appearance open to air   Closure Open to air   Base non-blanchable;moist;pink;red   Periwound intact;blanchable;dry;pink   Periwound Temperature warm   Periwound Skin Turgor soft   Edges irregular   Wound Length (cm) 0.2 cm   Wound Width (cm) 0.5 cm   Wound Depth (cm) 0.1 cm   Wound Surface Area (cm^2) 0.1 cm^2   Wound Volume (cm^3) 0.01 cm^3   Tunneling [Depth (cm)/Location] 0   Undermining [Depth (cm)/Location] 0   Drainage Characteristics/Odor serosanguineous   Drainage Amount small   Care, Wound cleansed with;sterile normal saline  (honey)   Dressing Care dressing applied  (mepilex)   Musculoskeletal   Musculoskeletal WDL X;mobility   General Mobility mildly impaired   Functional Screen (every 3 days/change)   Ambulation 1 - assistive equipment   Transferring 1 - assistive equipment   Communication 0 - understands/communicates without difficulty   Nutrition   Nutrition Risk Screen no indicators present   Safety   Safety WDL WDL   Psychiatric High Risk Falls Assessment (If Fall score is >/=13, add the Fall Risk CPG to the care plan)    Fallen in past 6 months 0--> No   Mental Status 0--> no mental status change   Elimination 0--> No elimination issues   Mobility 2--> Requires assistance- transfer, walker, etc.   Medications 1--> Insulin/ Oral hypoglycemic   Nurses' Clinical Judgement 5   Total Fall Risk Score 9   Safety Management   Safety Promotion/Fall Prevention safety round/check completed     Wound Goal (s):Closure, Epithelization, Free of infection and No further symptoms  Assessment & Plan      Patient Active Problem List    Diagnosis     Non-healing surgical wound [T81.89XA]  - Honeygel to base and secure with adhesive dressing   -Keep area clean and dry   -Recommend yara protein diet 120g/day along with vitamin C  2000mg/day, vitamin A 5000 Units/day, vitamin D3 5000 Units/day, zinc 50mg/day to help promote wound healing   -Labs ordered: CBC, CMP, CRP, sed rate, prealbumin, and hemoglobain A1C to assess inflammatory markers and nutritional status as this both and negatively impact wound healing if not properly treated.      Diabetes mellitus (HCC) [E11.9]  -Recommend adequate glycemic control to help promote wound healing  -Poor glycemic  Control increases risk of infection, prolonged wound healing, loss of limb and premature death      Class 1 obesity in adult [E66.9]  -An obese person?is at greater risk for wound infection and dehiscence or evisceration.     PAD (peripheral artery disease) (HCC) [I73.9]  -Currently following with vascular surgeon   -Can negatively impact wound progression      Tobacco abuse [Z72.0]  -Tissue perfusion is lower in users of tobacco and other forms of nicotine. Reduced tissue perfusion strongly inhibits wound healing, increases risk of infection, and dramatically increases risk of limb loss.      Clinical Impression:Low Complexity    Follow-up: 1 week    DENIS Nguyen   [facility]  05/19/2023  [id number]

## 2024-02-01 ENCOUNTER — HOSPITAL ENCOUNTER (OUTPATIENT)
Dept: WOUND CARE | Facility: HOSPITAL | Age: 69
Discharge: HOME | End: 2024-02-01
Payer: MEDICARE

## 2024-02-01 VITALS
DIASTOLIC BLOOD PRESSURE: 83 MMHG | HEART RATE: 70 BPM | SYSTOLIC BLOOD PRESSURE: 184 MMHG | RESPIRATION RATE: 20 BRPM | TEMPERATURE: 98.5 F

## 2024-02-01 DIAGNOSIS — T14.8XXA OPEN WOUND: Primary | ICD-10-CM

## 2024-02-01 PROCEDURE — 97602 WOUND(S) CARE NON-SELECTIVE: CPT

## 2024-02-01 PROCEDURE — 63710000001 COLLAGENASE 250 UNIT/GM OINTMENT 30 G TUBE: Performed by: NURSE PRACTITIONER

## 2024-02-01 PROCEDURE — A9270 NON-COVERED ITEM OR SERVICE: HCPCS | Performed by: NURSE PRACTITIONER

## 2024-02-01 RX ORDER — SODIUM HYPOCHLORITE 1.25 MG/ML
1 SOLUTION TOPICAL AS NEEDED
OUTPATIENT
Start: 2024-02-01

## 2024-02-01 RX ORDER — DIAPER,BRIEF,INFANT-TODD,DISP
1 EACH MISCELLANEOUS ONCE
OUTPATIENT
Start: 2024-02-01 | End: 2024-02-01

## 2024-02-01 RX ORDER — NYSTATIN 100000 [USP'U]/G
1 POWDER TOPICAL ONCE
OUTPATIENT
Start: 2024-02-01 | End: 2024-02-01

## 2024-02-01 RX ORDER — LIDOCAINE HYDROCHLORIDE 20 MG/ML
JELLY TOPICAL AS NEEDED
OUTPATIENT
Start: 2024-02-01

## 2024-02-01 RX ORDER — SODIUM HYPOCHLORITE 2.5 MG/ML
1 SOLUTION TOPICAL AS NEEDED
OUTPATIENT
Start: 2024-02-01

## 2024-02-01 RX ORDER — LIDOCAINE HYDROCHLORIDE 20 MG/ML
10 INJECTION, SOLUTION INFILTRATION; PERINEURAL ONCE
OUTPATIENT
Start: 2024-02-01 | End: 2024-02-01

## 2024-02-01 RX ORDER — LIDOCAINE 50 MG/G
1 PATCH TOPICAL EVERY 24 HOURS
Qty: 3 PATCH | Refills: 3 | Status: SHIPPED | OUTPATIENT
Start: 2024-02-01

## 2024-02-01 RX ORDER — LIDOCAINE HYDROCHLORIDE 20 MG/ML
6 JELLY TOPICAL ONCE
OUTPATIENT
Start: 2024-02-01 | End: 2024-02-01

## 2024-02-01 RX ORDER — LIDOCAINE HYDROCHLORIDE 20 MG/ML
6 JELLY TOPICAL ONCE
Status: COMPLETED | OUTPATIENT
Start: 2024-02-01 | End: 2024-02-01

## 2024-02-01 RX ORDER — CASTOR OIL AND BALSAM, PERU 788; 87 MG/G; MG/G
1 OINTMENT TOPICAL AS NEEDED
OUTPATIENT
Start: 2024-02-01

## 2024-02-01 RX ORDER — LIDOCAINE HYDROCHLORIDE AND EPINEPHRINE BITARTRATE 20; .01 MG/ML; MG/ML
10 INJECTION, SOLUTION SUBCUTANEOUS ONCE
OUTPATIENT
Start: 2024-02-01 | End: 2024-02-01

## 2024-02-01 RX ADMIN — COLLAGENASE SANTYL 1 APPLICATION: 250 OINTMENT TOPICAL at 11:41

## 2024-02-01 RX ADMIN — LIDOCAINE HYDROCHLORIDE 6 ML: 20 JELLY TOPICAL at 11:41

## 2024-02-03 PROBLEM — L97.522 NON-PRESSURE CHRONIC ULCER OF OTHER PART OF LEFT FOOT WITH FAT LAYER EXPOSED: Status: ACTIVE | Noted: 2024-02-03

## 2024-02-03 NOTE — ADDENDUM NOTE
Encounter addended by: Judith Hutson APRN on: 2/3/2024 7:47 AM   Actions taken: Clinical Note Signed

## 2024-02-07 ENCOUNTER — HOSPITAL ENCOUNTER (OUTPATIENT)
Dept: WOUND CARE | Facility: HOSPITAL | Age: 69
Discharge: HOME | End: 2024-02-07
Payer: MEDICARE

## 2024-02-07 VITALS
RESPIRATION RATE: 20 BRPM | HEIGHT: 63 IN | HEART RATE: 75 BPM | SYSTOLIC BLOOD PRESSURE: 191 MMHG | TEMPERATURE: 98.7 F | DIASTOLIC BLOOD PRESSURE: 75 MMHG | WEIGHT: 155 LBS | BODY MASS INDEX: 27.46 KG/M2

## 2024-02-07 DIAGNOSIS — T14.8XXA OPEN WOUND: Primary | ICD-10-CM

## 2024-02-07 PROCEDURE — 97602 WOUND(S) CARE NON-SELECTIVE: CPT

## 2024-02-07 RX ORDER — LIDOCAINE HYDROCHLORIDE 20 MG/ML
6 JELLY TOPICAL ONCE
Status: CANCELLED | OUTPATIENT
Start: 2024-02-07 | End: 2024-02-07

## 2024-02-07 RX ORDER — CASTOR OIL AND BALSAM, PERU 788; 87 MG/G; MG/G
1 OINTMENT TOPICAL AS NEEDED
OUTPATIENT
Start: 2024-02-07

## 2024-02-07 RX ORDER — SODIUM HYPOCHLORITE 2.5 MG/ML
1 SOLUTION TOPICAL AS NEEDED
OUTPATIENT
Start: 2024-02-07

## 2024-02-07 RX ORDER — LIDOCAINE HYDROCHLORIDE 20 MG/ML
10 INJECTION, SOLUTION INFILTRATION; PERINEURAL ONCE
OUTPATIENT
Start: 2024-02-07 | End: 2024-02-07

## 2024-02-07 RX ORDER — LIDOCAINE HYDROCHLORIDE 20 MG/ML
JELLY TOPICAL AS NEEDED
OUTPATIENT
Start: 2024-02-07

## 2024-02-07 RX ORDER — SODIUM HYPOCHLORITE 1.25 MG/ML
1 SOLUTION TOPICAL AS NEEDED
OUTPATIENT
Start: 2024-02-07

## 2024-02-07 RX ORDER — NYSTATIN 100000 [USP'U]/G
1 POWDER TOPICAL ONCE
OUTPATIENT
Start: 2024-02-07 | End: 2024-02-07

## 2024-02-07 RX ORDER — LIDOCAINE HYDROCHLORIDE AND EPINEPHRINE BITARTRATE 20; .01 MG/ML; MG/ML
10 INJECTION, SOLUTION SUBCUTANEOUS ONCE
OUTPATIENT
Start: 2024-02-07 | End: 2024-02-07

## 2024-02-07 RX ORDER — DIAPER,BRIEF,INFANT-TODD,DISP
1 EACH MISCELLANEOUS ONCE
OUTPATIENT
Start: 2024-02-07 | End: 2024-02-07

## 2024-02-07 RX ORDER — LIDOCAINE HYDROCHLORIDE 20 MG/ML
6 JELLY TOPICAL ONCE
Status: COMPLETED | OUTPATIENT
Start: 2024-02-07 | End: 2024-02-07

## 2024-02-07 RX ADMIN — LIDOCAINE HYDROCHLORIDE 6 ML: 20 JELLY TOPICAL at 14:55

## 2024-02-08 NOTE — PROGRESS NOTES
Wound Clinic Note  Patient Identification:  Name:  Monae Chauhan  Age:  68 y.o.  Sex:  female  :  1955  MRN:  [id number]   Visit Number:  [id number]  Primary Care Physician:  Zachary Sullivan MD     Subjective     Chief complaint:     Multiple wound to abdomen, and right thigh    History of presenting illness:     Patient is a 68 y.o. female with past medical history significant for diabetes, PVD and former smoker quit in . Presented today for evaluation of wounds to abdomen, and right upper thigh and right medial thigh. 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 She has multiple debridements to the area. She has sutures in place to the right thigh and abdominal wounds. These were removed 5 from the right thigh and 5 to the right abdominal wound. Wound base red and moist to all wounds. No evidence of cellulitis. Reports she has been applying a wet-to dry dressing to the area. Denies any fever or chills. Mild to moderate pain to the area.    Interval History:  2023: Seen in clinic today for follow-up to multiple wounds to abdomen, and right thigh. Areas are improving. Increase in granulation tissue. Denies any fever or chills. No new issues or concerns. Tolerating current treatment without complications. Reports glucose levels are controlled.    03/15/2023: Seen in clinic today for follow-up to multiple wounds to abdomen, and right thigh. Wounds with significant improvement. Small open areas. Denies any fever or chills. No new complications. Reports compliance with treatment regimen.     2024: Seen in clinic today for follow-up she has new wound to left lateral foot. Reports wound has been present for 2 months. She has been applying peroxide and leaving open to air. No recent imaging or labs. Denies any fever or chills. No new complications. Reports compliance with treatment regimen. Reports smoking 1 pack per day. Glucose  levels around 100-200. Last A1C 7.5.     02/01/2024: Seen in clinic today for follow-up to wound to left lateral foot. No recent imaging or labs. Denies any fever or chills. No new complications. Reports compliance with treatment regimen. Reports smoking 1 pack per day. Glucose levels around 100-200. JUNIOR was completed [facility] right 0.87 and left 0.85. Will refer to vascular surgeon for evaluation.   ---------------------------------------------------------------------------------------------------------------------   Review of Systems   Constitutional:  Negative for chills and fever.   HENT:  Negative for congestion and rhinorrhea.    Respiratory:  Negative for cough and shortness of breath.    Cardiovascular:  Negative for chest pain and leg swelling.   Gastrointestinal:  Negative for nausea and vomiting.   Musculoskeletal:  Positive for back pain and gait problem.   Skin:  Positive for wound.   Hematological:  Does not bruise/bleed easily.      ---------------------------------------------------------------------------------------------------------------------   Past Medical History:   Diagnosis Date    Arthritis     Coronary artery disease     Depression     Diabetes mellitus     Elevated cholesterol     Full dentures     GERD (gastroesophageal reflux disease)     Hyperlipidemia     Hypertension     Myocardial infarction     Peripheral vascular disease     Psoriasis      Past Surgical History:   Procedure Laterality Date    APPENDECTOMY      CARDIAC CATHETERIZATION N/A 01/09/2017    Procedure: Left Heart Cath;  Surgeon: David Diane MD;  Location: [facility] CATH INVASIVE LOCATION;  Service:     CORONARY STENT PLACEMENT      CYSTOSCOPY BOTOX INJECTION OF BLADDER N/A 10/20/2021    Procedure: Cystoscopy Botox injection of bladder;  Surgeon: Say Robles MD;  Location: [facility];  Service: Urology;  Laterality: N/A;    FOOT SURGERY Left     GALLBLADDER SURGERY      LEG SURGERY Left     TUBAL ABDOMINAL  "LIGATION      VASCULAR SURGERY Left     Lower Extremity Vascular Stent-[facility]     Family History   Problem Relation Age of Onset    Breast cancer Maternal Grandmother 70    Cancer Mother     Diabetes Mother     Cancer Father     Diabetes Father      Social History     Socioeconomic History    Marital status:     Number of children: 2   Tobacco Use    Smoking status: Former     Packs/day: 1.00     Years: 40.00     Additional pack years: 0.00     Total pack years: 40.00     Types: Cigarettes     Quit date: 2021     Years since quittin.6    Smokeless tobacco: Never    Tobacco comments:     2-4 PPD off and on for the last 40 years   Vaping Use    Vaping Use: Former    Substances: Nicotine, Flavoring    Devices: RefStokeble tank   Substance and Sexual Activity    Alcohol use: No    Drug use: No    Sexual activity: Defer     ---------------------------------------------------------------------------------------------------------------------   Allergies:  Amoxicillin and Penicillins  ---------------------------------------------------------------------------------------------------------------------  Objective     ---------------------------------------------------------------------------------------------------------------------   Vital Signs:  BP (!) 184/83   Pulse 70   Temp 98.5 °F (36.9 °C) (Infrared)   Resp 20   Estimated body mass index is 27.46 kg/m² as calculated from the following:    Height as of 24: 160 cm (63\").    Weight as of 24: 70.3 kg (155 lb).  There is no height or weight on file to calculate BMI.  Wt Readings from Last 3 Encounters:   24 70.3 kg (155 lb)   24 70.3 kg (155 lb)   23 72.1 kg (159 lb)       ---------------------------------------------------------------------------------------------------------------------     Physical Exam  Constitutional:       Appearance: Normal appearance.   HENT:      Head: Normocephalic and atraumatic.      Nose: Nose normal. "      Mouth/Throat:      Mouth: Mucous membranes are moist.   Eyes:      Pupils: Pupils are equal, round, and reactive to light.   Cardiovascular:      Rate and Rhythm: Normal rate.   Pulmonary:      Effort: Pulmonary effort is normal.   Abdominal:      General: Abdomen is flat.      Palpations: Abdomen is soft.   Musculoskeletal:         General: Normal range of motion.   Skin:     General: Skin is warm and dry.      Capillary Refill: Capillary refill takes less than 2 seconds.   Neurological:      General: No focal deficit present.      Mental Status: She is alert and oriented to person, place, and time.   Psychiatric:         Mood and Affect: Mood normal.         Behavior: Behavior normal.     Wound Assessment:  Location: Left, lateral, foot  Wound Measurements: 2 X 2 X 0.3cm   Tunneling: No Undermining: No  Dressing Appearance: dressingappearance: clean  Closure: NA  Changes since last exam: no changes  Etiology and classification: reyna grade 1 DFU (skin and integument but no deep structures)  Wound bed structures/characteristics: full-thickness (subcutaneous tissue is exposed in at least a portion of the wound), moist, necrotic  Edges moist  Periwound characteristics: edematous  Periwound Temperature: cool  Drainage characteristics: moderate, serous   Perfusion characteristics: suggest some degree of PAD    Wound Goal (s):Closure, Epithelization, Free of infection and No further symptoms  Assessment & Plan      Patient Active Problem List    Diagnosis     Diabetic foot ulcer of left lateral foot [l97.522]  -Clean with dakins apply santyl to base and secure with kerlix and ACE  -X-ray to assess for any other contributing factors  -JUNIOR: right 0.87 and left 0.85, referral to vascular surgeon for evaluation  -Recommend yara protein diet 0.75g/kg/day along with vitamin C 2000mg/day, vitamin A 5000 Units/day, vitamin D3 5000 Units/day, zinc 50mg/day to help promote wound healing        Diabetes mellitus (HCC)  [E11.9]  -Recommend adequate glycemic control to help promote wound healing  -Poor glycemic  Control increases risk of infection, prolonged wound healing, loss of limb and premature death      Class 1 obesity in adult [E66.9]  -An obese person?is at greater risk for wound infection and dehiscence or evisceration.     PAD (peripheral artery disease) (HCC) [I73.9]  -Following with vascular, has not seen in several months  -Can negatively impact wound progression      Tobacco abuse [Z72.0]  -Tissue perfusion is lower in users of tobacco and other forms of nicotine. Reduced tissue perfusion strongly inhibits wound healing, increases risk of infection, and dramatically increases risk of limb loss.      Clinical Impression:Moderate Complexity    Follow-up: 1 week    DENIS Nguyen   [facility]  02/01/2024  1100

## 2024-02-13 ENCOUNTER — HOSPITAL ENCOUNTER (OUTPATIENT)
Dept: WOUND CARE | Facility: HOSPITAL | Age: 69
Discharge: HOME | End: 2024-02-13
Payer: MEDICARE

## 2024-02-13 VITALS
HEART RATE: 71 BPM | TEMPERATURE: 98.5 F | SYSTOLIC BLOOD PRESSURE: 189 MMHG | DIASTOLIC BLOOD PRESSURE: 84 MMHG | RESPIRATION RATE: 18 BRPM

## 2024-02-13 DIAGNOSIS — T14.8XXA OPEN WOUND: Primary | ICD-10-CM

## 2024-02-13 PROCEDURE — 63710000001 COLLAGENASE 250 UNIT/GM OINTMENT 30 G TUBE: Performed by: NURSE PRACTITIONER

## 2024-02-13 PROCEDURE — A9270 NON-COVERED ITEM OR SERVICE: HCPCS | Performed by: NURSE PRACTITIONER

## 2024-02-13 RX ORDER — CASTOR OIL AND BALSAM, PERU 788; 87 MG/G; MG/G
1 OINTMENT TOPICAL AS NEEDED
OUTPATIENT
Start: 2024-02-13

## 2024-02-13 RX ORDER — LIDOCAINE HYDROCHLORIDE 20 MG/ML
10 INJECTION, SOLUTION INFILTRATION; PERINEURAL ONCE
OUTPATIENT
Start: 2024-02-13 | End: 2024-02-13

## 2024-02-13 RX ORDER — DIAPER,BRIEF,INFANT-TODD,DISP
1 EACH MISCELLANEOUS ONCE
OUTPATIENT
Start: 2024-02-13 | End: 2024-02-13

## 2024-02-13 RX ORDER — LIDOCAINE HYDROCHLORIDE 20 MG/ML
6 JELLY TOPICAL ONCE
Status: CANCELLED | OUTPATIENT
Start: 2024-02-13 | End: 2024-02-13

## 2024-02-13 RX ORDER — SODIUM HYPOCHLORITE 2.5 MG/ML
1 SOLUTION TOPICAL AS NEEDED
OUTPATIENT
Start: 2024-02-13

## 2024-02-13 RX ORDER — LIDOCAINE HYDROCHLORIDE 20 MG/ML
JELLY TOPICAL AS NEEDED
OUTPATIENT
Start: 2024-02-13

## 2024-02-13 RX ORDER — NYSTATIN 100000 [USP'U]/G
1 POWDER TOPICAL ONCE
OUTPATIENT
Start: 2024-02-13 | End: 2024-02-13

## 2024-02-13 RX ORDER — LIDOCAINE HYDROCHLORIDE 20 MG/ML
6 JELLY TOPICAL ONCE
Status: COMPLETED | OUTPATIENT
Start: 2024-02-13 | End: 2024-02-13

## 2024-02-13 RX ORDER — SODIUM HYPOCHLORITE 1.25 MG/ML
1 SOLUTION TOPICAL AS NEEDED
OUTPATIENT
Start: 2024-02-13

## 2024-02-13 RX ORDER — LIDOCAINE HYDROCHLORIDE AND EPINEPHRINE BITARTRATE 20; .01 MG/ML; MG/ML
10 INJECTION, SOLUTION SUBCUTANEOUS ONCE
OUTPATIENT
Start: 2024-02-13 | End: 2024-02-13

## 2024-02-13 RX ADMIN — COLLAGENASE SANTYL 1 APPLICATION: 250 OINTMENT TOPICAL at 14:27

## 2024-02-13 RX ADMIN — LIDOCAINE HYDROCHLORIDE 6 ML: 20 JELLY TOPICAL at 14:27

## 2024-02-20 ENCOUNTER — HOSPITAL ENCOUNTER (OUTPATIENT)
Dept: WOUND CARE | Facility: HOSPITAL | Age: 69
Discharge: HOME | End: 2024-02-20
Admitting: NURSE PRACTITIONER
Payer: MEDICARE

## 2024-02-20 VITALS
DIASTOLIC BLOOD PRESSURE: 79 MMHG | RESPIRATION RATE: 20 BRPM | TEMPERATURE: 98.3 F | WEIGHT: 155 LBS | SYSTOLIC BLOOD PRESSURE: 165 MMHG | BODY MASS INDEX: 27.46 KG/M2 | HEART RATE: 78 BPM | HEIGHT: 63 IN

## 2024-02-20 DIAGNOSIS — T14.8XXA OPEN WOUND: Primary | ICD-10-CM

## 2024-02-20 RX ORDER — LIDOCAINE HYDROCHLORIDE AND EPINEPHRINE BITARTRATE 20; .01 MG/ML; MG/ML
10 INJECTION, SOLUTION SUBCUTANEOUS ONCE
OUTPATIENT
Start: 2024-02-20 | End: 2024-02-20

## 2024-02-20 RX ORDER — LIDOCAINE HYDROCHLORIDE 20 MG/ML
10 INJECTION, SOLUTION INFILTRATION; PERINEURAL ONCE
OUTPATIENT
Start: 2024-02-20 | End: 2024-02-20

## 2024-02-20 RX ORDER — SODIUM HYPOCHLORITE 1.25 MG/ML
1 SOLUTION TOPICAL AS NEEDED
OUTPATIENT
Start: 2024-02-20

## 2024-02-20 RX ORDER — LIDOCAINE HYDROCHLORIDE 20 MG/ML
6 JELLY TOPICAL ONCE
Status: COMPLETED | OUTPATIENT
Start: 2024-02-20 | End: 2024-02-20

## 2024-02-20 RX ORDER — SODIUM HYPOCHLORITE 2.5 MG/ML
1 SOLUTION TOPICAL AS NEEDED
OUTPATIENT
Start: 2024-02-20

## 2024-02-20 RX ORDER — LIDOCAINE HYDROCHLORIDE 20 MG/ML
6 JELLY TOPICAL ONCE
OUTPATIENT
Start: 2024-02-20 | End: 2024-02-20

## 2024-02-20 RX ORDER — LIDOCAINE HYDROCHLORIDE 20 MG/ML
JELLY TOPICAL AS NEEDED
OUTPATIENT
Start: 2024-02-20

## 2024-02-20 RX ORDER — DIAPER,BRIEF,INFANT-TODD,DISP
1 EACH MISCELLANEOUS ONCE
OUTPATIENT
Start: 2024-02-20 | End: 2024-02-20

## 2024-02-20 RX ORDER — NYSTATIN 100000 [USP'U]/G
1 POWDER TOPICAL ONCE
OUTPATIENT
Start: 2024-02-20 | End: 2024-02-20

## 2024-02-20 RX ORDER — CASTOR OIL AND BALSAM, PERU 788; 87 MG/G; MG/G
1 OINTMENT TOPICAL AS NEEDED
OUTPATIENT
Start: 2024-02-20

## 2024-02-20 RX ADMIN — LIDOCAINE HYDROCHLORIDE 6 ML: 20 JELLY TOPICAL at 11:37

## 2024-02-20 NOTE — PROGRESS NOTES
Wound Clinic Note  Patient Identification:  Name:  Monae Chauhan  Age:  68 y.o.  Sex:  female  :  1955  MRN:  [id number]   Visit Number:  [id number]  Primary Care Physician:  Zachary Sullivan MD     Subjective     Chief complaint:     Left lateral foot    History of presenting illness:     Patient is a 68 y.o. female with past medical history significant for diabetes, PVD and former smoker quit in . Presented today for evaluation of wounds to abdomen, and right upper thigh and right medial thigh. 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 She has multiple debridements to the area. She has sutures in place to the right thigh and abdominal wounds. These were removed 5 from the right thigh and 5 to the right abdominal wound. Wound base red and moist to all wounds. No evidence of cellulitis. Reports she has been applying a wet-to dry dressing to the area. Denies any fever or chills. Mild to moderate pain to the area.    Interval History:  2023: Seen in clinic today for follow-up to multiple wounds to abdomen, and right thigh. Areas are improving. Increase in granulation tissue. Denies any fever or chills. No new issues or concerns. Tolerating current treatment without complications. Reports glucose levels are controlled.    03/15/2023: Seen in clinic today for follow-up to multiple wounds to abdomen, and right thigh. Wounds with significant improvement. Small open areas. Denies any fever or chills. No new complications. Reports compliance with treatment regimen.     2024: Seen in clinic today for follow-up she has new wound to left lateral foot. Reports wound has been present for 2 months. She has been applying peroxide and leaving open to air. No recent imaging or labs. Denies any fever or chills. No new complications. Reports compliance with treatment regimen. Reports smoking 1 pack per day. Glucose levels around 100-200.  Last A1C 7.5.     02/01/2024: Seen in clinic today for follow-up to wound to left lateral foot. No recent imaging or labs. Denies any fever or chills. No new complications. Reports compliance with treatment regimen. Reports smoking 1 pack per day. Glucose levels around 100-200. JUNIOR was completed wt right 0.87 and left 0.85. Will refer to vascular surgeon for evaluation.     02/007/2024: Seen in clinic today for follow-up to wound to left lateral foot. No recent imaging or labs. Denies any fever or chills. No new complications. Reports compliance with treatment regimen. Reports smoking 1 pack per day. Glucose levels around 100-200. JUNIOR was completed wt right 0.87 and left 0.85. Referred to vascular surgeon for evaluation, she has appointment 02/22/2024.     02/20/2024: Seen in clinic today for follow-up to wound to left lateral foot. Reports ongoing pain to left foot when she walks. Xrays are negative for deformity but shows degenerative changes. We discussed the importance of supportive shoes and the possibility of plantar fasciitis. Denies any fever or chills. No new complications. Reports compliance with treatment regimen. Reports smoking 1 pack per day. JUNIOR was completed wt right 0.87 and left 0.85. Referred to vascular surgeon for evaluation, she has appointment 02/22/2024.   ---------------------------------------------------------------------------------------------------------------------   Review of Systems   Constitutional:  Negative for chills and fever.   HENT:  Negative for congestion and rhinorrhea.    Respiratory:  Negative for cough and shortness of breath.    Cardiovascular:  Negative for chest pain and leg swelling.   Gastrointestinal:  Negative for nausea and vomiting.   Musculoskeletal:  Positive for back pain and gait problem.   Skin:  Positive for wound.   Hematological:  Does not bruise/bleed easily.       ---------------------------------------------------------------------------------------------------------------------   Past Medical History:   Diagnosis Date    Arthritis     Coronary artery disease     Depression     Diabetes mellitus     Elevated cholesterol     Full dentures     GERD (gastroesophageal reflux disease)     Hyperlipidemia     Hypertension     Myocardial infarction     Peripheral vascular disease     Psoriasis      Past Surgical History:   Procedure Laterality Date    APPENDECTOMY      CARDIAC CATHETERIZATION N/A 2017    Procedure: Left Heart Cath;  Surgeon: David Diane MD;  Location: [facility] CATH INVASIVE LOCATION;  Service:     CORONARY STENT PLACEMENT      CYSTOSCOPY BOTOX INJECTION OF BLADDER N/A 10/20/2021    Procedure: Cystoscopy Botox injection of bladder;  Surgeon: Say Robles MD;  Location: [facility] OR;  Service: Urology;  Laterality: N/A;    FOOT SURGERY Left     GALLBLADDER SURGERY      LEG SURGERY Left     TUBAL ABDOMINAL LIGATION      VASCULAR SURGERY Left     Lower Extremity Vascular Stent-[facility]     Family History   Problem Relation Age of Onset    Breast cancer Maternal Grandmother 70    Cancer Mother     Diabetes Mother     Cancer Father     Diabetes Father      Social History     Socioeconomic History    Marital status:     Number of children: 2   Tobacco Use    Smoking status: Former     Packs/day: 1.00     Years: 40.00     Additional pack years: 0.00     Total pack years: 40.00     Types: Cigarettes     Quit date: 2021     Years since quittin.6    Smokeless tobacco: Never    Tobacco comments:     2-4 PPD off and on for the last 40 years   Vaping Use    Vaping Use: Former    Substances: Nicotine, Flavoring    Devices: Refillable tank   Substance and Sexual Activity    Alcohol use: No    Drug use: No    Sexual activity: Defer     ---------------------------------------------------------------------------------------------------------------------  "  Allergies:  Amoxicillin and Penicillins  ---------------------------------------------------------------------------------------------------------------------  Objective     ---------------------------------------------------------------------------------------------------------------------   Vital Signs:  /79   Pulse 78   Temp 98.3 °F (36.8 °C) (Infrared)   Resp 20   Ht 160 cm (62.99\")   Wt 70.3 kg (155 lb)   BMI 27.46 kg/m²   Estimated body mass index is 27.46 kg/m² as calculated from the following:    Height as of this encounter: 160 cm (62.99\").    Weight as of this encounter: 70.3 kg (155 lb).  Body mass index is 27.46 kg/m².  Wt Readings from Last 3 Encounters:   02/20/24 70.3 kg (155 lb)   02/07/24 70.3 kg (155 lb)   01/26/24 70.3 kg (155 lb)       ---------------------------------------------------------------------------------------------------------------------     Physical Exam  Constitutional:       Appearance: Normal appearance.   HENT:      Head: Normocephalic and atraumatic.      Nose: Nose normal.      Mouth/Throat:      Mouth: Mucous membranes are moist.   Eyes:      Pupils: Pupils are equal, round, and reactive to light.   Cardiovascular:      Rate and Rhythm: Normal rate.   Pulmonary:      Effort: Pulmonary effort is normal.   Abdominal:      General: Abdomen is flat.      Palpations: Abdomen is soft.   Musculoskeletal:         General: Normal range of motion.   Skin:     General: Skin is warm and dry.      Capillary Refill: Capillary refill takes less than 2 seconds.   Neurological:      General: No focal deficit present.      Mental Status: She is alert and oriented to person, place, and time.   Psychiatric:         Mood and Affect: Mood normal.         Behavior: Behavior normal.     Wound Assessment:  Location: Left, lateral, foot  Wound Measurements: 2.5 X 2.3 X 0.3cm   Tunneling: No Undermining: No  Dressing Appearance: dressingappearance: clean  Closure: NA  Changes since last " exam: no changes  Etiology and classification: reyna grade 1 DFU (skin and integument but no deep structures)  Wound bed structures/characteristics: full-thickness (subcutaneous tissue is exposed in at least a portion of the wound), moist, necrotic  Edges moist  Periwound characteristics: edematous  Periwound Temperature: cool  Drainage characteristics: moderate, serous   Perfusion characteristics: suggest some degree of PAD    Wound Goal (s):Closure, Epithelization, Free of infection and No further symptoms  Assessment & Plan      Patient Active Problem List    Diagnosis     Diabetic foot ulcer of left lateral foot [l97.522]  -Clean with dakins apply santyl to base and secure with kerlix and ACE  -JUNIOR: right 0.87 and left 0.85, referral to vascular surgeon for evaluation appointment 02/22/2024  -Recommend yara protein diet 0.75g/kg/day along with vitamin C 2000mg/day, vitamin A 5000 Units/day, vitamin D3 5000 Units/day, zinc 50mg/day to help promote wound healing        Diabetes mellitus (HCC) [E11.9]  -Recommend adequate glycemic control to help promote wound healing  -Poor glycemic  Control increases risk of infection, prolonged wound healing, loss of limb and premature death      Class 1 obesity in adult [E66.9]  -An obese person?is at greater risk for wound infection and dehiscence or evisceration.     PAD (peripheral artery disease) (HCC) [I73.9]  -Following with vascular, has not seen in several months  -Can negatively impact wound progression      Tobacco abuse [Z72.0]  -Tissue perfusion is lower in users of tobacco and other forms of nicotine. Reduced tissue perfusion strongly inhibits wound healing, increases risk of infection, and dramatically increases risk of limb loss.        Wound Care Debridement Note   Pre-Procedure  Pre-Procedure Diagnosis: Diabetic foot ulcer of left lateral foot with fatty layer exposed  Checked for Allergies: yes  Consent:Consent obtained, consent given by Patient,Risks  Discussed, Alternatives Discussed  Indication: slough  Vascular status: See vascular this week  Time out was called prior to procedure.   Pre procedure Pain assessment: mild  Pre debridement measurements: Length 2.5,Width 2.3, depth 0.3,   Volume:1.725, Surface area: 5.75  sinus/tunnelNo, undermining No    Post Procedure  Post-Procedure Diagnosis: Diabetic foot ulcer of left lateral foot with fatty layer exposed  Post debridement measurements: Length 2.6,Width 2.4, depth 0.31,   Volume: 1.725, Surface area:  5.75  sinus/tunnelNo, undermining No  Post procedure Pain assessment: moderate  Graft/Implant/Prosthetics/Implanted Device/Transplants:  None  Complication(s):  None    Procedure details:  Method of Debridement: excissional (Surgical removal or cutting away, outside or beyond the wound margin devitalized tissue, necrosis or slough.)  Procedure: The site was prepared using clean techniques, subcutaneous tissue was removed by surgical excision.  Instrument(s) used: Curette 4mm  Anesthesia:After checking patient allergies,lidocaine topical 2% was administered to provide anesthesia.  Tissue removed: subuctaneous, Percent Removed 100%  Culture or Biopsy: skin/subcutaneous tissue biopsy  Estimated Blood Loss: Minimal  Hemostasis Obtained: pressure       Clinical Impression:Moderate Complexity    Follow-up: 1 week    Audra Henson PA-C   [facility]  02/20/2024  1133

## 2024-02-21 NOTE — PROGRESS NOTES
Wound Clinic Note  Patient Identification:  Name:  Monae Chauhan  Age:  68 y.o.  Sex:  female  :  1955  MRN:  [id number]   Visit Number:  [id number]  Primary Care Physician:  Zachary Sullivan MD     Subjective     Chief complaint:     Multiple wound to abdomen, and right thigh    History of presenting illness:     Patient is a 68 y.o. female with past medical history significant for diabetes, PVD and former smoker quit in . Presented today for evaluation of wounds to abdomen, and right upper thigh and right medial thigh. 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 She has multiple debridements to the area. She has sutures in place to the right thigh and abdominal wounds. These were removed 5 from the right thigh and 5 to the right abdominal wound. Wound base red and moist to all wounds. No evidence of cellulitis. Reports she has been applying a wet-to dry dressing to the area. Denies any fever or chills. Mild to moderate pain to the area.    Interval History:  2023: Seen in clinic today for follow-up to multiple wounds to abdomen, and right thigh. Areas are improving. Increase in granulation tissue. Denies any fever or chills. No new issues or concerns. Tolerating current treatment without complications. Reports glucose levels are controlled.    03/15/2023: Seen in clinic today for follow-up to multiple wounds to abdomen, and right thigh. Wounds with significant improvement. Small open areas. Denies any fever or chills. No new complications. Reports compliance with treatment regimen.     2024: Seen in clinic today for follow-up she has new wound to left lateral foot. Reports wound has been present for 2 months. She has been applying peroxide and leaving open to air. No recent imaging or labs. Denies any fever or chills. No new complications. Reports compliance with treatment regimen. Reports smoking 1 pack per day. Glucose  levels around 100-200. Last A1C 7.5.     02/01/2024: Seen in clinic today for follow-up to wound to left lateral foot. No recent imaging or labs. Denies any fever or chills. No new complications. Reports compliance with treatment regimen. Reports smoking 1 pack per day. Glucose levels around 100-200. JUNIOR was completed [facility] right 0.87 and left 0.85. Will refer to vascular surgeon for evaluation.     02/007/2024: Seen in clinic today for follow-up to wound to left lateral foot. No recent imaging or labs. Denies any fever or chills. No new complications. Reports compliance with treatment regimen. Reports smoking 1 pack per day. Glucose levels around 100-200. JUNIOR was completed [facility] right 0.87 and left 0.85. Referred to vascular surgeon for evaluation, she has appointment 02/22/2024.     02/13/2024: Seen in clinic today for follow-up to wound to left lateral foot. . No new complications. Reports compliance with treatment regimen. Reports smoking 1 pack per day. Glucose levels around 100-200. JUNIOR was completed [facility] right 0.87 and left 0.85. has vascular appointment next week.   ---------------------------------------------------------------------------------------------------------------------   Review of Systems   Constitutional:  Negative for chills and fever.   HENT:  Negative for congestion and rhinorrhea.    Respiratory:  Negative for cough and shortness of breath.    Cardiovascular:  Negative for chest pain and leg swelling.   Gastrointestinal:  Negative for nausea and vomiting.   Musculoskeletal:  Positive for back pain and gait problem.   Skin:  Positive for wound.   Hematological:  Does not bruise/bleed easily.      ---------------------------------------------------------------------------------------------------------------------   Past Medical History:   Diagnosis Date    Arthritis     Coronary artery disease     Depression     Diabetes mellitus     Elevated cholesterol     Full dentures     GERD  (gastroesophageal reflux disease)     Hyperlipidemia     Hypertension     Myocardial infarction     Peripheral vascular disease     Psoriasis      Past Surgical History:   Procedure Laterality Date    APPENDECTOMY      CARDIAC CATHETERIZATION N/A 2017    Procedure: Left Heart Cath;  Surgeon: David Diane MD;  Location: [facility] CATH INVASIVE LOCATION;  Service:     CORONARY STENT PLACEMENT      CYSTOSCOPY BOTOX INJECTION OF BLADDER N/A 10/20/2021    Procedure: Cystoscopy Botox injection of bladder;  Surgeon: Say Robles MD;  Location: [facility] OR;  Service: Urology;  Laterality: N/A;    FOOT SURGERY Left     GALLBLADDER SURGERY      LEG SURGERY Left     TUBAL ABDOMINAL LIGATION      VASCULAR SURGERY Left     Lower Extremity Vascular Stent-[facility]     Family History   Problem Relation Age of Onset    Breast cancer Maternal Grandmother 70    Cancer Mother     Diabetes Mother     Cancer Father     Diabetes Father      Social History     Socioeconomic History    Marital status:     Number of children: 2   Tobacco Use    Smoking status: Former     Packs/day: 1.00     Years: 40.00     Additional pack years: 0.00     Total pack years: 40.00     Types: Cigarettes     Quit date: 2021     Years since quittin.6    Smokeless tobacco: Never    Tobacco comments:     2-4 PPD off and on for the last 40 years   Vaping Use    Vaping Use: Former    Substances: Nicotine, Flavoring    Devices: RefRest Devicesble tank   Substance and Sexual Activity    Alcohol use: No    Drug use: No    Sexual activity: Defer     ---------------------------------------------------------------------------------------------------------------------   Allergies:  Amoxicillin and Penicillins  ---------------------------------------------------------------------------------------------------------------------  Objective  "    ---------------------------------------------------------------------------------------------------------------------   Vital Signs:  BP (!) 189/84   Pulse 71   Temp 98.5 °F (36.9 °C) (Infrared)   Resp 18   Estimated body mass index is 27.46 kg/m² as calculated from the following:    Height as of 2/20/24: 160 cm (62.99\").    Weight as of 2/20/24: 70.3 kg (155 lb).  There is no height or weight on file to calculate BMI.  Wt Readings from Last 3 Encounters:   02/20/24 70.3 kg (155 lb)   02/07/24 70.3 kg (155 lb)   01/26/24 70.3 kg (155 lb)       ---------------------------------------------------------------------------------------------------------------------     Physical Exam  Constitutional:       Appearance: Normal appearance.   HENT:      Head: Normocephalic and atraumatic.      Nose: Nose normal.      Mouth/Throat:      Mouth: Mucous membranes are moist.   Eyes:      Pupils: Pupils are equal, round, and reactive to light.   Cardiovascular:      Rate and Rhythm: Normal rate.   Pulmonary:      Effort: Pulmonary effort is normal.   Abdominal:      General: Abdomen is flat.      Palpations: Abdomen is soft.   Musculoskeletal:         General: Normal range of motion.   Skin:     General: Skin is warm and dry.      Capillary Refill: Capillary refill takes less than 2 seconds.   Neurological:      General: No focal deficit present.      Mental Status: She is alert and oriented to person, place, and time.   Psychiatric:         Mood and Affect: Mood normal.         Behavior: Behavior normal.     Wound Assessment:  Location: Left, lateral, foot  Wound Measurements: 2.5 X 2.5 X 0.3cm   Tunneling: No Undermining: No  Dressing Appearance: dressingappearance: clean  Closure: NA  Changes since last exam: no changes  Etiology and classification: reyna grade 1 DFU (skin and integument but no deep structures)  Wound bed structures/characteristics: full-thickness (subcutaneous tissue is exposed in at least a portion of " the wound), moist, necrotic  Edges moist  Periwound characteristics: edematous  Periwound Temperature: cool  Drainage characteristics: moderate, serous   Perfusion characteristics: suggest some degree of PAD    Wound Goal (s):Closure, Epithelization, Free of infection and No further symptoms  Assessment & Plan      Patient Active Problem List    Diagnosis     Diabetic foot ulcer of left lateral foot [l97.522]  -Clean with dakins apply santyl to base and secure with kerlix and ACE  -JUNIOR: right 0.87 and left 0.85, referral to vascular surgeon for evaluation appointment 02/22/2024  -Recommend yara protein diet 0.75g/kg/day along with vitamin C 2000mg/day, vitamin A 5000 Units/day, vitamin D3 5000 Units/day, zinc 50mg/day to help promote wound healing        Diabetes mellitus (HCC) [E11.9]  -Recommend adequate glycemic control to help promote wound healing  -Poor glycemic  Control increases risk of infection, prolonged wound healing, loss of limb and premature death      Class 1 obesity in adult [E66.9]  -An obese person?is at greater risk for wound infection and dehiscence or evisceration.     PAD (peripheral artery disease) (HCC) [I73.9]  -Following with vascular, has not seen in several months  -Can negatively impact wound progression      Tobacco abuse [Z72.0]  -Tissue perfusion is lower in users of tobacco and other forms of nicotine. Reduced tissue perfusion strongly inhibits wound healing, increases risk of infection, and dramatically increases risk of limb loss.    Wound Care Debridement Note   Pre-Procedure  Pre-Procedure Diagnosis: Diabetic foot ulcer of left lateral foot [l97.522] with fat layer exposed  Checked for Allergies: yes  Consent:Consent obtained, consent given by Patient,Risks Discussed, Alternatives Discussed  Indication: slough and bioburden  Vascular status:JUNIOR testing was reviewed, and value is >0.6.  Time out was called prior to procedure.   Pre procedure Pain assessment: a 8 on a scale of  0-10  Pre debridement measurements: 2.5 X 2.5 X 0.3cm   Volume: 1.875cm , Surface area: 6.25cm  sinus/tunnelNo, undermining No    Post Procedure  Post-Procedure Diagnosis: Diabetic foot ulcer of left lateral foot [l97.522] with fat layer exposed  Post debridement measurements: 2.6 X 2.6 X 0.4cm   Volume:2.704, Surface area: 6.76cm  sinus/tunnelNo, undermining No  Post procedure Pain assessment: a 8 on a scale of 0-10  Graft/Implant/Prosthetics/Implanted Device/Transplants:  None  Complication(s):  None    Procedure details:  Method of Debridement: excissional (Surgical removal or cutting away, outside or beyond the wound margin devitalized tissue, necrosis or slough.)  Procedure: The site was prepared using clean techniques, subcutaneous tissue was removed by surgical excision.  Instrument(s) used: Curette 4mm  Anesthesia:After checking patient allergies,lidocaine topical 2% was administered to provide anesthesia.  Tissue removed: subuctaneous, Percent Removed 100  Culture or Biopsy:  None  Estimated Blood Loss: Small  Hemostasis Obtained: pressure    Clinical Impression:Moderate Complexity    Follow-up: 1 week    DENIS Nguyen   [facility]  02/13/2024  [id number]

## 2024-02-22 ENCOUNTER — APPOINTMENT (OUTPATIENT)
Dept: CT IMAGING | Facility: HOSPITAL | Age: 69
End: 2024-02-22
Payer: MEDICARE

## 2024-02-22 ENCOUNTER — HOSPITAL ENCOUNTER (EMERGENCY)
Facility: HOSPITAL | Age: 69
Discharge: HOME | End: 2024-02-22
Attending: EMERGENCY MEDICINE
Payer: MEDICARE

## 2024-02-22 VITALS
SYSTOLIC BLOOD PRESSURE: 178 MMHG | TEMPERATURE: 98 F | WEIGHT: 155 LBS | HEART RATE: 73 BPM | BODY MASS INDEX: 27.46 KG/M2 | OXYGEN SATURATION: 95 % | RESPIRATION RATE: 17 BRPM | HEIGHT: 63 IN | DIASTOLIC BLOOD PRESSURE: 76 MMHG

## 2024-02-22 DIAGNOSIS — R11.2 NAUSEA AND VOMITING, UNSPECIFIED VOMITING TYPE: Primary | ICD-10-CM

## 2024-02-22 LAB
ALBUMIN SERPL-MCNC: 3.6 G/DL (ref 3.5–5.2)
ALBUMIN/GLOB SERPL: 1.1 G/DL
ALP SERPL-CCNC: 104 U/L (ref 39–117)
ALT SERPL W P-5'-P-CCNC: 8 U/L (ref 1–33)
AMYLASE SERPL-CCNC: 78 U/L (ref 28–100)
ANION GAP SERPL CALCULATED.3IONS-SCNC: 8.2 MMOL/L (ref 5–15)
APTT PPP: 35.8 SECONDS (ref 26.5–34.5)
AST SERPL-CCNC: 11 U/L (ref 1–32)
BACTERIA UR QL AUTO: ABNORMAL /HPF
BASOPHILS # BLD AUTO: 0.03 10*3/MM3 (ref 0–0.2)
BASOPHILS NFR BLD AUTO: 0.4 % (ref 0–1.5)
BILIRUB SERPL-MCNC: 0.2 MG/DL (ref 0–1.2)
BILIRUB UR QL STRIP: NEGATIVE
BUN SERPL-MCNC: 20 MG/DL (ref 8–23)
BUN/CREAT SERPL: 23.5 (ref 7–25)
CALCIUM SPEC-SCNC: 9.2 MG/DL (ref 8.6–10.5)
CHLORIDE SERPL-SCNC: 100 MMOL/L (ref 98–107)
CLARITY UR: ABNORMAL
CO2 SERPL-SCNC: 29.8 MMOL/L (ref 22–29)
COLOR UR: YELLOW
CREAT SERPL-MCNC: 0.85 MG/DL (ref 0.57–1)
DEPRECATED RDW RBC AUTO: 56 FL (ref 37–54)
EGFRCR SERPLBLD CKD-EPI 2021: 74.7 ML/MIN/1.73
EOSINOPHIL # BLD AUTO: 0.05 10*3/MM3 (ref 0–0.4)
EOSINOPHIL NFR BLD AUTO: 0.7 % (ref 0.3–6.2)
ERYTHROCYTE [DISTWIDTH] IN BLOOD BY AUTOMATED COUNT: 17.4 % (ref 12.3–15.4)
GLOBULIN UR ELPH-MCNC: 3.2 GM/DL
GLUCOSE BLDC GLUCOMTR-MCNC: 222 MG/DL (ref 70–130)
GLUCOSE SERPL-MCNC: 221 MG/DL (ref 65–99)
GLUCOSE UR STRIP-MCNC: NEGATIVE MG/DL
HCT VFR BLD AUTO: 38.3 % (ref 34–46.6)
HGB BLD-MCNC: 11.9 G/DL (ref 12–15.9)
HGB UR QL STRIP.AUTO: ABNORMAL
HYALINE CASTS UR QL AUTO: ABNORMAL /LPF
IMM GRANULOCYTES # BLD AUTO: 0.02 10*3/MM3 (ref 0–0.05)
IMM GRANULOCYTES NFR BLD AUTO: 0.3 % (ref 0–0.5)
INR PPP: 0.96 (ref 0.9–1.1)
KETONES UR QL STRIP: NEGATIVE
LEUKOCYTE ESTERASE UR QL STRIP.AUTO: NEGATIVE
LIPASE SERPL-CCNC: 76 U/L (ref 13–60)
LYMPHOCYTES # BLD AUTO: 1.72 10*3/MM3 (ref 0.7–3.1)
LYMPHOCYTES NFR BLD AUTO: 22.9 % (ref 19.6–45.3)
MCH RBC QN AUTO: 27.4 PG (ref 26.6–33)
MCHC RBC AUTO-ENTMCNC: 31.1 G/DL (ref 31.5–35.7)
MCV RBC AUTO: 88 FL (ref 79–97)
MONOCYTES # BLD AUTO: 0.34 10*3/MM3 (ref 0.1–0.9)
MONOCYTES NFR BLD AUTO: 4.5 % (ref 5–12)
NEUTROPHILS NFR BLD AUTO: 5.35 10*3/MM3 (ref 1.7–7)
NEUTROPHILS NFR BLD AUTO: 71.2 % (ref 42.7–76)
NITRITE UR QL STRIP: NEGATIVE
NRBC BLD AUTO-RTO: 0 /100 WBC (ref 0–0.2)
PH UR STRIP.AUTO: 8.5 [PH] (ref 5–8)
PLATELET # BLD AUTO: 325 10*3/MM3 (ref 140–450)
PMV BLD AUTO: 9.3 FL (ref 6–12)
POTASSIUM SERPL-SCNC: 4 MMOL/L (ref 3.5–5.2)
PROT SERPL-MCNC: 6.8 G/DL (ref 6–8.5)
PROT UR QL STRIP: ABNORMAL
PROTHROMBIN TIME: 13.3 SECONDS (ref 12.1–14.7)
RBC # BLD AUTO: 4.35 10*6/MM3 (ref 3.77–5.28)
RBC # UR STRIP: ABNORMAL /HPF
REF LAB TEST METHOD: ABNORMAL
SODIUM SERPL-SCNC: 138 MMOL/L (ref 136–145)
SP GR UR STRIP: 1.01 (ref 1–1.03)
SQUAMOUS #/AREA URNS HPF: ABNORMAL /HPF
TROPONIN T SERPL HS-MCNC: 21 NG/L
TROPONIN T SERPL HS-MCNC: 22 NG/L
UROBILINOGEN UR QL STRIP: ABNORMAL
WBC # UR STRIP: ABNORMAL /HPF
WBC NRBC COR # BLD AUTO: 7.51 10*3/MM3 (ref 3.4–10.8)

## 2024-02-22 PROCEDURE — 83690 ASSAY OF LIPASE: CPT | Performed by: EMERGENCY MEDICINE

## 2024-02-22 PROCEDURE — 96361 HYDRATE IV INFUSION ADD-ON: CPT

## 2024-02-22 PROCEDURE — 36415 COLL VENOUS BLD VENIPUNCTURE: CPT

## 2024-02-22 PROCEDURE — 70450 CT HEAD/BRAIN W/O DYE: CPT

## 2024-02-22 PROCEDURE — 99285 EMERGENCY DEPT VISIT HI MDM: CPT

## 2024-02-22 PROCEDURE — 96375 TX/PRO/DX INJ NEW DRUG ADDON: CPT

## 2024-02-22 PROCEDURE — 93010 ELECTROCARDIOGRAM REPORT: CPT | Performed by: INTERNAL MEDICINE

## 2024-02-22 PROCEDURE — 74177 CT ABD & PELVIS W/CONTRAST: CPT | Performed by: RADIOLOGY

## 2024-02-22 PROCEDURE — 85025 COMPLETE CBC W/AUTO DIFF WBC: CPT | Performed by: EMERGENCY MEDICINE

## 2024-02-22 PROCEDURE — 96376 TX/PRO/DX INJ SAME DRUG ADON: CPT

## 2024-02-22 PROCEDURE — 25010000002 HYDRALAZINE PER 20 MG: Performed by: EMERGENCY MEDICINE

## 2024-02-22 PROCEDURE — 74177 CT ABD & PELVIS W/CONTRAST: CPT

## 2024-02-22 PROCEDURE — 82948 REAGENT STRIP/BLOOD GLUCOSE: CPT

## 2024-02-22 PROCEDURE — 93005 ELECTROCARDIOGRAM TRACING: CPT | Performed by: EMERGENCY MEDICINE

## 2024-02-22 PROCEDURE — 80053 COMPREHEN METABOLIC PANEL: CPT | Performed by: EMERGENCY MEDICINE

## 2024-02-22 PROCEDURE — 70450 CT HEAD/BRAIN W/O DYE: CPT | Performed by: RADIOLOGY

## 2024-02-22 PROCEDURE — 25810000003 LACTATED RINGERS SOLUTION: Performed by: EMERGENCY MEDICINE

## 2024-02-22 PROCEDURE — 82150 ASSAY OF AMYLASE: CPT | Performed by: EMERGENCY MEDICINE

## 2024-02-22 PROCEDURE — 85610 PROTHROMBIN TIME: CPT | Performed by: EMERGENCY MEDICINE

## 2024-02-22 PROCEDURE — 25010000002 MORPHINE PER 10 MG: Performed by: EMERGENCY MEDICINE

## 2024-02-22 PROCEDURE — 25010000002 ONDANSETRON PER 1 MG: Performed by: EMERGENCY MEDICINE

## 2024-02-22 PROCEDURE — 96374 THER/PROPH/DIAG INJ IV PUSH: CPT

## 2024-02-22 PROCEDURE — 85730 THROMBOPLASTIN TIME PARTIAL: CPT | Performed by: EMERGENCY MEDICINE

## 2024-02-22 PROCEDURE — 25510000001 IOPAMIDOL 61 % SOLUTION: Performed by: EMERGENCY MEDICINE

## 2024-02-22 PROCEDURE — 81001 URINALYSIS AUTO W/SCOPE: CPT | Performed by: EMERGENCY MEDICINE

## 2024-02-22 PROCEDURE — 84484 ASSAY OF TROPONIN QUANT: CPT | Performed by: EMERGENCY MEDICINE

## 2024-02-22 RX ORDER — HYDRALAZINE HYDROCHLORIDE 20 MG/ML
10 INJECTION INTRAMUSCULAR; INTRAVENOUS ONCE
Status: COMPLETED | OUTPATIENT
Start: 2024-02-22 | End: 2024-02-22

## 2024-02-22 RX ORDER — ONDANSETRON 4 MG/1
4 TABLET, ORALLY DISINTEGRATING ORAL EVERY 8 HOURS PRN
Qty: 15 TABLET | Refills: 0 | Status: SHIPPED | OUTPATIENT
Start: 2024-02-22

## 2024-02-22 RX ORDER — METOPROLOL TARTRATE 1 MG/ML
5 INJECTION, SOLUTION INTRAVENOUS ONCE
Status: COMPLETED | OUTPATIENT
Start: 2024-02-22 | End: 2024-02-22

## 2024-02-22 RX ORDER — MORPHINE SULFATE 2 MG/ML
2 INJECTION, SOLUTION INTRAMUSCULAR; INTRAVENOUS ONCE
Status: COMPLETED | OUTPATIENT
Start: 2024-02-22 | End: 2024-02-22

## 2024-02-22 RX ORDER — PANTOPRAZOLE SODIUM 40 MG/10ML
40 INJECTION, POWDER, LYOPHILIZED, FOR SOLUTION INTRAVENOUS ONCE
Status: COMPLETED | OUTPATIENT
Start: 2024-02-22 | End: 2024-02-22

## 2024-02-22 RX ORDER — GABAPENTIN 300 MG/1
600 CAPSULE ORAL ONCE
Status: DISCONTINUED | OUTPATIENT
Start: 2024-02-22 | End: 2024-02-23 | Stop reason: HOSPADM

## 2024-02-22 RX ORDER — ONDANSETRON 2 MG/ML
4 INJECTION INTRAMUSCULAR; INTRAVENOUS ONCE
Status: COMPLETED | OUTPATIENT
Start: 2024-02-22 | End: 2024-02-22

## 2024-02-22 RX ORDER — LOSARTAN POTASSIUM 25 MG/1
50 TABLET ORAL ONCE
Status: DISCONTINUED | OUTPATIENT
Start: 2024-02-22 | End: 2024-02-23 | Stop reason: HOSPADM

## 2024-02-22 RX ADMIN — PANTOPRAZOLE SODIUM 40 MG: 40 INJECTION, POWDER, FOR SOLUTION INTRAVENOUS at 16:07

## 2024-02-22 RX ADMIN — SODIUM CHLORIDE, POTASSIUM CHLORIDE, SODIUM LACTATE AND CALCIUM CHLORIDE 1000 ML: 600; 310; 30; 20 INJECTION, SOLUTION INTRAVENOUS at 16:32

## 2024-02-22 RX ADMIN — IOPAMIDOL 100 ML: 612 INJECTION, SOLUTION INTRAVENOUS at 18:34

## 2024-02-22 RX ADMIN — MORPHINE SULFATE 4 MG: 4 INJECTION, SOLUTION INTRAMUSCULAR; INTRAVENOUS at 20:55

## 2024-02-22 RX ADMIN — MORPHINE SULFATE 2 MG: 2 INJECTION, SOLUTION INTRAMUSCULAR; INTRAVENOUS at 17:13

## 2024-02-22 RX ADMIN — ONDANSETRON 4 MG: 2 INJECTION INTRAMUSCULAR; INTRAVENOUS at 16:32

## 2024-02-22 RX ADMIN — HYDRALAZINE HYDROCHLORIDE 10 MG: 20 INJECTION INTRAMUSCULAR; INTRAVENOUS at 18:07

## 2024-02-22 RX ADMIN — METOPROLOL TARTRATE 5 MG: 1 INJECTION, SOLUTION INTRAVENOUS at 19:20

## 2024-02-22 NOTE — ED PROVIDER NOTES
Subjective   History of Present Illness  HPI:   68-year-old female for last 3 days has had nausea and emesis without focal abdominal pain.  Reporting appropriate bowel movements and flatus without diarrhea or constipation.  She has not been taking her medications.  Initially saw primary care and was sent here for further evaluation.    Patient has been progressing in recovery and treatment for left lateral of the left foot ulcer.  However patient at baseline has chronic pain in the legs and is unchanged per patient and family    Review of systems:  Negative fever and chills  Negative new foods  Negative melena or hematochezia  Negative diarrhea or constipation  Negative chest pain or palpitations  Negative shortness of breath or cough  Negative ill contact or travel  Negative swelling  Negative rash  Negative headaches or vision changes  Negative dysuria or hematuria  Negative vaginal bleeding or discharge  All other systems reviewed are negative    Reviewed pertinent EMR records  Past Medical history:  reviewed nursing notes: Diabetes peripheral vascular disease hyperlipidemia coronary disease GERD past Surrgical history: reviewed nursing notes:    Social History: Reviewed nursing notes: Daily tobacco use or  Pertinent Family History:  Medications and allergies: reviewed nursing notes:      Physical examination:  Vital signs: Reviewed  General: Nontoxic, nondistressed, AOx 4  Head: Atraumatic normocephalic  HEENT: Oral mucosa moist, pupils equal and reactive to light,, sclera is clear  Neck: Unremarkable  Lungs: Nonlabored breathing, equal rise of the chest, bilateral breath sounds are clear, negative cough wheezing crackles  Cardiovascular: Regular rate and rhythm, negative rubs or murmurs  Abdomen: Soft, nontender to palpation, negative guarding rebound or distention, positive bowel sounds negative Lenz sign  Back: Unremarkable, no CVA tenderness  Extremities: Negative for edema, patient adamantly refused for  me to evaluate her lower extremities nor even touch them.  Skin: Negative petechiae, negative rash  Neuro: No gross motor or sensory deficit, speech is clear, movements are fluid without ataxia, no facial asymmetry, linear thought process  Mood/affect: Unremarkable    Tests and radiological imaging interpreted by provider  EKG: A sinus rhythm rate of 75 with a 45 degree axis.  No gross ST elevation    Medical Decision Making:  after reviewing applicable available laboratory and radiological data, while reviewing my HPI and Review of Systems along with my physical exam findings:  IV fluids  Morphine  Lopressor  Home p.o. meds offered patient declined    Assessment and plan:   This patient is nontoxic examination.  Patient was alert and oriented who was concerned was having nausea and emesis without underlying abdominal pain.  Patient does complain of left lateral foot pain but she states this is changed and is extremely painful since her recent debridement by wound care.  Patient was found to be hypertensive.  Initially she was denying headache or any other symptoms other than nausea and vomiting.  But on my second examination patient was noted at this time and complained of a headache poorly described.  Patient was neurologically intact.  Blood pressure was elevated potentially due to lack of her medication as well as pain although cannot fully exclude potential hypertensive urgency proceeded with a more thorough workup, Labs here were predominantly unremarkable.    My follow-up examination patient was under the blankets conversed with her but she was very blunted her responses did not wish to speak with me at this time.    My follow-up examination tried to inquire about potential reasoning for taking her p.o. medication, but states that she is still hurting in her foot and requested additional pain medication.    My follow-up examination this patient is now ambulating and is requesting to leave.  Patient is here  with a family member.  Patient was walking to the bathroom would advise that return shortly to discuss the results as well as limitations of today's workup.    I was informed by nursing staff that patient at this point was demanding to have her IV removed was to be promptly discharged.  Started to work on discharge paperwork has not was wanting to take the paperwork with her as well as to explain with her today's workup potential etiologies and the need for blood pressure control.  Unfortunately nursing staff patient demand nursing staff to remove the IV and patient left abruptly.    In light of her workup here suspicion for intracranial process is lower.  Abdomen is nice and soft and her abdominal exam and here not overly impressive.  Patient did not want to take her home pain medication.     Unfortunately patient did not permit the time to disclose today's results    Discussed with  laboratory, radiology studies, diagnosis and action plan, and follow-up.  Patient verbalized understanding and agreement    Clinical impression:  Nausea vomiting: Resolved   Essential hypertension: Established poorly controlled  Chronic left heel pain      This note was performed, in part, by voice-recognition software and may contain dictated related graphical errors including word substitution errors or nonsensical phrases may exist.  These areas have been corrected to the best of my ability, however some errors may remain due to prioritization of patient care.    Under the 21st Century Cures Act, medical progress note should be visible to the patient.  It must be taken into consideration that these notes will include professional medical terminology, protocols and practices that may be somewhat confusing without interpretation from your medical team.  It is strongly advisable to review this documentation with your primary care provider.  The intent of this progress note and other documents are to communicate information between  medical professionals involved in your care and to serve as future reference for physicians or other professionals when reviewing your case                    Review of Systems    Past Medical History:   Diagnosis Date    Arthritis     Coronary artery disease     Depression     Diabetes mellitus     Elevated cholesterol     Full dentures     GERD (gastroesophageal reflux disease)     Hyperlipidemia     Hypertension     Myocardial infarction     Peripheral vascular disease     Psoriasis        Allergies   Allergen Reactions    Amoxicillin Rash    Penicillins Rash       Past Surgical History:   Procedure Laterality Date    APPENDECTOMY      CARDIAC CATHETERIZATION N/A 2017    Procedure: Left Heart Cath;  Surgeon: David Diane MD;  Location: [facility] CATH INVASIVE LOCATION;  Service:     CORONARY STENT PLACEMENT      CYSTOSCOPY BOTOX INJECTION OF BLADDER N/A 10/20/2021    Procedure: Cystoscopy Botox injection of bladder;  Surgeon: Say Robles MD;  Location: [facility] OR;  Service: Urology;  Laterality: N/A;    FOOT SURGERY Left     GALLBLADDER SURGERY      LEG SURGERY Left     TUBAL ABDOMINAL LIGATION      VASCULAR SURGERY Left     Lower Extremity Vascular Stent-[facility]       Family History   Problem Relation Age of Onset    Breast cancer Maternal Grandmother 70    Cancer Mother     Diabetes Mother     Cancer Father     Diabetes Father        Social History     Socioeconomic History    Marital status:     Number of children: 2   Tobacco Use    Smoking status: Former     Packs/day: 1.00     Years: 40.00     Additional pack years: 0.00     Total pack years: 40.00     Types: Cigarettes     Quit date: 2021     Years since quittin.6    Smokeless tobacco: Never    Tobacco comments:     2-4 PPD off and on for the last 40 years   Vaping Use    Vaping Use: Former    Substances: Nicotine, Flavoring    Devices: Refillable tank   Substance and Sexual Activity    Alcohol use: No    Drug use: No     Sexual activity: Defer           Objective   Physical Exam    Procedures           ED Course  ED Course as of 02/23/24 0239   Thu Feb 22, 2024   1654 Lipase(!): 76 [KRISTOFER]   1654 Lipase(!) [KRISTOFER]   1908 Lipase(!): 76 [KRISTOFER]   1909 Lipase(!) [KRISTOFER]   1909 Lipase(!): 76 [KRISTOFER]      ED Course User Index  [KRISTOFER] Shin Sanchez MD                                             Medical Decision Making  Problems Addressed:  Nausea and vomiting, unspecified vomiting type: complicated acute illness or injury    Amount and/or Complexity of Data Reviewed  Labs: ordered. Decision-making details documented in ED Course.  Radiology: ordered.  ECG/medicine tests: ordered.    Risk  Prescription drug management.        Final diagnoses:   None       ED Disposition  ED Disposition       None            No follow-up provider specified.       Medication List      No changes were made to your prescriptions during this visit.            Shin Sanchez MD  02/23/24 0244

## 2024-02-25 LAB
QT INTERVAL: 424 MS
QTC INTERVAL: 460 MS

## 2024-02-27 ENCOUNTER — HOSPITAL ENCOUNTER (OUTPATIENT)
Dept: WOUND CARE | Facility: HOSPITAL | Age: 69
Discharge: HOME | End: 2024-02-27
Payer: MEDICARE

## 2024-02-27 ENCOUNTER — TELEPHONE (OUTPATIENT)
Dept: WOUND CARE | Facility: HOSPITAL | Age: 69
End: 2024-02-27
Payer: MEDICARE

## 2024-02-27 VITALS
DIASTOLIC BLOOD PRESSURE: 76 MMHG | BODY MASS INDEX: 27.46 KG/M2 | RESPIRATION RATE: 20 BRPM | HEIGHT: 63 IN | WEIGHT: 155 LBS | SYSTOLIC BLOOD PRESSURE: 191 MMHG | HEART RATE: 74 BPM | TEMPERATURE: 98.7 F

## 2024-02-27 DIAGNOSIS — T14.8XXA OPEN WOUND: Primary | ICD-10-CM

## 2024-02-27 PROCEDURE — 97602 WOUND(S) CARE NON-SELECTIVE: CPT

## 2024-02-27 RX ORDER — CASTOR OIL AND BALSAM, PERU 788; 87 MG/G; MG/G
1 OINTMENT TOPICAL AS NEEDED
OUTPATIENT
Start: 2024-02-27

## 2024-02-27 RX ORDER — DIAPER,BRIEF,INFANT-TODD,DISP
1 EACH MISCELLANEOUS ONCE
OUTPATIENT
Start: 2024-02-27 | End: 2024-02-27

## 2024-02-27 RX ORDER — LIDOCAINE HYDROCHLORIDE 20 MG/ML
6 JELLY TOPICAL ONCE
Status: COMPLETED | OUTPATIENT
Start: 2024-02-27 | End: 2024-02-27

## 2024-02-27 RX ORDER — LIDOCAINE HYDROCHLORIDE 20 MG/ML
10 INJECTION, SOLUTION INFILTRATION; PERINEURAL ONCE
OUTPATIENT
Start: 2024-02-27 | End: 2024-02-27

## 2024-02-27 RX ORDER — NYSTATIN 100000 [USP'U]/G
1 POWDER TOPICAL ONCE
OUTPATIENT
Start: 2024-02-27 | End: 2024-02-27

## 2024-02-27 RX ORDER — LIDOCAINE HYDROCHLORIDE AND EPINEPHRINE BITARTRATE 20; .01 MG/ML; MG/ML
10 INJECTION, SOLUTION SUBCUTANEOUS ONCE
OUTPATIENT
Start: 2024-02-27 | End: 2024-02-27

## 2024-02-27 RX ORDER — LIDOCAINE HYDROCHLORIDE 20 MG/ML
JELLY TOPICAL AS NEEDED
OUTPATIENT
Start: 2024-02-27

## 2024-02-27 RX ORDER — SODIUM HYPOCHLORITE 1.25 MG/ML
1 SOLUTION TOPICAL AS NEEDED
OUTPATIENT
Start: 2024-02-27

## 2024-02-27 RX ORDER — SODIUM HYPOCHLORITE 2.5 MG/ML
1 SOLUTION TOPICAL AS NEEDED
OUTPATIENT
Start: 2024-02-27

## 2024-02-27 RX ORDER — LIDOCAINE HYDROCHLORIDE 20 MG/ML
6 JELLY TOPICAL ONCE
OUTPATIENT
Start: 2024-02-27 | End: 2024-02-27

## 2024-02-27 RX ADMIN — LIDOCAINE HYDROCHLORIDE 6 ML: 20 JELLY TOPICAL at 11:11

## 2024-02-27 NOTE — PROGRESS NOTES
Wound Clinic Note  Patient Identification:  Name:  Monae Chauhan  Age:  68 y.o.  Sex:  female  :  1955  MRN:  [id number]   Visit Number:  [id number]  Primary Care Physician:  Zachary Sullivan MD     Subjective     Chief complaint:     Multiple wound to abdomen, and right thigh    History of presenting illness:     Patient is a 68 y.o. female with past medical history significant for diabetes, PVD and former smoker quit in . Presented today for evaluation of wounds to abdomen, and right upper thigh and right medial thigh. 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 She has multiple debridements to the area. She has sutures in place to the right thigh and abdominal wounds. These were removed 5 from the right thigh and 5 to the right abdominal wound. Wound base red and moist to all wounds. No evidence of cellulitis. Reports she has been applying a wet-to dry dressing to the area. Denies any fever or chills. Mild to moderate pain to the area.    Interval History:  2023: Seen in clinic today for follow-up to multiple wounds to abdomen, and right thigh. Areas are improving. Increase in granulation tissue. Denies any fever or chills. No new issues or concerns. Tolerating current treatment without complications. Reports glucose levels are controlled.    03/15/2023: Seen in clinic today for follow-up to multiple wounds to abdomen, and right thigh. Wounds with significant improvement. Small open areas. Denies any fever or chills. No new complications. Reports compliance with treatment regimen.     2024: Seen in clinic today for follow-up she has new wound to left lateral foot. Reports wound has been present for 2 months. She has been applying peroxide and leaving open to air. No recent imaging or labs. Denies any fever or chills. No new complications. Reports compliance with treatment regimen. Reports smoking 1 pack per day. Glucose  levels around 100-200. Last A1C 7.5.     02/01/2024: Seen in clinic today for follow-up to wound to left lateral foot. No recent imaging or labs. Denies any fever or chills. No new complications. Reports compliance with treatment regimen. Reports smoking 1 pack per day. Glucose levels around 100-200. JUNIOR was completed [facility] right 0.87 and left 0.85. Will refer to vascular surgeon for evaluation.     02/007/2024: Seen in clinic today for follow-up to wound to left lateral foot. No recent imaging or labs. Denies any fever or chills. No new complications. Reports compliance with treatment regimen. Reports smoking 1 pack per day. Glucose levels around 100-200. JUNIOR was completed [facility] right 0.87 and left 0.85. Referred to vascular surgeon for evaluation, she has appointment 02/22/2024.     02/13/2024: Seen in clinic today for follow-up to wound to left lateral foot. . No new complications. Reports compliance with treatment regimen. Reports smoking 1 pack per day. Glucose levels around 100-200. JUNIOR was completed [facility] right 0.87 and left 0.85. has vascular appointment next week.   ---------------------------------------------------------------------------------------------------------------------   Review of Systems   Constitutional:  Negative for chills and fever.   HENT:  Negative for congestion and rhinorrhea.    Respiratory:  Negative for cough and shortness of breath.    Cardiovascular:  Negative for chest pain and leg swelling.   Gastrointestinal:  Negative for nausea and vomiting.   Musculoskeletal:  Positive for back pain and gait problem.   Skin:  Positive for wound.   Hematological:  Does not bruise/bleed easily.      ---------------------------------------------------------------------------------------------------------------------   Past Medical History:   Diagnosis Date    Arthritis     Coronary artery disease     Depression     Diabetes mellitus     Elevated cholesterol     Full dentures     GERD  (gastroesophageal reflux disease)     Hyperlipidemia     Hypertension     Myocardial infarction     Peripheral vascular disease     Psoriasis      Past Surgical History:   Procedure Laterality Date    APPENDECTOMY      CARDIAC CATHETERIZATION N/A 2017    Procedure: Left Heart Cath;  Surgeon: David Diane MD;  Location: [facility] CATH INVASIVE LOCATION;  Service:     CORONARY STENT PLACEMENT      CYSTOSCOPY BOTOX INJECTION OF BLADDER N/A 10/20/2021    Procedure: Cystoscopy Botox injection of bladder;  Surgeon: Say Robles MD;  Location: [facility] OR;  Service: Urology;  Laterality: N/A;    FOOT SURGERY Left     GALLBLADDER SURGERY      LEG SURGERY Left     TUBAL ABDOMINAL LIGATION      VASCULAR SURGERY Left     Lower Extremity Vascular Stent-[facility]     Family History   Problem Relation Age of Onset    Breast cancer Maternal Grandmother 70    Cancer Mother     Diabetes Mother     Cancer Father     Diabetes Father      Social History     Socioeconomic History    Marital status:     Number of children: 2   Tobacco Use    Smoking status: Former     Packs/day: 1.00     Years: 40.00     Additional pack years: 0.00     Total pack years: 40.00     Types: Cigarettes     Quit date: 2021     Years since quittin.6    Smokeless tobacco: Never    Tobacco comments:     2-4 PPD off and on for the last 40 years   Vaping Use    Vaping Use: Former    Substances: Nicotine, Flavoring    Devices: RefDesigualble tank   Substance and Sexual Activity    Alcohol use: No    Drug use: No    Sexual activity: Defer     ---------------------------------------------------------------------------------------------------------------------   Allergies:  Amoxicillin and Penicillins  ---------------------------------------------------------------------------------------------------------------------  Objective  "    ---------------------------------------------------------------------------------------------------------------------   Vital Signs:  BP (!) 191/76   Pulse 74   Temp 98.7 °F (37.1 °C) (Infrared)   Resp 20   Ht 160 cm (62.99\")   Wt 70.3 kg (155 lb)   BMI 27.46 kg/m²   Estimated body mass index is 27.46 kg/m² as calculated from the following:    Height as of this encounter: 160 cm (62.99\").    Weight as of this encounter: 70.3 kg (155 lb).  Body mass index is 27.46 kg/m².  Wt Readings from Last 3 Encounters:   02/27/24 70.3 kg (155 lb)   02/22/24 70.3 kg (155 lb)   02/20/24 70.3 kg (155 lb)       ---------------------------------------------------------------------------------------------------------------------     Physical Exam  Constitutional:       Appearance: Normal appearance.   HENT:      Head: Normocephalic and atraumatic.      Nose: Nose normal.      Mouth/Throat:      Mouth: Mucous membranes are moist.   Eyes:      Pupils: Pupils are equal, round, and reactive to light.   Cardiovascular:      Rate and Rhythm: Normal rate.   Pulmonary:      Effort: Pulmonary effort is normal.   Abdominal:      General: Abdomen is flat.      Palpations: Abdomen is soft.   Musculoskeletal:         General: Normal range of motion.   Skin:     General: Skin is warm and dry.      Capillary Refill: Capillary refill takes less than 2 seconds.   Neurological:      General: No focal deficit present.      Mental Status: She is alert and oriented to person, place, and time.   Psychiatric:         Mood and Affect: Mood normal.         Behavior: Behavior normal.     Wound Assessment:  Location: Left, lateral, foot  Wound Measurements: 2.5 X 2.5 X 0.3cm   Tunneling: No Undermining: No  Dressing Appearance: dressingappearance: clean  Closure: NA  Changes since last exam: no changes  Etiology and classification: reyna grade 1 DFU (skin and integument but no deep structures)  Wound bed structures/characteristics: full-thickness " (subcutaneous tissue is exposed in at least a portion of the wound), moist, necrotic  Edges moist  Periwound characteristics: edematous  Periwound Temperature: cool  Drainage characteristics: moderate, serous   Perfusion characteristics: suggest some degree of PAD    Wound Goal (s):Closure, Epithelization, Free of infection and No further symptoms  Assessment & Plan      Patient Active Problem List    Diagnosis     Diabetic foot ulcer of left lateral foot [l97.522]  -Clean with dakins apply santyl to base and secure with kerlix and ACE  -JUNIOR: right 0.87 and left 0.85, referral to vascular surgeon for evaluation appointment 02/22/2024  -Recommend yara protein diet 0.75g/kg/day along with vitamin C 2000mg/day, vitamin A 5000 Units/day, vitamin D3 5000 Units/day, zinc 50mg/day to help promote wound healing        Diabetes mellitus (HCC) [E11.9]  -Recommend adequate glycemic control to help promote wound healing  -Poor glycemic  Control increases risk of infection, prolonged wound healing, loss of limb and premature death      Class 1 obesity in adult [E66.9]  -An obese person?is at greater risk for wound infection and dehiscence or evisceration.     PAD (peripheral artery disease) (HCC) [I73.9]  -Following with vascular, has not seen in several months  -Can negatively impact wound progression      Tobacco abuse [Z72.0]  -Tissue perfusion is lower in users of tobacco and other forms of nicotine. Reduced tissue perfusion strongly inhibits wound healing, increases risk of infection, and dramatically increases risk of limb loss.    Wound Care Debridement Note   Pre-Procedure  Pre-Procedure Diagnosis: Diabetic foot ulcer of left lateral foot [l97.522] with fat layer exposed  Checked for Allergies: yes  Consent:Consent obtained, consent given by Patient,Risks Discussed, Alternatives Discussed  Indication: slough and bioburden  Vascular status:JUNIOR testing was reviewed, and value is >0.6.  Time out was called prior to  procedure.   Pre procedure Pain assessment: a 8 on a scale of 0-10  Pre debridement measurements: 2.5 X 2.5 X 0.3cm   Volume: 1.875cm , Surface area: 6.25cm  sinus/tunnelNo, undermining No    Post Procedure  Post-Procedure Diagnosis: Diabetic foot ulcer of left lateral foot [l97.522] with fat layer exposed  Post debridement measurements: 2.6 X 2.6 X 0.4cm   Volume:2.704, Surface area: 6.76cm  sinus/tunnelNo, undermining No  Post procedure Pain assessment: a 8 on a scale of 0-10  Graft/Implant/Prosthetics/Implanted Device/Transplants:  None  Complication(s):  None    Procedure details:  Method of Debridement: excissional (Surgical removal or cutting away, outside or beyond the wound margin devitalized tissue, necrosis or slough.)  Procedure: The site was prepared using clean techniques, subcutaneous tissue was removed by surgical excision.  Instrument(s) used: Curette 4mm  Anesthesia:After checking patient allergies,lidocaine topical 2% was administered to provide anesthesia.  Tissue removed: subuctaneous, Percent Removed 100  Culture or Biopsy:  None  Estimated Blood Loss: Small  Hemostasis Obtained: pressure    Clinical Impression:Moderate Complexity    Follow-up: 1 week    DENIS Nguyen   [facility]   02/13/2024  [id number]   Complexity    Follow-up: 1 week    DENIS Nguyen   [facility]  02/27/2024  1100

## 2024-03-05 ENCOUNTER — HOSPITAL ENCOUNTER (OUTPATIENT)
Dept: WOUND CARE | Facility: HOSPITAL | Age: 69
Discharge: HOME | End: 2024-03-05
Payer: MEDICARE

## 2024-03-05 VITALS
HEART RATE: 68 BPM | RESPIRATION RATE: 20 BRPM | TEMPERATURE: 98.2 F | SYSTOLIC BLOOD PRESSURE: 180 MMHG | DIASTOLIC BLOOD PRESSURE: 78 MMHG

## 2024-03-05 DIAGNOSIS — L97.522 NON-PRESSURE CHRONIC ULCER OF OTHER PART OF LEFT FOOT WITH FAT LAYER EXPOSED: Primary | ICD-10-CM

## 2024-03-05 DIAGNOSIS — T14.8XXA OPEN WOUND: ICD-10-CM

## 2024-03-05 PROCEDURE — 97602 WOUND(S) CARE NON-SELECTIVE: CPT

## 2024-03-05 RX ORDER — LIDOCAINE HYDROCHLORIDE 20 MG/ML
10 INJECTION, SOLUTION INFILTRATION; PERINEURAL ONCE
OUTPATIENT
Start: 2024-03-05 | End: 2024-03-05

## 2024-03-05 RX ORDER — LIDOCAINE HYDROCHLORIDE 20 MG/ML
6 JELLY TOPICAL ONCE
Status: CANCELLED | OUTPATIENT
Start: 2024-03-05 | End: 2024-03-05

## 2024-03-05 RX ORDER — SODIUM HYPOCHLORITE 2.5 MG/ML
1 SOLUTION TOPICAL AS NEEDED
OUTPATIENT
Start: 2024-03-05

## 2024-03-05 RX ORDER — SODIUM HYPOCHLORITE 2.5 MG/ML
1 SOLUTION TOPICAL AS NEEDED
Status: CANCELLED | OUTPATIENT
Start: 2024-03-05

## 2024-03-05 RX ORDER — LIDOCAINE HYDROCHLORIDE AND EPINEPHRINE BITARTRATE 20; .01 MG/ML; MG/ML
10 INJECTION, SOLUTION SUBCUTANEOUS ONCE
OUTPATIENT
Start: 2024-03-05 | End: 2024-03-05

## 2024-03-05 RX ORDER — CASTOR OIL AND BALSAM, PERU 788; 87 MG/G; MG/G
1 OINTMENT TOPICAL AS NEEDED
Status: CANCELLED | OUTPATIENT
Start: 2024-03-05

## 2024-03-05 RX ORDER — SODIUM HYPOCHLORITE 1.25 MG/ML
1 SOLUTION TOPICAL AS NEEDED
OUTPATIENT
Start: 2024-03-05

## 2024-03-05 RX ORDER — DIAPER,BRIEF,INFANT-TODD,DISP
1 EACH MISCELLANEOUS ONCE
OUTPATIENT
Start: 2024-03-05 | End: 2024-03-05

## 2024-03-05 RX ORDER — LIDOCAINE HYDROCHLORIDE 20 MG/ML
6 JELLY TOPICAL ONCE
Status: COMPLETED | OUTPATIENT
Start: 2024-03-05 | End: 2024-03-05

## 2024-03-05 RX ORDER — LIDOCAINE HYDROCHLORIDE AND EPINEPHRINE BITARTRATE 20; .01 MG/ML; MG/ML
10 INJECTION, SOLUTION SUBCUTANEOUS ONCE
Status: CANCELLED | OUTPATIENT
Start: 2024-03-05 | End: 2024-03-05

## 2024-03-05 RX ORDER — LIDOCAINE HYDROCHLORIDE 20 MG/ML
JELLY TOPICAL AS NEEDED
Status: CANCELLED | OUTPATIENT
Start: 2024-03-05

## 2024-03-05 RX ORDER — SODIUM HYPOCHLORITE 1.25 MG/ML
1 SOLUTION TOPICAL AS NEEDED
Status: CANCELLED | OUTPATIENT
Start: 2024-03-05

## 2024-03-05 RX ORDER — LIDOCAINE HYDROCHLORIDE 20 MG/ML
10 INJECTION, SOLUTION INFILTRATION; PERINEURAL ONCE
Status: CANCELLED | OUTPATIENT
Start: 2024-03-05 | End: 2024-03-05

## 2024-03-05 RX ORDER — CASTOR OIL AND BALSAM, PERU 788; 87 MG/G; MG/G
1 OINTMENT TOPICAL AS NEEDED
OUTPATIENT
Start: 2024-03-05

## 2024-03-05 RX ORDER — DIAPER,BRIEF,INFANT-TODD,DISP
1 EACH MISCELLANEOUS ONCE
Status: CANCELLED | OUTPATIENT
Start: 2024-03-05 | End: 2024-03-05

## 2024-03-05 RX ORDER — LIDOCAINE HYDROCHLORIDE 20 MG/ML
JELLY TOPICAL AS NEEDED
OUTPATIENT
Start: 2024-03-05

## 2024-03-05 RX ADMIN — LIDOCAINE HYDROCHLORIDE 6 ML: 20 JELLY TOPICAL at 13:14

## 2024-03-12 ENCOUNTER — HOSPITAL ENCOUNTER (OUTPATIENT)
Dept: WOUND CARE | Facility: HOSPITAL | Age: 69
Discharge: HOME | End: 2024-03-12
Admitting: NURSE PRACTITIONER
Payer: MEDICARE

## 2024-03-12 VITALS
SYSTOLIC BLOOD PRESSURE: 188 MMHG | HEIGHT: 63 IN | HEART RATE: 76 BPM | RESPIRATION RATE: 18 BRPM | BODY MASS INDEX: 27.46 KG/M2 | TEMPERATURE: 98.3 F | WEIGHT: 155 LBS | DIASTOLIC BLOOD PRESSURE: 74 MMHG

## 2024-03-12 DIAGNOSIS — T14.8XXA OPEN WOUND: Primary | ICD-10-CM

## 2024-03-12 DIAGNOSIS — L97.522 NON-PRESSURE CHRONIC ULCER OF OTHER PART OF LEFT FOOT WITH FAT LAYER EXPOSED: ICD-10-CM

## 2024-03-12 PROCEDURE — 63710000001 COLLAGENASE 250 UNIT/GM OINTMENT 30 G TUBE: Performed by: NURSE PRACTITIONER

## 2024-03-12 PROCEDURE — A9270 NON-COVERED ITEM OR SERVICE: HCPCS | Performed by: NURSE PRACTITIONER

## 2024-03-12 RX ORDER — LIDOCAINE HYDROCHLORIDE 20 MG/ML
6 JELLY TOPICAL ONCE
Status: COMPLETED | OUTPATIENT
Start: 2024-03-12 | End: 2024-03-12

## 2024-03-12 RX ORDER — LIDOCAINE HYDROCHLORIDE 20 MG/ML
10 INJECTION, SOLUTION INFILTRATION; PERINEURAL ONCE
OUTPATIENT
Start: 2024-03-12 | End: 2024-03-12

## 2024-03-12 RX ORDER — LIDOCAINE HYDROCHLORIDE 20 MG/ML
6 JELLY TOPICAL ONCE
OUTPATIENT
Start: 2024-03-12 | End: 2024-03-12

## 2024-03-12 RX ORDER — LIDOCAINE HYDROCHLORIDE 20 MG/ML
JELLY TOPICAL AS NEEDED
OUTPATIENT
Start: 2024-03-12

## 2024-03-12 RX ORDER — LIDOCAINE HYDROCHLORIDE AND EPINEPHRINE BITARTRATE 20; .01 MG/ML; MG/ML
10 INJECTION, SOLUTION SUBCUTANEOUS ONCE
OUTPATIENT
Start: 2024-03-12 | End: 2024-03-12

## 2024-03-12 RX ORDER — CASTOR OIL AND BALSAM, PERU 788; 87 MG/G; MG/G
1 OINTMENT TOPICAL AS NEEDED
OUTPATIENT
Start: 2024-03-12

## 2024-03-12 RX ORDER — SODIUM HYPOCHLORITE 1.25 MG/ML
1 SOLUTION TOPICAL AS NEEDED
OUTPATIENT
Start: 2024-03-12

## 2024-03-12 RX ORDER — DIAPER,BRIEF,INFANT-TODD,DISP
1 EACH MISCELLANEOUS ONCE
OUTPATIENT
Start: 2024-03-12 | End: 2024-03-12

## 2024-03-12 RX ORDER — SODIUM HYPOCHLORITE 2.5 MG/ML
1 SOLUTION TOPICAL AS NEEDED
OUTPATIENT
Start: 2024-03-12

## 2024-03-12 RX ADMIN — LIDOCAINE HYDROCHLORIDE 6 ML: 20 JELLY TOPICAL at 12:01

## 2024-03-12 RX ADMIN — COLLAGENASE SANTYL 1 APPLICATION: 250 OINTMENT TOPICAL at 12:00

## 2024-03-12 NOTE — PROGRESS NOTES
Wound Clinic Note  Patient Identification:  Name:  Monae Chauhan  Age:  68 y.o.  Sex:  female  :  1955  MRN:  [id number]   Visit Number:  [id number]  Primary Care Physician:  Zachary Sullivan MD     Subjective     Chief complaint:     Left foot    History of presenting illness:     Patient is a 68 y.o. female with past medical history significant for diabetes, PVD and former smoker quit in . Presented today for evaluation of wounds to abdomen, and right upper thigh and right medial thigh. 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 She has multiple debridements to the area. She has sutures in place to the right thigh and abdominal wounds. These were removed 5 from the right thigh and 5 to the right abdominal wound. Wound base red and moist to all wounds. No evidence of cellulitis. Reports she has been applying a wet-to dry dressing to the area. Denies any fever or chills. Mild to moderate pain to the area.    Interval History:  2023: Seen in clinic today for follow-up to multiple wounds to abdomen, and right thigh. Areas are improving. Increase in granulation tissue. Denies any fever or chills. No new issues or concerns. Tolerating current treatment without complications. Reports glucose levels are controlled.    03/15/2023: Seen in clinic today for follow-up to multiple wounds to abdomen, and right thigh. Wounds with significant improvement. Small open areas. Denies any fever or chills. No new complications. Reports compliance with treatment regimen.     2024: Seen in clinic today for follow-up she has new wound to left lateral foot. Reports wound has been present for 2 months. She has been applying peroxide and leaving open to air. No recent imaging or labs. Denies any fever or chills. No new complications. Reports compliance with treatment regimen. Reports smoking 1 pack per day. Glucose levels around 100-200. Last A1C  7.5.     02/01/2024: Seen in clinic today for follow-up to wound to left lateral foot. No recent imaging or labs. Denies any fever or chills. No new complications. Reports compliance with treatment regimen. Reports smoking 1 pack per day. Glucose levels around 100-200. JUNIOR was completed wt right 0.87 and left 0.85. Will refer to vascular surgeon for evaluation.     02/007/2024: Seen in clinic today for follow-up to wound to left lateral foot. No recent imaging or labs. Denies any fever or chills. No new complications. Reports compliance with treatment regimen. Reports smoking 1 pack per day. Glucose levels around 100-200. JUNIOR was completed wt right 0.87 and left 0.85. Referred to vascular surgeon for evaluation, she has appointment 02/22/2024.     02/13/2024: Seen in clinic today for follow-up to wound to left lateral foot. . No new complications. Reports compliance with treatment regimen. Reports smoking 1 pack per day. Glucose levels around 100-200. JUNIOR was completed wt right 0.87 and left 0.85. has vascular appointment next week.     02/27/2024: Seen in clinic today for follow-up to wound to left lateral foot. Base with yellow slough. No new complications. Reports compliance with treatment regimen. Reports cutting back on smoking. Glucose levels around 100-200. Missed appointment with Dr. Doyle for vascular evaluation as she was hospitalized.     03/12/2024: Seen in clinic today for follow-up to wound to left foot. Base with yellow slough. No new complications. Reports compliance with treatment regimen. She has an appt with vascular on 3/28/24. Denies fever or chills.   ---------------------------------------------------------------------------------------------------------------------   Review of Systems   Constitutional:  Negative for chills and fever.   HENT:  Negative for congestion and rhinorrhea.    Respiratory:  Negative for cough and shortness of breath.    Cardiovascular:  Negative for chest pain and  leg swelling.   Gastrointestinal:  Negative for nausea and vomiting.   Musculoskeletal:  Positive for back pain and gait problem.   Skin:  Positive for wound.   Hematological:  Does not bruise/bleed easily.      ---------------------------------------------------------------------------------------------------------------------   Past Medical History:   Diagnosis Date    Arthritis     Coronary artery disease     Depression     Diabetes mellitus     Elevated cholesterol     Full dentures     GERD (gastroesophageal reflux disease)     Hyperlipidemia     Hypertension     Myocardial infarction     Peripheral vascular disease     Psoriasis      Past Surgical History:   Procedure Laterality Date    APPENDECTOMY      CARDIAC CATHETERIZATION N/A 2017    Procedure: Left Heart Cath;  Surgeon: David Diane MD;  Location: [facility] CATH INVASIVE LOCATION;  Service:     CORONARY STENT PLACEMENT      CYSTOSCOPY BOTOX INJECTION OF BLADDER N/A 10/20/2021    Procedure: Cystoscopy Botox injection of bladder;  Surgeon: Say Robles MD;  Location: [facility] OR;  Service: Urology;  Laterality: N/A;    FOOT SURGERY Left     GALLBLADDER SURGERY      LEG SURGERY Left     TUBAL ABDOMINAL LIGATION      VASCULAR SURGERY Left     Lower Extremity Vascular Stent-LCR     Family History   Problem Relation Age of Onset    Breast cancer Maternal Grandmother 70    Cancer Mother     Diabetes Mother     Cancer Father     Diabetes Father      Social History     Socioeconomic History    Marital status:     Number of children: 2   Tobacco Use    Smoking status: Former     Current packs/day: 0.00     Average packs/day: 1 pack/day for 40.0 years (40.0 ttl pk-yrs)     Types: Cigarettes     Start date: 1981     Quit date: 2021     Years since quittin.7    Smokeless tobacco: Never    Tobacco comments:     2-4 PPD off and on for the last 40 years   Vaping Use    Vaping status: Former    Substances: Nicotine, Flavoring     "Devices: Refillable tank   Substance and Sexual Activity    Alcohol use: No    Drug use: No    Sexual activity: Defer     ---------------------------------------------------------------------------------------------------------------------   Allergies:  Amoxicillin and Penicillins  ---------------------------------------------------------------------------------------------------------------------  Objective     ---------------------------------------------------------------------------------------------------------------------   Vital Signs:  There were no vitals taken for this visit.  Estimated body mass index is 27.46 kg/m² as calculated from the following:    Height as of 2/27/24: 160 cm (62.99\").    Weight as of 2/27/24: 70.3 kg (155 lb).  There is no height or weight on file to calculate BMI.  Wt Readings from Last 3 Encounters:   02/27/24 70.3 kg (155 lb)   02/22/24 70.3 kg (155 lb)   02/20/24 70.3 kg (155 lb)       ---------------------------------------------------------------------------------------------------------------------     Physical Exam  Constitutional:       Appearance: Normal appearance.   HENT:      Head: Normocephalic and atraumatic.      Nose: Nose normal.      Mouth/Throat:      Mouth: Mucous membranes are moist.   Eyes:      Pupils: Pupils are equal, round, and reactive to light.   Cardiovascular:      Rate and Rhythm: Normal rate.   Pulmonary:      Effort: Pulmonary effort is normal.   Abdominal:      General: Abdomen is flat.      Palpations: Abdomen is soft.   Musculoskeletal:         General: Normal range of motion.   Skin:     General: Skin is warm and dry.      Capillary Refill: Capillary refill takes less than 2 seconds.   Neurological:      General: No focal deficit present.      Mental Status: She is alert and oriented to person, place, and time.   Psychiatric:         Mood and Affect: Mood normal.         Behavior: Behavior normal.   Wound Assessment:  Location: Left, lateral, " foot  Wound Measurements: 2.3 X 2.8 X 0.3cm   Tunneling: No Undermining: No  Dressing Appearance: dressingappearance: clean  Closure: NA  Changes since last exam: no changes  Etiology and classification: reyna grade 1 DFU (skin and integument but no deep structures)  Wound bed structures/characteristics: full-thickness (subcutaneous tissue is exposed in at least a portion of the wound), moist, necrotic  Edges moist  Periwound characteristics: edematous  Periwound Temperature: cool  Drainage characteristics: moderate, serous   Perfusion characteristics: suggest some degree of PAD    Wound Goal (s):Closure, Epithelization, Free of infection and No further symptoms  Assessment & Plan      Patient Active Problem List    Diagnosis     Diabetic foot ulcer of left lateral foot [l97.522]  -Clean with dakins apply santyl to base and secure with kerlix and ACE  -JUNIOR: right 0.87 and left 0.85, referral to vascular surgeon for evaluation appointment 02/22/2024  -Farrow wraps at home   -Recommend yara protein diet 0.75g/kg/day along with vitamin C 2000mg/day, vitamin A 5000 Units/day, vitamin D3 5000 Units/day, zinc 50mg/day to help promote wound healing        Diabetes mellitus (HCC) [E11.9]  -Recommend adequate glycemic control to help promote wound healing  -Poor glycemic  Control increases risk of infection, prolonged wound healing, loss of limb and premature death      Class 1 obesity in adult [E66.9]  -An obese person?is at greater risk for wound infection and dehiscence or evisceration.     PAD (peripheral artery disease) (HCC) [I73.9]  -Following with vascular, has not seen in several months  -Can negatively impact wound progression      Tobacco abuse [Z72.0]  -Tissue perfusion is lower in users of tobacco and other forms of nicotine. Reduced tissue perfusion strongly inhibits wound healing, increases risk of infection, and dramatically increases risk of limb loss.      Wound Care Debridement Note    Pre-Procedure  Pre-Procedure Diagnosis: Diabetic foot ulcer of left lateral foot with fat layer exposed  Checked for Allergies: yes  Consent:Consent obtained, consent given by Patient,Risks Discussed, Alternatives Discussed  Indication: slough  Vascular status:JUNIOR testing was reviewed, and value is >0.6.  Time out was called prior to procedure.   Pre procedure Pain assessment: moderate  Pre debridement measurements: Length 2.3,Width 2.8, depth 0.3,   Volume:1.932, Surface area: 6.44  sinus/tunnelNo, undermining No    Post Procedure  Post-Procedure Diagnosis: Diabetic foot ulcer of left lateral foot with fat layer exposed  Post debridement measurements: Length 2.4,Width 2.9, depth 0.31,   Volume: 2.1576, Surface area:  6.96  sinus/tunnelNo, undermining No  Post procedure Pain assessment: moderate  Graft/Implant/Prosthetics/Implanted Device/Transplants:  None  Complication(s):  None    Procedure details:  Method of Debridement: excissional (Surgical removal or cutting away, outside or beyond the wound margin devitalized tissue, necrosis or slough.)  Procedure: The site was prepared using clean techniques, subcutaneous tissue was removed by surgical excision.  Instrument(s) used: Curette 4mm  Anesthesia:After checking patient allergies,lidocaine topical 2% was administered to provide anesthesia.  Tissue removed: subuctaneous, Percent Removed 90%  Culture or Biopsy:  NA  Estimated Blood Loss: Minimal  Hemostasis Obtained: pressure       Skin Substitute Evaluation:  The application of skin substitute has been considered for this non-healing ulcer located on left lateral foot. The ulcer has failed to show evidence of healing by adequate advancement of the epithelial margin after 30 days of comprehensive management and standard conservative wound care. The preservice record has been thoroughly documented and reviewed to ensure the circumstances resulting in wound failure were addressed appropriately and with the required  interventions. The patient has been compliant with all recommendations of their comprehensive management to include control of underlying conditions that contributed to the ulcer, appropriate off-loading, and an optimized nutritional plan. Nutritional assessment and planning is based on labs of ALB 3.6, PREALB 14.5.  Adequate circulation/oxygenation to support tissue growth evidenced by an JUNIOR of The right JUNIOR measures 0.87. The left JUNIOR measures 0.85.Patient's HbA1c is 7.7 and is being managed by PCP There is no evidence of underlying osteomyelitis or cellulitis, and any potential nidus of infection or bacterial colonization has been controlled. The ulcer is clean and free of necrotic debris and exudate with a granular base. Ulcer is full (skin loss , not involving tendon, muscle, joint capsule, or exhibiting bone or sinus tracts with a clean granular bed. At this time, placement of a cellular and/or tissue-based therapy is recommended, because its dermal matrix should provide the ideal environment to support cellular repopulation. The patient is reducing systemic tobacco use. Appropriate compression bandaging has been provided with diligent use of multilayer bandages, stockings (>20 mmHg) or velcro devices The addition of Skin Substitutes is reasonable and necessary for the treatment of this non healing ulceration, and may afford a healing advantage over dressings and conservative treatments that have proven insufficient to complete heal this patient.     Clinical Impression:Moderate Complexity    Follow-up: 1 week    Audra Henson PA-C   [facility]  03/12/2024  [id number]

## 2024-03-13 NOTE — PROGRESS NOTES
Wound Clinic Note  Patient Identification:  Name:  Monae Chauhan  Age:  68 y.o.  Sex:  female  :  1955  MRN:  [id number]   Visit Number:  [id number]  Primary Care Physician:  Zachary Sullivan MD     Subjective     Chief complaint:     Multiple wound to abdomen, and right thigh    History of presenting illness:     Patient is a 68 y.o. female with past medical history significant for diabetes, PVD and former smoker quit in . Presented today for evaluation of wounds to abdomen, and right upper thigh and right medial thigh. 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 She has multiple debridements to the area. She has sutures in place to the right thigh and abdominal wounds. These were removed 5 from the right thigh and 5 to the right abdominal wound. Wound base red and moist to all wounds. No evidence of cellulitis. Reports she has been applying a wet-to dry dressing to the area. Denies any fever or chills. Mild to moderate pain to the area.    Interval History:  2023: Seen in clinic today for follow-up to multiple wounds to abdomen, and right thigh. Areas are improving. Increase in granulation tissue. Denies any fever or chills. No new issues or concerns. Tolerating current treatment without complications. Reports glucose levels are controlled.    03/15/2023: Seen in clinic today for follow-up to multiple wounds to abdomen, and right thigh. Wounds with significant improvement. Small open areas. Denies any fever or chills. No new complications. Reports compliance with treatment regimen.     2024: Seen in clinic today for follow-up she has new wound to left lateral foot. Reports wound has been present for 2 months. She has been applying peroxide and leaving open to air. No recent imaging or labs. Denies any fever or chills. No new complications. Reports compliance with treatment regimen. Reports smoking 1 pack per day. Glucose  Detail Level: Detailed levels around 100-200. Last A1C 7.5.     02/01/2024: Seen in clinic today for follow-up to wound to left lateral foot. No recent imaging or labs. Denies any fever or chills. No new complications. Reports compliance with treatment regimen. Reports smoking 1 pack per day. Glucose levels around 100-200. JUNIOR was completed wt right 0.87 and left 0.85. Will refer to vascular surgeon for evaluation.     02/007/2024: Seen in clinic today for follow-up to wound to left lateral foot. No recent imaging or labs. Denies any fever or chills. No new complications. Reports compliance with treatment regimen. Reports smoking 1 pack per day. Glucose levels around 100-200. JUNIOR was completed wt right 0.87 and left 0.85. Referred to vascular surgeon for evaluation, she has appointment 02/22/2024.     02/13/2024: Seen in clinic today for follow-up to wound to left lateral foot. . No new complications. Reports compliance with treatment regimen. Reports smoking 1 pack per day. Glucose levels around 100-200. JUNIOR was completed wt right 0.87 and left 0.85. has vascular appointment next week.     02/27/2024: Seen in clinic today for follow-up to wound to left lateral foot. Base with yellow slough. No new complications. Reports compliance with treatment regimen. Reports cutting back on smoking. Glucose levels around 100-200. Missed appointment with Dr. Doyle for vascular evaluation as she was hospitalized.     03/05/2024: Seen in clinic today for follow-up to wound of left lateral foot. Base continues with slough. There is no surrounding evidence of cellulitis. Denies any fever or chills. She has been applying santyl to area without complications. She is awaiting evaluation by vascular, this is scheduled for 03/28/2024.  ---------------------------------------------------------------------------------------------------------------------   Review of Systems   Constitutional:  Negative for chills and fever.   HENT:  Negative for congestion and  Quality 111:Pneumonia Vaccination Status For Older Adults: Pneumococcal Vaccination Previously Received rhinorrhea.    Respiratory:  Negative for cough and shortness of breath.    Cardiovascular:  Negative for chest pain and leg swelling.   Gastrointestinal:  Negative for nausea and vomiting.   Musculoskeletal:  Positive for back pain and gait problem.   Skin:  Positive for wound.   Hematological:  Does not bruise/bleed easily.      ---------------------------------------------------------------------------------------------------------------------   Past Medical History:   Diagnosis Date    Arthritis     Coronary artery disease     Depression     Diabetes mellitus     Elevated cholesterol     Full dentures     GERD (gastroesophageal reflux disease)     Hyperlipidemia     Hypertension     Myocardial infarction     Peripheral vascular disease     Psoriasis      Past Surgical History:   Procedure Laterality Date    APPENDECTOMY      CARDIAC CATHETERIZATION N/A 2017    Procedure: Left Heart Cath;  Surgeon: David Diane MD;  Location: [facility] CATH INVASIVE LOCATION;  Service:     CORONARY STENT PLACEMENT      CYSTOSCOPY BOTOX INJECTION OF BLADDER N/A 10/20/2021    Procedure: Cystoscopy Botox injection of bladder;  Surgeon: Say Robles MD;  Location: [facility] OR;  Service: Urology;  Laterality: N/A;    FOOT SURGERY Left     GALLBLADDER SURGERY      LEG SURGERY Left     TUBAL ABDOMINAL LIGATION      VASCULAR SURGERY Left     Lower Extremity Vascular Stent-[facility]     Family History   Problem Relation Age of Onset    Breast cancer Maternal Grandmother 70    Cancer Mother     Diabetes Mother     Cancer Father     Diabetes Father      Social History     Socioeconomic History    Marital status:     Number of children: 2   Tobacco Use    Smoking status: Former     Current packs/day: 0.00     Average packs/day: 1 pack/day for 40.0 years (40.0 ttl pk-yrs)     Types: Cigarettes     Start date: 1981     Quit date: 2021     Years since quittin.7    Smokeless tobacco: Never    Tobacco comments:     " 2-4 PPD off and on for the last 40 years   Vaping Use    Vaping status: Former    Substances: Nicotine, Flavoring    Devices: Refillable tank   Substance and Sexual Activity    Alcohol use: No    Drug use: No    Sexual activity: Defer     ---------------------------------------------------------------------------------------------------------------------   Allergies:  Amoxicillin and Penicillins  ---------------------------------------------------------------------------------------------------------------------  Objective     ---------------------------------------------------------------------------------------------------------------------   Vital Signs:  /78   Pulse 68   Temp 98.2 °F (36.8 °C) (Infrared)   Resp 20   Estimated body mass index is 27.46 kg/m² as calculated from the following:    Height as of 3/12/24: 160 cm (62.99\").    Weight as of 3/12/24: 70.3 kg (155 lb).  There is no height or weight on file to calculate BMI.  Wt Readings from Last 3 Encounters:   03/12/24 70.3 kg (155 lb)   02/27/24 70.3 kg (155 lb)   02/22/24 70.3 kg (155 lb)       ---------------------------------------------------------------------------------------------------------------------     Physical Exam  Constitutional:       Appearance: Normal appearance.   HENT:      Head: Normocephalic and atraumatic.      Nose: Nose normal.      Mouth/Throat:      Mouth: Mucous membranes are moist.   Eyes:      Pupils: Pupils are equal, round, and reactive to light.   Cardiovascular:      Rate and Rhythm: Normal rate.   Pulmonary:      Effort: Pulmonary effort is normal.   Abdominal:      General: Abdomen is flat.      Palpations: Abdomen is soft.   Musculoskeletal:         General: Normal range of motion.   Skin:     General: Skin is warm and dry.      Capillary Refill: Capillary refill takes less than 2 seconds.   Neurological:      General: No focal deficit present.      Mental Status: She is alert and oriented to person, place, " Quality 110: Preventive Care And Screening: Influenza Immunization: Influenza Immunization Administered during Influenza season and time.   Psychiatric:         Mood and Affect: Mood normal.         Behavior: Behavior normal.     Wound Assessment:  Location: Left, lateral, foot  Wound Measurements: 3 X 2.2 X 0.2cm   Tunneling: No Undermining: No  Dressing Appearance: dressingappearance: clean  Closure: NA  Changes since last exam: no changes  Etiology and classification: reyna grade 1 DFU (skin and integument but no deep structures)  Wound bed structures/characteristics: full-thickness (subcutaneous tissue is exposed in at least a portion of the wound), moist, necrotic  Edges moist  Periwound characteristics: edematous  Periwound Temperature: cool  Drainage characteristics: moderate, serous   Perfusion characteristics: suggest some degree of PAD    Wound Goal (s):Closure, Epithelization, Free of infection and No further symptoms  Assessment & Plan      Patient Active Problem List    Diagnosis     Diabetic foot ulcer of left lateral foot [l97.522]  -Clean with dakins apply santyl to base and secure with kerlix and ACE  -JUNIOR: right 0.87 and left 0.85, referral to vascular surgeon for evaluation appointment 02/22/2024  -Farrow wraps at home   -Recommend yara protein diet 0.75g/kg/day along with vitamin C 2000mg/day, vitamin A 5000 Units/day, vitamin D3 5000 Units/day, zinc 50mg/day to help promote wound healing        Diabetes mellitus (HCC) [E11.9]  -Recommend adequate glycemic control to help promote wound healing  -Poor glycemic  Control increases risk of infection, prolonged wound healing, loss of limb and premature death      Class 1 obesity in adult [E66.9]  -An obese person?is at greater risk for wound infection and dehiscence or evisceration.     PAD (peripheral artery disease) (HCC) [I73.9]  -Following with vascular, has not seen in several months  -Can negatively impact wound progression      Tobacco abuse [Z72.0]  -Tissue perfusion is lower in users of tobacco and other forms of nicotine. Reduced tissue perfusion strongly inhibits  wound healing, increases risk of infection, and dramatically increases risk of limb loss.      Skin Substitute Evaluation:  The application of skin substitute has been considered for this non-healing ulcer located on left lateral foot. The ulcer has failed to show evidence of healing by adequate advancement of the epithelial margin after 30 days of comprehensive management and standard conservative wound care. The preservice record has been thoroughly documented and reviewed to ensure the circumstances resulting in wound failure were addressed appropriately and with the required interventions. The patient has been compliant with all recommendations of their comprehensive management to include control of underlying conditions that contributed to the ulcer, appropriate off-loading, and an optimized nutritional plan. Nutritional assessment and planning is based on labs of ALB 3.6, PREALB 14.5.  Adequate circulation/oxygenation to support tissue growth evidenced by an JUNIOR of The right JUNIOR measures 0.87. The left JUNIOR measures 0.85.Patient's HbA1c is 7.7 and is being managed by PCP There is no evidence of underlying osteomyelitis or cellulitis, and any potential nidus of infection or bacterial colonization has been controlled. The ulcer is clean and free of necrotic debris and exudate with a granular base. Ulcer is full (skin loss , not involving tendon, muscle, joint capsule, or exhibiting bone or sinus tracts with a clean granular bed. At this time, placement of a cellular and/or tissue-based therapy is recommended, because its dermal matrix should provide the ideal environment to support cellular repopulation. The patient is reducing systemic tobacco use. Appropriate compression bandaging has been provided with diligent use of multilayer bandages, stockings (>20 mmHg) or velcro devices The addition of Skin Substitutes is reasonable and necessary for the treatment of this non healing ulceration, and may afford a healing  advantage over dressings and conservative treatments that have proven insufficient to complete heal this patient.     Clinical Impression:Moderate Complexity    Follow-up: 1 week    DENIS Nguyen   [facility]  03/05/2024  [id number]

## 2024-03-19 ENCOUNTER — HOSPITAL ENCOUNTER (OUTPATIENT)
Dept: WOUND CARE | Facility: HOSPITAL | Age: 69
Discharge: HOME | End: 2024-03-19
Payer: MEDICARE

## 2024-03-19 VITALS
BODY MASS INDEX: 27.46 KG/M2 | RESPIRATION RATE: 20 BRPM | DIASTOLIC BLOOD PRESSURE: 83 MMHG | TEMPERATURE: 98.3 F | WEIGHT: 155 LBS | SYSTOLIC BLOOD PRESSURE: 193 MMHG | HEIGHT: 63 IN | HEART RATE: 76 BPM

## 2024-03-19 DIAGNOSIS — L97.522 NON-PRESSURE CHRONIC ULCER OF OTHER PART OF LEFT FOOT WITH FAT LAYER EXPOSED: ICD-10-CM

## 2024-03-19 DIAGNOSIS — T14.8XXA OPEN WOUND: Primary | ICD-10-CM

## 2024-03-19 PROCEDURE — 97602 WOUND(S) CARE NON-SELECTIVE: CPT

## 2024-03-19 RX ORDER — LIDOCAINE HYDROCHLORIDE 20 MG/ML
JELLY TOPICAL AS NEEDED
OUTPATIENT
Start: 2024-03-19

## 2024-03-19 RX ORDER — SODIUM HYPOCHLORITE 1.25 MG/ML
1 SOLUTION TOPICAL AS NEEDED
OUTPATIENT
Start: 2024-03-19

## 2024-03-19 RX ORDER — LIDOCAINE HYDROCHLORIDE 20 MG/ML
6 JELLY TOPICAL ONCE
Status: COMPLETED | OUTPATIENT
Start: 2024-03-19 | End: 2024-03-19

## 2024-03-19 RX ORDER — SODIUM HYPOCHLORITE 2.5 MG/ML
1 SOLUTION TOPICAL AS NEEDED
OUTPATIENT
Start: 2024-03-19

## 2024-03-19 RX ORDER — LIDOCAINE HYDROCHLORIDE AND EPINEPHRINE BITARTRATE 20; .01 MG/ML; MG/ML
10 INJECTION, SOLUTION SUBCUTANEOUS ONCE
OUTPATIENT
Start: 2024-03-19 | End: 2024-03-19

## 2024-03-19 RX ORDER — LIDOCAINE HYDROCHLORIDE 20 MG/ML
10 INJECTION, SOLUTION INFILTRATION; PERINEURAL ONCE
OUTPATIENT
Start: 2024-03-19 | End: 2024-03-19

## 2024-03-19 RX ORDER — CASTOR OIL AND BALSAM, PERU 788; 87 MG/G; MG/G
1 OINTMENT TOPICAL AS NEEDED
OUTPATIENT
Start: 2024-03-19

## 2024-03-19 RX ORDER — DIAPER,BRIEF,INFANT-TODD,DISP
1 EACH MISCELLANEOUS ONCE
OUTPATIENT
Start: 2024-03-19 | End: 2024-03-19

## 2024-03-19 RX ORDER — LIDOCAINE HYDROCHLORIDE 20 MG/ML
6 JELLY TOPICAL ONCE
OUTPATIENT
Start: 2024-03-19 | End: 2024-03-19

## 2024-03-19 RX ADMIN — LIDOCAINE HYDROCHLORIDE 6 ML: 20 JELLY TOPICAL at 11:26

## 2024-03-26 NOTE — PROGRESS NOTES
Wound Clinic Note  Patient Identification:  Name:  Monae Chauhan  Age:  68 y.o.  Sex:  female  :  1955  MRN:  [id number]   Visit Number:  [id number]  Primary Care Physician:  Zachary Sullivan MD     Subjective     Chief complaint:     Multiple wound to abdomen, and right thigh    History of presenting illness:     Patient is a 68 y.o. female with past medical history significant for diabetes, PVD and former smoker quit in . Presented today for evaluation of wounds to abdomen, and right upper thigh and right medial thigh. 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 She has multiple debridements to the area. She has sutures in place to the right thigh and abdominal wounds. These were removed 5 from the right thigh and 5 to the right abdominal wound. Wound base red and moist to all wounds. No evidence of cellulitis. Reports she has been applying a wet-to dry dressing to the area. Denies any fever or chills. Mild to moderate pain to the area.    Interval History:  2023: Seen in clinic today for follow-up to multiple wounds to abdomen, and right thigh. Areas are improving. Increase in granulation tissue. Denies any fever or chills. No new issues or concerns. Tolerating current treatment without complications. Reports glucose levels are controlled.    03/15/2023: Seen in clinic today for follow-up to multiple wounds to abdomen, and right thigh. Wounds with significant improvement. Small open areas. Denies any fever or chills. No new complications. Reports compliance with treatment regimen.     2024: Seen in clinic today for follow-up she has new wound to left lateral foot. Reports wound has been present for 2 months. She has been applying peroxide and leaving open to air. No recent imaging or labs. Denies any fever or chills. No new complications. Reports compliance with treatment regimen. Reports smoking 1 pack per day. Glucose  levels around 100-200. Last A1C 7.5.     02/01/2024: Seen in clinic today for follow-up to wound to left lateral foot. No recent imaging or labs. Denies any fever or chills. No new complications. Reports compliance with treatment regimen. Reports smoking 1 pack per day. Glucose levels around 100-200. JUNIOR was completed wt right 0.87 and left 0.85. Will refer to vascular surgeon for evaluation.     02/007/2024: Seen in clinic today for follow-up to wound to left lateral foot. No recent imaging or labs. Denies any fever or chills. No new complications. Reports compliance with treatment regimen. Reports smoking 1 pack per day. Glucose levels around 100-200. JNUIOR was completed wth right 0.87 and left 0.85. Referred to vascular surgeon for evaluation, she has appointment 02/22/2024.     02/13/2024: Seen in clinic today for follow-up to wound to left lateral foot. . No new complications. Reports compliance with treatment regimen. Reports smoking 1 pack per day. Glucose levels around 100-200. JUNIOR was completed wt right 0.87 and left 0.85. has vascular appointment next week.     02/27/2024: Seen in clinic today for follow-up to wound to left lateral foot. Base with yellow slough. No new complications. Reports compliance with treatment regimen. Reports cutting back on smoking. Glucose levels around 100-200. Missed appointment with Dr. Doyle for vascular evaluation as she was hospitalized.     03/05/2024: Seen in clinic today for follow-up to wound of left lateral foot. Base continues with slough. There is no surrounding evidence of cellulitis. Denies any fever or chills. She has been applying santyl to area without complications. She is awaiting evaluation by vascular, this is scheduled for 03/28/2024.    03/12/2024: Seen in clinic today for follow-up to wound to left foot. Base with yellow slough. No new complications. Reports compliance with treatment regimen. She has an appt with vascular on 3/28/24. Denies fever or  chills.     03/19/2024: Seen in clinic today for follow-up to wound of left lateral foot. Base continues with slough. There is no surrounding evidence of cellulitis. Denies any fever or chills. She has been applying santyl to area without complications. She is awaiting evaluation by vascular, this is scheduled for 03/28/2024.  ---------------------------------------------------------------------------------------------------------------------   Review of Systems   Constitutional:  Negative for chills and fever.   HENT:  Negative for congestion and rhinorrhea.    Respiratory:  Negative for cough and shortness of breath.    Cardiovascular:  Negative for chest pain and leg swelling.   Gastrointestinal:  Negative for nausea and vomiting.   Musculoskeletal:  Positive for back pain and gait problem.   Skin:  Positive for wound.   Hematological:  Does not bruise/bleed easily.      ---------------------------------------------------------------------------------------------------------------------   Past Medical History:   Diagnosis Date    Arthritis     Coronary artery disease     Depression     Diabetes mellitus     Elevated cholesterol     Full dentures     GERD (gastroesophageal reflux disease)     Hyperlipidemia     Hypertension     Myocardial infarction     Peripheral vascular disease     Psoriasis      Past Surgical History:   Procedure Laterality Date    APPENDECTOMY      CARDIAC CATHETERIZATION N/A 01/09/2017    Procedure: Left Heart Cath;  Surgeon: David Diane MD;  Location: [facility] CATH INVASIVE LOCATION;  Service:     CORONARY STENT PLACEMENT      CYSTOSCOPY BOTOX INJECTION OF BLADDER N/A 10/20/2021    Procedure: Cystoscopy Botox injection of bladder;  Surgeon: Say Robles MD;  Location: [facility];  Service: Urology;  Laterality: N/A;    FOOT SURGERY Left     GALLBLADDER SURGERY      LEG SURGERY Left     TUBAL ABDOMINAL LIGATION      VASCULAR SURGERY Left     Lower Extremity [facility] Stent-[facility]  "    Family History   Problem Relation Age of Onset    Breast cancer Maternal Grandmother 70    Cancer Mother     Diabetes Mother     Cancer Father     Diabetes Father      Social History     Socioeconomic History    Marital status:     Number of children: 2   Tobacco Use    Smoking status: Former     Current packs/day: 0.00     Average packs/day: 1 pack/day for 40.0 years (40.0 ttl pk-yrs)     Types: Cigarettes     Start date: 1981     Quit date: 2021     Years since quittin.7    Smokeless tobacco: Never    Tobacco comments:     2-4 PPD off and on for the last 40 years   Vaping Use    Vaping status: Former    Substances: Nicotine, Flavoring    Devices: [employer] tank   Substance and Sexual Activity    Alcohol use: No    Drug use: No    Sexual activity: Defer     ---------------------------------------------------------------------------------------------------------------------   Allergies:  Amoxicillin and Penicillins  ---------------------------------------------------------------------------------------------------------------------  Objective     ---------------------------------------------------------------------------------------------------------------------   Vital Signs:  BP (!) 193/83   Pulse 76   Temp 98.3 °F (36.8 °C) (Infrared)   Resp 20   Ht 160 cm (62.99\")   Wt 70.3 kg (155 lb)   BMI 27.47 kg/m²   Estimated body mass index is 27.47 kg/m² as calculated from the following:    Height as of this encounter: 160 cm (62.99\").    Weight as of this encounter: 70.3 kg (155 lb).  Body mass index is 27.47 kg/m².  Wt Readings from Last 3 Encounters:   24 70.3 kg (155 lb)   24 70.3 kg (155 lb)   24 70.3 kg (155 lb)       ---------------------------------------------------------------------------------------------------------------------     Physical Exam  Constitutional:       Appearance: Normal appearance.   HENT:      Head: Normocephalic and atraumatic.      Nose: " Nose normal.      Mouth/Throat:      Mouth: Mucous membranes are moist.   Eyes:      Pupils: Pupils are equal, round, and reactive to light.   Cardiovascular:      Rate and Rhythm: Normal rate.   Pulmonary:      Effort: Pulmonary effort is normal.   Abdominal:      General: Abdomen is flat.      Palpations: Abdomen is soft.   Musculoskeletal:         General: Normal range of motion.   Skin:     General: Skin is warm and dry.      Capillary Refill: Capillary refill takes less than 2 seconds.   Neurological:      General: No focal deficit present.      Mental Status: She is alert and oriented to person, place, and time.   Psychiatric:         Mood and Affect: Mood normal.         Behavior: Behavior normal.     Wound Assessment:  Location: Left, lateral, foot  Wound Measurements: 2.6 X 3 X 0.3cm   Tunneling: No Undermining: No  Dressing Appearance: dressingappearance: clean  Closure: NA  Changes since last exam: no changes  Etiology and classification: reyna grade 1 DFU (skin and integument but no deep structures)  Wound bed structures/characteristics: full-thickness (subcutaneous tissue is exposed in at least a portion of the wound), moist, necrotic  Edges moist  Periwound characteristics: edematous  Periwound Temperature: cool  Drainage characteristics: moderate, serous   Perfusion characteristics: suggest some degree of PAD    Wound Goal (s):Closure, Epithelization, Free of infection and No further symptoms  Assessment & Plan      Patient Active Problem List    Diagnosis     Diabetic foot ulcer of left lateral foot [l97.522]  -Clean with wound cleanser apply santyl to base and secure with kerlix and ACE  -JUNIOR: right 0.87 and left 0.85, referral to vascular surgeon for evaluation appointment 02/22/2024  -Farrow wraps at home   -Recommend yara protein diet 0.75g/kg/day along with vitamin C 2000mg/day, vitamin A 5000 Units/day, vitamin D3 5000 Units/day, zinc 50mg/day to help promote wound healing        Diabetes  mellitus (HCC) [E11.9]  -Recommend adequate glycemic control to help promote wound healing  -Poor glycemic  Control increases risk of infection, prolonged wound healing, loss of limb and premature death      Class 1 obesity in adult [E66.9]  -An obese person?is at greater risk for wound infection and dehiscence or evisceration.     PAD (peripheral artery disease) (HCC) [I73.9]  -Following with vascular, has not seen in several months  -Can negatively impact wound progression      Tobacco abuse [Z72.0]  -Tissue perfusion is lower in users of tobacco and other forms of nicotine. Reduced tissue perfusion strongly inhibits wound healing, increases risk of infection, and dramatically increases risk of limb loss.      Skin Substitute Evaluation:  The application of skin substitute has been considered for this non-healing ulcer located on left lateral foot. The ulcer has failed to show evidence of healing by adequate advancement of the epithelial margin after 30 days of comprehensive management and standard conservative wound care. The preservice record has been thoroughly documented and reviewed to ensure the circumstances resulting in wound failure were addressed appropriately and with the required interventions. The patient has been compliant with all recommendations of their comprehensive management to include control of underlying conditions that contributed to the ulcer, appropriate off-loading, and an optimized nutritional plan. Nutritional assessment and planning is based on labs of ALB 3.6, PREALB 14.5.  Adequate circulation/oxygenation to support tissue growth evidenced by an JUNIOR of The right JUNIOR measures 0.87. The left JUNIOR measures 0.85.Patient's HbA1c is 7.7 and is being managed by PCP There is no evidence of underlying osteomyelitis or cellulitis, and any potential nidus of infection or bacterial colonization has been controlled. The ulcer is clean and free of necrotic debris and exudate with a granular base.  Ulcer is full (skin loss , not involving tendon, muscle, joint capsule, or exhibiting bone or sinus tracts with a clean granular bed. At this time, placement of a cellular and/or tissue-based therapy is recommended, because its dermal matrix should provide the ideal environment to support cellular repopulation. The patient is reducing systemic tobacco use. Appropriate compression bandaging has been provided with diligent use of multilayer bandages, stockings (>20 mmHg) or velcro devices The addition of Skin Substitutes is reasonable and necessary for the treatment of this non healing ulceration, and may afford a healing advantage over dressings and conservative treatments that have proven insufficient to complete heal this patient.     Clinical Impression:Moderate Complexity    Follow-up: 1 week    DENIS Nguyen   [facility]  03/19/2024  [id number]

## 2024-03-28 ENCOUNTER — OFFICE VISIT (OUTPATIENT)
Dept: CARDIOLOGY | Facility: CLINIC | Age: 69
End: 2024-03-28
Payer: MEDICARE

## 2024-03-28 VITALS
WEIGHT: 155 LBS | OXYGEN SATURATION: 90 % | HEIGHT: 63 IN | DIASTOLIC BLOOD PRESSURE: 89 MMHG | HEART RATE: 64 BPM | SYSTOLIC BLOOD PRESSURE: 184 MMHG | BODY MASS INDEX: 27.46 KG/M2

## 2024-03-28 DIAGNOSIS — I70.223 ATHEROSCLEROSIS OF NATIVE ARTERIES OF EXTREMITIES WITH REST PAIN, BILATERAL LEGS: Primary | ICD-10-CM

## 2024-03-28 NOTE — PROGRESS NOTES
[facility] CARDIOLOGY  [address] [place] [address]  Phone: [phone]  Fax: [phone]    03/28/2024    Chief Complaint   Patient presents with    Leg Swelling     Legs swelling, wound to left foot / in wound care         History:   Monae Chauhan is a 68 y.o. female seen in consultation, has been referred by Rayna Hutson from wound care for nonhealing wound and worsening wound on the left leg.  She had a history of prior stents and a CT scan done at  last year showing that the left SFA is flush occluded with ostial stents.  Has been having worsening pain in the left foot with new ulceration formation on the shin.  Has severe pain at rest.            Past Medical History:   Diagnosis Date    Arthritis     Coronary artery disease     Depression     Diabetes mellitus     Elevated cholesterol     Full dentures     GERD (gastroesophageal reflux disease)     Hyperlipidemia     Hypertension     Myocardial infarction     Peripheral vascular disease     Psoriasis        Past Surgical History:   Procedure Laterality Date    APPENDECTOMY      CARDIAC CATHETERIZATION N/A 01/09/2017    Procedure: Left Heart Cath;  Surgeon: David Diane MD;  Location: [facility] CATH INVASIVE LOCATION;  Service:     CORONARY STENT PLACEMENT      CYSTOSCOPY BOTOX INJECTION OF BLADDER N/A 10/20/2021    Procedure: Cystoscopy Botox injection of bladder;  Surgeon: Say Robles MD;  Location: [facility] OR;  Service: Urology;  Laterality: N/A;    FOOT SURGERY Left     GALLBLADDER SURGERY      LEG SURGERY Left     TUBAL ABDOMINAL LIGATION      VASCULAR SURGERY Left     Lower Extremity Vascular Stent-[facility]        ROS  As above otherwise negative    Past Social History:  Social History     Socioeconomic History    Marital status:     Number of children: 2   Tobacco Use    Smoking status: Former     Current packs/day: 0.00     Average packs/day: 1 pack/day for 40.0 years (40.0 ttl pk-yrs)      Types: Cigarettes     Start date: 1981     Quit date: 2021     Years since quittin.7    Smokeless tobacco: Never    Tobacco comments:     2-4 PPD off and on for the last 40 years   Vaping Use    Vaping status: Former    Substances: Nicotine, Flavoring    Devices: Refillable tank   Substance and Sexual Activity    Alcohol use: No    Drug use: No    Sexual activity: Defer       Past Family History:  Family History   Problem Relation Age of Onset    Breast cancer Maternal Grandmother 70    Cancer Mother     Diabetes Mother     Cancer Father     Diabetes Father        Current Outpatient Medications   Medication Sig Dispense Refill    albuterol (PROVENTIL) (2.5 MG/3ML) 0.083% nebulizer solution Take 2.5 mg by nebulization Every 4 (Four) Hours As Needed for Wheezing.      apixaban (Eliquis) 5 MG tablet tablet Take 1 tablet by mouth 2 (Two) Times a Day.      carvedilol (COREG) 3.125 MG tablet Take 1 tablet by mouth 2 (Two) Times a Day With Meals.      gabapentin (NEURONTIN) 800 MG tablet 3 (Three) Times a Day.      HYDROcodone-acetaminophen (NORCO) 7.5-325 MG per tablet As Needed.      hydrOXYzine pamoate (VISTARIL) 25 MG capsule As Needed.      insulin aspart (novoLOG FLEXPEN) 100 UNIT/ML solution pen-injector sc pen Inject 100 Units under the skin into the appropriate area as directed 3 (Three) Times a Day With Meals.      lidocaine (LIDODERM) 5 % Place 1 patch on the skin as directed by provider Daily. Remove & Discard patch within 12 hours or as directed by MD 3 patch 3    lidocaine (XYLOCAINE) 2 % jelly Apply  topically to the appropriate area as directed Daily. 85 g 3    losartan (COZAAR) 50 MG tablet Take 1 tablet by mouth Daily.      metoprolol tartrate (LOPRESSOR) 25 MG tablet Take 0.5 tablets by mouth Every 12 (Twelve) Hours. 30 tablet 2    ondansetron ODT (ZOFRAN-ODT) 4 MG disintegrating tablet Place 1 tablet on the tongue Every 8 (Eight) Hours As Needed for Nausea or Vomiting for up to 15  doses. 15 tablet 0    rosuvastatin (CRESTOR) 20 MG tablet Take 1 tablet by mouth Daily.      aspirin  MG tablet Take 1 tablet by mouth Every 6 (Six) Hours As Needed. (Patient not taking: Reported on 3/28/2024)      clopidogrel (PLAVIX) 75 MG tablet Take 1 tablet by mouth Daily. (Patient not taking: Reported on 3/28/2024) 30 tablet 11    Dulaglutide (Trulicity) 0.75 MG/0.5ML solution pen-injector Inject  under the skin into the appropriate area as directed. (Patient not taking: Reported on 3/28/2024)      glimepiride (AMARYL) 4 MG tablet Take 1 tablet by mouth Every Night. (Patient not taking: Reported on 3/28/2024)       No current facility-administered medications for this visit.        Allergies   Allergen Reactions    Amoxicillin Rash    Penicillins Rash         Objective:  Vitals:    03/28/24 1516   BP: (!) 184/89   Pulse: 64   SpO2: 90%       Physical Exam  Left foot is tender to touch with no pulsations felt.  Reduced pulses on the right side but palpable.  Open wound approximately 1 x 1 cm in size     DATA:  Results for orders placed during the hospital encounter of 01/08/17    SCANNED - TELEMETRY     Results for orders placed during the hospital encounter of 01/08/17    Adult Transthoracic Echo Complete    Interpretation Summary  · Left ventricular wall thickness is consistent with mild concentric hypertrophy.  · Left ventricular function is normal.  · Estimated EF appears to be in the range of 66 - 70%  · All left ventricular wall segments contract normally.  · Left ventricular diastolic dysfunction (grade I) consistent with impaired relaxation.  · All valves appear normal in structure and function.  · Pericardium: There is no evidence of pericardial effusion.   Results for orders placed during the hospital encounter of 08/20/20    Stress Test With Myocardial Perfusion (1 Day)    Interpretation Summary  · A pharmacological stress test was performed using regadenoson without low-level exercise.  ·  Findings consistent with a normal ECG stress test.  · Myocardial perfusion imaging indicates a normal myocardial perfusion study with no evidence of ischemia.  · Normal LV cavity size. Normal LV wall motion noted.  · Left ventricular ejection fraction is hyperdynamic (Calculated EF > 70%).  · Impressions are consistent with a low risk study.   Results for orders placed during the hospital encounter of 08/20/20    Stress Test With Myocardial Perfusion (1 Day)    Interpretation Summary  · A pharmacological stress test was performed using regadenoson without low-level exercise.  · Findings consistent with a normal ECG stress test.  · Myocardial perfusion imaging indicates a normal myocardial perfusion study with no evidence of ischemia.  · Normal LV cavity size. Normal LV wall motion noted.  · Left ventricular ejection fraction is hyperdynamic (Calculated EF > 70%).  · Impressions are consistent with a low risk study.   Results for orders placed during the hospital encounter of 01/08/17    Cardiac Catheterization/Vascular Study    Narrative  · Right dominant coronary artery system with 2 vessel disease involving the obtuse marginal branch and the posterior descending artery  · Successful angioplasty and stenting of the obtuse marginal branch.    Final impression:  Right dominant coronary artery system with 2 vessel disease involving the obtuse marginal system and the posterior descending artery  Successful angioplasty and stenting of the obtuse marginal branch    Procedures performed:  Coronary angiography  Angioplasty and stenting of the obtuse marginal branch    History of present illness:  Monae is a pleasant 61-year-old woman who presents with a history of peripheral vascular disease, hypertension, diabetes mellitus, tobacco consumption and peripheral vascular disease with a non-ST elevation myocardial infarction.  Based on her presentation she was referred for coronary angiography.  Prior to the procedure the  patient was given informed consent apprising her of the risk of heart attack, stroke and death.  The patient understood these risks and agreed to proceed.    Procedure in detail:  The patient was brought to the cardiac catheterization laboratory and prepped and draped in the usual sterile fashion.  Adequate anesthesia was obtained by infiltrating the right groin with local lidocaine.  Using a modified similar technique a 4 Israeli sheath was placed in the right femoral artery.  A 4 Israeli JL4 catheter was used to engage the ostium of the left main coronary artery and angiography was performed in varying views using hand injection of contrast material.  After this a 4 Israeli JR4 catheter was used to engage the ostium of the right coronary artery and angiography was again performed in varying views using hand injection of contrast material.  At this point review of her angiography revealed severe disease involving a relatively large obtuse marginal branch that supplied the lateral aspect of the left ventricle and another severe lesion in the proximal portion of the posterior descending artery however, there was also significant disease just distal to this.  Overall was felt that most likely the culprit lesion was in the obtuse marginal branch and that this had a significant vascular distribution.  It was felt that the posterior descending artery lesion could likely be treated medically.  As such the decision was made to proceed with ad hoc intervention.  The patient was given adequate heparinization to achieve an ACT greater than 200.  She had been given 600 mg of Plavix earlier in the day.  A 5 Israeli sheath was exchanged for the 4 Israeli sheath and a 5 Israeli XB 3.5 guiding catheter was used to engage the ostium of the left main coronary artery.  A BMW was placed atraumatically in the distal portion of the first obtuse marginal branch.  The lesion was predilated with a 2.0 mm balloon however the stent with  subsequently not pass.  As such a 2.5 mm noncompliant balloon was then used to dilate the most critical portion of the lesion.  After this a 2.25 x 23 mm Alpine drug-eluting stent was deployed at 12 raj.  The balloon was reinflated to 12 raj.  The stent was postdilated with a 2.5 mm noncompliant balloon.  After this orthogonal angiography revealed an excellent result without evidence of dissection and TIANA-3 flow the distal vascular bed.  The guidewire was withdrawn and orthogonal angiography confirmed the above-noted results.  The patient was returned to the floor without evidence of complication.    Findings in detail:  Left Main coronary artery:  The left main coronary artery is a large vessel which trifurcates into the LAD and ramus intermedius branch and the circumflex artery.  The left main coronary artery is free of atherosclerotic disease.    Left anterior descending artery:  Left anterior descending artery is a large vessel which is noted to have significant calcification in his proximal and midsegment.  It gives rise to 3 small to moderate size diagonal branches.  Just distal to the second diagonal branch there is a hazy area of approximately 50% stenosis however on orthogonal views this does not appear to be hemodynamically significant.    Circumflex artery:  The circumflex artery is a large vessel which gives rise to one large branching obtuse marginal vessel and then 2 smaller posterior lateral branches.  The first obtuse marginal branch is noted to have a diffuse 80% lesion in its proximal segment.  This is felt to be the culprit lesion.  Postintervention this is reduced to 0% residual stenosis without evidence of dissection and TIANA-3 flow in the distal vascular bed.  There is TIANA-3 flow preintervention.  The lesion length is approximately 23 mm.  The vessel diameter is approximately 2.5 mm    Right coronary artery:  The right coronary artery is large vessel which gives rise to the posterior  "descending artery and several posterior lateral branches.  There is a 70% lesion noted in the proximal portion of the PDA.  There is a subsequent 50% lesion noted in the distal portion of the PDA.    Final impression and plan:  Overall it is my impression at Monae is undergone successful percutaneous revascularization of the obtuse marginal system she will continue risk factor modification as appropriate.   Results for orders placed during the hospital encounter of 01/26/24    US Ankle / Brachial Indices Extremity Limited    Narrative  PROCEDURE: US ANKLE / BRACHIAL INDICES EXTREMITY LIMITED-    HISTORY: wound swelling; I73.9-Peripheral vascular disease, unspecified    PROCEDURE: Brachial and ankle pressures were obtained bilaterally. JUNIOR's  were calculated.    FINDINGS: The right JUNIOR measures 0.87.    The left JUNIOR measures 0.85.    Impression  Mildly reduced ankle-brachial indices.        This report was finalized on 1/26/2024 10:13 AM by Gloria Hanson M.D..      Procedures     Lab Results   Component Value Date    CHOL 251 (H) 01/08/2017     Lab Results   Component Value Date    TRIG 475 (H) 01/08/2017     Lab Results   Component Value Date    HDL 22 (L) 01/08/2017     Lab Results   Component Value Date    LDL  01/08/2017      Comment:      Unable to calculate       No results found for: \"TSH\"          Invalid input(s): \"LABALBU\", \"PROT\"            Assessment and Plan:    Monae Chauhan  reports that she quit smoking about 2 years ago. Her smoking use included cigarettes. She started smoking about 42 years ago. She has a 40 pack-year smoking history. She has never used smokeless tobacco.   Smoking cessation was strongly emphasized.  Will plan a urgent peripheral angiogram with possible intervention of the left SFA    Thank you for allowing me to participate in the care of Monae Chauhan. Feel free to contact me directly with any further questions or concerns.            Jaiden Doyle MD, FACC, " FSCAI  Interventional Cardiology

## 2024-04-04 ENCOUNTER — TELEPHONE (OUTPATIENT)
Dept: CARDIOLOGY | Facility: CLINIC | Age: 69
End: 2024-04-04
Payer: MEDICARE

## 2024-04-04 NOTE — TELEPHONE ENCOUNTER
Patient's grandson Chucky called in and states that Mrs Chauhan's leg wound is not healing and is discolored. He states he is aware of Dr Doyle not being back in office until 04/25/24. He states they are considering taking her to [place] to see Dr Doyle , possibly through the ED at [facility]. Chucky states he will call us and let us know what they decide to do.

## 2024-04-15 ENCOUNTER — HOSPITAL ENCOUNTER (OUTPATIENT)
Dept: WOUND CARE | Facility: HOSPITAL | Age: 69
Discharge: HOME | End: 2024-04-15
Payer: MEDICARE

## 2024-04-15 VITALS
DIASTOLIC BLOOD PRESSURE: 74 MMHG | RESPIRATION RATE: 20 BRPM | TEMPERATURE: 98.9 F | SYSTOLIC BLOOD PRESSURE: 199 MMHG | HEART RATE: 66 BPM

## 2024-04-15 DIAGNOSIS — L97.522 NON-PRESSURE CHRONIC ULCER OF OTHER PART OF LEFT FOOT WITH FAT LAYER EXPOSED: ICD-10-CM

## 2024-04-15 DIAGNOSIS — T14.8XXA OPEN WOUND: Primary | ICD-10-CM

## 2024-04-15 PROCEDURE — 97602 WOUND(S) CARE NON-SELECTIVE: CPT

## 2024-04-15 RX ORDER — LIDOCAINE HYDROCHLORIDE AND EPINEPHRINE BITARTRATE 20; .01 MG/ML; MG/ML
10 INJECTION, SOLUTION SUBCUTANEOUS ONCE
OUTPATIENT
Start: 2024-04-15 | End: 2024-04-15

## 2024-04-15 RX ORDER — LIDOCAINE HYDROCHLORIDE 20 MG/ML
10 INJECTION, SOLUTION INFILTRATION; PERINEURAL ONCE
OUTPATIENT
Start: 2024-04-15 | End: 2024-04-15

## 2024-04-15 RX ORDER — LIDOCAINE HYDROCHLORIDE 20 MG/ML
6 JELLY TOPICAL ONCE
OUTPATIENT
Start: 2024-04-15 | End: 2024-04-15

## 2024-04-15 RX ORDER — DIAPER,BRIEF,INFANT-TODD,DISP
1 EACH MISCELLANEOUS ONCE
OUTPATIENT
Start: 2024-04-15 | End: 2024-04-15

## 2024-04-15 RX ORDER — SODIUM HYPOCHLORITE 2.5 MG/ML
1 SOLUTION TOPICAL AS NEEDED
OUTPATIENT
Start: 2024-04-15

## 2024-04-15 RX ORDER — SODIUM HYPOCHLORITE 1.25 MG/ML
1 SOLUTION TOPICAL AS NEEDED
OUTPATIENT
Start: 2024-04-15

## 2024-04-15 RX ORDER — LIDOCAINE HYDROCHLORIDE 20 MG/ML
6 JELLY TOPICAL ONCE
Status: COMPLETED | OUTPATIENT
Start: 2024-04-15 | End: 2024-04-15

## 2024-04-15 RX ORDER — LIDOCAINE HYDROCHLORIDE 20 MG/ML
JELLY TOPICAL AS NEEDED
OUTPATIENT
Start: 2024-04-15

## 2024-04-15 RX ORDER — CASTOR OIL AND BALSAM, PERU 788; 87 MG/G; MG/G
1 OINTMENT TOPICAL AS NEEDED
OUTPATIENT
Start: 2024-04-15

## 2024-04-15 RX ADMIN — LIDOCAINE HYDROCHLORIDE 6 ML: 20 JELLY TOPICAL at 15:13

## 2024-04-23 NOTE — PROGRESS NOTES
Wound Clinic Note  Patient Identification:  Name:  Monae Chauhan  Age:  68 y.o.  Sex:  female  :  1955  MRN:  [id number]   Visit Number:  [id number]  Primary Care Physician:  Zachary Sullivan MD     Subjective     Chief complaint:     Multiple wound to abdomen, and right thigh    History of presenting illness:     Patient is a 68 y.o. female with past medical history significant for diabetes, PVD and former smoker quit in . Presented today for evaluation of wounds to abdomen, and right upper thigh and right medial thigh. 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 She has multiple debridements to the area. She has sutures in place to the right thigh and abdominal wounds. These were removed 5 from the right thigh and 5 to the right abdominal wound. Wound base red and moist to all wounds. No evidence of cellulitis. Reports she has been applying a wet-to dry dressing to the area. Denies any fever or chills. Mild to moderate pain to the area.    Interval History:  2023: Seen in clinic today for follow-up to multiple wounds to abdomen, and right thigh. Areas are improving. Increase in granulation tissue. Denies any fever or chills. No new issues or concerns. Tolerating current treatment without complications. Reports glucose levels are controlled.    03/15/2023: Seen in clinic today for follow-up to multiple wounds to abdomen, and right thigh. Wounds with significant improvement. Small open areas. Denies any fever or chills. No new complications. Reports compliance with treatment regimen.     2024: Seen in clinic today for follow-up she has new wound to left lateral foot. Reports wound has been present for 2 months. She has been applying peroxide and leaving open to air. No recent imaging or labs. Denies any fever or chills. No new complications. Reports compliance with treatment regimen. Reports smoking 1 pack per day. Glucose  levels around 100-200. Last A1C 7.5.     02/01/2024: Seen in clinic today for follow-up to wound to left lateral foot. No recent imaging or labs. Denies any fever or chills. No new complications. Reports compliance with treatment regimen. Reports smoking 1 pack per day. Glucose levels around 100-200. JUNIOR was completed wt right 0.87 and left 0.85. Will refer to vascular surgeon for evaluation.     02/007/2024: Seen in clinic today for follow-up to wound to left lateral foot. No recent imaging or labs. Denies any fever or chills. No new complications. Reports compliance with treatment regimen. Reports smoking 1 pack per day. Glucose levels around 100-200. JUNIOR was completed wth right 0.87 and left 0.85. Referred to vascular surgeon for evaluation, she has appointment 02/22/2024.     02/13/2024: Seen in clinic today for follow-up to wound to left lateral foot. . No new complications. Reports compliance with treatment regimen. Reports smoking 1 pack per day. Glucose levels around 100-200. JUNIOR was completed wt right 0.87 and left 0.85. has vascular appointment next week.     02/27/2024: Seen in clinic today for follow-up to wound to left lateral foot. Base with yellow slough. No new complications. Reports compliance with treatment regimen. Reports cutting back on smoking. Glucose levels around 100-200. Missed appointment with Dr. Doyle for vascular evaluation as she was hospitalized.     03/05/2024: Seen in clinic today for follow-up to wound of left lateral foot. Base continues with slough. There is no surrounding evidence of cellulitis. Denies any fever or chills. She has been applying santyl to area without complications. She is awaiting evaluation by vascular, this is scheduled for 03/28/2024.    03/12/2024: Seen in clinic today for follow-up to wound to left foot. Base with yellow slough. No new complications. Reports compliance with treatment regimen. She has an appt with vascular on 3/28/24. Denies fever or  chills.     03/19/2024: Seen in clinic today for follow-up to wound of left lateral foot. Base continues with slough. There is no surrounding evidence of cellulitis. Denies any fever or chills. She has been applying santyl to area without complications. She is awaiting evaluation by vascular, this is scheduled for 03/28/2024.    04/15/2024: Seen in clinic today for follow-up to wound of left lateral foot. Base continues with slough. There is no surrounding evidence of cellulitis. Denies any fever or chills. She has been applying santyl to area without complications. She is following closely with vascular disease.  ---------------------------------------------------------------------------------------------------------------------   Review of Systems   Constitutional:  Negative for chills and fever.   HENT:  Negative for congestion and rhinorrhea.    Respiratory:  Negative for cough and shortness of breath.    Cardiovascular:  Negative for chest pain and leg swelling.   Gastrointestinal:  Negative for nausea and vomiting.   Musculoskeletal:  Positive for back pain and gait problem.   Skin:  Positive for wound.   Hematological:  Does not bruise/bleed easily.      ---------------------------------------------------------------------------------------------------------------------   Past Medical History:   Diagnosis Date    Arthritis     Coronary artery disease     Depression     Diabetes mellitus     Elevated cholesterol     Full dentures     GERD (gastroesophageal reflux disease)     Hyperlipidemia     Hypertension     Myocardial infarction     Peripheral vascular disease     Psoriasis      Past Surgical History:   Procedure Laterality Date    APPENDECTOMY      CARDIAC CATHETERIZATION N/A 01/09/2017    Procedure: Left Heart Cath;  Surgeon: David Diane MD;  Location: [facility] CATH INVASIVE LOCATION;  Service:     CORONARY STENT PLACEMENT      CYSTOSCOPY BOTOX INJECTION OF BLADDER N/A 10/20/2021    Procedure:  "Cystoscopy Botox injection of bladder;  Surgeon: Say Robles MD;  Location: [facility];  Service: Urology;  Laterality: N/A;    FOOT SURGERY Left     GALLBLADDER SURGERY      LEG SURGERY Left     TUBAL ABDOMINAL LIGATION      VASCULAR SURGERY Left     Lower Extremity Vascular Stent-[facility]     Family History   Problem Relation Age of Onset    Breast cancer Maternal Grandmother 70    Cancer Mother     Diabetes Mother     Cancer Father     Diabetes Father      Social History     Socioeconomic History    Marital status:     Number of children: 2   Tobacco Use    Smoking status: Former     Current packs/day: 0.00     Average packs/day: 1 pack/day for 40.0 years (40.0 ttl pk-yrs)     Types: Cigarettes     Start date: 1981     Quit date: 2021     Years since quittin.8    Smokeless tobacco: Never    Tobacco comments:     2-4 PPD off and on for the last 40 years   Vaping Use    Vaping status: Former    Substances: Nicotine, Flavoring    Devices: RefOhmxble tank   Substance and Sexual Activity    Alcohol use: No    Drug use: No    Sexual activity: Defer     ---------------------------------------------------------------------------------------------------------------------   Allergies:  Amoxicillin and Penicillins  ---------------------------------------------------------------------------------------------------------------------  Objective     ---------------------------------------------------------------------------------------------------------------------   Vital Signs:  BP (!) 199/74 Comment: Judith was informed on the BP being high  Pulse 66   Temp 98.9 °F (37.2 °C) (Infrared)   Resp 20   Estimated body mass index is 27.46 kg/m² as calculated from the following:    Height as of 3/28/24: 160 cm (63\").    Weight as of 3/28/24: 70.3 kg (155 lb).  There is no height or weight on file to calculate BMI.  Wt Readings from Last 3 Encounters:   24 70.3 kg (155 lb)   24 70.3 kg (155 " lb)   03/12/24 70.3 kg (155 lb)       ---------------------------------------------------------------------------------------------------------------------     Physical Exam  Constitutional:       Appearance: Normal appearance.   HENT:      Head: Normocephalic and atraumatic.      Nose: Nose normal.      Mouth/Throat:      Mouth: Mucous membranes are moist.   Eyes:      Pupils: Pupils are equal, round, and reactive to light.   Cardiovascular:      Rate and Rhythm: Normal rate.   Pulmonary:      Effort: Pulmonary effort is normal.   Abdominal:      General: Abdomen is flat.      Palpations: Abdomen is soft.   Musculoskeletal:         General: Normal range of motion.   Skin:     General: Skin is warm and dry.      Capillary Refill: Capillary refill takes less than 2 seconds.   Neurological:      General: No focal deficit present.      Mental Status: She is alert and oriented to person, place, and time.   Psychiatric:         Mood and Affect: Mood normal.         Behavior: Behavior normal.     Wound Assessment:  Location: Left, lateral, foot  Wound Measurements: 2.5 X 3.4 X 0.3cm   Tunneling: No Undermining: No  Dressing Appearance: dressingappearance: clean  Closure: NA  Changes since last exam: no changes  Etiology and classification: reyna grade 1 DFU (skin and integument but no deep structures)  Wound bed structures/characteristics: full-thickness (subcutaneous tissue is exposed in at least a portion of the wound), moist, necrotic  Edges moist  Periwound characteristics: edematous  Periwound Temperature: cool  Drainage characteristics: moderate, serous   Perfusion characteristics: suggest some degree of PAD    Wound Goal (s):Closure, Epithelization, Free of infection and No further symptoms  Assessment & Plan      Patient Active Problem List    Diagnosis     Diabetic foot ulcer of left lateral foot [l97.522]  -Clean with wound cleanser apply santyl to base and secure with kerlix and ACE  -JUNIOR: right 0.87 and left  0.85, referral to vascular surgeon for evaluation appointment 02/22/2024  -Farrow wraps at home   -Recommend yara protein diet 0.75g/kg/day along with vitamin C 2000mg/day, vitamin A 5000 Units/day, vitamin D3 5000 Units/day, zinc 50mg/day to help promote wound healing        Diabetes mellitus (HCC) [E11.9]  -Recommend adequate glycemic control to help promote wound healing  -Poor glycemic  Control increases risk of infection, prolonged wound healing, loss of limb and premature death      Class 1 obesity in adult [E66.9]  -An obese person?is at greater risk for wound infection and dehiscence or evisceration.     PAD (peripheral artery disease) (HCC) [I73.9]  -Following with vascular, has not seen in several months  -Can negatively impact wound progression      Tobacco abuse [Z72.0]  -Tissue perfusion is lower in users of tobacco and other forms of nicotine. Reduced tissue perfusion strongly inhibits wound healing, increases risk of infection, and dramatically increases risk of limb loss.      Skin Substitute Evaluation:  The application of skin substitute has been considered for this non-healing ulcer located on left lateral foot. The ulcer has failed to show evidence of healing by adequate advancement of the epithelial margin after 30 days of comprehensive management and standard conservative wound care. The preservice record has been thoroughly documented and reviewed to ensure the circumstances resulting in wound failure were addressed appropriately and with the required interventions. The patient has been compliant with all recommendations of their comprehensive management to include control of underlying conditions that contributed to the ulcer, appropriate off-loading, and an optimized nutritional plan. Nutritional assessment and planning is based on labs of ALB 3.6, PREALB 14.5.  Adequate circulation/oxygenation to support tissue growth evidenced by an JUNIOR of The right JUNIOR measures 0.87. The left JUNIOR  measures 0.85.Patient's HbA1c is 7.7 and is being managed by PCP There is no evidence of underlying osteomyelitis or cellulitis, and any potential nidus of infection or bacterial colonization has been controlled. The ulcer is clean and free of necrotic debris and exudate with a granular base. Ulcer is full (skin loss , not involving tendon, muscle, joint capsule, or exhibiting bone or sinus tracts with a clean granular bed. At this time, placement of a cellular and/or tissue-based therapy is recommended, because its dermal matrix should provide the ideal environment to support cellular repopulation. The patient is reducing systemic tobacco use. Appropriate compression bandaging has been provided with diligent use of multilayer bandages, stockings (>20 mmHg) or velcro devices The addition of Skin Substitutes is reasonable and necessary for the treatment of this non healing ulceration, and may afford a healing advantage over dressings and conservative treatments that have proven insufficient to complete heal this patient.     Clinical Impression:Moderate Complexity    Follow-up: 1 week    DENIS Nguyen   [facility]  04/15/2024  1500

## 2024-04-26 ENCOUNTER — OFFICE VISIT (OUTPATIENT)
Dept: CARDIOLOGY | Facility: CLINIC | Age: 69
End: 2024-04-26
Payer: MEDICARE

## 2024-04-26 VITALS
BODY MASS INDEX: 27.11 KG/M2 | SYSTOLIC BLOOD PRESSURE: 159 MMHG | HEART RATE: 68 BPM | WEIGHT: 153 LBS | HEIGHT: 63 IN | DIASTOLIC BLOOD PRESSURE: 86 MMHG | OXYGEN SATURATION: 93 %

## 2024-04-26 DIAGNOSIS — I70.223 CRITICAL LIMB ISCHEMIA OF BOTH LOWER EXTREMITIES WITH REST PAIN: Primary | ICD-10-CM

## 2024-04-26 RX ORDER — DICYCLOMINE HCL 20 MG
1 TABLET ORAL 3 TIMES DAILY
COMMUNITY
Start: 2024-02-28

## 2024-04-26 RX ORDER — INSULIN GLARGINE 100 [IU]/ML
30 INJECTION, SOLUTION SUBCUTANEOUS
COMMUNITY

## 2024-04-26 RX ORDER — AMLODIPINE BESYLATE 5 MG/1
1 TABLET ORAL DAILY
COMMUNITY
Start: 2024-04-22

## 2024-04-26 RX ORDER — LIDOCAINE AND PRILOCAINE 25; 25 MG/G; MG/G
CREAM TOPICAL
COMMUNITY
Start: 2024-01-29

## 2024-04-26 RX ORDER — FUROSEMIDE 20 MG/1
1 TABLET ORAL DAILY
COMMUNITY
Start: 2024-04-16

## 2024-04-26 RX ORDER — SPIRONOLACTONE 25 MG/1
1 TABLET ORAL DAILY
COMMUNITY
Start: 2024-02-28

## 2024-04-26 RX ORDER — VALSARTAN 320 MG/1
1 TABLET ORAL DAILY
COMMUNITY
Start: 2024-04-16

## 2024-04-26 RX ORDER — PAROXETINE 10 MG/1
1 TABLET, FILM COATED ORAL DAILY
COMMUNITY
Start: 2024-04-03

## 2024-04-26 RX ORDER — POTASSIUM CHLORIDE 750 MG/1
CAPSULE, EXTENDED RELEASE ORAL
COMMUNITY
Start: 2024-04-16

## 2024-04-26 RX ORDER — TRAMADOL HYDROCHLORIDE 50 MG/1
TABLET ORAL
COMMUNITY

## 2024-04-26 NOTE — PROGRESS NOTES
[facility] CARDIOLOGY  [address] [facility] [place] [address]  Phone: [phone]  Fax: [phone]    04/26/2024    Chief Complaint   Patient presents with    Establish Care     Wound Care of left foot, F/U procedure in [place].  Pt denies CP,mild leg/ankle swelling.    Med Review     Tolerating all current medications.        History:   Monae Chauhan is a 68 y.o. female seen in consultation, referred by the wound care team.  She had stenosis of the left SFA and subsequently came in urgently to [place] to have her intervention.  We had a successful intervention of the left with shockwave and Rota Rosas therapy and drug-coated balloon angioplasty.  Also had additional Cinthia stenting in the distal left had small vessel and a week after developed swelling behind the left knee.  Has been doing much better still has open wounds on the left foot and some swelling.             Past Medical History:   Diagnosis Date    Arthritis     Coronary artery disease     Depression     Diabetes mellitus     Elevated cholesterol     Full dentures     GERD (gastroesophageal reflux disease)     Hyperlipidemia     Hypertension     Myocardial infarction     Peripheral vascular disease     Psoriasis        Past Surgical History:   Procedure Laterality Date    APPENDECTOMY      CARDIAC CATHETERIZATION N/A 01/09/2017    Procedure: Left Heart Cath;  Surgeon: David Diane MD;  Location: [facility] CATH INVASIVE LOCATION;  Service:     CORONARY STENT PLACEMENT      CYSTOSCOPY BOTOX INJECTION OF BLADDER N/A 10/20/2021    Procedure: Cystoscopy Botox injection of bladder;  Surgeon: Say Robles MD;  Location: [facility] OR;  Service: Urology;  Laterality: N/A;    FOOT SURGERY Left     GALLBLADDER SURGERY      LEG SURGERY Left     TUBAL ABDOMINAL LIGATION      VASCULAR SURGERY Left     Lower Extremity Vascular Stent-[facility]        ROS  As above otherwise negative    Past Social History:  Social History      Socioeconomic History    Marital status:     Number of children: 2   Tobacco Use    Smoking status: Every Day     Current packs/day: 1.00     Average packs/day: 1 pack/day for 42.8 years (42.8 ttl pk-yrs)     Types: Cigarettes     Start date: 6/25/1981    Smokeless tobacco: Never    Tobacco comments:     2-4 PPD off and on for the last 40 years   Vaping Use    Vaping status: Former    Substances: Nicotine, Flavoring    Devices: Refillable tank   Substance and Sexual Activity    Alcohol use: No    Drug use: No    Sexual activity: Defer       Past Family History:  Family History   Problem Relation Age of Onset    Breast cancer Maternal Grandmother 70    Cancer Mother     Diabetes Mother     Cancer Father     Diabetes Father        Current Outpatient Medications   Medication Sig Dispense Refill    albuterol (PROVENTIL) (2.5 MG/3ML) 0.083% nebulizer solution Take 2.5 mg by nebulization Every 4 (Four) Hours As Needed for Wheezing.      amLODIPine (NORVASC) 5 MG tablet Take 1 tablet by mouth Daily.      apixaban (Eliquis) 5 MG tablet tablet Take 1 tablet by mouth 2 (Two) Times a Day.      aspirin  MG tablet Take 1 tablet by mouth Every 6 (Six) Hours As Needed.      carvedilol (COREG) 3.125 MG tablet Take 1 tablet by mouth 2 (Two) Times a Day With Meals.      clopidogrel (PLAVIX) 75 MG tablet Take 1 tablet by mouth Daily. 30 tablet 11    dicyclomine (BENTYL) 20 MG tablet Take 1 tablet by mouth 3 times a day.      Dulaglutide (Trulicity) 0.75 MG/0.5ML solution pen-injector Inject  under the skin into the appropriate area as directed.      furosemide (LASIX) 20 MG tablet Take 1 tablet by mouth Daily.      gabapentin (NEURONTIN) 800 MG tablet 3 (Three) Times a Day.      HYDROcodone-acetaminophen (NORCO) 7.5-325 MG per tablet As Needed.      hydrOXYzine pamoate (VISTARIL) 25 MG capsule As Needed.      insulin aspart (novoLOG FLEXPEN) 100 UNIT/ML solution pen-injector sc pen Inject 100 Units under the skin into  the appropriate area as directed 3 (Three) Times a Day With Meals.      insulin glargine (LANTUS, SEMGLEE) 100 UNIT/ML injection Inject 30 Units under the skin into the appropriate area as directed.      lidocaine (LIDODERM) 5 % Place 1 patch on the skin as directed by provider Daily. Remove & Discard patch within 12 hours or as directed by MD 3 patch 3    lidocaine (XYLOCAINE) 2 % jelly Apply  topically to the appropriate area as directed Daily. 85 g 3    lidocaine-prilocaine (EMLA) 2.5-2.5 % cream       ondansetron ODT (ZOFRAN-ODT) 4 MG disintegrating tablet Place 1 tablet on the tongue Every 8 (Eight) Hours As Needed for Nausea or Vomiting for up to 15 doses. 15 tablet 0    PARoxetine (PAXIL) 10 MG tablet Take 1 tablet by mouth Daily.      potassium chloride (MICRO-K) 10 MEQ CR capsule TAKE ONE CAPSULE BY MOUTH EVERY DAY WITH FOOD WHEN TAKING FUROSEMIDE      rosuvastatin (CRESTOR) 20 MG tablet Take 1 tablet by mouth Daily.      spironolactone (ALDACTONE) 25 MG tablet Take 1 tablet by mouth Daily.      traMADol (ULTRAM) 50 MG tablet TAKE ONE TABLET BY MOUTH TWICE DAILY AS NEEDED FOR BREAKTHROUGH PAIN      valsartan (DIOVAN) 320 MG tablet Take 1 tablet by mouth Daily.      glimepiride (AMARYL) 4 MG tablet Take 1 tablet by mouth Every Night. (Patient not taking: Reported on 3/28/2024)      losartan (COZAAR) 50 MG tablet Take 1 tablet by mouth Daily. (Patient not taking: Reported on 4/26/2024)      metoprolol tartrate (LOPRESSOR) 25 MG tablet Take 0.5 tablets by mouth Every 12 (Twelve) Hours. 30 tablet 2     No current facility-administered medications for this visit.        Allergies   Allergen Reactions    Amoxicillin Rash    Penicillins Rash         Objective:  Vitals:    04/26/24 0954   BP: 159/86   Pulse: 68   SpO2: 93%       Physical Exam  1+ edema on the left foot weak pulses but foot is warm.  Ulceration in the left anterior shin and the dorsal surface of the left foot that is at least 4 x 2 cm in size but  not painful      DATA:  Results for orders placed during the hospital encounter of 01/08/17    SCANNED - TELEMETRY     Results for orders placed during the hospital encounter of 01/08/17    Adult Transthoracic Echo Complete    Interpretation Summary  · Left ventricular wall thickness is consistent with mild concentric hypertrophy.  · Left ventricular function is normal.  · Estimated EF appears to be in the range of 66 - 70%  · All left ventricular wall segments contract normally.  · Left ventricular diastolic dysfunction (grade I) consistent with impaired relaxation.  · All valves appear normal in structure and function.  · Pericardium: There is no evidence of pericardial effusion.   Results for orders placed during the hospital encounter of 08/20/20    Stress Test With Myocardial Perfusion (1 Day)    Interpretation Summary  · A pharmacological stress test was performed using regadenoson without low-level exercise.  · Findings consistent with a normal ECG stress test.  · Myocardial perfusion imaging indicates a normal myocardial perfusion study with no evidence of ischemia.  · Normal LV cavity size. Normal LV wall motion noted.  · Left ventricular ejection fraction is hyperdynamic (Calculated EF > 70%).  · Impressions are consistent with a low risk study.   Results for orders placed during the hospital encounter of 08/20/20    Stress Test With Myocardial Perfusion (1 Day)    Interpretation Summary  · A pharmacological stress test was performed using regadenoson without low-level exercise.  · Findings consistent with a normal ECG stress test.  · Myocardial perfusion imaging indicates a normal myocardial perfusion study with no evidence of ischemia.  · Normal LV cavity size. Normal LV wall motion noted.  · Left ventricular ejection fraction is hyperdynamic (Calculated EF > 70%).  · Impressions are consistent with a low risk study.   Results for orders placed during the hospital encounter of 01/08/17    Cardiac  Catheterization/Vascular Study    Narrative  · Right dominant coronary artery system with 2 vessel disease involving the obtuse marginal branch and the posterior descending artery  · Successful angioplasty and stenting of the obtuse marginal branch.    Final impression:  Right dominant coronary artery system with 2 vessel disease involving the obtuse marginal system and the posterior descending artery  Successful angioplasty and stenting of the obtuse marginal branch    Procedures performed:  Coronary angiography  Angioplasty and stenting of the obtuse marginal branch    History of present illness:  Monae is a pleasant 61-year-old woman who presents with a history of peripheral vascular disease, hypertension, diabetes mellitus, tobacco consumption and peripheral vascular disease with a non-ST elevation myocardial infarction.  Based on her presentation she was referred for coronary angiography.  Prior to the procedure the patient was given informed consent apprising her of the risk of heart attack, stroke and death.  The patient understood these risks and agreed to proceed.    Procedure in detail:  The patient was brought to the cardiac catheterization laboratory and prepped and draped in the usual sterile fashion.  Adequate anesthesia was obtained by infiltrating the right groin with local lidocaine.  Using a modified similar technique a 4 Peruvian sheath was placed in the right femoral artery.  A 4 Peruvian JL4 catheter was used to engage the ostium of the left main coronary artery and angiography was performed in varying views using hand injection of contrast material.  After this a 4 Peruvian JR4 catheter was used to engage the ostium of the right coronary artery and angiography was again performed in varying views using hand injection of contrast material.  At this point review of her angiography revealed severe disease involving a relatively large obtuse marginal branch that supplied the lateral aspect of the  left ventricle and another severe lesion in the proximal portion of the posterior descending artery however, there was also significant disease just distal to this.  Overall was felt that most likely the culprit lesion was in the obtuse marginal branch and that this had a significant vascular distribution.  It was felt that the posterior descending artery lesion could likely be treated medically.  As such the decision was made to proceed with ad hoc intervention.  The patient was given adequate heparinization to achieve an ACT greater than 200.  She had been given 600 mg of Plavix earlier in the day.  A 5 Belgian sheath was exchanged for the 4 Belgian sheath and a 5 Belgian XB 3.5 guiding catheter was used to engage the ostium of the left main coronary artery.  A BMW was placed atraumatically in the distal portion of the first obtuse marginal branch.  The lesion was predilated with a 2.0 mm balloon however the stent with subsequently not pass.  As such a 2.5 mm noncompliant balloon was then used to dilate the most critical portion of the lesion.  After this a 2.25 x 23 mm Alpine drug-eluting stent was deployed at 12 raj.  The balloon was reinflated to 12 raj.  The stent was postdilated with a 2.5 mm noncompliant balloon.  After this orthogonal angiography revealed an excellent result without evidence of dissection and TIANA-3 flow the distal vascular bed.  The guidewire was withdrawn and orthogonal angiography confirmed the above-noted results.  The patient was returned to the floor without evidence of complication.    Findings in detail:  Left Main coronary artery:  The left main coronary artery is a large vessel which trifurcates into the LAD and ramus intermedius branch and the circumflex artery.  The left main coronary artery is free of atherosclerotic disease.    Left anterior descending artery:  Left anterior descending artery is a large vessel which is noted to have significant calcification in his proximal and  midsegment.  It gives rise to 3 small to moderate size diagonal branches.  Just distal to the second diagonal branch there is a hazy area of approximately 50% stenosis however on orthogonal views this does not appear to be hemodynamically significant.    Circumflex artery:  The circumflex artery is a large vessel which gives rise to one large branching obtuse marginal vessel and then 2 smaller posterior lateral branches.  The first obtuse marginal branch is noted to have a diffuse 80% lesion in its proximal segment.  This is felt to be the culprit lesion.  Postintervention this is reduced to 0% residual stenosis without evidence of dissection and TIANA-3 flow in the distal vascular bed.  There is TIANA-3 flow preintervention.  The lesion length is approximately 23 mm.  The vessel diameter is approximately 2.5 mm    Right coronary artery:  The right coronary artery is large vessel which gives rise to the posterior descending artery and several posterior lateral branches.  There is a 70% lesion noted in the proximal portion of the PDA.  There is a subsequent 50% lesion noted in the distal portion of the PDA.    Final impression and plan:  Overall it is my impression at [facility] is undergone successful percutaneous revascularization of the obtuse marginal system she will continue risk factor modification as appropriate.   Results for orders placed during the hospital encounter of 01/26/24    US Ankle / Brachial Indices Extremity Limited    Narrative  PROCEDURE: US ANKLE / BRACHIAL INDICES EXTREMITY LIMITED-    HISTORY: wound swelling; I73.9-Peripheral vascular disease, unspecified    PROCEDURE: Brachial and ankle pressures were obtained bilaterally. JUNIOR's  were calculated.    FINDINGS: The right JUNIOR measures 0.87.    The left JUNIOR measures 0.85.    Impression  Mildly reduced ankle-brachial indices.        This report was finalized on 1/26/2024 10:13 AM by Gloria Hanson M.D..      Procedures     Lab Results  "  Component Value Date    CHOL 251 (H) 01/08/2017     Lab Results   Component Value Date    TRIG 475 (H) 01/08/2017     Lab Results   Component Value Date    HDL 22 (L) 01/08/2017     Lab Results   Component Value Date    LDL  01/08/2017      Comment:      Unable to calculate       No results found for: \"TSH\"          Invalid input(s): \"LABALBU\", \"PROT\"            Assessment and Plan:    Monae Chauhan  reports that she has been smoking cigarettes. She started smoking about 42 years ago. She has a 42.8 pack-year smoking history. She has never used smokeless tobacco.  She is going to continue to work on cutting smoking.  I am concerned that she may also has have venous disease given the nature of the ulcers and ongoing swelling.  Arterial flow has been reestablished.  We will plan to bring her back to assess for iliac vein compression syndrome  Thank you for allowing me to participate in the care of Monae Chauhan. Feel free to contact me directly with any further questions or concerns.            Jaiden Doyle MD, FACC, [facility]  Interventional Cardiology     "

## 2024-04-26 NOTE — H&P (VIEW-ONLY)
[facility] CARDIOLOGY  [address] [facility] [place] [address]  Phone: [phone]  Fax: [phone]    04/26/2024    Chief Complaint   Patient presents with    Establish Care     Wound Care of left foot, F/U procedure in [place].  Pt denies CP,mild leg/ankle swelling.    Med Review     Tolerating all current medications.        History:   Monae Chauhan is a 68 y.o. female seen in consultation, referred by the wound care team.  She had stenosis of the left SFA and subsequently came in urgently to [place] to have her intervention.  We had a successful intervention of the left with shockwave and Rota Rosas therapy and drug-coated balloon angioplasty.  Also had additional Cinthia stenting in the distal left had small vessel and a week after developed swelling behind the left knee.  Has been doing much better still has open wounds on the left foot and some swelling.             Past Medical History:   Diagnosis Date    Arthritis     Coronary artery disease     Depression     Diabetes mellitus     Elevated cholesterol     Full dentures     GERD (gastroesophageal reflux disease)     Hyperlipidemia     Hypertension     Myocardial infarction     Peripheral vascular disease     Psoriasis        Past Surgical History:   Procedure Laterality Date    APPENDECTOMY      CARDIAC CATHETERIZATION N/A 01/09/2017    Procedure: Left Heart Cath;  Surgeon: David Diane MD;  Location: [facility] CATH INVASIVE LOCATION;  Service:     CORONARY STENT PLACEMENT      CYSTOSCOPY BOTOX INJECTION OF BLADDER N/A 10/20/2021    Procedure: Cystoscopy Botox injection of bladder;  Surgeon: Say Robles MD;  Location: [facility] OR;  Service: Urology;  Laterality: N/A;    FOOT SURGERY Left     GALLBLADDER SURGERY      LEG SURGERY Left     TUBAL ABDOMINAL LIGATION      VASCULAR SURGERY Left     Lower Extremity Vascular Stent-[facility]        ROS  As above otherwise negative    Past Social History:  Social History      Socioeconomic History    Marital status:     Number of children: 2   Tobacco Use    Smoking status: Every Day     Current packs/day: 1.00     Average packs/day: 1 pack/day for 42.8 years (42.8 ttl pk-yrs)     Types: Cigarettes     Start date: 6/25/1981    Smokeless tobacco: Never    Tobacco comments:     2-4 PPD off and on for the last 40 years   Vaping Use    Vaping status: Former    Substances: Nicotine, Flavoring    Devices: Refillable tank   Substance and Sexual Activity    Alcohol use: No    Drug use: No    Sexual activity: Defer       Past Family History:  Family History   Problem Relation Age of Onset    Breast cancer Maternal Grandmother 70    Cancer Mother     Diabetes Mother     Cancer Father     Diabetes Father        Current Outpatient Medications   Medication Sig Dispense Refill    albuterol (PROVENTIL) (2.5 MG/3ML) 0.083% nebulizer solution Take 2.5 mg by nebulization Every 4 (Four) Hours As Needed for Wheezing.      amLODIPine (NORVASC) 5 MG tablet Take 1 tablet by mouth Daily.      apixaban (Eliquis) 5 MG tablet tablet Take 1 tablet by mouth 2 (Two) Times a Day.      aspirin  MG tablet Take 1 tablet by mouth Every 6 (Six) Hours As Needed.      carvedilol (COREG) 3.125 MG tablet Take 1 tablet by mouth 2 (Two) Times a Day With Meals.      clopidogrel (PLAVIX) 75 MG tablet Take 1 tablet by mouth Daily. 30 tablet 11    dicyclomine (BENTYL) 20 MG tablet Take 1 tablet by mouth 3 times a day.      Dulaglutide (Trulicity) 0.75 MG/0.5ML solution pen-injector Inject  under the skin into the appropriate area as directed.      furosemide (LASIX) 20 MG tablet Take 1 tablet by mouth Daily.      gabapentin (NEURONTIN) 800 MG tablet 3 (Three) Times a Day.      HYDROcodone-acetaminophen (NORCO) 7.5-325 MG per tablet As Needed.      hydrOXYzine pamoate (VISTARIL) 25 MG capsule As Needed.      insulin aspart (novoLOG FLEXPEN) 100 UNIT/ML solution pen-injector sc pen Inject 100 Units under the skin into  the appropriate area as directed 3 (Three) Times a Day With Meals.      insulin glargine (LANTUS, SEMGLEE) 100 UNIT/ML injection Inject 30 Units under the skin into the appropriate area as directed.      lidocaine (LIDODERM) 5 % Place 1 patch on the skin as directed by provider Daily. Remove & Discard patch within 12 hours or as directed by MD 3 patch 3    lidocaine (XYLOCAINE) 2 % jelly Apply  topically to the appropriate area as directed Daily. 85 g 3    lidocaine-prilocaine (EMLA) 2.5-2.5 % cream       ondansetron ODT (ZOFRAN-ODT) 4 MG disintegrating tablet Place 1 tablet on the tongue Every 8 (Eight) Hours As Needed for Nausea or Vomiting for up to 15 doses. 15 tablet 0    PARoxetine (PAXIL) 10 MG tablet Take 1 tablet by mouth Daily.      potassium chloride (MICRO-K) 10 MEQ CR capsule TAKE ONE CAPSULE BY MOUTH EVERY DAY WITH FOOD WHEN TAKING FUROSEMIDE      rosuvastatin (CRESTOR) 20 MG tablet Take 1 tablet by mouth Daily.      spironolactone (ALDACTONE) 25 MG tablet Take 1 tablet by mouth Daily.      traMADol (ULTRAM) 50 MG tablet TAKE ONE TABLET BY MOUTH TWICE DAILY AS NEEDED FOR BREAKTHROUGH PAIN      valsartan (DIOVAN) 320 MG tablet Take 1 tablet by mouth Daily.      glimepiride (AMARYL) 4 MG tablet Take 1 tablet by mouth Every Night. (Patient not taking: Reported on 3/28/2024)      losartan (COZAAR) 50 MG tablet Take 1 tablet by mouth Daily. (Patient not taking: Reported on 4/26/2024)      metoprolol tartrate (LOPRESSOR) 25 MG tablet Take 0.5 tablets by mouth Every 12 (Twelve) Hours. 30 tablet 2     No current facility-administered medications for this visit.        Allergies   Allergen Reactions    Amoxicillin Rash    Penicillins Rash         Objective:  Vitals:    04/26/24 0954   BP: 159/86   Pulse: 68   SpO2: 93%       Physical Exam  1+ edema on the left foot weak pulses but foot is warm.  Ulceration in the left anterior shin and the dorsal surface of the left foot that is at least 4 x 2 cm in size but  not painful      DATA:  Results for orders placed during the hospital encounter of 01/08/17    SCANNED - TELEMETRY     Results for orders placed during the hospital encounter of 01/08/17    Adult Transthoracic Echo Complete    Interpretation Summary  · Left ventricular wall thickness is consistent with mild concentric hypertrophy.  · Left ventricular function is normal.  · Estimated EF appears to be in the range of 66 - 70%  · All left ventricular wall segments contract normally.  · Left ventricular diastolic dysfunction (grade I) consistent with impaired relaxation.  · All valves appear normal in structure and function.  · Pericardium: There is no evidence of pericardial effusion.   Results for orders placed during the hospital encounter of 08/20/20    Stress Test With Myocardial Perfusion (1 Day)    Interpretation Summary  · A pharmacological stress test was performed using regadenoson without low-level exercise.  · Findings consistent with a normal ECG stress test.  · Myocardial perfusion imaging indicates a normal myocardial perfusion study with no evidence of ischemia.  · Normal LV cavity size. Normal LV wall motion noted.  · Left ventricular ejection fraction is hyperdynamic (Calculated EF > 70%).  · Impressions are consistent with a low risk study.   Results for orders placed during the hospital encounter of 08/20/20    Stress Test With Myocardial Perfusion (1 Day)    Interpretation Summary  · A pharmacological stress test was performed using regadenoson without low-level exercise.  · Findings consistent with a normal ECG stress test.  · Myocardial perfusion imaging indicates a normal myocardial perfusion study with no evidence of ischemia.  · Normal LV cavity size. Normal LV wall motion noted.  · Left ventricular ejection fraction is hyperdynamic (Calculated EF > 70%).  · Impressions are consistent with a low risk study.   Results for orders placed during the hospital encounter of 01/08/17    Cardiac  Catheterization/Vascular Study    Narrative  · Right dominant coronary artery system with 2 vessel disease involving the obtuse marginal branch and the posterior descending artery  · Successful angioplasty and stenting of the obtuse marginal branch.    Final impression:  Right dominant coronary artery system with 2 vessel disease involving the obtuse marginal system and the posterior descending artery  Successful angioplasty and stenting of the obtuse marginal branch    Procedures performed:  Coronary angiography  Angioplasty and stenting of the obtuse marginal branch    History of present illness:  Monae is a pleasant 61-year-old woman who presents with a history of peripheral vascular disease, hypertension, diabetes mellitus, tobacco consumption and peripheral vascular disease with a non-ST elevation myocardial infarction.  Based on her presentation she was referred for coronary angiography.  Prior to the procedure the patient was given informed consent apprising her of the risk of heart attack, stroke and death.  The patient understood these risks and agreed to proceed.    Procedure in detail:  The patient was brought to the cardiac catheterization laboratory and prepped and draped in the usual sterile fashion.  Adequate anesthesia was obtained by infiltrating the right groin with local lidocaine.  Using a modified similar technique a 4 Georgian sheath was placed in the right femoral artery.  A 4 Georgian JL4 catheter was used to engage the ostium of the left main coronary artery and angiography was performed in varying views using hand injection of contrast material.  After this a 4 Georgian JR4 catheter was used to engage the ostium of the right coronary artery and angiography was again performed in varying views using hand injection of contrast material.  At this point review of her angiography revealed severe disease involving a relatively large obtuse marginal branch that supplied the lateral aspect of the  left ventricle and another severe lesion in the proximal portion of the posterior descending artery however, there was also significant disease just distal to this.  Overall was felt that most likely the culprit lesion was in the obtuse marginal branch and that this had a significant vascular distribution.  It was felt that the posterior descending artery lesion could likely be treated medically.  As such the decision was made to proceed with ad hoc intervention.  The patient was given adequate heparinization to achieve an ACT greater than 200.  She had been given 600 mg of Plavix earlier in the day.  A 5 Iranian sheath was exchanged for the 4 Iranian sheath and a 5 Iranian XB 3.5 guiding catheter was used to engage the ostium of the left main coronary artery.  A BMW was placed atraumatically in the distal portion of the first obtuse marginal branch.  The lesion was predilated with a 2.0 mm balloon however the stent with subsequently not pass.  As such a 2.5 mm noncompliant balloon was then used to dilate the most critical portion of the lesion.  After this a 2.25 x 23 mm Alpine drug-eluting stent was deployed at 12 raj.  The balloon was reinflated to 12 raj.  The stent was postdilated with a 2.5 mm noncompliant balloon.  After this orthogonal angiography revealed an excellent result without evidence of dissection and TIANA-3 flow the distal vascular bed.  The guidewire was withdrawn and orthogonal angiography confirmed the above-noted results.  The patient was returned to the floor without evidence of complication.    Findings in detail:  Left Main coronary artery:  The left main coronary artery is a large vessel which trifurcates into the LAD and ramus intermedius branch and the circumflex artery.  The left main coronary artery is free of atherosclerotic disease.    Left anterior descending artery:  Left anterior descending artery is a large vessel which is noted to have significant calcification in his proximal and  midsegment.  It gives rise to 3 small to moderate size diagonal branches.  Just distal to the second diagonal branch there is a hazy area of approximately 50% stenosis however on orthogonal views this does not appear to be hemodynamically significant.    Circumflex artery:  The circumflex artery is a large vessel which gives rise to one large branching obtuse marginal vessel and then 2 smaller posterior lateral branches.  The first obtuse marginal branch is noted to have a diffuse 80% lesion in its proximal segment.  This is felt to be the culprit lesion.  Postintervention this is reduced to 0% residual stenosis without evidence of dissection and TIANA-3 flow in the distal vascular bed.  There is TIANA-3 flow preintervention.  The lesion length is approximately 23 mm.  The vessel diameter is approximately 2.5 mm    Right coronary artery:  The right coronary artery is large vessel which gives rise to the posterior descending artery and several posterior lateral branches.  There is a 70% lesion noted in the proximal portion of the PDA.  There is a subsequent 50% lesion noted in the distal portion of the PDA.    Final impression and plan:  Overall it is my impression at [facility] is undergone successful percutaneous revascularization of the obtuse marginal system she will continue risk factor modification as appropriate.   Results for orders placed during the hospital encounter of 01/26/24    US Ankle / Brachial Indices Extremity Limited    Narrative  PROCEDURE: US ANKLE / BRACHIAL INDICES EXTREMITY LIMITED-    HISTORY: wound swelling; I73.9-Peripheral vascular disease, unspecified    PROCEDURE: Brachial and ankle pressures were obtained bilaterally. JUNIOR's  were calculated.    FINDINGS: The right JUNIOR measures 0.87.    The left JUNIOR measures 0.85.    Impression  Mildly reduced ankle-brachial indices.        This report was finalized on 1/26/2024 10:13 AM by Gloria Hanson M.D..      Procedures     Lab Results  "  Component Value Date    CHOL 251 (H) 01/08/2017     Lab Results   Component Value Date    TRIG 475 (H) 01/08/2017     Lab Results   Component Value Date    HDL 22 (L) 01/08/2017     Lab Results   Component Value Date    LDL  01/08/2017      Comment:      Unable to calculate       No results found for: \"TSH\"          Invalid input(s): \"LABALBU\", \"PROT\"            Assessment and Plan:    Monae Chauhan  reports that she has been smoking cigarettes. She started smoking about 42 years ago. She has a 42.8 pack-year smoking history. She has never used smokeless tobacco.  She is going to continue to work on cutting smoking.  I am concerned that she may also has have venous disease given the nature of the ulcers and ongoing swelling.  Arterial flow has been reestablished.  We will plan to bring her back to assess for iliac vein compression syndrome  Thank you for allowing me to participate in the care of Monae Chauhan. Feel free to contact me directly with any further questions or concerns.            Jaiden Doyle MD, FACC, [facility]  Interventional Cardiology     "

## 2024-05-01 PROBLEM — I70.223: Status: ACTIVE | Noted: 2024-04-26

## 2024-05-20 ENCOUNTER — HOSPITAL ENCOUNTER (OUTPATIENT)
Facility: HOSPITAL | Age: 69
Setting detail: HOSPITAL OUTPATIENT SURGERY
Discharge: HOME | End: 2024-05-20
Attending: INTERNAL MEDICINE | Admitting: INTERNAL MEDICINE
Payer: MEDICARE

## 2024-05-20 VITALS
RESPIRATION RATE: 24 BRPM | TEMPERATURE: 97.8 F | DIASTOLIC BLOOD PRESSURE: 99 MMHG | HEIGHT: 63 IN | WEIGHT: 165 LBS | OXYGEN SATURATION: 96 % | SYSTOLIC BLOOD PRESSURE: 193 MMHG | BODY MASS INDEX: 29.23 KG/M2 | HEART RATE: 83 BPM

## 2024-05-20 DIAGNOSIS — I70.223 CRITICAL LIMB ISCHEMIA OF BOTH LOWER EXTREMITIES WITH REST PAIN: ICD-10-CM

## 2024-05-20 LAB
ANION GAP SERPL CALCULATED.3IONS-SCNC: 6.3 MMOL/L (ref 5–15)
BUN SERPL-MCNC: 19 MG/DL (ref 8–23)
BUN/CREAT SERPL: 20.2 (ref 7–25)
CALCIUM SPEC-SCNC: 9.4 MG/DL (ref 8.6–10.5)
CHLORIDE SERPL-SCNC: 103 MMOL/L (ref 98–107)
CO2 SERPL-SCNC: 28.7 MMOL/L (ref 22–29)
CREAT SERPL-MCNC: 0.94 MG/DL (ref 0.57–1)
DEPRECATED RDW RBC AUTO: 58.5 FL (ref 37–54)
EGFRCR SERPLBLD CKD-EPI 2021: 66.2 ML/MIN/1.73
ERYTHROCYTE [DISTWIDTH] IN BLOOD BY AUTOMATED COUNT: 17.6 % (ref 12.3–15.4)
GLUCOSE SERPL-MCNC: 247 MG/DL (ref 65–99)
HCT VFR BLD AUTO: 37.8 % (ref 34–46.6)
HGB BLD-MCNC: 11.6 G/DL (ref 12–15.9)
MCH RBC QN AUTO: 27.9 PG (ref 26.6–33)
MCHC RBC AUTO-ENTMCNC: 30.7 G/DL (ref 31.5–35.7)
MCV RBC AUTO: 90.9 FL (ref 79–97)
PLATELET # BLD AUTO: 259 10*3/MM3 (ref 140–450)
PMV BLD AUTO: 9.4 FL (ref 6–12)
POTASSIUM SERPL-SCNC: 4.5 MMOL/L (ref 3.5–5.2)
RBC # BLD AUTO: 4.16 10*6/MM3 (ref 3.77–5.28)
SODIUM SERPL-SCNC: 138 MMOL/L (ref 136–145)
WBC NRBC COR # BLD AUTO: 6.82 10*3/MM3 (ref 3.4–10.8)

## 2024-05-20 PROCEDURE — C1894 INTRO/SHEATH, NON-LASER: HCPCS | Performed by: INTERNAL MEDICINE

## 2024-05-20 PROCEDURE — 25010000002 FENTANYL CITRATE (PF) 50 MCG/ML SOLUTION: Performed by: INTERNAL MEDICINE

## 2024-05-20 PROCEDURE — C1725 CATH, TRANSLUMIN NON-LASER: HCPCS | Performed by: INTERNAL MEDICINE

## 2024-05-20 PROCEDURE — 99152 MOD SED SAME PHYS/QHP 5/>YRS: CPT | Performed by: INTERNAL MEDICINE

## 2024-05-20 PROCEDURE — 85027 COMPLETE CBC AUTOMATED: CPT | Performed by: INTERNAL MEDICINE

## 2024-05-20 PROCEDURE — C1876 STENT, NON-COA/NON-COV W/DEL: HCPCS | Performed by: INTERNAL MEDICINE

## 2024-05-20 PROCEDURE — 99153 MOD SED SAME PHYS/QHP EA: CPT | Performed by: INTERNAL MEDICINE

## 2024-05-20 PROCEDURE — 25510000001 IOPAMIDOL 61 % SOLUTION: Performed by: INTERNAL MEDICINE

## 2024-05-20 PROCEDURE — 37252 INTRVASC US NONCORONARY 1ST: CPT

## 2024-05-20 PROCEDURE — 25010000002 HEPARIN (PORCINE) PER 1000 UNITS: Performed by: INTERNAL MEDICINE

## 2024-05-20 PROCEDURE — 25010000002 MIDAZOLAM PER 1 MG: Performed by: INTERNAL MEDICINE

## 2024-05-20 PROCEDURE — C1769 GUIDE WIRE: HCPCS | Performed by: INTERNAL MEDICINE

## 2024-05-20 PROCEDURE — C1753 CATH, INTRAVAS ULTRASOUND: HCPCS | Performed by: INTERNAL MEDICINE

## 2024-05-20 PROCEDURE — 25810000003 SODIUM CHLORIDE 0.9 % SOLUTION: Performed by: INTERNAL MEDICINE

## 2024-05-20 PROCEDURE — 75820 VEIN X-RAY ARM/LEG: CPT | Performed by: INTERNAL MEDICINE

## 2024-05-20 PROCEDURE — 80048 BASIC METABOLIC PNL TOTAL CA: CPT | Performed by: INTERNAL MEDICINE

## 2024-05-20 DEVICE — IMPLANTABLE DEVICE: Type: IMPLANTABLE DEVICE | Status: FUNCTIONAL

## 2024-05-20 RX ORDER — FENTANYL CITRATE 50 UG/ML
INJECTION, SOLUTION INTRAMUSCULAR; INTRAVENOUS
Status: DISCONTINUED | OUTPATIENT
Start: 2024-05-20 | End: 2024-05-20 | Stop reason: HOSPADM

## 2024-05-20 RX ORDER — MIDAZOLAM HYDROCHLORIDE 1 MG/ML
INJECTION INTRAMUSCULAR; INTRAVENOUS
Status: DISCONTINUED | OUTPATIENT
Start: 2024-05-20 | End: 2024-05-20 | Stop reason: HOSPADM

## 2024-05-20 RX ORDER — SODIUM CHLORIDE 9 MG/ML
INJECTION, SOLUTION INTRAVENOUS
Status: COMPLETED | OUTPATIENT
Start: 2024-05-20 | End: 2024-05-20

## 2024-05-20 RX ORDER — LIDOCAINE HYDROCHLORIDE 20 MG/ML
INJECTION, SOLUTION INFILTRATION; PERINEURAL
Status: DISCONTINUED | OUTPATIENT
Start: 2024-05-20 | End: 2024-05-20 | Stop reason: HOSPADM

## 2024-05-20 RX ORDER — HEPARIN SODIUM 1000 [USP'U]/ML
INJECTION, SOLUTION INTRAVENOUS; SUBCUTANEOUS
Status: DISCONTINUED | OUTPATIENT
Start: 2024-05-20 | End: 2024-05-20 | Stop reason: HOSPADM

## 2024-05-20 NOTE — Clinical Note
A 8 fr sheath was successfully inserted using micropuncture technique with ultrasound guidance into the left femoral vein.

## 2024-05-20 NOTE — DISCHARGE INSTR - ACTIVITY
No driving for 24 hours,.   No tub baths, hot tubs, lotions, powders, ointments in groin for 3-5 days or until site heals.   No heavy lifting, bending at the waist, vigorous activity, or climbing stairs for next couple days.        If site starts to bleed, you notice swelling or bruising at site, hold pressure and call 911 or have someone drive you to the nearest Emergency Department.

## 2024-05-20 NOTE — INTERVAL H&P NOTE
H&P reviewed. The patient was examined and there are no changes to the H&P.    ROS as above otherwise negative  Exam limited as this is a periperhal

## 2024-05-20 NOTE — NURSING NOTE
ACT result is 195.  Dr. Doyle notified.    MD order to pull venous sheath in 15-30 min.    Primary RN, Felecia Ying, notified.

## 2024-05-22 RX ORDER — NITROGLYCERIN 0.4 MG/1
0.4 TABLET SUBLINGUAL
Status: DISCONTINUED | OUTPATIENT
Start: 2024-05-22 | End: 2024-05-22 | Stop reason: HOSPADM

## 2024-05-22 RX ORDER — ACETAMINOPHEN 325 MG/1
650 TABLET ORAL EVERY 4 HOURS PRN
Status: DISCONTINUED | OUTPATIENT
Start: 2024-05-22 | End: 2024-05-22 | Stop reason: HOSPADM

## 2024-05-22 RX ORDER — SODIUM CHLORIDE 9 MG/ML
250 INJECTION, SOLUTION INTRAVENOUS ONCE AS NEEDED
Status: DISCONTINUED | OUTPATIENT
Start: 2024-05-22 | End: 2024-05-22 | Stop reason: HOSPADM

## 2024-06-25 ENCOUNTER — OFFICE VISIT (OUTPATIENT)
Dept: CARDIOLOGY | Facility: CLINIC | Age: 69
End: 2024-06-25
Payer: MEDICARE

## 2024-06-25 VITALS
OXYGEN SATURATION: 95 % | BODY MASS INDEX: 30.37 KG/M2 | WEIGHT: 171.4 LBS | HEART RATE: 71 BPM | DIASTOLIC BLOOD PRESSURE: 71 MMHG | HEIGHT: 63 IN | SYSTOLIC BLOOD PRESSURE: 115 MMHG

## 2024-06-25 DIAGNOSIS — I73.9 PAD (PERIPHERAL ARTERY DISEASE): Primary | ICD-10-CM

## 2024-06-25 PROCEDURE — 99214 OFFICE O/P EST MOD 30 MIN: CPT | Performed by: INTERNAL MEDICINE

## 2024-06-25 PROCEDURE — 3074F SYST BP LT 130 MM HG: CPT | Performed by: INTERNAL MEDICINE

## 2024-06-25 PROCEDURE — 3078F DIAST BP <80 MM HG: CPT | Performed by: INTERNAL MEDICINE

## 2024-06-25 RX ORDER — PROMETHAZINE HYDROCHLORIDE 25 MG/1
1 TABLET ORAL EVERY 6 HOURS
COMMUNITY
Start: 2024-05-16

## 2024-06-25 RX ORDER — TIRZEPATIDE 2.5 MG/.5ML
2.5 INJECTION, SOLUTION SUBCUTANEOUS WEEKLY
COMMUNITY
Start: 2024-06-12

## 2024-06-25 NOTE — PROGRESS NOTES
[facility] CARDIOLOGY  [address] [facility] [place] [address]  Phone: [phone]  Fax: [phone]    06/25/2024    Chief Complaint   Patient presents with    Follow-up     Pt denies CP, still has some numbness in the left leg/foot.    Med Management     Tolerating all current medications with no side effects.        History:   Monae Chauhan is a 68 y.o. female seen in consultation, previously had intervention in [place] by me in the left leg.  Previously had been to [place] 3 times with failed interventions at   Has been doing well and her wound has been healing and her ability to walk has improved.        Past Medical History:   Diagnosis Date    Arthritis     Coronary artery disease     Depression     Diabetes mellitus     Elevated cholesterol     Full dentures     GERD (gastroesophageal reflux disease)     Hyperlipidemia     Hypertension     Myocardial infarction     Peripheral vascular disease     Psoriasis        Past Surgical History:   Procedure Laterality Date    APPENDECTOMY      CARDIAC CATHETERIZATION N/A 01/09/2017    Procedure: Left Heart Cath;  Surgeon: David Diane MD;  Location: [facility] CATH INVASIVE LOCATION;  Service:     CARDIAC CATHETERIZATION Left 5/20/2024    Procedure: Peripheral angiography;  Surgeon: Jaiden Doyle MD;  Location: [facility] CATH INVASIVE LOCATION;  Service: Cardiovascular;  Laterality: Left;  Left Venogram -    CORONARY STENT PLACEMENT      CYSTOSCOPY BOTOX INJECTION OF BLADDER N/A 10/20/2021    Procedure: Cystoscopy Botox injection of bladder;  Surgeon: Say Robles MD;  Location: [facility] OR;  Service: Urology;  Laterality: N/A;    FOOT SURGERY Left     GALLBLADDER SURGERY      LEG SURGERY Left     TUBAL ABDOMINAL LIGATION      VASCULAR SURGERY Left     Lower Extremity Vascular Stent-[facility]        ROS  As above otherwise negative    Past Social History:  Social History     Socioeconomic History    Marital status:      Number of children: 2   Tobacco Use    Smoking status: Every Day     Current packs/day: 1.00     Average packs/day: 1 pack/day for 43.0 years (43.0 ttl pk-yrs)     Types: Cigarettes     Start date: 6/25/1981    Smokeless tobacco: Never    Tobacco comments:     2-4 PPD off and on for the last 40 years   Vaping Use    Vaping status: Former    Substances: Nicotine, Flavoring    Devices: Refillable tank   Substance and Sexual Activity    Alcohol use: No    Drug use: No    Sexual activity: Defer       Past Family History:  Family History   Problem Relation Age of Onset    Breast cancer Maternal Grandmother 70    Cancer Mother     Diabetes Mother     Cancer Father     Diabetes Father        Current Outpatient Medications   Medication Sig Dispense Refill    albuterol (PROVENTIL) (2.5 MG/3ML) 0.083% nebulizer solution Take 2.5 mg by nebulization Every 4 (Four) Hours As Needed for Wheezing.      amLODIPine (NORVASC) 5 MG tablet Take 1 tablet by mouth Daily.      apixaban (Eliquis) 5 MG tablet tablet Take 1 tablet by mouth 2 (Two) Times a Day.      aspirin  MG tablet Take 1 tablet by mouth Every 6 (Six) Hours As Needed.      carvedilol (COREG) 3.125 MG tablet Take 1 tablet by mouth 2 (Two) Times a Day With Meals.      clopidogrel (PLAVIX) 75 MG tablet Take 1 tablet by mouth Daily. 30 tablet 11    furosemide (LASIX) 20 MG tablet Take 1 tablet by mouth Daily.      gabapentin (NEURONTIN) 800 MG tablet 3 (Three) Times a Day.      HYDROcodone-acetaminophen (NORCO) 7.5-325 MG per tablet As Needed.      hydrOXYzine pamoate (VISTARIL) 25 MG capsule As Needed.      insulin aspart (novoLOG FLEXPEN) 100 UNIT/ML solution pen-injector sc pen Inject 100 Units under the skin into the appropriate area as directed 3 (Three) Times a Day With Meals. 8-10 units daily.      insulin glargine (LANTUS, SEMGLEE) 100 UNIT/ML injection Inject 30 Units under the skin into the appropriate area as directed.      lidocaine (LIDODERM) 5 % Place 1  patch on the skin as directed by provider Daily. Remove & Discard patch within 12 hours or as directed by MD 3 patch 3    lidocaine (XYLOCAINE) 2 % jelly Apply  topically to the appropriate area as directed Daily. 85 g 3    metoprolol tartrate (LOPRESSOR) 25 MG tablet Take 0.5 tablets by mouth Every 12 (Twelve) Hours. 30 tablet 2    Mounjaro 2.5 MG/0.5ML solution pen-injector pen Inject 0.5 mL under the skin into the appropriate area as directed 1 (One) Time Per Week.      ondansetron ODT (ZOFRAN-ODT) 4 MG disintegrating tablet Place 1 tablet on the tongue Every 8 (Eight) Hours As Needed for Nausea or Vomiting for up to 15 doses. 15 tablet 0    PARoxetine (PAXIL) 10 MG tablet Take 1 tablet by mouth Daily.      potassium chloride (MICRO-K) 10 MEQ CR capsule TAKE ONE CAPSULE BY MOUTH EVERY DAY WITH FOOD WHEN TAKING FUROSEMIDE      promethazine (PHENERGAN) 25 MG tablet Take 1 tablet by mouth Every 6 (Six) Hours.      rosuvastatin (CRESTOR) 20 MG tablet Take 1 tablet by mouth Daily.      spironolactone (ALDACTONE) 25 MG tablet Take 1 tablet by mouth Daily.      traMADol (ULTRAM) 50 MG tablet TAKE ONE TABLET BY MOUTH TWICE DAILY AS NEEDED FOR BREAKTHROUGH PAIN      valsartan (DIOVAN) 320 MG tablet Take 1 tablet by mouth Daily.      dicyclomine (BENTYL) 20 MG tablet Take 1 tablet by mouth 3 times a day. (Patient not taking: Reported on 6/25/2024)      Dulaglutide (Trulicity) 0.75 MG/0.5ML solution pen-injector Inject  under the skin into the appropriate area as directed. (Patient not taking: Reported on 6/25/2024)      glimepiride (AMARYL) 4 MG tablet Take 1 tablet by mouth Every Night. (Patient not taking: Reported on 3/28/2024)      lidocaine-prilocaine (EMLA) 2.5-2.5 % cream  (Patient not taking: Reported on 6/25/2024)      losartan (COZAAR) 50 MG tablet Take 1 tablet by mouth Daily. (Patient not taking: Reported on 4/26/2024)       No current facility-administered medications for this visit.        Allergies    Allergen Reactions    Amoxicillin Rash    Penicillins Rash         Objective:  Vitals:    06/25/24 1418   BP: 115/71   Pulse: 71   SpO2: 95%       Physical Exam  No acute distress pleasant cooperative alert and oriented foot feels warm there is a 3 x 2 cm ulceration that appears healed with no seepage or weeping    DATA:  Results for orders placed during the hospital encounter of 01/08/17    SCANNED - TELEMETRY     Results for orders placed during the hospital encounter of 01/08/17    Adult Transthoracic Echo Complete    Interpretation Summary  · Left ventricular wall thickness is consistent with mild concentric hypertrophy.  · Left ventricular function is normal.  · Estimated EF appears to be in the range of 66 - 70%  · All left ventricular wall segments contract normally.  · Left ventricular diastolic dysfunction (grade I) consistent with impaired relaxation.  · All valves appear normal in structure and function.  · Pericardium: There is no evidence of pericardial effusion.   Results for orders placed during the hospital encounter of 08/20/20    Stress Test With Myocardial Perfusion (1 Day)    Interpretation Summary  · A pharmacological stress test was performed using regadenoson without low-level exercise.  · Findings consistent with a normal ECG stress test.  · Myocardial perfusion imaging indicates a normal myocardial perfusion study with no evidence of ischemia.  · Normal LV cavity size. Normal LV wall motion noted.  · Left ventricular ejection fraction is hyperdynamic (Calculated EF > 70%).  · Impressions are consistent with a low risk study.   Results for orders placed during the hospital encounter of 08/20/20    Stress Test With Myocardial Perfusion (1 Day)    Interpretation Summary  · A pharmacological stress test was performed using regadenoson without low-level exercise.  · Findings consistent with a normal ECG stress test.  · Myocardial perfusion imaging indicates a normal myocardial perfusion study  with no evidence of ischemia.  · Normal LV cavity size. Normal LV wall motion noted.  · Left ventricular ejection fraction is hyperdynamic (Calculated EF > 70%).  · Impressions are consistent with a low risk study.   Results for orders placed during the hospital encounter of 05/20/24    Cardiac Catheterization/Vascular Study    Conclusion  PERIPHERAL ARTERIOGRAM / INTERVENTION REPORT    DATE OF PROCEDURE: 05/20/24    INDICATION FOR PROCEDURE:  Left Leg edema, CEAP Class 4-5  Known PAD s/p revascularlization    PROCEDURE PERFORMED:  Ultrasound-guided access to the left femoral vein  Left femoral venogram  Self-expanding stent placement in the left iliac vein with balloon venoplasty    PROCEDURE COMMENTS:    Patient was brought to the cath lab and placed on the cardiac catherization table.  Moderate conscious sedation was utilized for 50 minutes for duration procedure supervised administration fentanyl and Versed.  We inserted a 8 Australian sheath in the left femoral vein and did a venogram.  We subsequently inserted a 10 Australian sheath and then subsequently went up with a Glidewire advantage and performed IVUS using a Volcano system.  The vessel appears to be a 15 x 17 mm sized vessel in the external iliac vein.  There is significant compression at the level of the crossover of the arterial system that is seen on fluoroscopy with heavy calcification.  The area is less than 150 mm² in the iliac vein in the area of compression.  We decided to proceed to stenting and deployed a 18 x 120 mm BD Venova stent.  We used an 18 x 40 balloon to post dilate.  She did have some back pain and was subsequently pulled out and sutured the sheath in place.  We did use heparin for anticoagulation.      Findings:  Severe compression seen at the left iliac vein status post successful self-expanding stent placement          CONCLUSION:  Successful venous stenting with self-expanding stent secondary to iliac vein compression syndrome      EBL:  "20 ML  No specimens were removed.      Jaiden Doyle MD   Results for orders placed during the hospital encounter of 01/26/24    US Ankle / Brachial Indices Extremity Limited    Narrative  PROCEDURE: US ANKLE / BRACHIAL INDICES EXTREMITY LIMITED-    HISTORY: wound swelling; I73.9-Peripheral vascular disease, unspecified    PROCEDURE: Brachial and ankle pressures were obtained bilaterally. JUNIOR's  were calculated.    FINDINGS: The right JUNIOR measures 0.87.    The left JUNIOR measures 0.85.    Impression  Mildly reduced ankle-brachial indices.        This report was finalized on 1/26/2024 10:13 AM by Gloria Hanson M.D..      Procedures     Lab Results   Component Value Date    CHOL 251 (H) 01/08/2017     Lab Results   Component Value Date    TRIG 475 (H) 01/08/2017     Lab Results   Component Value Date    HDL 22 (L) 01/08/2017     Lab Results   Component Value Date    LDL  01/08/2017      Comment:      Unable to calculate       No results found for: \"TSH\"          Invalid input(s): \"LABALBU\", \"PROT\"            Assessment and Plan:    Monae Chauhan  reports that she has been smoking cigarettes. She started smoking about 43 years ago. She has a 43 pack-year smoking history. She has never used smokeless tobacco.  Smoking cessation was strongly emphasized.  Wound has been healing.  Continue exercise therapy and physical therapy    Thank you for allowing me to participate in the care of Monae Chauhan. Feel free to contact me directly with any further questions or concerns.            Jaiden Doyle MD, FACC, [facility]  Interventional Cardiology     "

## 2025-01-02 ENCOUNTER — OFFICE VISIT (OUTPATIENT)
Dept: CARDIOLOGY | Facility: CLINIC | Age: 70
End: 2025-01-02
Payer: MEDICARE

## 2025-01-02 VITALS
BODY MASS INDEX: 30.3 KG/M2 | SYSTOLIC BLOOD PRESSURE: 122 MMHG | HEIGHT: 63 IN | WEIGHT: 171 LBS | DIASTOLIC BLOOD PRESSURE: 78 MMHG | HEART RATE: 58 BPM

## 2025-01-02 DIAGNOSIS — I70.223 ATHEROSCLEROSIS OF NATIVE ARTERIES OF EXTREMITIES WITH REST PAIN, BILATERAL LEGS: Primary | ICD-10-CM

## 2025-01-02 PROCEDURE — 3078F DIAST BP <80 MM HG: CPT | Performed by: INTERNAL MEDICINE

## 2025-01-02 PROCEDURE — 99214 OFFICE O/P EST MOD 30 MIN: CPT | Performed by: INTERNAL MEDICINE

## 2025-01-02 PROCEDURE — 3074F SYST BP LT 130 MM HG: CPT | Performed by: INTERNAL MEDICINE

## 2025-01-02 NOTE — PROGRESS NOTES
[facility] CARDIOLOGY  [address] [place] [address]  Phone: [phone]  Fax: [phone]    01/02/2025    Chief Complaint   Patient presents with    Follow-up     Pain and swelling feet and legs         History:   Monae Chauhan is a 69 y.o. female who is well-known to me.  She has had a previous intervention on the left leg by me in [place].  She subsequently had a venous intervention here in the left leg as she had compression and ongoing edema.  She is having worsening pain on the right side and recurrent pain on the left as well.  She is wheelchair-bound because of the pain bilaterally in the legs no open ulcers.     Past Medical History:   Diagnosis Date    Arthritis     Coronary artery disease     Depression     Diabetes mellitus     Elevated cholesterol     Full dentures     GERD (gastroesophageal reflux disease)     Hyperlipidemia     Hypertension     Myocardial infarction     Peripheral vascular disease     Psoriasis        Past Surgical History:   Procedure Laterality Date    APPENDECTOMY      CARDIAC CATHETERIZATION N/A 01/09/2017    Procedure: Left Heart Cath;  Surgeon: David Diane MD;  Location: [facility] INVASIVE LOCATION;  Service:     CARDIAC CATHETERIZATION Left 5/20/2024    Procedure: Peripheral angiography;  Surgeon: Jaiden Doyle MD;  Location: [facility] INVASIVE LOCATION;  Service: Cardiovascular;  Laterality: Left;  Left Venogram -    CORONARY STENT PLACEMENT      CYSTOSCOPY BOTOX INJECTION OF BLADDER N/A 10/20/2021    Procedure: Cystoscopy Botox injection of bladder;  Surgeon: Say Robles MD;  [facility];  Service: Urology;  Laterality: N/A;    FOOT SURGERY Left     GALLBLADDER SURGERY      LEG SURGERY Left     TUBAL ABDOMINAL LIGATION      VASCULAR SURGERY Left     Lower Extremity [facility] Stent[facility]        Past Social History:  Social History     Socioeconomic History    Marital status:     Number of  children: 2   Tobacco Use    Smoking status: Every Day     Current packs/day: 1.00     Average packs/day: 1 pack/day for 43.5 years (43.5 ttl pk-yrs)     Types: Cigarettes     Start date: 6/25/1981    Smokeless tobacco: Never    Tobacco comments:     2-4 PPD off and on for the last 40 years   Vaping Use    Vaping status: Former    Substances: Nicotine, Flavoring    Devices: Refillable tank   Substance and Sexual Activity    Alcohol use: No    Drug use: No    Sexual activity: Defer       Past Family History:  Family History   Problem Relation Age of Onset    Breast cancer Maternal Grandmother 70    Cancer Mother     Diabetes Mother     Cancer Father     Diabetes Father        Review of Systems:   ROS  As above otherwise negative    Current Outpatient Medications   Medication Sig Dispense Refill    albuterol (PROVENTIL) (2.5 MG/3ML) 0.083% nebulizer solution Take 2.5 mg by nebulization Every 4 (Four) Hours As Needed for Wheezing.      amLODIPine (NORVASC) 5 MG tablet Take 1 tablet by mouth Daily.      apixaban (Eliquis) 5 MG tablet tablet Take 1 tablet by mouth 2 (Two) Times a Day.      aspirin  MG tablet Take 1 tablet by mouth Every 6 (Six) Hours As Needed.      carvedilol (COREG) 3.125 MG tablet Take 1 tablet by mouth 2 (Two) Times a Day With Meals.      clopidogrel (PLAVIX) 75 MG tablet Take 1 tablet by mouth Daily. 30 tablet 11    furosemide (LASIX) 20 MG tablet Take 1 tablet by mouth Daily.      gabapentin (NEURONTIN) 800 MG tablet 3 (Three) Times a Day.      HYDROcodone-acetaminophen (NORCO) 7.5-325 MG per tablet As Needed.      hydrOXYzine pamoate (VISTARIL) 25 MG capsule As Needed.      insulin aspart (novoLOG FLEXPEN) 100 UNIT/ML solution pen-injector sc pen Inject 100 Units under the skin into the appropriate area as directed 3 (Three) Times a Day With Meals. 8-10 units daily.      insulin glargine (LANTUS, SEMGLEE) 100 UNIT/ML injection Inject 30 Units under the skin into the appropriate area as  directed.      lidocaine (LIDODERM) 5 % Place 1 patch on the skin as directed by provider Daily. Remove & Discard patch within 12 hours or as directed by MD 3 patch 3    lidocaine (XYLOCAINE) 2 % jelly Apply  topically to the appropriate area as directed Daily. 85 g 3    metoprolol tartrate (LOPRESSOR) 25 MG tablet Take 0.5 tablets by mouth Every 12 (Twelve) Hours. 30 tablet 2    Mounjaro 2.5 MG/0.5ML solution pen-injector pen Inject 0.5 mL under the skin into the appropriate area as directed 1 (One) Time Per Week.      ondansetron ODT (ZOFRAN-ODT) 4 MG disintegrating tablet Place 1 tablet on the tongue Every 8 (Eight) Hours As Needed for Nausea or Vomiting for up to 15 doses. 15 tablet 0    PARoxetine (PAXIL) 10 MG tablet Take 1 tablet by mouth Daily.      potassium chloride (MICRO-K) 10 MEQ CR capsule TAKE ONE CAPSULE BY MOUTH EVERY DAY WITH FOOD WHEN TAKING FUROSEMIDE      promethazine (PHENERGAN) 25 MG tablet Take 1 tablet by mouth Every 6 (Six) Hours.      rosuvastatin (CRESTOR) 20 MG tablet Take 1 tablet by mouth Daily.      spironolactone (ALDACTONE) 25 MG tablet Take 1 tablet by mouth Daily.      traMADol (ULTRAM) 50 MG tablet TAKE ONE TABLET BY MOUTH TWICE DAILY AS NEEDED FOR BREAKTHROUGH PAIN      valsartan (DIOVAN) 320 MG tablet Take 1 tablet by mouth Daily.      dicyclomine (BENTYL) 20 MG tablet Take 1 tablet by mouth 3 times a day. (Patient not taking: Reported on 1/2/2025)      Dulaglutide (Trulicity) 0.75 MG/0.5ML solution pen-injector Inject  under the skin into the appropriate area as directed. (Patient not taking: Reported on 1/2/2025)      glimepiride (AMARYL) 4 MG tablet Take 1 tablet by mouth Every Night. (Patient not taking: Reported on 3/28/2024)      lidocaine-prilocaine (EMLA) 2.5-2.5 % cream  (Patient not taking: Reported on 1/2/2025)      losartan (COZAAR) 50 MG tablet Take 1 tablet by mouth Daily. (Patient not taking: Reported on 1/2/2025)       No current facility-administered  "medications for this visit.        Allergies   Allergen Reactions    Amoxicillin Rash    Penicillins Rash       Objective     /78 (BP Location: Left arm, Patient Position: Sitting, Cuff Size: Adult)   Pulse 58   Ht 160 cm (63\")   Wt 77.6 kg (171 lb)   BMI 30.29 kg/m²     Physical Exam  Reduced pulse in the lower extremity but feet feel warm edema worse in the left compared to the right.  No rash and no ulceration she is pleasant but however seems stressed    DATA:  Results for orders placed during the hospital encounter of 01/08/17    SCANNED - TELEMETRY     Results for orders placed during the hospital encounter of 01/08/17    Adult Transthoracic Echo Complete    Interpretation Summary  · Left ventricular wall thickness is consistent with mild concentric hypertrophy.  · Left ventricular function is normal.  · Estimated EF appears to be in the range of 66 - 70%  · All left ventricular wall segments contract normally.  · Left ventricular diastolic dysfunction (grade I) consistent with impaired relaxation.  · All valves appear normal in structure and function.  · Pericardium: There is no evidence of pericardial effusion.   Results for orders placed during the hospital encounter of 08/20/20    Stress Test With Myocardial Perfusion (1 Day)    Interpretation Summary  · A pharmacological stress test was performed using regadenoson without low-level exercise.  · Findings consistent with a normal ECG stress test.  · Myocardial perfusion imaging indicates a normal myocardial perfusion study with no evidence of ischemia.  · Normal LV cavity size. Normal LV wall motion noted.  · Left ventricular ejection fraction is hyperdynamic (Calculated EF > 70%).  · Impressions are consistent with a low risk study.   Results for orders placed during the hospital encounter of 08/20/20    Stress Test With Myocardial Perfusion (1 Day)    Interpretation Summary  · A pharmacological stress test was performed using regadenoson without " low-level exercise.  · Findings consistent with a normal ECG stress test.  · Myocardial perfusion imaging indicates a normal myocardial perfusion study with no evidence of ischemia.  · Normal LV cavity size. Normal LV wall motion noted.  · Left ventricular ejection fraction is hyperdynamic (Calculated EF > 70%).  · Impressions are consistent with a low risk study.   Results for orders placed during the hospital encounter of 05/20/24    Cardiac Catheterization/Vascular Study    Conclusion  PERIPHERAL ARTERIOGRAM / INTERVENTION REPORT    DATE OF PROCEDURE: 05/20/24    INDICATION FOR PROCEDURE:  Left Leg edema, CEAP Class 4-5  Known PAD s/p revascularlization    PROCEDURE PERFORMED:  Ultrasound-guided access to the left femoral vein  Left femoral venogram  Self-expanding stent placement in the left iliac vein with balloon venoplasty    PROCEDURE COMMENTS:    Patient was brought to the cath lab and placed on the cardiac catherization table.  Moderate conscious sedation was utilized for 50 minutes for duration procedure supervised administration fentanyl and Versed.  We inserted a 8 Dutch sheath in the left femoral vein and did a venogram.  We subsequently inserted a 10 Dutch sheath and then subsequently went up with a Glidewire advantage and performed IVUS using a Volcano system.  The vessel appears to be a 15 x 17 mm sized vessel in the external iliac vein.  There is significant compression at the level of the crossover of the arterial system that is seen on fluoroscopy with heavy calcification.  The area is less than 150 mm² in the iliac vein in the area of compression.  We decided to proceed to stenting and deployed a 18 x 120 mm BD Venova stent.  We used an 18 x 40 balloon to post dilate.  She did have some back pain and was subsequently pulled out and sutured the sheath in place.  We did use heparin for anticoagulation.      Findings:  Severe compression seen at the left iliac vein status post successful  "self-expanding stent placement          CONCLUSION:  Successful venous stenting with self-expanding stent secondary to iliac vein compression syndrome      EBL: 20 ML  No specimens were removed.      Jaiden Doyle MD    Procedures     Lab Results   Component Value Date    CHOL 251 (H) 01/08/2017     Lab Results   Component Value Date    TRIG 475 (H) 01/08/2017     Lab Results   Component Value Date    HDL 22 (L) 01/08/2017     Lab Results   Component Value Date    LDL  01/08/2017      Comment:      Unable to calculate       No results found for: \"TSH\"            Invalid input(s): \"LABALBU\", \"PROT\"        Assessment and Plan  Critical Limb ischemia.  Will plan to get a CTA abdomen with runoff and subsequently she will be scheduled for a peripheral intervention on the right side.  Smoking cessation was strongly emphasized        Recommended increase activity to 30 minutes of walking daily, most days of the week    Thank you for allowing me to participate in the care of Monae Chauhan. Feel free to contact me directly with any further questions or concerns.          Jaiden Doyle MD, FACC, [facility]  Interventional Cardiology     "

## 2025-01-10 ENCOUNTER — HOSPITAL ENCOUNTER (OUTPATIENT)
Dept: CT IMAGING | Facility: HOSPITAL | Age: 70
Discharge: HOME | End: 2025-01-10
Payer: MEDICARE

## 2025-01-10 DIAGNOSIS — I70.223 ATHEROSCLEROSIS OF NATIVE ARTERIES OF EXTREMITIES WITH REST PAIN, BILATERAL LEGS: ICD-10-CM

## 2025-01-10 LAB — CREAT BLDA-MCNC: 1.2 MG/DL (ref 0.6–1.3)

## 2025-01-10 PROCEDURE — 75635 CT ANGIO ABDOMINAL ARTERIES: CPT

## 2025-01-10 PROCEDURE — 25510000001 IOPAMIDOL PER 1 ML: Performed by: INTERNAL MEDICINE

## 2025-01-10 PROCEDURE — 82565 ASSAY OF CREATININE: CPT

## 2025-01-10 RX ORDER — IOPAMIDOL 755 MG/ML
100 INJECTION, SOLUTION INTRAVASCULAR
Status: COMPLETED | OUTPATIENT
Start: 2025-01-10 | End: 2025-01-10

## 2025-01-10 RX ADMIN — IOPAMIDOL 120 ML: 755 INJECTION, SOLUTION INTRAVENOUS at 11:10

## 2025-01-27 ENCOUNTER — OFFICE VISIT (OUTPATIENT)
Dept: CARDIOLOGY | Facility: CLINIC | Age: 70
End: 2025-01-27
Payer: MEDICARE

## 2025-01-27 VITALS
HEART RATE: 58 BPM | OXYGEN SATURATION: 93 % | HEIGHT: 63 IN | BODY MASS INDEX: 31.89 KG/M2 | DIASTOLIC BLOOD PRESSURE: 83 MMHG | SYSTOLIC BLOOD PRESSURE: 144 MMHG | WEIGHT: 180 LBS

## 2025-01-27 DIAGNOSIS — I70.223 CRITICAL LIMB ISCHEMIA OF BOTH LOWER EXTREMITIES WITH REST PAIN: ICD-10-CM

## 2025-01-27 DIAGNOSIS — I25.10 CORONARY ARTERY DISEASE INVOLVING NATIVE CORONARY ARTERY OF NATIVE HEART WITHOUT ANGINA PECTORIS: ICD-10-CM

## 2025-01-27 DIAGNOSIS — I10 ESSENTIAL HYPERTENSION: ICD-10-CM

## 2025-01-27 DIAGNOSIS — E78.2 MIXED HYPERLIPIDEMIA: ICD-10-CM

## 2025-01-27 DIAGNOSIS — I73.9 PAD (PERIPHERAL ARTERY DISEASE): Primary | ICD-10-CM

## 2025-01-27 PROBLEM — Z72.0 TOBACCO USE: Status: ACTIVE | Noted: 2025-01-27

## 2025-01-27 PROCEDURE — 3077F SYST BP >= 140 MM HG: CPT | Performed by: NURSE PRACTITIONER

## 2025-01-27 PROCEDURE — 1159F MED LIST DOCD IN RCRD: CPT | Performed by: NURSE PRACTITIONER

## 2025-01-27 PROCEDURE — 99214 OFFICE O/P EST MOD 30 MIN: CPT | Performed by: NURSE PRACTITIONER

## 2025-01-27 PROCEDURE — 3079F DIAST BP 80-89 MM HG: CPT | Performed by: NURSE PRACTITIONER

## 2025-01-27 PROCEDURE — 1160F RVW MEDS BY RX/DR IN RCRD: CPT | Performed by: NURSE PRACTITIONER

## 2025-01-27 NOTE — H&P (VIEW-ONLY)
Chief Complaint  Follow-up (Denies CP, Complains of mild/moderate leg/ankle swelling, constant pain in feet/legs. CT results.) and Med Management (Tolerating all current medications.)    Subjective          Monae Chauhan presents to [facility] for follow up.    History of Present Illness  History of Present Illness  The patient is a 69-year-old female who follows in the clinic with coronary artery disease, peripheral artery disease, hypertension, and dyslipidemia. She called the office on Friday and stated that she was not able to walk due to excessive pain in her legs bilaterally.      CT angio of the abdominal aorta with bilateral runoff on 1/10/2025 showed stent of the proximal left SFA appears to be completely occluded with continued occlusion to the level of the popliteal where some reconstitution is noted at the level of trifurcation with multifocal calcific plaques of the arteries of the calf and multifocal right common femoral artery stenosis that is mild to moderate and near the origin of the SFA shows severe stenosis of at least 90% The right SFA stent is completely occluded but with reconstitution of the popliteal that shows multifocal narrowing and multifocal calcific plaque noted through the vessels of the trifurcation at the level of the calf arteries.      She is here today for follow-up and to schedule a peripheral arteriogram.      She states that she began experiencing severe bilateral leg pain in December, which has since rendered her unable to walk.  She has arrived to our office today via wheelchair.  She reports a sensation of impending rupture in her right leg, particularly at night, and notes swelling in the same leg after prolonged standing.     He does report neuropathy which is worse in her left lower extremity than her right.  She finds relief from standing on cold concrete floors. She experiences intense pain in her toes when they are not kept warm. She  has been managing neuropathy for several years, which causes her to feel as though the soles of her feet are on fire approximately 4 nights per week. This sensation typically lasts for about 30 minutes, during which she slowly walks to her lift chair due to the cold floor. She applies lidocaine patches to both feet, which help alleviate the burning sensation and allow her to fall asleep around 2 or 3 in the morning. She has been on gabapentin for several years, taking up to three 800 mg tablets daily. She also takes tramadol and Vistaril. She prepares her medications in a pill box monthly and attempts to limit her gabapentin intake to two tablets daily, but increases the dose when necessary. She has discontinued Zofran due to worsening nausea. She no longer uses EMLA cream or lidocaine jelly, only patches. She is currently not taking aspirin. She uses an albuterol inhaler and is in the process of quitting smoking. She considers her left foot to be more problematic than her right, but experiences more pain in her right leg. She drove herself to today's appointment. She has previously undergone a venogram at this facility and a stent placement at [facility], [place], operated by [facility], both procedures were performed by Dr. Doyle.     SOCIAL HISTORY  She is in the process of quitting smoking.    MEDICATIONS  Current: valsartan, Aldactone, Lasix, carvedilol, Plavix, Eliquis, amlodipine, rosuvastatin, tramadol, Phenergan, Paxil, insulin, Mounjaro, gabapentin, dicyclomine, albuterol inhaler  Discontinued: Zofran, Amaryl, glimepiride, Trulicity, aspirin, EMLA cream, lidocaine jelly     Past Medical History:   Diagnosis Date    Arthritis     Coronary artery disease     Depression     Diabetes mellitus     Elevated cholesterol     Full dentures     GERD (gastroesophageal reflux disease)     Hyperlipidemia     Hypertension     Myocardial infarction     Peripheral vascular disease     Psoriasis      Social History     Socioeconomic History    Marital  "status:     Number of children: 2   Tobacco Use    Smoking status: Every Day     Current packs/day: 1.00     Average packs/day: 1 pack/day for 43.6 years (43.6 ttl pk-yrs)     Types: Cigarettes     Start date: 6/25/1981    Smokeless tobacco: Never    Tobacco comments:     2-4 PPD off and on for the last 40 years   Vaping Use    Vaping status: Former    Substances: Nicotine, Flavoring    Devices: Refillable tank   Substance and Sexual Activity    Alcohol use: No    Drug use: No    Sexual activity: Defer     Family History   Problem Relation Age of Onset    Breast cancer Maternal Grandmother 70    Cancer Mother     Diabetes Mother     Cancer Father     Diabetes Father      Allergies   Allergen Reactions    Amoxicillin Rash    Penicillins Rash     Review of Systems   Constitutional:  Negative for fatigue.   Respiratory:  Negative for shortness of breath.    Cardiovascular:  Negative for chest pain, palpitations and leg swelling.        Bilateral leg/calf pain with right worse than left.     Gastrointestinal:  Negative for blood in stool.   Neurological:  Negative for dizziness, syncope, weakness and light-headedness.   Hematological:  Does not bruise/bleed easily.   All other systems reviewed and are negative.       Objective     Vital Signs:   /83 (BP Location: Left arm, Patient Position: Sitting, Cuff Size: Adult)   Pulse 58   Ht 160 cm (63\")   Wt 81.6 kg (180 lb) Comment: per patient  SpO2 93%   BMI 31.89 kg/m²       Physical Exam  Vitals reviewed.   Constitutional:       Appearance: Normal appearance. She is well-developed.   Cardiovascular:      Rate and Rhythm: Normal rate and regular rhythm.      Pulses:           Dorsalis pedis pulses are 0 on the right side and 0 on the left side.        Posterior tibial pulses are 0 on the right side and 0 on the left side.      Heart sounds: No murmur heard.     No friction rub. No gallop.   Pulmonary:      Effort: Pulmonary effort is normal. No respiratory " distress.      Breath sounds: Normal breath sounds. No wheezing or rales.   Skin:     General: Skin is warm and dry.   Neurological:      Mental Status: She is alert and oriented to person, place, and time.   Psychiatric:         Mood and Affect: Mood normal.         Behavior: Behavior normal.        Physical Exam  Lungs were auscultated.  Heart was examined.  Ankles were examined.    Result Review :     CMP          2/22/2024    15:56 5/20/2024    07:39 1/10/2025    10:40   CMP   Glucose 221  247     BUN 20  19     Creatinine 0.85  0.94  1.20    EGFR 74.7  66.2     Sodium 138  138     Potassium 4.0  4.5     Chloride 100  103     Calcium 9.2  9.4     Total Protein 6.8      Albumin 3.6      Globulin 3.2      Total Bilirubin 0.2      Alkaline Phosphatase 104      AST (SGOT) 11      ALT (SGPT) 8      Albumin/Globulin Ratio 1.1      BUN/Creatinine Ratio 23.5  20.2     Anion Gap 8.2  6.3       CBC          2/22/2024    15:56 5/20/2024    07:39   CBC   WBC 7.51  6.82    RBC 4.35  4.16    Hemoglobin 11.9  11.6    Hematocrit 38.3  37.8    MCV 88.0  90.9    MCH 27.4  27.9    MCHC 31.1  30.7    RDW 17.4  17.6    Platelets 325  259                 Most recent echocardiogram  Results for orders placed during the hospital encounter of 01/08/17    Adult Transthoracic Echo Complete    Interpretation Summary  · Left ventricular wall thickness is consistent with mild concentric hypertrophy.  · Left ventricular function is normal.  · Estimated EF appears to be in the range of 66 - 70%  · All left ventricular wall segments contract normally.  · Left ventricular diastolic dysfunction (grade I) consistent with impaired relaxation.  · All valves appear normal in structure and function.  · Pericardium: There is no evidence of pericardial effusion.      Most recent Stress Test  Results for orders placed during the hospital encounter of 08/20/20    Stress Test With Myocardial Perfusion (1 Day)    Interpretation Summary  · A pharmacological  stress test was performed using regadenoson without low-level exercise.  · Findings consistent with a normal ECG stress test.  · Myocardial perfusion imaging indicates a normal myocardial perfusion study with no evidence of ischemia.  · Normal LV cavity size. Normal LV wall motion noted.  · Left ventricular ejection fraction is hyperdynamic (Calculated EF > 70%).  · Impressions are consistent with a low risk study.       Most recent Cardiac Cath  Results for orders placed during the hospital encounter of 05/20/24    Cardiac Catheterization/Vascular Study    Conclusion  PERIPHERAL ARTERIOGRAM / INTERVENTION REPORT    DATE OF PROCEDURE: 05/20/24    INDICATION FOR PROCEDURE:  Left Leg edema, CEAP Class 4-5  Known PAD s/p revascularlization    PROCEDURE PERFORMED:  Ultrasound-guided access to the left femoral vein  Left femoral venogram  Self-expanding stent placement in the left iliac vein with balloon venoplasty    PROCEDURE COMMENTS:    Patient was brought to the cath lab and placed on the cardiac catherization table.  Moderate conscious sedation was utilized for 50 minutes for duration procedure supervised administration fentanyl and Versed.  We inserted a 8 [place] sheath in the left femoral vein and did a venogram.  We subsequently inserted a 10 [place]arian sheath and then subsequently went up with a Glidewire advantage and performed IVUS using a Volcano system.  The vessel appears to be a 15 x 17 mm sized vessel in the external iliac vein.  There is significant compression at the level of the crossover of the arterial system that is seen on fluoroscopy with heavy calcification.  The area is less than 150 mm² in the iliac vein in the area of compression.  We decided to proceed to stenting and deployed a 18 x 120 mm BD Venova stent.  We used an 18 x 40 balloon to post dilate.  She did have some back pain and was subsequently pulled out and sutured the sheath in place.  We did use heparin for  anticoagulation.      Findings:  Severe compression seen at the left iliac vein status post successful self-expanding stent placement          CONCLUSION:  Successful venous stenting with self-expanding stent secondary to iliac vein compression syndrome      EBL: 20 ML  No specimens were removed.      Jaiden Doyle MD      Most recent Coronary CT  No results found for this or any previous visit.         Current Outpatient Medications   Medication Sig Dispense Refill    albuterol (PROVENTIL) (2.5 MG/3ML) 0.083% nebulizer solution Take 2.5 mg by nebulization Every 4 (Four) Hours As Needed for Wheezing.      amLODIPine (NORVASC) 5 MG tablet Take 1 tablet by mouth Daily.      apixaban (Eliquis) 5 MG tablet tablet Take 1 tablet by mouth 2 (Two) Times a Day.      carvedilol (COREG) 3.125 MG tablet Take 1 tablet by mouth 2 (Two) Times a Day With Meals.      clopidogrel (PLAVIX) 75 MG tablet Take 1 tablet by mouth Daily. 30 tablet 11    dicyclomine (BENTYL) 20 MG tablet Take 1 tablet by mouth 3 times a day.      furosemide (LASIX) 20 MG tablet Take 1 tablet by mouth Daily.      gabapentin (NEURONTIN) 800 MG tablet 3 (Three) Times a Day.      HYDROcodone-acetaminophen (NORCO) 7.5-325 MG per tablet As Needed.      hydrOXYzine pamoate (VISTARIL) 25 MG capsule As Needed.      insulin aspart (novoLOG FLEXPEN) 100 UNIT/ML solution pen-injector sc pen Inject 100 Units under the skin into the appropriate area as directed 3 (Three) Times a Day With Meals. 8-10 units daily.      insulin glargine (LANTUS, SEMGLEE) 100 UNIT/ML injection Inject 30 Units under the skin into the appropriate area as directed.      lidocaine (LIDODERM) 5 % Place 1 patch on the skin as directed by provider Daily. Remove & Discard patch within 12 hours or as directed by MD 3 patch 3    PARoxetine (PAXIL) 10 MG tablet Take 1 tablet by mouth Daily.      potassium chloride (MICRO-K) 10 MEQ CR capsule TAKE ONE CAPSULE BY MOUTH EVERY DAY WITH FOOD WHEN TAKING  FUROSEMIDE      promethazine (PHENERGAN) 25 MG tablet Take 1 tablet by mouth Every 6 (Six) Hours.      rosuvastatin (CRESTOR) 20 MG tablet Take 1 tablet by mouth Daily.      spironolactone (ALDACTONE) 25 MG tablet Take 1 tablet by mouth Daily.      Tirzepatide 5 MG/0.5ML solution auto-injector Inject 5 mg under the skin into the appropriate area as directed 1 (One) Time Per Week.      traMADol (ULTRAM) 50 MG tablet TAKE ONE TABLET BY MOUTH TWICE DAILY AS NEEDED FOR BREAKTHROUGH PAIN      valsartan (DIOVAN) 320 MG tablet Take 1 tablet by mouth Daily.       No current facility-administered medications for this visit.               Problem List Items Addressed This Visit          Cardiac and Vasculature    CAD (coronary artery disease)    Overview     1. NSTEMI / LHC 1-9-17  Right dominant coronary artery system with 2 vessel disease involving the obtuse marginal branch and the posterior descending artery  Successful angioplasty and stenting of the obtuse marginal branch.   There is a 70% lesion noted in the proximal portion of the PDA.  There is a subsequent 50% lesion noted in the distal portion of the PDA.       2. Echo 1-8-17  Left ventricular wall thickness is consistent with mild concentric hypertrophy.  Left ventricular function is normal.  Estimated EF appears to be in the range of 66 - 70%  All left ventricular wall segments contract normally.  Left ventricular diastolic dysfunction (grade I) consistent with impaired relaxation.  All valves appear normal in structure and function.         Essential hypertension    Mixed hyperlipidemia    PAD (peripheral artery disease) - Primary    Overview     1. CTA 6-12-14    Long segment of occlusion in the right superficial femoral artery  with reconstitution distally.   Smaller segment of occlusion in the distal left superficial femoral  artery.    2. Stent to Left lower extremity            Other    Critical limb ischemia of both lower extremities with rest pain     Relevant Orders    CBC (No Diff)    Basic Metabolic Panel       Assessment & Plan  1. Bilateral lower extremity pain.  The patient reports excessive pain in both legs, which started in December and has progressively worsened, preventing her from walking. A recent CT angiogram revealed significant disease in both the right and left legs, including a completely occluded stent in the proximal left SFA and severe stenosis in the right common femoral artery. She will be scheduled for a peripheral arteriogram with Dr. Hearn next week. The procedure will likely involve addressing one leg first, followed by the other after a period of approximately 2 weeks. She is currently using lidocaine patches on both feet to manage neuropathic pain and takes gabapentin 800 mg up to three times daily. She also uses tramadol for pain management.    2. Peripheral artery disease.  The patient has a history of peripheral artery disease, confirmed by a recent CT angiogram showing a completely occluded stent in the proximal left SFA and severe stenosis in the right common femoral artery. She will undergo a peripheral arteriogram with Dr. Estevez next week to address these issues. She is currently on antiplatelet Plavix and Eliquis for anticoagulation.  She has been instructed to discontinue Eliquis two days prior to her procedure    3. Coronary artery disease.  The patient has a history of coronary artery disease. She is currently on valsartan, Aldactone, carvedilol 3.125 mg, and amlodipine for blood pressure management. She is also taking rosuvastatin for cholesterol management.  She is on Plavix, which she has been instructed to continue for procedure.     4. Hypertension.  The patient is on valsartan, Aldactone, carvedilol 3.125 mg, and amlodipine for blood pressure management. She reports adherence to her medication regimen.    5. Dyslipidemia.  The patient is on rosuvastatin for cholesterol management. She reports adherence to her  medication regimen.    6. Diabetes Mellitus.  The patient is on insulin and Mounjaro for diabetes management. She reports no longer taking Amaryl or Trulicity.    7. Tobacco use  Reports that she has been trying to quit cigarette smoking.  She was counseled regarding the correlation between tobacco use and peripheral arterial disease         Follow Up     Return in about 3 weeks (around 2/17/2025).    Patient was given instructions and counseling regarding her condition or for health maintenance advice. Please see specific information pulled into the AVS if appropriate.     Patient or patient representative verbalized consent for the use of Ambient Listening during the visit with  DENIS Sweet for chart documentation. 1/28/2025  10:42 EST

## 2025-01-27 NOTE — PROGRESS NOTES
Chief Complaint  Follow-up (Denies CP, Complains of mild/moderate leg/ankle swelling, constant pain in feet/legs. CT results.) and Med Management (Tolerating all current medications.)    Subjective          Monae Chauhan presents to [facility] for follow up.    History of Present Illness  History of Present Illness  The patient is a 69-year-old female who follows in the clinic with coronary artery disease, peripheral artery disease, hypertension, and dyslipidemia. She called the office on Friday and stated that she was not able to walk due to excessive pain in her legs bilaterally.      CT angio of the abdominal aorta with bilateral runoff on 1/10/2025 showed stent of the proximal left SFA appears to be completely occluded with continued occlusion to the level of the popliteal where some reconstitution is noted at the level of trifurcation with multifocal calcific plaques of the arteries of the calf and multifocal right common femoral artery stenosis that is mild to moderate and near the origin of the SFA shows severe stenosis of at least 90% The right SFA stent is completely occluded but with reconstitution of the popliteal that shows multifocal narrowing and multifocal calcific plaque noted through the vessels of the trifurcation at the level of the calf arteries.      She is here today for follow-up and to schedule a peripheral arteriogram.      She states that she began experiencing severe bilateral leg pain in December, which has since rendered her unable to walk.  She has arrived to our office today via wheelchair.  She reports a sensation of impending rupture in her right leg, particularly at night, and notes swelling in the same leg after prolonged standing.     He does report neuropathy which is worse in her left lower extremity than her right.  She finds relief from standing on cold concrete floors. She experiences intense pain in her toes when they are not kept warm. She  has been managing neuropathy for several years, which causes her to feel as though the soles of her feet are on fire approximately 4 nights per week. This sensation typically lasts for about 30 minutes, during which she slowly walks to her lift chair due to the cold floor. She applies lidocaine patches to both feet, which help alleviate the burning sensation and allow her to fall asleep around 2 or 3 in the morning. She has been on gabapentin for several years, taking up to three 800 mg tablets daily. She also takes tramadol and Vistaril. She prepares her medications in a pill box monthly and attempts to limit her gabapentin intake to two tablets daily, but increases the dose when necessary. She has discontinued Zofran due to worsening nausea. She no longer uses EMLA cream or lidocaine jelly, only patches. She is currently not taking aspirin. She uses an albuterol inhaler and is in the process of quitting smoking. She considers her left foot to be more problematic than her right, but experiences more pain in her right leg. She drove herself to today's appointment. She has previously undergone a venogram at this facility and a stent placement at [facility], both procedures were performed by Dr. Doyle.     SOCIAL HISTORY  She is in the process of quitting smoking.    MEDICATIONS  Current: valsartan, Aldactone, Lasix, carvedilol, Plavix, Eliquis, amlodipine, rosuvastatin, tramadol, Phenergan, Paxil, insulin, Mounjaro, gabapentin, dicyclomine, albuterol inhaler  Discontinued: Zofran, Amaryl, glimepiride, Trulicity, aspirin, EMLA cream, lidocaine jelly     Past Medical History:   Diagnosis Date    Arthritis     Coronary artery disease     Depression     Diabetes mellitus     Elevated cholesterol     Full dentures     GERD (gastroesophageal reflux disease)     Hyperlipidemia     Hypertension     Myocardial infarction     Peripheral vascular disease     Psoriasis      Social History     Socioeconomic History    Marital  "status:     Number of children: 2   Tobacco Use    Smoking status: Every Day     Current packs/day: 1.00     Average packs/day: 1 pack/day for 43.6 years (43.6 ttl pk-yrs)     Types: Cigarettes     Start date: 6/25/1981    Smokeless tobacco: Never    Tobacco comments:     2-4 PPD off and on for the last 40 years   Vaping Use    Vaping status: Former    Substances: Nicotine, Flavoring    Devices: Refillable tank   Substance and Sexual Activity    Alcohol use: No    Drug use: No    Sexual activity: Defer     Family History   Problem Relation Age of Onset    Breast cancer Maternal Grandmother 70    Cancer Mother     Diabetes Mother     Cancer Father     Diabetes Father      Allergies   Allergen Reactions    Amoxicillin Rash    Penicillins Rash     Review of Systems   Constitutional:  Negative for fatigue.   Respiratory:  Negative for shortness of breath.    Cardiovascular:  Negative for chest pain, palpitations and leg swelling.        Bilateral leg/calf pain with right worse than left.     Gastrointestinal:  Negative for blood in stool.   Neurological:  Negative for dizziness, syncope, weakness and light-headedness.   Hematological:  Does not bruise/bleed easily.   All other systems reviewed and are negative.       Objective     Vital Signs:   /83 (BP Location: Left arm, Patient Position: Sitting, Cuff Size: Adult)   Pulse 58   Ht 160 cm (63\")   Wt 81.6 kg (180 lb) Comment: per patient  SpO2 93%   BMI 31.89 kg/m²       Physical Exam  Vitals reviewed.   Constitutional:       Appearance: Normal appearance. She is well-developed.   Cardiovascular:      Rate and Rhythm: Normal rate and regular rhythm.      Pulses:           Dorsalis pedis pulses are 0 on the right side and 0 on the left side.        Posterior tibial pulses are 0 on the right side and 0 on the left side.      Heart sounds: No murmur heard.     No friction rub. No gallop.   Pulmonary:      Effort: Pulmonary effort is normal. No respiratory " distress.      Breath sounds: Normal breath sounds. No wheezing or rales.   Skin:     General: Skin is warm and dry.   Neurological:      Mental Status: She is alert and oriented to person, place, and time.   Psychiatric:         Mood and Affect: Mood normal.         Behavior: Behavior normal.        Physical Exam  Lungs were auscultated.  Heart was examined.  Ankles were examined.    Result Review :     CMP          2/22/2024    15:56 5/20/2024    07:39 1/10/2025    10:40   CMP   Glucose 221  247     BUN 20  19     Creatinine 0.85  0.94  1.20    EGFR 74.7  66.2     Sodium 138  138     Potassium 4.0  4.5     Chloride 100  103     Calcium 9.2  9.4     Total Protein 6.8      Albumin 3.6      Globulin 3.2      Total Bilirubin 0.2      Alkaline Phosphatase 104      AST (SGOT) 11      ALT (SGPT) 8      Albumin/Globulin Ratio 1.1      BUN/Creatinine Ratio 23.5  20.2     Anion Gap 8.2  6.3       CBC          2/22/2024    15:56 5/20/2024    07:39   CBC   WBC 7.51  6.82    RBC 4.35  4.16    Hemoglobin 11.9  11.6    Hematocrit 38.3  37.8    MCV 88.0  90.9    MCH 27.4  27.9    MCHC 31.1  30.7    RDW 17.4  17.6    Platelets 325  259                 Most recent echocardiogram  Results for orders placed during the hospital encounter of 01/08/17    Adult Transthoracic Echo Complete    Interpretation Summary  · Left ventricular wall thickness is consistent with mild concentric hypertrophy.  · Left ventricular function is normal.  · Estimated EF appears to be in the range of 66 - 70%  · All left ventricular wall segments contract normally.  · Left ventricular diastolic dysfunction (grade I) consistent with impaired relaxation.  · All valves appear normal in structure and function.  · Pericardium: There is no evidence of pericardial effusion.      Most recent Stress Test  Results for orders placed during the hospital encounter of 08/20/20    Stress Test With Myocardial Perfusion (1 Day)    Interpretation Summary  · A pharmacological  stress test was performed using regadenoson without low-level exercise.  · Findings consistent with a normal ECG stress test.  · Myocardial perfusion imaging indicates a normal myocardial perfusion study with no evidence of ischemia.  · Normal LV cavity size. Normal LV wall motion noted.  · Left ventricular ejection fraction is hyperdynamic (Calculated EF > 70%).  · Impressions are consistent with a low risk study.       Most recent Cardiac Cath  Results for orders placed during the hospital encounter of 05/20/24    Cardiac Catheterization/Vascular Study    Conclusion  PERIPHERAL ARTERIOGRAM / INTERVENTION REPORT    DATE OF PROCEDURE: 05/20/24    INDICATION FOR PROCEDURE:  Left Leg edema, CEAP Class 4-5  Known PAD s/p revascularlization    PROCEDURE PERFORMED:  Ultrasound-guided access to the left femoral vein  Left femoral venogram  Self-expanding stent placement in the left iliac vein with balloon venoplasty    PROCEDURE COMMENTS:    Patient was brought to the cath lab and placed on the cardiac catherization table.  Moderate conscious sedation was utilized for 50 minutes for duration procedure supervised administration fentanyl and Versed.  We inserted a 8 [place] sheath in the left femoral vein and did a venogram.  We subsequently inserted a 10 [place] sheath and then subsequently went up with a Glidewire advantage and performed IVUS using a Volcano system.  The vessel appears to be a 15 x 17 mm sized vessel in the external iliac vein.  There is significant compression at the level of the crossover of the arterial system that is seen on fluoroscopy with heavy calcification.  The area is less than 150 mm² in the iliac vein in the area of compression.  We decided to proceed to stenting and deployed a 18 x 120 mm BD Venova stent.  We used an 18 x 40 balloon to post dilate.  She did have some back pain and was subsequently pulled out and sutured the sheath in place.  We did use heparin for  anticoagulation.      Findings:  Severe compression seen at the left iliac vein status post successful self-expanding stent placement          CONCLUSION:  Successful venous stenting with self-expanding stent secondary to iliac vein compression syndrome      EBL: 20 ML  No specimens were removed.      Jaiden Doyle MD      Most recent Coronary CT  No results found for this or any previous visit.         Current Outpatient Medications   Medication Sig Dispense Refill    albuterol (PROVENTIL) (2.5 MG/3ML) 0.083% nebulizer solution Take 2.5 mg by nebulization Every 4 (Four) Hours As Needed for Wheezing.      amLODIPine (NORVASC) 5 MG tablet Take 1 tablet by mouth Daily.      apixaban (Eliquis) 5 MG tablet tablet Take 1 tablet by mouth 2 (Two) Times a Day.      carvedilol (COREG) 3.125 MG tablet Take 1 tablet by mouth 2 (Two) Times a Day With Meals.      clopidogrel (PLAVIX) 75 MG tablet Take 1 tablet by mouth Daily. 30 tablet 11    dicyclomine (BENTYL) 20 MG tablet Take 1 tablet by mouth 3 times a day.      furosemide (LASIX) 20 MG tablet Take 1 tablet by mouth Daily.      gabapentin (NEURONTIN) 800 MG tablet 3 (Three) Times a Day.      HYDROcodone-acetaminophen (NORCO) 7.5-325 MG per tablet As Needed.      hydrOXYzine pamoate (VISTARIL) 25 MG capsule As Needed.      insulin aspart (novoLOG FLEXPEN) 100 UNIT/ML solution pen-injector sc pen Inject 100 Units under the skin into the appropriate area as directed 3 (Three) Times a Day With Meals. 8-10 units daily.      insulin glargine (LANTUS, SEMGLEE) 100 UNIT/ML injection Inject 30 Units under the skin into the appropriate area as directed.      lidocaine (LIDODERM) 5 % Place 1 patch on the skin as directed by provider Daily. Remove & Discard patch within 12 hours or as directed by MD 3 patch 3    PARoxetine (PAXIL) 10 MG tablet Take 1 tablet by mouth Daily.      potassium chloride (MICRO-K) 10 MEQ CR capsule TAKE ONE CAPSULE BY MOUTH EVERY DAY WITH FOOD WHEN TAKING  FUROSEMIDE      promethazine (PHENERGAN) 25 MG tablet Take 1 tablet by mouth Every 6 (Six) Hours.      rosuvastatin (CRESTOR) 20 MG tablet Take 1 tablet by mouth Daily.      spironolactone (ALDACTONE) 25 MG tablet Take 1 tablet by mouth Daily.      Tirzepatide 5 MG/0.5ML solution auto-injector Inject 5 mg under the skin into the appropriate area as directed 1 (One) Time Per Week.      traMADol (ULTRAM) 50 MG tablet TAKE ONE TABLET BY MOUTH TWICE DAILY AS NEEDED FOR BREAKTHROUGH PAIN      valsartan (DIOVAN) 320 MG tablet Take 1 tablet by mouth Daily.       No current facility-administered medications for this visit.               Problem List Items Addressed This Visit          Cardiac and Vasculature    CAD (coronary artery disease)    Overview     1. NSTEMI / LHC 1-9-17  Right dominant coronary artery system with 2 vessel disease involving the obtuse marginal branch and the posterior descending artery  Successful angioplasty and stenting of the obtuse marginal branch.   There is a 70% lesion noted in the proximal portion of the PDA.  There is a subsequent 50% lesion noted in the distal portion of the PDA.       2. Echo 1-8-17  Left ventricular wall thickness is consistent with mild concentric hypertrophy.  Left ventricular function is normal.  Estimated EF appears to be in the range of 66 - 70%  All left ventricular wall segments contract normally.  Left ventricular diastolic dysfunction (grade I) consistent with impaired relaxation.  All valves appear normal in structure and function.         Essential hypertension    Mixed hyperlipidemia    PAD (peripheral artery disease) - Primary    Overview     1. CTA 6-12-14    Long segment of occlusion in the right superficial femoral artery  with reconstitution distally.   Smaller segment of occlusion in the distal left superficial femoral  artery.    2. Stent to Left lower extremity            Other    Critical limb ischemia of both lower extremities with rest pain     Relevant Orders    CBC (No Diff)    Basic Metabolic Panel       Assessment & Plan  1. Bilateral lower extremity pain.  The patient reports excessive pain in both legs, which started in December and has progressively worsened, preventing her from walking. A recent CT angiogram revealed significant disease in both the right and left legs, including a completely occluded stent in the proximal left SFA and severe stenosis in the right common femoral artery. She will be scheduled for a peripheral arteriogram with Dr. Hearn next week. The procedure will likely involve addressing one leg first, followed by the other after a period of approximately 2 weeks. She is currently using lidocaine patches on both feet to manage neuropathic pain and takes gabapentin 800 mg up to three times daily. She also uses tramadol for pain management.    2. Peripheral artery disease.  The patient has a history of peripheral artery disease, confirmed by a recent CT angiogram showing a completely occluded stent in the proximal left SFA and severe stenosis in the right common femoral artery. She will undergo a peripheral arteriogram with Dr. Estevez next week to address these issues. She is currently on antiplatelet Plavix and Eliquis for anticoagulation.  She has been instructed to discontinue Eliquis two days prior to her procedure    3. Coronary artery disease.  The patient has a history of coronary artery disease. She is currently on valsartan, Aldactone, carvedilol 3.125 mg, and amlodipine for blood pressure management. She is also taking rosuvastatin for cholesterol management.  She is on Plavix, which she has been instructed to continue for procedure.     4. Hypertension.  The patient is on valsartan, Aldactone, carvedilol 3.125 mg, and amlodipine for blood pressure management. She reports adherence to her medication regimen.    5. Dyslipidemia.  The patient is on rosuvastatin for cholesterol management. She reports adherence to her  medication regimen.    6. Diabetes Mellitus.  The patient is on insulin and Mounjaro for diabetes management. She reports no longer taking Amaryl or Trulicity.    7. Tobacco use  Reports that she has been trying to quit cigarette smoking.  She was counseled regarding the correlation between tobacco use and peripheral arterial disease         Follow Up     Return in about 3 weeks (around 2/17/2025).    Patient was given instructions and counseling regarding her condition or for health maintenance advice. Please see specific information pulled into the AVS if appropriate.     Patient or patient representative verbalized consent for the use of Ambient Listening during the visit with  DENIS Sweet for chart documentation. 1/28/2025  10:42 EST

## 2025-01-27 NOTE — H&P (VIEW-ONLY)
Chief Complaint  Follow-up (Denies CP, Complains of mild/moderate leg/ankle swelling, constant pain in feet/legs. CT results.) and Med Management (Tolerating all current medications.)    Subjective          Monae Chauhan presents to [facility] for follow up.    History of Present Illness  History of Present Illness  The patient is a 69-year-old female who follows in the clinic with coronary artery disease, peripheral artery disease, hypertension, and dyslipidemia. She called the office on Friday and stated that she was not able to walk due to excessive pain in her legs bilaterally.      CT angio of the abdominal aorta with bilateral runoff on 1/10/2025 showed stent of the proximal left SFA appears to be completely occluded with continued occlusion to the level of the popliteal where some reconstitution is noted at the level of trifurcation with multifocal calcific plaques of the arteries of the calf and multifocal right common femoral artery stenosis that is mild to moderate and near the origin of the SFA shows severe stenosis of at least 90% The right SFA stent is completely occluded but with reconstitution of the popliteal that shows multifocal narrowing and multifocal calcific plaque noted through the vessels of the trifurcation at the level of the calf arteries.      She is here today for follow-up and to schedule a peripheral arteriogram.      She states that she began experiencing severe bilateral leg pain in December, which has since rendered her unable to walk.  She has arrived to our office today via wheelchair.  She reports a sensation of impending rupture in her right leg, particularly at night, and notes swelling in the same leg after prolonged standing.     He does report neuropathy which is worse in her left lower extremity than her right.  She finds relief from standing on cold concrete floors. She experiences intense pain in her toes when they are not kept warm. She  has been managing neuropathy for several years, which causes her to feel as though the soles of her feet are on fire approximately 4 nights per week. This sensation typically lasts for about 30 minutes, during which she slowly walks to her lift chair due to the cold floor. She applies lidocaine patches to both feet, which help alleviate the burning sensation and allow her to fall asleep around 2 or 3 in the morning. She has been on gabapentin for several years, taking up to three 800 mg tablets daily. She also takes tramadol and Vistaril. She prepares her medications in a pill box monthly and attempts to limit her gabapentin intake to two tablets daily, but increases the dose when necessary. She has discontinued Zofran due to worsening nausea. She no longer uses EMLA cream or lidocaine jelly, only patches. She is currently not taking aspirin. She uses an albuterol inhaler and is in the process of quitting smoking. She considers her left foot to be more problematic than her right, but experiences more pain in her right leg. She drove herself to today's appointment. She has previously undergone a venogram at this facility and a stent placement at [facility], both procedures were performed by Dr. Doyle.     SOCIAL HISTORY  She is in the process of quitting smoking.    MEDICATIONS  Current: valsartan, Aldactone, Lasix, carvedilol, Plavix, Eliquis, amlodipine, rosuvastatin, tramadol, Phenergan, Paxil, insulin, Mounjaro, gabapentin, dicyclomine, albuterol inhaler  Discontinued: Zofran, Amaryl, glimepiride, Trulicity, aspirin, EMLA cream, lidocaine jelly     Past Medical History:   Diagnosis Date    Arthritis     Coronary artery disease     Depression     Diabetes mellitus     Elevated cholesterol     Full dentures     GERD (gastroesophageal reflux disease)     Hyperlipidemia     Hypertension     Myocardial infarction     Peripheral vascular disease     Psoriasis      Social History     Socioeconomic History    Marital  "status:     Number of children: 2   Tobacco Use    Smoking status: Every Day     Current packs/day: 1.00     Average packs/day: 1 pack/day for 43.6 years (43.6 ttl pk-yrs)     Types: Cigarettes     Start date: 6/25/1981    Smokeless tobacco: Never    Tobacco comments:     2-4 PPD off and on for the last 40 years   Vaping Use    Vaping status: Former    Substances: Nicotine, Flavoring    Devices: Refillable tank   Substance and Sexual Activity    Alcohol use: No    Drug use: No    Sexual activity: Defer     Family History   Problem Relation Age of Onset    Breast cancer Maternal Grandmother 70    Cancer Mother     Diabetes Mother     Cancer Father     Diabetes Father      Allergies   Allergen Reactions    Amoxicillin Rash    Penicillins Rash     Review of Systems   Constitutional:  Negative for fatigue.   Respiratory:  Negative for shortness of breath.    Cardiovascular:  Negative for chest pain, palpitations and leg swelling.        Bilateral leg/calf pain with right worse than left.     Gastrointestinal:  Negative for blood in stool.   Neurological:  Negative for dizziness, syncope, weakness and light-headedness.   Hematological:  Does not bruise/bleed easily.   All other systems reviewed and are negative.       Objective     Vital Signs:   /83 (BP Location: Left arm, Patient Position: Sitting, Cuff Size: Adult)   Pulse 58   Ht 160 cm (63\")   Wt 81.6 kg (180 lb) Comment: per patient  SpO2 93%   BMI 31.89 kg/m²       Physical Exam  Vitals reviewed.   Constitutional:       Appearance: Normal appearance. She is well-developed.   Cardiovascular:      Rate and Rhythm: Normal rate and regular rhythm.      Pulses:           Dorsalis pedis pulses are 0 on the right side and 0 on the left side.        Posterior tibial pulses are 0 on the right side and 0 on the left side.      Heart sounds: No murmur heard.     No friction rub. No gallop.   Pulmonary:      Effort: Pulmonary effort is normal. No respiratory " distress.      Breath sounds: Normal breath sounds. No wheezing or rales.   Skin:     General: Skin is warm and dry.   Neurological:      Mental Status: She is alert and oriented to person, place, and time.   Psychiatric:         Mood and Affect: Mood normal.         Behavior: Behavior normal.        Physical Exam  Lungs were auscultated.  Heart was examined.  Ankles were examined.    Result Review :     CMP          2/22/2024    15:56 5/20/2024    07:39 1/10/2025    10:40   CMP   Glucose 221  247     BUN 20  19     Creatinine 0.85  0.94  1.20    EGFR 74.7  66.2     Sodium 138  138     Potassium 4.0  4.5     Chloride 100  103     Calcium 9.2  9.4     Total Protein 6.8      Albumin 3.6      Globulin 3.2      Total Bilirubin 0.2      Alkaline Phosphatase 104      AST (SGOT) 11      ALT (SGPT) 8      Albumin/Globulin Ratio 1.1      BUN/Creatinine Ratio 23.5  20.2     Anion Gap 8.2  6.3       CBC          2/22/2024    15:56 5/20/2024    07:39   CBC   WBC 7.51  6.82    RBC 4.35  4.16    Hemoglobin 11.9  11.6    Hematocrit 38.3  37.8    MCV 88.0  90.9    MCH 27.4  27.9    MCHC 31.1  30.7    RDW 17.4  17.6    Platelets 325  259                 Most recent echocardiogram  Results for orders placed during the hospital encounter of 01/08/17    Adult Transthoracic Echo Complete    Interpretation Summary  · Left ventricular wall thickness is consistent with mild concentric hypertrophy.  · Left ventricular function is normal.  · Estimated EF appears to be in the range of 66 - 70%  · All left ventricular wall segments contract normally.  · Left ventricular diastolic dysfunction (grade I) consistent with impaired relaxation.  · All valves appear normal in structure and function.  · Pericardium: There is no evidence of pericardial effusion.      Most recent Stress Test  Results for orders placed during the hospital encounter of 08/20/20    Stress Test With Myocardial Perfusion (1 Day)    Interpretation Summary  · A pharmacological  stress test was performed using regadenoson without low-level exercise.  · Findings consistent with a normal ECG stress test.  · Myocardial perfusion imaging indicates a normal myocardial perfusion study with no evidence of ischemia.  · Normal LV cavity size. Normal LV wall motion noted.  · Left ventricular ejection fraction is hyperdynamic (Calculated EF > 70%).  · Impressions are consistent with a low risk study.       Most recent Cardiac Cath  Results for orders placed during the hospital encounter of 05/20/24    Cardiac Catheterization/Vascular Study    Conclusion  PERIPHERAL ARTERIOGRAM / INTERVENTION REPORT    DATE OF PROCEDURE: 05/20/24    INDICATION FOR PROCEDURE:  Left Leg edema, CEAP Class 4-5  Known PAD s/p revascularlization    PROCEDURE PERFORMED:  Ultrasound-guided access to the left femoral vein  Left femoral venogram  Self-expanding stent placement in the left iliac vein with balloon venoplasty    PROCEDURE COMMENTS:    Patient was brought to the cath lab and placed on the cardiac catherization table.  Moderate conscious sedation was utilized for 50 minutes for duration procedure supervised administration fentanyl and Versed.  We inserted a 8 Afghan sheath in the left femoral vein and did a venogram.  We subsequently inserted a 10 Afghan sheath and then subsequently went up with a Glidewire advantage and performed IVUS using a Volcano system.  The vessel appears to be a 15 x 17 mm sized vessel in the external iliac vein.  There is significant compression at the level of the crossover of the arterial system that is seen on fluoroscopy with heavy calcification.  The area is less than 150 mm² in the iliac vein in the area of compression.  We decided to proceed to stenting and deployed a 18 x 120 mm BD Venova stent.  We used an 18 x 40 balloon to post dilate.  She did have some back pain and was subsequently pulled out and sutured the sheath in place.  We did use heparin for  anticoagulation.      Findings:  Severe compression seen at the left iliac vein status post successful self-expanding stent placement          CONCLUSION:  Successful venous stenting with self-expanding stent secondary to iliac vein compression syndrome      EBL: 20 ML  No specimens were removed.      Jaiden Doyle MD      Most recent Coronary CT  No results found for this or any previous visit.         Current Outpatient Medications   Medication Sig Dispense Refill    albuterol (PROVENTIL) (2.5 MG/3ML) 0.083% nebulizer solution Take 2.5 mg by nebulization Every 4 (Four) Hours As Needed for Wheezing.      amLODIPine (NORVASC) 5 MG tablet Take 1 tablet by mouth Daily.      apixaban (Eliquis) 5 MG tablet tablet Take 1 tablet by mouth 2 (Two) Times a Day.      carvedilol (COREG) 3.125 MG tablet Take 1 tablet by mouth 2 (Two) Times a Day With Meals.      clopidogrel (PLAVIX) 75 MG tablet Take 1 tablet by mouth Daily. 30 tablet 11    dicyclomine (BENTYL) 20 MG tablet Take 1 tablet by mouth 3 times a day.      furosemide (LASIX) 20 MG tablet Take 1 tablet by mouth Daily.      gabapentin (NEURONTIN) 800 MG tablet 3 (Three) Times a Day.      HYDROcodone-acetaminophen (NORCO) 7.5-325 MG per tablet As Needed.      hydrOXYzine pamoate (VISTARIL) 25 MG capsule As Needed.      insulin aspart (novoLOG FLEXPEN) 100 UNIT/ML solution pen-injector sc pen Inject 100 Units under the skin into the appropriate area as directed 3 (Three) Times a Day With Meals. 8-10 units daily.      insulin glargine (LANTUS, SEMGLEE) 100 UNIT/ML injection Inject 30 Units under the skin into the appropriate area as directed.      lidocaine (LIDODERM) 5 % Place 1 patch on the skin as directed by provider Daily. Remove & Discard patch within 12 hours or as directed by MD 3 patch 3    PARoxetine (PAXIL) 10 MG tablet Take 1 tablet by mouth Daily.      potassium chloride (MICRO-K) 10 MEQ CR capsule TAKE ONE CAPSULE BY MOUTH EVERY DAY WITH FOOD WHEN TAKING  FUROSEMIDE      promethazine (PHENERGAN) 25 MG tablet Take 1 tablet by mouth Every 6 (Six) Hours.      rosuvastatin (CRESTOR) 20 MG tablet Take 1 tablet by mouth Daily.      spironolactone (ALDACTONE) 25 MG tablet Take 1 tablet by mouth Daily.      Tirzepatide 5 MG/0.5ML solution auto-injector Inject 5 mg under the skin into the appropriate area as directed 1 (One) Time Per Week.      traMADol (ULTRAM) 50 MG tablet TAKE ONE TABLET BY MOUTH TWICE DAILY AS NEEDED FOR BREAKTHROUGH PAIN      valsartan (DIOVAN) 320 MG tablet Take 1 tablet by mouth Daily.       No current facility-administered medications for this visit.               Problem List Items Addressed This Visit          Cardiac and Vasculature    CAD (coronary artery disease)    Overview     1. NSTEMI / LHC 1-9-17  Right dominant coronary artery system with 2 vessel disease involving the obtuse marginal branch and the posterior descending artery  Successful angioplasty and stenting of the obtuse marginal branch.   There is a 70% lesion noted in the proximal portion of the PDA.  There is a subsequent 50% lesion noted in the distal portion of the PDA.       2. Echo 1-8-17  Left ventricular wall thickness is consistent with mild concentric hypertrophy.  Left ventricular function is normal.  Estimated EF appears to be in the range of 66 - 70%  All left ventricular wall segments contract normally.  Left ventricular diastolic dysfunction (grade I) consistent with impaired relaxation.  All valves appear normal in structure and function.         Essential hypertension    Mixed hyperlipidemia    PAD (peripheral artery disease) - Primary    Overview     1. CTA 6-12-14    Long segment of occlusion in the right superficial femoral artery  with reconstitution distally.   Smaller segment of occlusion in the distal left superficial femoral  artery.    2. Stent to Left lower extremity            Other    Critical limb ischemia of both lower extremities with rest pain     Relevant Orders    CBC (No Diff)    Basic Metabolic Panel       Assessment & Plan  1. Bilateral lower extremity pain.  The patient reports excessive pain in both legs, which started in December and has progressively worsened, preventing her from walking. A recent CT angiogram revealed significant disease in both the right and left legs, including a completely occluded stent in the proximal left SFA and severe stenosis in the right common femoral artery. She will be scheduled for a peripheral arteriogram with Dr. Hearn next week. The procedure will likely involve addressing one leg first, followed by the other after a period of approximately 2 weeks. She is currently using lidocaine patches on both feet to manage neuropathic pain and takes gabapentin 800 mg up to three times daily. She also uses tramadol for pain management.    2. Peripheral artery disease.  The patient has a history of peripheral artery disease, confirmed by a recent CT angiogram showing a completely occluded stent in the proximal left SFA and severe stenosis in the right common femoral artery. She will undergo a peripheral arteriogram with Dr. Estevez next week to address these issues. She is currently on antiplatelet Plavix and Eliquis for anticoagulation.  She has been instructed to discontinue Eliquis two days prior to her procedure    3. Coronary artery disease.  The patient has a history of coronary artery disease. She is currently on valsartan, Aldactone, carvedilol 3.125 mg, and amlodipine for blood pressure management. She is also taking rosuvastatin for cholesterol management.  She is on Plavix, which she has been instructed to continue for procedure.     4. Hypertension.  The patient is on valsartan, Aldactone, carvedilol 3.125 mg, and amlodipine for blood pressure management. She reports adherence to her medication regimen.    5. Dyslipidemia.  The patient is on rosuvastatin for cholesterol management. She reports adherence to her  medication regimen.    6. Diabetes Mellitus.  The patient is on insulin and Mounjaro for diabetes management. She reports no longer taking Amaryl or Trulicity.    7. Tobacco use  Reports that she has been trying to quit cigarette smoking.  She was counseled regarding the correlation between tobacco use and peripheral arterial disease         Follow Up     Return in about 3 weeks (around 2/17/2025).    Patient was given instructions and counseling regarding her condition or for health maintenance advice. Please see specific information pulled into the AVS if appropriate.     Patient or patient representative verbalized consent for the use of Ambient Listening during the visit with  DENIS Sweet for chart documentation. 1/28/2025  10:42 EST

## 2025-02-04 ENCOUNTER — HOSPITAL ENCOUNTER (OUTPATIENT)
Facility: HOSPITAL | Age: 70
Setting detail: HOSPITAL OUTPATIENT SURGERY
Discharge: HOME | End: 2025-02-04
Attending: INTERNAL MEDICINE | Admitting: INTERNAL MEDICINE
Payer: MEDICARE

## 2025-02-04 VITALS
HEART RATE: 63 BPM | OXYGEN SATURATION: 95 % | HEIGHT: 63 IN | WEIGHT: 171 LBS | SYSTOLIC BLOOD PRESSURE: 136 MMHG | DIASTOLIC BLOOD PRESSURE: 95 MMHG | TEMPERATURE: 97.3 F | BODY MASS INDEX: 30.3 KG/M2 | RESPIRATION RATE: 14 BRPM

## 2025-02-04 DIAGNOSIS — I70.223 CRITICAL LIMB ISCHEMIA OF BOTH LOWER EXTREMITIES WITH REST PAIN: ICD-10-CM

## 2025-02-04 DIAGNOSIS — I70.223 CRITICAL LIMB ISCHEMIA OF BOTH LOWER EXTREMITIES WITH REST PAIN: Primary | ICD-10-CM

## 2025-02-04 DIAGNOSIS — I70.223 ATHEROSCLEROSIS OF NATIVE ARTERIES OF EXTREMITIES WITH REST PAIN, BILATERAL LEGS: ICD-10-CM

## 2025-02-04 LAB
ANION GAP SERPL CALCULATED.3IONS-SCNC: 10.2 MMOL/L (ref 5–15)
BUN SERPL-MCNC: 11 MG/DL (ref 8–23)
BUN/CREAT SERPL: 12.9 (ref 7–25)
CALCIUM SPEC-SCNC: 9.1 MG/DL (ref 8.6–10.5)
CHLORIDE SERPL-SCNC: 104 MMOL/L (ref 98–107)
CO2 SERPL-SCNC: 27.8 MMOL/L (ref 22–29)
CREAT SERPL-MCNC: 0.85 MG/DL (ref 0.57–1)
DEPRECATED RDW RBC AUTO: 55.8 FL (ref 37–54)
EGFRCR SERPLBLD CKD-EPI 2021: 74.3 ML/MIN/1.73
ERYTHROCYTE [DISTWIDTH] IN BLOOD BY AUTOMATED COUNT: 17.4 % (ref 12.3–15.4)
GLUCOSE SERPL-MCNC: 116 MG/DL (ref 65–99)
HCT VFR BLD AUTO: 37.5 % (ref 34–46.6)
HGB BLD-MCNC: 11.2 G/DL (ref 12–15.9)
MCH RBC QN AUTO: 26.3 PG (ref 26.6–33)
MCHC RBC AUTO-ENTMCNC: 29.9 G/DL (ref 31.5–35.7)
MCV RBC AUTO: 88 FL (ref 79–97)
PLATELET # BLD AUTO: 318 10*3/MM3 (ref 140–450)
PMV BLD AUTO: 8.8 FL (ref 6–12)
POTASSIUM SERPL-SCNC: 2.8 MMOL/L (ref 3.5–5.2)
RBC # BLD AUTO: 4.26 10*6/MM3 (ref 3.77–5.28)
SODIUM SERPL-SCNC: 142 MMOL/L (ref 136–145)
WBC NRBC COR # BLD AUTO: 6.83 10*3/MM3 (ref 3.4–10.8)

## 2025-02-04 PROCEDURE — 80048 BASIC METABOLIC PNL TOTAL CA: CPT | Performed by: NURSE PRACTITIONER

## 2025-02-04 PROCEDURE — 25010000002 POTASSIUM CHLORIDE 10 MEQ/100ML SOLUTION: Performed by: INTERNAL MEDICINE

## 2025-02-04 PROCEDURE — 85027 COMPLETE CBC AUTOMATED: CPT | Performed by: NURSE PRACTITIONER

## 2025-02-04 RX ORDER — POTASSIUM CHLORIDE 7.45 MG/ML
10 INJECTION INTRAVENOUS
Status: COMPLETED | OUTPATIENT
Start: 2025-02-04 | End: 2025-02-04

## 2025-02-04 RX ORDER — POTASSIUM CHLORIDE 1500 MG/1
40 TABLET, EXTENDED RELEASE ORAL DAILY
Status: DISCONTINUED | OUTPATIENT
Start: 2025-02-04 | End: 2025-02-04 | Stop reason: HOSPADM

## 2025-02-04 RX ADMIN — POTASSIUM CHLORIDE 10 MEQ: 7.46 INJECTION, SOLUTION INTRAVENOUS at 13:18

## 2025-02-04 RX ADMIN — POTASSIUM CHLORIDE 10 MEQ: 7.46 INJECTION, SOLUTION INTRAVENOUS at 11:20

## 2025-02-04 RX ADMIN — POTASSIUM CHLORIDE 40 MEQ: 1500 TABLET, EXTENDED RELEASE ORAL at 11:15

## 2025-02-04 RX ADMIN — POTASSIUM CHLORIDE 10 MEQ: 7.46 INJECTION, SOLUTION INTRAVENOUS at 14:19

## 2025-02-04 RX ADMIN — POTASSIUM CHLORIDE 10 MEQ: 7.46 INJECTION, SOLUTION INTRAVENOUS at 12:17

## 2025-02-04 NOTE — DISCHARGE INSTRUCTIONS
Take 20mEq Potassium on Wednesday    Resume all other medications today EXCEPT Eliquis...do not take until after your procedure    You may take your medications on Thursday morning prior to coming to the hospital

## 2025-02-04 NOTE — NURSING NOTE
Decision made per Dr. Estevez to not proceed with peripheral procedure today.  Patient to remain in cath lab for potassium replacement, then to be discharged home.  Patient verbalizes understanding that procedure will be scheduled as outpatient on Thursday, February 6, 2025

## 2025-02-06 ENCOUNTER — HOSPITAL ENCOUNTER (OUTPATIENT)
Facility: HOSPITAL | Age: 70
Discharge: HOME | End: 2025-02-07
Attending: INTERNAL MEDICINE | Admitting: INTERNAL MEDICINE
Payer: MEDICARE

## 2025-02-06 DIAGNOSIS — I73.9 PAD (PERIPHERAL ARTERY DISEASE): Primary | ICD-10-CM

## 2025-02-06 DIAGNOSIS — I70.223 CRITICAL LIMB ISCHEMIA OF BOTH LOWER EXTREMITIES WITH REST PAIN: ICD-10-CM

## 2025-02-06 LAB
ACT BLD: 147 SECONDS (ref 82–152)
ACT BLD: 158 SECONDS (ref 82–152)
ANION GAP SERPL CALCULATED.3IONS-SCNC: 10.1 MMOL/L (ref 5–15)
BUN SERPL-MCNC: 9 MG/DL (ref 8–23)
BUN/CREAT SERPL: 10.8 (ref 7–25)
CALCIUM SPEC-SCNC: 8.8 MG/DL (ref 8.6–10.5)
CHLORIDE SERPL-SCNC: 104 MMOL/L (ref 98–107)
CO2 SERPL-SCNC: 28.9 MMOL/L (ref 22–29)
CREAT SERPL-MCNC: 0.83 MG/DL (ref 0.57–1)
DEPRECATED RDW RBC AUTO: 54.9 FL (ref 37–54)
EGFRCR SERPLBLD CKD-EPI 2021: 76.4 ML/MIN/1.73
ERYTHROCYTE [DISTWIDTH] IN BLOOD BY AUTOMATED COUNT: 17.3 % (ref 12.3–15.4)
GLUCOSE BLDC GLUCOMTR-MCNC: 139 MG/DL (ref 70–130)
GLUCOSE BLDC GLUCOMTR-MCNC: 145 MG/DL (ref 70–130)
GLUCOSE BLDC GLUCOMTR-MCNC: 157 MG/DL (ref 70–130)
GLUCOSE SERPL-MCNC: 142 MG/DL (ref 65–99)
HCT VFR BLD AUTO: 38.5 % (ref 34–46.6)
HGB BLD-MCNC: 11.5 G/DL (ref 12–15.9)
MCH RBC QN AUTO: 26.1 PG (ref 26.6–33)
MCHC RBC AUTO-ENTMCNC: 29.9 G/DL (ref 31.5–35.7)
MCV RBC AUTO: 87.5 FL (ref 79–97)
PLATELET # BLD AUTO: 306 10*3/MM3 (ref 140–450)
PMV BLD AUTO: 8.7 FL (ref 6–12)
POTASSIUM SERPL-SCNC: 3.2 MMOL/L (ref 3.5–5.2)
QT INTERVAL: 460 MS
QTC INTERVAL: 482 MS
RBC # BLD AUTO: 4.4 10*6/MM3 (ref 3.77–5.28)
SODIUM SERPL-SCNC: 143 MMOL/L (ref 136–145)
WBC NRBC COR # BLD AUTO: 5.72 10*3/MM3 (ref 3.4–10.8)

## 2025-02-06 PROCEDURE — 63710000001 HYDROCODONE-ACETAMINOPHEN 7.5-325 MG TABLET: Performed by: INTERNAL MEDICINE

## 2025-02-06 PROCEDURE — C1894 INTRO/SHEATH, NON-LASER: HCPCS | Performed by: INTERNAL MEDICINE

## 2025-02-06 PROCEDURE — 82948 REAGENT STRIP/BLOOD GLUCOSE: CPT

## 2025-02-06 PROCEDURE — 63710000001 ACETAMINOPHEN 325 MG TABLET: Performed by: INTERNAL MEDICINE

## 2025-02-06 PROCEDURE — 25010000002 FENTANYL CITRATE (PF) 50 MCG/ML SOLUTION: Performed by: INTERNAL MEDICINE

## 2025-02-06 PROCEDURE — 25010000002 PROCHLORPERAZINE 10 MG/2ML SOLUTION: Performed by: INTERNAL MEDICINE

## 2025-02-06 PROCEDURE — C2623 CATH, TRANSLUMIN, DRUG-COAT: HCPCS | Performed by: INTERNAL MEDICINE

## 2025-02-06 PROCEDURE — C1725 CATH, TRANSLUMIN NON-LASER: HCPCS | Performed by: INTERNAL MEDICINE

## 2025-02-06 PROCEDURE — A9270 NON-COVERED ITEM OR SERVICE: HCPCS | Performed by: INTERNAL MEDICINE

## 2025-02-06 PROCEDURE — 63710000001 PROMETHAZINE PER 25 MG: Performed by: INTERNAL MEDICINE

## 2025-02-06 PROCEDURE — C1724 CATH, TRANS ATHEREC,ROTATION: HCPCS | Performed by: INTERNAL MEDICINE

## 2025-02-06 PROCEDURE — 85027 COMPLETE CBC AUTOMATED: CPT | Performed by: INTERNAL MEDICINE

## 2025-02-06 PROCEDURE — 25010000002 HEPARIN (PORCINE) PER 1000 UNITS: Performed by: INTERNAL MEDICINE

## 2025-02-06 PROCEDURE — 63710000001 FUROSEMIDE 20 MG TABLET: Performed by: INTERNAL MEDICINE

## 2025-02-06 PROCEDURE — 80048 BASIC METABOLIC PNL TOTAL CA: CPT | Performed by: INTERNAL MEDICINE

## 2025-02-06 PROCEDURE — 25010000002 HYDRALAZINE PER 20 MG: Performed by: INTERNAL MEDICINE

## 2025-02-06 PROCEDURE — 25010000002 LIDOCAINE 2% SOLUTION: Performed by: INTERNAL MEDICINE

## 2025-02-06 PROCEDURE — 63710000001 ROSUVASTATIN 20 MG TABLET: Performed by: INTERNAL MEDICINE

## 2025-02-06 PROCEDURE — 63710000001 AMLODIPINE 5 MG TABLET: Performed by: INTERNAL MEDICINE

## 2025-02-06 PROCEDURE — 85347 COAGULATION TIME ACTIVATED: CPT | Performed by: INTERNAL MEDICINE

## 2025-02-06 PROCEDURE — 75716 ARTERY X-RAYS ARMS/LEGS: CPT | Performed by: INTERNAL MEDICINE

## 2025-02-06 PROCEDURE — 25810000003 SODIUM CHLORIDE 0.9 % SOLUTION: Performed by: INTERNAL MEDICINE

## 2025-02-06 PROCEDURE — 75625 CONTRAST EXAM ABDOMINL AORTA: CPT | Performed by: INTERNAL MEDICINE

## 2025-02-06 PROCEDURE — 63710000001 TRAMADOL 50 MG TABLET: Performed by: INTERNAL MEDICINE

## 2025-02-06 PROCEDURE — G0378 HOSPITAL OBSERVATION PER HR: HCPCS

## 2025-02-06 PROCEDURE — 25510000001 IOPAMIDOL 61 % SOLUTION: Performed by: INTERNAL MEDICINE

## 2025-02-06 PROCEDURE — 63710000001 VALSARTAN 160 MG TABLET: Performed by: INTERNAL MEDICINE

## 2025-02-06 PROCEDURE — 63710000001 INSULIN GLARGINE PER 5 UNITS: Performed by: INTERNAL MEDICINE

## 2025-02-06 PROCEDURE — 25010000002 MIDAZOLAM PER 1 MG: Performed by: INTERNAL MEDICINE

## 2025-02-06 PROCEDURE — 63710000001 GABAPENTIN 400 MG CAPSULE: Performed by: INTERNAL MEDICINE

## 2025-02-06 PROCEDURE — 93005 ELECTROCARDIOGRAM TRACING: CPT | Performed by: INTERNAL MEDICINE

## 2025-02-06 PROCEDURE — C1887 CATHETER, GUIDING: HCPCS | Performed by: INTERNAL MEDICINE

## 2025-02-06 PROCEDURE — 63710000001 POTASSIUM CHLORIDE 20 MEQ TABLET CONTROLLED-RELEASE: Performed by: INTERNAL MEDICINE

## 2025-02-06 PROCEDURE — C1769 GUIDE WIRE: HCPCS | Performed by: INTERNAL MEDICINE

## 2025-02-06 PROCEDURE — 63710000001 SPIRONOLACTONE 25 MG TABLET: Performed by: INTERNAL MEDICINE

## 2025-02-06 PROCEDURE — 63710000001 CLOPIDOGREL 75 MG TABLET: Performed by: INTERNAL MEDICINE

## 2025-02-06 PROCEDURE — 63710000001 CARVEDILOL 3.125 MG TABLET: Performed by: INTERNAL MEDICINE

## 2025-02-06 RX ORDER — PAROXETINE 10 MG/1
10 TABLET, FILM COATED ORAL DAILY
Status: DISCONTINUED | OUTPATIENT
Start: 2025-02-06 | End: 2025-02-06

## 2025-02-06 RX ORDER — PAROXETINE 20 MG/1
30 TABLET, FILM COATED ORAL DAILY
Status: DISCONTINUED | OUTPATIENT
Start: 2025-02-07 | End: 2025-02-07 | Stop reason: HOSPADM

## 2025-02-06 RX ORDER — PAROXETINE 30 MG/1
30 TABLET, FILM COATED ORAL EVERY MORNING
COMMUNITY

## 2025-02-06 RX ORDER — FAMOTIDINE 40 MG/1
40 TABLET, FILM COATED ORAL 2 TIMES DAILY
COMMUNITY

## 2025-02-06 RX ORDER — VALSARTAN 160 MG/1
320 TABLET ORAL DAILY
Status: DISCONTINUED | OUTPATIENT
Start: 2025-02-06 | End: 2025-02-07 | Stop reason: HOSPADM

## 2025-02-06 RX ORDER — HYDROCODONE BITARTRATE AND ACETAMINOPHEN 7.5; 325 MG/1; MG/1
1 TABLET ORAL EVERY 6 HOURS PRN
Status: DISCONTINUED | OUTPATIENT
Start: 2025-02-06 | End: 2025-02-06

## 2025-02-06 RX ORDER — SODIUM CHLORIDE 9 MG/ML
150 INJECTION, SOLUTION INTRAVENOUS ONCE AS NEEDED
Status: ACTIVE | OUTPATIENT
Start: 2025-02-06 | End: 2025-02-06

## 2025-02-06 RX ORDER — CARVEDILOL 3.12 MG/1
3.12 TABLET ORAL 2 TIMES DAILY WITH MEALS
Status: DISCONTINUED | OUTPATIENT
Start: 2025-02-06 | End: 2025-02-07 | Stop reason: HOSPADM

## 2025-02-06 RX ORDER — MIDAZOLAM HYDROCHLORIDE 1 MG/ML
INJECTION, SOLUTION INTRAMUSCULAR; INTRAVENOUS
Status: DISCONTINUED | OUTPATIENT
Start: 2025-02-06 | End: 2025-02-06 | Stop reason: HOSPADM

## 2025-02-06 RX ORDER — DICYCLOMINE HCL 20 MG
20 TABLET ORAL
Status: DISCONTINUED | OUTPATIENT
Start: 2025-02-06 | End: 2025-02-07 | Stop reason: HOSPADM

## 2025-02-06 RX ORDER — ALPRAZOLAM 0.25 MG/1
0.25 TABLET ORAL 2 TIMES DAILY PRN
Status: CANCELLED | OUTPATIENT
Start: 2025-02-06

## 2025-02-06 RX ORDER — CLOPIDOGREL BISULFATE 75 MG/1
75 TABLET ORAL DAILY
Status: DISCONTINUED | OUTPATIENT
Start: 2025-02-06 | End: 2025-02-07 | Stop reason: HOSPADM

## 2025-02-06 RX ORDER — ALBUTEROL SULFATE 0.83 MG/ML
2.5 SOLUTION RESPIRATORY (INHALATION) EVERY 4 HOURS PRN
Status: DISCONTINUED | OUTPATIENT
Start: 2025-02-06 | End: 2025-02-07 | Stop reason: HOSPADM

## 2025-02-06 RX ORDER — INSULIN LISPRO 100 [IU]/ML
10 INJECTION, SOLUTION INTRAVENOUS; SUBCUTANEOUS NIGHTLY
Status: ON HOLD | COMMUNITY
End: 2025-02-06

## 2025-02-06 RX ORDER — POTASSIUM CHLORIDE 1500 MG/1
20 TABLET, EXTENDED RELEASE ORAL 2 TIMES DAILY WITH MEALS
Status: DISCONTINUED | OUTPATIENT
Start: 2025-02-06 | End: 2025-02-07 | Stop reason: HOSPADM

## 2025-02-06 RX ORDER — PROMETHAZINE HYDROCHLORIDE 25 MG/1
25 TABLET ORAL EVERY 6 HOURS
Status: DISCONTINUED | OUTPATIENT
Start: 2025-02-06 | End: 2025-02-07 | Stop reason: HOSPADM

## 2025-02-06 RX ORDER — INSULIN GLARGINE 100 [IU]/ML
30 INJECTION, SOLUTION SUBCUTANEOUS NIGHTLY
Status: DISCONTINUED | OUTPATIENT
Start: 2025-02-06 | End: 2025-02-07 | Stop reason: HOSPADM

## 2025-02-06 RX ORDER — ACETAMINOPHEN 325 MG/1
650 TABLET ORAL EVERY 4 HOURS PRN
Status: DISCONTINUED | OUTPATIENT
Start: 2025-02-06 | End: 2025-02-07 | Stop reason: HOSPADM

## 2025-02-06 RX ORDER — IOPAMIDOL 612 MG/ML
INJECTION, SOLUTION INTRAVASCULAR
Status: DISCONTINUED | OUTPATIENT
Start: 2025-02-06 | End: 2025-02-06 | Stop reason: HOSPADM

## 2025-02-06 RX ORDER — ALPRAZOLAM 0.25 MG/1
0.25 TABLET ORAL 2 TIMES DAILY PRN
COMMUNITY

## 2025-02-06 RX ORDER — INSULIN LISPRO 100 [IU]/ML
100 INJECTION, SOLUTION INTRAVENOUS; SUBCUTANEOUS
Status: DISCONTINUED | OUTPATIENT
Start: 2025-02-06 | End: 2025-02-06

## 2025-02-06 RX ORDER — LIDOCAINE HYDROCHLORIDE 20 MG/ML
INJECTION, SOLUTION INFILTRATION; PERINEURAL
Status: DISCONTINUED | OUTPATIENT
Start: 2025-02-06 | End: 2025-02-06 | Stop reason: HOSPADM

## 2025-02-06 RX ORDER — HYDROCODONE BITARTRATE AND ACETAMINOPHEN 10; 325 MG/1; MG/1
1 TABLET ORAL EVERY 8 HOURS PRN
Status: CANCELLED | OUTPATIENT
Start: 2025-02-06

## 2025-02-06 RX ORDER — INSULIN LISPRO 100 [IU]/ML
10 INJECTION, SOLUTION INTRAVENOUS; SUBCUTANEOUS
Status: DISCONTINUED | OUTPATIENT
Start: 2025-02-07 | End: 2025-02-07 | Stop reason: HOSPADM

## 2025-02-06 RX ORDER — HYDRALAZINE HYDROCHLORIDE 20 MG/ML
10 INJECTION INTRAMUSCULAR; INTRAVENOUS ONCE
Status: COMPLETED | OUTPATIENT
Start: 2025-02-06 | End: 2025-02-06

## 2025-02-06 RX ORDER — PROCHLORPERAZINE EDISYLATE 5 MG/ML
5 INJECTION INTRAMUSCULAR; INTRAVENOUS ONCE
Status: COMPLETED | OUTPATIENT
Start: 2025-02-06 | End: 2025-02-06

## 2025-02-06 RX ORDER — HYDROCODONE BITARTRATE AND ACETAMINOPHEN 7.5; 325 MG/1; MG/1
1 TABLET ORAL EVERY 8 HOURS PRN
Status: DISCONTINUED | OUTPATIENT
Start: 2025-02-06 | End: 2025-02-07 | Stop reason: HOSPADM

## 2025-02-06 RX ORDER — ROSUVASTATIN CALCIUM 20 MG/1
20 TABLET, COATED ORAL DAILY
Status: DISCONTINUED | OUTPATIENT
Start: 2025-02-06 | End: 2025-02-07 | Stop reason: HOSPADM

## 2025-02-06 RX ORDER — LIDOCAINE 4 G/G
1 PATCH TOPICAL DAILY
Status: DISCONTINUED | OUTPATIENT
Start: 2025-02-06 | End: 2025-02-07 | Stop reason: HOSPADM

## 2025-02-06 RX ORDER — SPIRONOLACTONE 25 MG/1
25 TABLET ORAL DAILY
Status: DISCONTINUED | OUTPATIENT
Start: 2025-02-06 | End: 2025-02-07 | Stop reason: HOSPADM

## 2025-02-06 RX ORDER — AMLODIPINE BESYLATE 5 MG/1
5 TABLET ORAL DAILY
Status: DISCONTINUED | OUTPATIENT
Start: 2025-02-06 | End: 2025-02-07

## 2025-02-06 RX ORDER — FENTANYL CITRATE 50 UG/ML
INJECTION, SOLUTION INTRAMUSCULAR; INTRAVENOUS
Status: DISCONTINUED | OUTPATIENT
Start: 2025-02-06 | End: 2025-02-06 | Stop reason: HOSPADM

## 2025-02-06 RX ORDER — INSULIN LISPRO 100 [IU]/ML
10 INJECTION, SOLUTION INTRAVENOUS; SUBCUTANEOUS NIGHTLY
COMMUNITY

## 2025-02-06 RX ORDER — PAROXETINE 20 MG/1
30 TABLET, FILM COATED ORAL EVERY MORNING
Status: CANCELLED | OUTPATIENT
Start: 2025-02-07

## 2025-02-06 RX ORDER — POTASSIUM CHLORIDE 1500 MG/1
40 TABLET, EXTENDED RELEASE ORAL DAILY
Status: COMPLETED | OUTPATIENT
Start: 2025-02-06 | End: 2025-02-06

## 2025-02-06 RX ORDER — FUROSEMIDE 20 MG/1
20 TABLET ORAL DAILY
Status: DISCONTINUED | OUTPATIENT
Start: 2025-02-06 | End: 2025-02-07 | Stop reason: HOSPADM

## 2025-02-06 RX ORDER — HYDRALAZINE HYDROCHLORIDE 20 MG/ML
INJECTION INTRAMUSCULAR; INTRAVENOUS
Status: DISCONTINUED | OUTPATIENT
Start: 2025-02-06 | End: 2025-02-06 | Stop reason: HOSPADM

## 2025-02-06 RX ORDER — INSULIN LISPRO 100 [IU]/ML
10 INJECTION, SOLUTION INTRAVENOUS; SUBCUTANEOUS NIGHTLY
Status: CANCELLED | OUTPATIENT
Start: 2025-02-06

## 2025-02-06 RX ORDER — TRAMADOL HYDROCHLORIDE 50 MG/1
50 TABLET ORAL EVERY 12 HOURS PRN
Status: DISCONTINUED | OUTPATIENT
Start: 2025-02-06 | End: 2025-02-07 | Stop reason: HOSPADM

## 2025-02-06 RX ORDER — NITROGLYCERIN 0.4 MG/1
0.4 TABLET SUBLINGUAL
Status: DISCONTINUED | OUTPATIENT
Start: 2025-02-06 | End: 2025-02-07 | Stop reason: HOSPADM

## 2025-02-06 RX ORDER — FAMOTIDINE 20 MG/1
40 TABLET, FILM COATED ORAL 2 TIMES DAILY
Status: CANCELLED | OUTPATIENT
Start: 2025-02-06

## 2025-02-06 RX ORDER — HEPARIN SODIUM 1000 [USP'U]/ML
INJECTION, SOLUTION INTRAVENOUS; SUBCUTANEOUS
Status: DISCONTINUED | OUTPATIENT
Start: 2025-02-06 | End: 2025-02-06 | Stop reason: HOSPADM

## 2025-02-06 RX ORDER — SODIUM CHLORIDE 9 MG/ML
INJECTION, SOLUTION INTRAVENOUS
Status: COMPLETED | OUTPATIENT
Start: 2025-02-06 | End: 2025-02-06

## 2025-02-06 RX ORDER — GABAPENTIN 400 MG/1
800 CAPSULE ORAL EVERY 8 HOURS SCHEDULED
Status: DISCONTINUED | OUTPATIENT
Start: 2025-02-06 | End: 2025-02-07 | Stop reason: HOSPADM

## 2025-02-06 RX ORDER — HYDROCODONE BITARTRATE AND ACETAMINOPHEN 10; 325 MG/1; MG/1
1 TABLET ORAL EVERY 8 HOURS PRN
COMMUNITY

## 2025-02-06 RX ADMIN — ROSUVASTATIN 20 MG: 20 TABLET, FILM COATED ORAL at 13:34

## 2025-02-06 RX ADMIN — HYDROCODONE BITARTRATE AND ACETAMINOPHEN 1 TABLET: 7.5; 325 TABLET ORAL at 15:26

## 2025-02-06 RX ADMIN — POTASSIUM CHLORIDE 20 MEQ: 1500 TABLET, EXTENDED RELEASE ORAL at 13:34

## 2025-02-06 RX ADMIN — AMLODIPINE BESYLATE 5 MG: 5 TABLET ORAL at 14:53

## 2025-02-06 RX ADMIN — SPIRONOLACTONE 25 MG: 25 TABLET ORAL at 14:56

## 2025-02-06 RX ADMIN — POTASSIUM CHLORIDE 40 MEQ: 1500 TABLET, EXTENDED RELEASE ORAL at 08:03

## 2025-02-06 RX ADMIN — GABAPENTIN 800 MG: 400 CAPSULE ORAL at 21:41

## 2025-02-06 RX ADMIN — GABAPENTIN 800 MG: 400 CAPSULE ORAL at 13:34

## 2025-02-06 RX ADMIN — POTASSIUM CHLORIDE 20 MEQ: 1500 TABLET, EXTENDED RELEASE ORAL at 17:52

## 2025-02-06 RX ADMIN — FUROSEMIDE 20 MG: 20 TABLET ORAL at 14:55

## 2025-02-06 RX ADMIN — INSULIN GLARGINE 30 UNITS: 100 INJECTION, SOLUTION SUBCUTANEOUS at 21:41

## 2025-02-06 RX ADMIN — ACETAMINOPHEN 650 MG: 325 TABLET ORAL at 20:02

## 2025-02-06 RX ADMIN — TRAMADOL HYDROCHLORIDE 50 MG: 50 TABLET, FILM COATED ORAL at 17:52

## 2025-02-06 RX ADMIN — SODIUM CHLORIDE 1000 ML: 900 INJECTION, SOLUTION INTRAVENOUS at 13:31

## 2025-02-06 RX ADMIN — VALSARTAN 320 MG: 160 TABLET, FILM COATED ORAL at 14:56

## 2025-02-06 RX ADMIN — PROMETHAZINE HYDROCHLORIDE 25 MG: 25 TABLET ORAL at 11:41

## 2025-02-06 RX ADMIN — PROMETHAZINE HYDROCHLORIDE 25 MG: 25 TABLET ORAL at 17:52

## 2025-02-06 RX ADMIN — PROCHLORPERAZINE EDISYLATE 5 MG: 5 INJECTION INTRAMUSCULAR; INTRAVENOUS at 11:41

## 2025-02-06 RX ADMIN — CARVEDILOL 3.12 MG: 3.12 TABLET, FILM COATED ORAL at 14:54

## 2025-02-06 RX ADMIN — HYDRALAZINE HYDROCHLORIDE 10 MG: 20 INJECTION INTRAMUSCULAR; INTRAVENOUS at 15:22

## 2025-02-06 RX ADMIN — CLOPIDOGREL BISULFATE 75 MG: 75 TABLET ORAL at 13:34

## 2025-02-06 NOTE — CASE MANAGEMENT/SOCIAL WORK
Discharge Planning Assessment  [facility]     Patient Name: Monae Chauhan  MRN: [id number]  Today's Date: 2/6/2025    Admit Date: 2/6/2025    Plan: SS received a consutl for new admit, concerned about living situation. SS spoke with pt and ruiz Lovell at bedside on this date. Pt lives alone. PCP is Zachary Sullivan. Pt does not utilize home health servcies at this time. Pt states she has a rolling walker, wheelchair, and straight cane. Pt states she can't remember the DME provider. Pt does not have a POA or living will. Pt states no concerns for living situation. Pt plans to return home at discharge with ruiz Lovell to provide transportation. SS to follow and assist with discharge planning as needed.   Discharge Needs Assessment       Row Name 02/06/25 1609       Living Environment    People in Home alone    Current Living Arrangements home    Potentially Unsafe Housing Conditions none    Primary Care Provided by self    Provides Primary Care For no one    Family Caregiver if Needed child(siri), adult;other relative(s)    Quality of Family Relationships involved;helpful;supportive    Able to Return to Prior Arrangements yes       Resource/Environmental Concerns    Resource/Environmental Concerns none    Transportation Concerns none       Transition Planning    Patient/Family Anticipates Transition to home with family    Patient/Family Anticipated Services at Transition none       Discharge Needs Assessment    Equipment Currently Used at Home walker, rolling;wheelchair;cane, straight    Anticipated Changes Related to Illness none    Equipment Needed After Discharge none         Discharge Plan       Row Name 02/06/25 1611       Plan    Plan SS received a consutl for new admit, concerned about living situation. SS spoke with pt and ruiz Lovell at bedside on this date. Pt lives alone. PCP is Zachary Sullivan. Pt does not utilize home health servcies at this time. Pt states she has a rolling walker, wheelchair, and  straight cane. Pt states she can't remember the DME provider. Pt does not have a POA or living will. Pt states no concerns for living situation. Pt plans to return home at discharge with pipparyan Liliya to provide transportation. SS to follow and assist with discharge planning as needed.    Patient/Family in Agreement with Plan yes        Continued Care and Services - Admitted Since 2/6/2025    No active coordination exists for this encounter.       Expected Discharge Date and Time       Expected Discharge Date Expected Discharge Time    Feb 8, 2025         Demographic Summary       Row Name 02/06/25 9860       General Information    Admission Type inpatient    Referral Source nursing    Reason for Consult discharge planning  SS received a consutl for new admit, concerned about living situation.        PJ HarmonW

## 2025-02-06 NOTE — INTERVAL H&P NOTE
H&P reviewed. The patient was examined and there are no changes to the H&P.      Review of system as per HPI otherwise negative.

## 2025-02-06 NOTE — PLAN OF CARE
Goal Outcome Evaluation:  Plan of Care Reviewed With: patient        Progress: improving  Outcome Evaluation: Patient A/Ox4. She is on room air. Sheath removed this shift, no complications at this time. Bed rest ongoing at this time. Call light within reach. Bed alarm set.

## 2025-02-06 NOTE — Clinical Note
A 6 fr sheath was successfully inserted using micropuncture technique with ultrasound guidance into the left femoral artery.

## 2025-02-07 VITALS
HEIGHT: 63 IN | HEART RATE: 65 BPM | TEMPERATURE: 98.3 F | BODY MASS INDEX: 31.91 KG/M2 | WEIGHT: 180.12 LBS | OXYGEN SATURATION: 96 % | DIASTOLIC BLOOD PRESSURE: 67 MMHG | RESPIRATION RATE: 14 BRPM | SYSTOLIC BLOOD PRESSURE: 153 MMHG

## 2025-02-07 LAB
ANION GAP SERPL CALCULATED.3IONS-SCNC: 5.8 MMOL/L (ref 5–15)
BUN SERPL-MCNC: 9 MG/DL (ref 8–23)
BUN/CREAT SERPL: 11.4 (ref 7–25)
CALCIUM SPEC-SCNC: 8.3 MG/DL (ref 8.6–10.5)
CHLORIDE SERPL-SCNC: 108 MMOL/L (ref 98–107)
CHOLEST SERPL-MCNC: 92 MG/DL (ref 0–200)
CO2 SERPL-SCNC: 25.2 MMOL/L (ref 22–29)
CREAT SERPL-MCNC: 0.79 MG/DL (ref 0.57–1)
DEPRECATED RDW RBC AUTO: 57.6 FL (ref 37–54)
EGFRCR SERPLBLD CKD-EPI 2021: 81.1 ML/MIN/1.73
ERYTHROCYTE [DISTWIDTH] IN BLOOD BY AUTOMATED COUNT: 17.8 % (ref 12.3–15.4)
GLUCOSE BLDC GLUCOMTR-MCNC: 147 MG/DL (ref 70–130)
GLUCOSE SERPL-MCNC: 136 MG/DL (ref 65–99)
HBA1C MFR BLD: 6.8 % (ref 4.8–5.6)
HCT VFR BLD AUTO: 33.4 % (ref 34–46.6)
HDLC SERPL-MCNC: 28 MG/DL (ref 40–60)
HGB BLD-MCNC: 9.7 G/DL (ref 12–15.9)
LDLC SERPL CALC-MCNC: 43 MG/DL (ref 0–100)
LDLC/HDLC SERPL: 1.48 {RATIO}
MCH RBC QN AUTO: 25.9 PG (ref 26.6–33)
MCHC RBC AUTO-ENTMCNC: 29 G/DL (ref 31.5–35.7)
MCV RBC AUTO: 89.1 FL (ref 79–97)
PLATELET # BLD AUTO: 263 10*3/MM3 (ref 140–450)
PMV BLD AUTO: 9 FL (ref 6–12)
POTASSIUM SERPL-SCNC: 3.9 MMOL/L (ref 3.5–5.2)
RBC # BLD AUTO: 3.75 10*6/MM3 (ref 3.77–5.28)
SODIUM SERPL-SCNC: 139 MMOL/L (ref 136–145)
TRIGL SERPL-MCNC: 113 MG/DL (ref 0–150)
VLDLC SERPL-MCNC: 21 MG/DL (ref 5–40)
WBC NRBC COR # BLD AUTO: 7.11 10*3/MM3 (ref 3.4–10.8)

## 2025-02-07 PROCEDURE — A9270 NON-COVERED ITEM OR SERVICE: HCPCS | Performed by: INTERNAL MEDICINE

## 2025-02-07 PROCEDURE — 80061 LIPID PANEL: CPT | Performed by: INTERNAL MEDICINE

## 2025-02-07 PROCEDURE — 63710000001 FUROSEMIDE 20 MG TABLET: Performed by: INTERNAL MEDICINE

## 2025-02-07 PROCEDURE — 63710000001 GABAPENTIN 400 MG CAPSULE: Performed by: INTERNAL MEDICINE

## 2025-02-07 PROCEDURE — 83036 HEMOGLOBIN GLYCOSYLATED A1C: CPT | Performed by: INTERNAL MEDICINE

## 2025-02-07 PROCEDURE — 94799 UNLISTED PULMONARY SVC/PX: CPT

## 2025-02-07 PROCEDURE — 63710000001 VALSARTAN 160 MG TABLET: Performed by: INTERNAL MEDICINE

## 2025-02-07 PROCEDURE — 63710000001 PAROXETINE 20 MG TABLET: Performed by: INTERNAL MEDICINE

## 2025-02-07 PROCEDURE — 80048 BASIC METABOLIC PNL TOTAL CA: CPT | Performed by: INTERNAL MEDICINE

## 2025-02-07 PROCEDURE — 94761 N-INVAS EAR/PLS OXIMETRY MLT: CPT

## 2025-02-07 PROCEDURE — 63710000001 POTASSIUM CHLORIDE 20 MEQ TABLET CONTROLLED-RELEASE: Performed by: INTERNAL MEDICINE

## 2025-02-07 PROCEDURE — 63710000001 CLOPIDOGREL 75 MG TABLET: Performed by: INTERNAL MEDICINE

## 2025-02-07 PROCEDURE — 63710000001 ROSUVASTATIN 20 MG TABLET: Performed by: INTERNAL MEDICINE

## 2025-02-07 PROCEDURE — 85027 COMPLETE CBC AUTOMATED: CPT | Performed by: INTERNAL MEDICINE

## 2025-02-07 PROCEDURE — A9270 NON-COVERED ITEM OR SERVICE: HCPCS | Performed by: NURSE PRACTITIONER

## 2025-02-07 PROCEDURE — 63710000001 HYDROCODONE-ACETAMINOPHEN 7.5-325 MG TABLET: Performed by: INTERNAL MEDICINE

## 2025-02-07 PROCEDURE — 63710000001 CARVEDILOL 3.125 MG TABLET: Performed by: INTERNAL MEDICINE

## 2025-02-07 PROCEDURE — G0378 HOSPITAL OBSERVATION PER HR: HCPCS

## 2025-02-07 PROCEDURE — 63710000001 AMLODIPINE 10 MG TABLET: Performed by: NURSE PRACTITIONER

## 2025-02-07 RX ORDER — AMLODIPINE BESYLATE 10 MG/1
10 TABLET ORAL DAILY
Qty: 30 TABLET | Refills: 2 | Status: SHIPPED | OUTPATIENT
Start: 2025-02-08

## 2025-02-07 RX ORDER — AMLODIPINE BESYLATE 10 MG/1
10 TABLET ORAL DAILY
Status: DISCONTINUED | OUTPATIENT
Start: 2025-02-07 | End: 2025-02-07 | Stop reason: HOSPADM

## 2025-02-07 RX ORDER — FAMOTIDINE 20 MG/1
40 TABLET, FILM COATED ORAL 2 TIMES DAILY
Status: CANCELLED | OUTPATIENT
Start: 2025-02-07

## 2025-02-07 RX ORDER — SODIUM CHLORIDE 9 MG/ML
40 INJECTION, SOLUTION INTRAVENOUS AS NEEDED
Status: DISCONTINUED | OUTPATIENT
Start: 2025-02-07 | End: 2025-02-07 | Stop reason: HOSPADM

## 2025-02-07 RX ORDER — SODIUM CHLORIDE 0.9 % (FLUSH) 0.9 %
10 SYRINGE (ML) INJECTION EVERY 12 HOURS SCHEDULED
Status: DISCONTINUED | OUTPATIENT
Start: 2025-02-07 | End: 2025-02-07 | Stop reason: HOSPADM

## 2025-02-07 RX ORDER — SODIUM CHLORIDE 0.9 % (FLUSH) 0.9 %
10 SYRINGE (ML) INJECTION AS NEEDED
Status: DISCONTINUED | OUTPATIENT
Start: 2025-02-07 | End: 2025-02-07 | Stop reason: HOSPADM

## 2025-02-07 RX ORDER — ALPRAZOLAM 0.25 MG/1
0.25 TABLET ORAL 2 TIMES DAILY PRN
Status: CANCELLED | OUTPATIENT
Start: 2025-02-07

## 2025-02-07 RX ADMIN — FUROSEMIDE 20 MG: 20 TABLET ORAL at 08:07

## 2025-02-07 RX ADMIN — VALSARTAN 320 MG: 160 TABLET, FILM COATED ORAL at 08:09

## 2025-02-07 RX ADMIN — GABAPENTIN 800 MG: 400 CAPSULE ORAL at 06:15

## 2025-02-07 RX ADMIN — AMLODIPINE BESYLATE 10 MG: 10 TABLET ORAL at 08:35

## 2025-02-07 RX ADMIN — ROSUVASTATIN 20 MG: 20 TABLET, FILM COATED ORAL at 08:07

## 2025-02-07 RX ADMIN — CLOPIDOGREL BISULFATE 75 MG: 75 TABLET ORAL at 08:07

## 2025-02-07 RX ADMIN — POTASSIUM CHLORIDE 20 MEQ: 1500 TABLET, EXTENDED RELEASE ORAL at 08:07

## 2025-02-07 RX ADMIN — Medication 10 ML: at 08:10

## 2025-02-07 RX ADMIN — CARVEDILOL 3.12 MG: 3.12 TABLET, FILM COATED ORAL at 08:10

## 2025-02-07 RX ADMIN — PAROXETINE HYDROCHLORIDE 30 MG: 20 TABLET, FILM COATED ORAL at 08:07

## 2025-02-07 RX ADMIN — HYDROCODONE BITARTRATE AND ACETAMINOPHEN 1 TABLET: 7.5; 325 TABLET ORAL at 02:00

## 2025-02-07 NOTE — PLAN OF CARE
Problem: Adult Inpatient Plan of Care  Goal: Absence of Hospital-Acquired Illness or Injury  Intervention: Identify and Manage Fall Risk  Recent Flowsheet Documentation  Taken 2/7/2025 0300 by Ryan Obando RN  Safety Promotion/Fall Prevention:   assistive device/personal items within reach   clutter free environment maintained   safety round/check completed  Taken 2/7/2025 0100 by Ryan Obando RN  Safety Promotion/Fall Prevention:   assistive device/personal items within reach   clutter free environment maintained   safety round/check completed  Taken 2/6/2025 2300 by Ryan Obando RN  Safety Promotion/Fall Prevention:   assistive device/personal items within reach   clutter free environment maintained   safety round/check completed  Taken 2/6/2025 2145 by Ryan Obando RN  Safety Promotion/Fall Prevention:   assistive device/personal items within reach   clutter free environment maintained   safety round/check completed  Taken 2/6/2025 2100 by Ryan Obando RN  Safety Promotion/Fall Prevention:   assistive device/personal items within reach   clutter free environment maintained   safety round/check completed  Taken 2/6/2025 1900 by Ryan Obando RN  Safety Promotion/Fall Prevention:   assistive device/personal items within reach   clutter free environment maintained   safety round/check completed  Intervention: Prevent Skin Injury  Recent Flowsheet Documentation  Taken 2/7/2025 0200 by Ryan Obando RN  Skin Protection: transparent dressing maintained  Taken 2/6/2025 2145 by Ryan Obando RN  Skin Protection:   transparent dressing maintained   incontinence pads utilized  Intervention: Prevent Infection  Recent Flowsheet Documentation  Taken 2/7/2025 0300 by Ryan Obando RN  Infection Prevention: single patient room provided  Taken 2/7/2025 0100 by Ryan Obando RN  Infection Prevention: single patient room provided  Taken 2/6/2025 2300 by Ryan Obando RN  Infection  Prevention: single patient room provided  Taken 2/6/2025 2145 by Ryan Obando RN  Infection Prevention: single patient room provided  Taken 2/6/2025 2100 by Ryan Obando RN  Infection Prevention: single patient room provided  Taken 2/6/2025 1900 by Ryan Obando RN  Infection Prevention: single patient room provided  Goal: Optimal Comfort and Wellbeing  Intervention: Monitor Pain and Promote Comfort  Recent Flowsheet Documentation  Taken 2/7/2025 0200 by Ryan Obando RN  Pain Management Interventions: pain medication given  Taken 2/6/2025 2002 by Ryan Obando RN  Pain Management Interventions: pain medication given  Intervention: Provide Person-Centered Care  Recent Flowsheet Documentation  Taken 2/7/2025 0200 by Ryan Obando RN  Trust Relationship/Rapport:   care explained   choices provided   thoughts/feelings acknowledged  Taken 2/6/2025 2145 by Ryan Obando RN  Trust Relationship/Rapport:   care explained   choices provided   thoughts/feelings acknowledged     Problem: Skin Injury Risk Increased  Goal: Skin Health and Integrity  Intervention: Optimize Skin Protection  Recent Flowsheet Documentation  Taken 2/7/2025 0200 by Ryan Obando RN  Pressure Reduction Techniques:   weight shift assistance provided   frequent weight shift encouraged  Pressure Reduction Devices: positioning supports utilized  Skin Protection: transparent dressing maintained  Taken 2/6/2025 2145 by Ryan Obando RN  Pressure Reduction Techniques:   frequent weight shift encouraged   weight shift assistance provided   heels elevated off bed  Pressure Reduction Devices: pressure-redistributing mattress utilized  Skin Protection:   transparent dressing maintained   incontinence pads utilized   Goal Outcome Evaluation:

## 2025-02-07 NOTE — DISCHARGE SUMMARY
"Patient Identification  Name:  Monae Chauhan  Age:  69 y.o.  Sex:  female  :  1955  MRN:  [id number]  Visit Number:  [id number]    Date of Admission: 2025  Date of Discharge: 25    PCP: Zachary Sullivan MD    DISCHARGE DIAGNOSIS  Peripheral arterial disease  Hypertension  Hyperlipidemia  Insulin-dependent type 2 diabetes  Smoking      CONSULTS   None placed    PROCEDURES PERFORMED  Left femoral artery access-manual pressure for hemostasis.  Abdominal angiogram.  Bilateral iliac angiogram.  Right lower extremity runoff.  Left lower extremity runoff.  Intervention of right SFA .  Rota Rosas arterial thrombectomy.  PTA with a 5 x 200 mm balloon.  PTA with a 5 x 250 mm impact Admiral DCB balloon.     HOSPITAL COURSE  Patient is a 69 y.o. female with coronary artery disease, peripheral arterial disease, hypertension, hyperlipidemia, insulin-dependent type 2 diabetes, and smoking.  Please see the admitting history and physical for further details.  She presented as an outpatient for peripheral arteriogram with possible intervention.  Please see procedure results below.  Her postprocedural course was unremarkable save uncontrolled blood pressure.  Medications were titrated.  She notes that the pain she was having in her leg was resolved.  She does still have some residual numbness.  She does report that she is not taking Eliquis because of a $30 co-pay.  When financial assistance was offered, she reports that she has multiple bottles at home, but she still does not intend to take it.  Plan for staged intervention for left femoral SFA .  Will need arterial ultrasound of right lower extremity in 3 weeks (ordered).    CONDITION ON DISCHARGE  Stable.    VITAL SIGNS  /67   Pulse 65   Temp 98.3 °F (36.8 °C) (Axillary)   Resp 14   Ht 160 cm (63\")   Wt 81.7 kg (180 lb 1.9 oz)   SpO2 96%   BMI 31.91 kg/m²     DISCHARGE PHYSICAL EXAM  Vitals and nursing note reviewed.   Constitutional:       " Appearance: Not in distress.   Eyes:      Conjunctiva/sclera: Conjunctivae normal.   Pulmonary:      Effort: Pulmonary effort is normal.      Breath sounds: Normal breath sounds.   Cardiovascular:      Normal rate. Regular rhythm.      Murmurs: There is no murmur.   Edema:     Peripheral edema absent.   Skin:     General: Skin is warm and dry.      Comments: Left femoral cath access site dressing clean, dry, and intact.  No evidence of hematoma or ecchymosis.   Neurological:      Mental Status: Alert.         RESULTS REVIEW  I reviewed the patient's new clinical results.  Results for orders placed during the hospital encounter of 02/06/25    Cardiac Catheterization/Vascular Study    Conclusion  Conclusion:  1.  Successful traversal of the right SFA  assisted by Rota Rosas thrombectomy and PTA with a 5 x 200 mm balloon and a 5 x 250 mm DCB balloon.  2.   of the left SFA and proximal popliteal artery.          Recommendation:  1.  Continue Eliquis and Plavix.  2.  Patient to be brought back in a few weeks for staged intervention of left SFA .  3.  Smoking cessation.  4.  Ultrasound of the right lower extremity in 3 months.  5.  Routine post-cath care.    [unfilled]        Results from last 7 days   Lab Units 02/07/25  0056 02/06/25  0721 02/04/25  0932   WBC 10*3/mm3 7.11 5.72 6.83   HEMOGLOBIN g/dL 9.7* 11.5* 11.2*   PLATELETS 10*3/mm3 263 306 318     Results from last 7 days   Lab Units 02/07/25  0056 02/06/25  0721 02/04/25  0932   SODIUM mmol/L 139 143 142   POTASSIUM mmol/L 3.9 3.2* 2.8*   CHLORIDE mmol/L 108* 104 104   CO2 mmol/L 25.2 28.9 27.8   BUN mg/dL 9 9 11   CREATININE mg/dL 0.79 0.83 0.85   CALCIUM mg/dL 8.3* 8.8 9.1   GLUCOSE mg/dL 136* 142* 116*     Lab Results   Component Value Date    INR 0.96 02/22/2024    INR 1.0 07/01/2023    INR 1.0 06/30/2023    INR 1.0 06/30/2023    INR 1.0 06/30/2023    INR 1.1 06/30/2023    INR 1.0 06/30/2023     Lab Results   Component Value Date    MG  "1.6 (L) 06/30/2023    MG 1.7 (L) 06/30/2023    MG 2.0 06/30/2023     Lab Results   Component Value Date    TRIG 113 02/07/2025    HDL 28 (L) 02/07/2025    LDL 43 02/07/2025      No results found for: \"PROBNP\"    DISCHARGE DISPOSITION   Home or Self Care    DISCHARGE MEDICATIONS     Discharge Medications        Changes to Medications        Instructions Start Date   amLODIPine 10 MG tablet  Commonly known as: NORVASC  What changed:   medication strength  how much to take   10 mg, Oral, Daily   Start Date: February 8, 2025            Continue These Medications        Instructions Start Date   ALPRAZolam 0.25 MG tablet  Commonly known as: XANAX   0.25 mg, Oral, 2 Times Daily PRN      carvedilol 3.125 MG tablet  Commonly known as: COREG   3.125 mg, Oral, 2 Times Daily With Meals      clopidogrel 75 MG tablet  Commonly known as: PLAVIX   75 mg, Oral, Daily      Eliquis 5 MG tablet tablet  Generic drug: apixaban   5 mg, Oral, 2 Times Daily      famotidine 40 MG tablet  Commonly known as: PEPCID   40 mg, Oral, 2 Times Daily      furosemide 20 MG tablet  Commonly known as: LASIX   1 tablet, Oral, Daily      gabapentin 800 MG tablet  Commonly known as: NEURONTIN   800 mg, Oral, Every 6 Hours PRN      HYDROcodone-acetaminophen  MG per tablet  Commonly known as: NORCO   1 tablet, Oral, Every 8 Hours PRN      insulin glargine 100 UNIT/ML injection  Commonly known as: LANTUS, SEMGLEE   30 Units, Subcutaneous, Nightly      Insulin Lispro (1 Unit Dial) 100 UNIT/ML solution pen-injector  Commonly known as: HUMALOG   10 Units, Nightly      lidocaine 5 %  Commonly known as: LIDODERM   1 patch, Transdermal, Every 24 Hours, Remove & Discard patch within 12 hours or as directed by MD      PARoxetine 30 MG tablet  Commonly known as: PAXIL   30 mg, Oral, Every Morning      potassium chloride 10 MEQ CR capsule  Commonly known as: MICRO-K   Take 1 capsule by mouth Daily.      promethazine 25 MG tablet  Commonly known as: PHENERGAN   1 " tablet, Oral, Every 6 Hours PRN      rosuvastatin 20 MG tablet  Commonly known as: CRESTOR   20 mg, Oral, Daily      Tirzepatide 5 MG/0.5ML solution auto-injector   5 mg, Subcutaneous, Weekly      traMADol 50 MG tablet  Commonly known as: ULTRAM   Take 1 tablet by mouth 2 (Two) Times a Day As Needed for Moderate Pain.      valsartan 320 MG tablet  Commonly known as: DIOVAN   1 tablet, Oral, Daily                    Activity Instructions       Bathing Restrictions      Type of Restriction: Bathing    Bathing Restrictions: No Tub Bath    Lifting Restrictions      Type of Restriction: Lifting    Lifting Restrictions: Lifting Restriction (Indicate Limit)    Weight Limit (Pounds): 5    Length of Lifting Restriction: Two weeks                    TEST  RESULTS PENDING AT DISCHARGE       Electronically signed by DENIS Young, 02/07/25, 10:17 AM EST.        Time: greater than 30 minutes.

## 2025-02-07 NOTE — CASE MANAGEMENT/SOCIAL WORK
Case Management Discharge Note      Final Note: Patient is being dischaged home on this date 2/7/25.  No needs identified.         Selected Continued Care - Admitted Since 2/6/2025               Final Discharge Disposition Code: 01 - home or self-care

## 2025-02-07 NOTE — NURSING NOTE
"Consult received for possible arterial ulcer to left pinky toe. Patient agreeable to allow WOCN to assess foot but said, \"there's nothing to do - that spot has been there for a year, my doctor is aware of it.\" A small red/brown discoloration noted to the fifth lateral toe with dry, flaky edge no opening present, no drainage. There is a dry, brown/black scab noted to the anterior left foot that appears to be an arterial ulcer. Edges are dried, closed; alonso wound is dry and intact. No drainage noted. Patient stated \"it had been open but it healed up\" after her doctor put her on an antibiotic for it. Recommend leaving open to air.     Adan score 18. PI prevention orders initiated.   "

## 2025-02-07 NOTE — DISCHARGE INSTR - APPOINTMENTS
Faxed Dr. Gill office visit notes per patient request. Fax receipt verified.   Follow up appointments:  PCP- February 14th @ 2:30pm   Cardiology- February 21st @8:30am

## 2025-02-19 ENCOUNTER — TELEPHONE (OUTPATIENT)
Dept: CARDIOLOGY | Facility: CLINIC | Age: 70
End: 2025-02-19
Payer: MEDICARE

## 2025-02-19 DIAGNOSIS — I70.223 CRITICAL LIMB ISCHEMIA OF BOTH LOWER EXTREMITIES WITH REST PAIN: Primary | ICD-10-CM

## 2025-02-19 NOTE — TELEPHONE ENCOUNTER
Patient underwent successful intervention (Rota Rosas thrombectomy and PTA with a 5 x 200 mm balloon and a 5 x 250 mm DCB balloon) of the right SFA  with Dr. Estevez on 2/6/2024.  At discharge he requested a right lower extremity arterial Doppler 3 weeks post peripheral arteriogram.    The hospital called to schedule her ultrasound and she was confused and she did not know why it was ordered.    I called her and talked with her about this right lower extremity Doppler that was ordered at discharge from hospital.  I explained that Dr. Estevez wanted to check on her blood flow.  I answered her questions and she agreed to proceed with this study.    Patient also needed to be scheduled for a left peripheral arteriogram.  We will schedule her for that next week with Dr. Doyle.

## 2025-02-26 ENCOUNTER — HOSPITAL ENCOUNTER (OUTPATIENT)
Facility: HOSPITAL | Age: 70
Discharge: HOME | End: 2025-02-27
Attending: INTERNAL MEDICINE | Admitting: INTERNAL MEDICINE
Payer: MEDICARE

## 2025-02-26 DIAGNOSIS — I70.223 CRITICAL LIMB ISCHEMIA OF BOTH LOWER EXTREMITIES WITH REST PAIN: ICD-10-CM

## 2025-02-26 PROBLEM — I73.9 PERIPHERAL ARTERY DISEASE: Status: ACTIVE | Noted: 2025-02-26

## 2025-02-26 LAB
ACT BLD: 153 SECONDS (ref 82–152)
ACT BLD: 176 SECONDS (ref 82–152)
ACT BLD: 210 SECONDS (ref 82–152)
ACT BLD: 245 SECONDS (ref 82–152)
ANION GAP SERPL CALCULATED.3IONS-SCNC: 7.6 MMOL/L (ref 5–15)
BUN SERPL-MCNC: 14 MG/DL (ref 8–23)
BUN/CREAT SERPL: 14.1 (ref 7–25)
CALCIUM SPEC-SCNC: 8.8 MG/DL (ref 8.6–10.5)
CHLORIDE SERPL-SCNC: 103 MMOL/L (ref 98–107)
CO2 SERPL-SCNC: 30.4 MMOL/L (ref 22–29)
CREAT SERPL-MCNC: 0.99 MG/DL (ref 0.57–1)
DEPRECATED RDW RBC AUTO: 58.8 FL (ref 37–54)
EGFRCR SERPLBLD CKD-EPI 2021: 61.9 ML/MIN/1.73
ERYTHROCYTE [DISTWIDTH] IN BLOOD BY AUTOMATED COUNT: 18.2 % (ref 12.3–15.4)
GLUCOSE SERPL-MCNC: 123 MG/DL (ref 65–99)
HCT VFR BLD AUTO: 37.8 % (ref 34–46.6)
HGB BLD-MCNC: 11 G/DL (ref 12–15.9)
MCH RBC QN AUTO: 26.1 PG (ref 26.6–33)
MCHC RBC AUTO-ENTMCNC: 29.1 G/DL (ref 31.5–35.7)
MCV RBC AUTO: 89.8 FL (ref 79–97)
PLATELET # BLD AUTO: 329 10*3/MM3 (ref 140–450)
PMV BLD AUTO: 9.1 FL (ref 6–12)
POTASSIUM SERPL-SCNC: 3.8 MMOL/L (ref 3.5–5.2)
RBC # BLD AUTO: 4.21 10*6/MM3 (ref 3.77–5.28)
SODIUM SERPL-SCNC: 141 MMOL/L (ref 136–145)
WBC NRBC COR # BLD AUTO: 7.75 10*3/MM3 (ref 3.4–10.8)

## 2025-02-26 PROCEDURE — 25010000002 MIDAZOLAM PER 1 MG: Performed by: INTERNAL MEDICINE

## 2025-02-26 PROCEDURE — 99152 MOD SED SAME PHYS/QHP 5/>YRS: CPT | Performed by: INTERNAL MEDICINE

## 2025-02-26 PROCEDURE — 80048 BASIC METABOLIC PNL TOTAL CA: CPT | Performed by: INTERNAL MEDICINE

## 2025-02-26 PROCEDURE — 25810000003 SODIUM CHLORIDE 0.9 % SOLUTION: Performed by: INTERNAL MEDICINE

## 2025-02-26 PROCEDURE — C1887 CATHETER, GUIDING: HCPCS | Performed by: INTERNAL MEDICINE

## 2025-02-26 PROCEDURE — 94761 N-INVAS EAR/PLS OXIMETRY MLT: CPT

## 2025-02-26 PROCEDURE — A9270 NON-COVERED ITEM OR SERVICE: HCPCS | Performed by: INTERNAL MEDICINE

## 2025-02-26 PROCEDURE — C1894 INTRO/SHEATH, NON-LASER: HCPCS | Performed by: INTERNAL MEDICINE

## 2025-02-26 PROCEDURE — 85347 COAGULATION TIME ACTIVATED: CPT

## 2025-02-26 PROCEDURE — C1769 GUIDE WIRE: HCPCS | Performed by: INTERNAL MEDICINE

## 2025-02-26 PROCEDURE — C2623 CATH, TRANSLUMIN, DRUG-COAT: HCPCS | Performed by: INTERNAL MEDICINE

## 2025-02-26 PROCEDURE — 25510000001 IOPAMIDOL 61 % SOLUTION: Performed by: INTERNAL MEDICINE

## 2025-02-26 PROCEDURE — 63710000001 HYDROCODONE-ACETAMINOPHEN 10-325 MG TABLET: Performed by: INTERNAL MEDICINE

## 2025-02-26 PROCEDURE — 75716 ARTERY X-RAYS ARMS/LEGS: CPT | Performed by: INTERNAL MEDICINE

## 2025-02-26 PROCEDURE — 37252 INTRVASC US NONCORONARY 1ST: CPT | Performed by: INTERNAL MEDICINE

## 2025-02-26 PROCEDURE — 25010000002 LIDOCAINE 2% SOLUTION: Performed by: INTERNAL MEDICINE

## 2025-02-26 PROCEDURE — 85347 COAGULATION TIME ACTIVATED: CPT | Performed by: INTERNAL MEDICINE

## 2025-02-26 PROCEDURE — 76937 US GUIDE VASCULAR ACCESS: CPT | Performed by: INTERNAL MEDICINE

## 2025-02-26 PROCEDURE — 37225 PR REVSC OPN/PRQ FEM/POP W/ATHRC/ANGIOP SM VSL: CPT | Performed by: INTERNAL MEDICINE

## 2025-02-26 PROCEDURE — 85027 COMPLETE CBC AUTOMATED: CPT | Performed by: INTERNAL MEDICINE

## 2025-02-26 PROCEDURE — 94799 UNLISTED PULMONARY SVC/PX: CPT

## 2025-02-26 PROCEDURE — C1724 CATH, TRANS ATHEREC,ROTATION: HCPCS | Performed by: INTERNAL MEDICINE

## 2025-02-26 PROCEDURE — G0378 HOSPITAL OBSERVATION PER HR: HCPCS

## 2025-02-26 PROCEDURE — 25010000002 HEPARIN (PORCINE) PER 1000 UNITS: Performed by: INTERNAL MEDICINE

## 2025-02-26 PROCEDURE — 99153 MOD SED SAME PHYS/QHP EA: CPT | Performed by: INTERNAL MEDICINE

## 2025-02-26 PROCEDURE — C1753 CATH, INTRAVAS ULTRASOUND: HCPCS | Performed by: INTERNAL MEDICINE

## 2025-02-26 PROCEDURE — C1725 CATH, TRANSLUMIN NON-LASER: HCPCS | Performed by: INTERNAL MEDICINE

## 2025-02-26 PROCEDURE — 25010000002 FENTANYL CITRATE (PF) 50 MCG/ML SOLUTION: Performed by: INTERNAL MEDICINE

## 2025-02-26 RX ORDER — ACETAMINOPHEN 325 MG/1
650 TABLET ORAL EVERY 4 HOURS PRN
Status: DISCONTINUED | OUTPATIENT
Start: 2025-02-26 | End: 2025-02-27 | Stop reason: HOSPADM

## 2025-02-26 RX ORDER — LIDOCAINE HYDROCHLORIDE 20 MG/ML
INJECTION, SOLUTION INFILTRATION; PERINEURAL
Status: DISCONTINUED | OUTPATIENT
Start: 2025-02-26 | End: 2025-02-26 | Stop reason: HOSPADM

## 2025-02-26 RX ORDER — HYDROCODONE BITARTRATE AND ACETAMINOPHEN 10; 325 MG/1; MG/1
1 TABLET ORAL EVERY 8 HOURS PRN
Status: DISCONTINUED | OUTPATIENT
Start: 2025-02-26 | End: 2025-02-27 | Stop reason: HOSPADM

## 2025-02-26 RX ORDER — SODIUM CHLORIDE 9 MG/ML
40 INJECTION, SOLUTION INTRAVENOUS AS NEEDED
Status: DISCONTINUED | OUTPATIENT
Start: 2025-02-26 | End: 2025-02-27 | Stop reason: HOSPADM

## 2025-02-26 RX ORDER — NITROGLYCERIN 0.4 MG/1
0.4 TABLET SUBLINGUAL
Status: DISCONTINUED | OUTPATIENT
Start: 2025-02-26 | End: 2025-02-27 | Stop reason: HOSPADM

## 2025-02-26 RX ORDER — HEPARIN SODIUM 1000 [USP'U]/ML
INJECTION, SOLUTION INTRAVENOUS; SUBCUTANEOUS
Status: DISCONTINUED | OUTPATIENT
Start: 2025-02-26 | End: 2025-02-26 | Stop reason: HOSPADM

## 2025-02-26 RX ORDER — SODIUM CHLORIDE 0.9 % (FLUSH) 0.9 %
10 SYRINGE (ML) INJECTION EVERY 12 HOURS SCHEDULED
Status: DISCONTINUED | OUTPATIENT
Start: 2025-02-26 | End: 2025-02-27 | Stop reason: HOSPADM

## 2025-02-26 RX ORDER — DICYCLOMINE HCL 20 MG
20 TABLET ORAL 3 TIMES DAILY PRN
COMMUNITY

## 2025-02-26 RX ORDER — INSULIN LISPRO 100 [IU]/ML
10 INJECTION, SOLUTION INTRAVENOUS; SUBCUTANEOUS NIGHTLY
Status: CANCELLED | OUTPATIENT
Start: 2025-02-26

## 2025-02-26 RX ORDER — IOPAMIDOL 612 MG/ML
INJECTION, SOLUTION INTRAVASCULAR
Status: DISCONTINUED | OUTPATIENT
Start: 2025-02-26 | End: 2025-02-26 | Stop reason: HOSPADM

## 2025-02-26 RX ORDER — HYDROCODONE BITARTRATE AND ACETAMINOPHEN 10; 325 MG/1; MG/1
1 TABLET ORAL EVERY 8 HOURS PRN
Status: CANCELLED | OUTPATIENT
Start: 2025-02-26

## 2025-02-26 RX ORDER — FENTANYL CITRATE 50 UG/ML
INJECTION, SOLUTION INTRAMUSCULAR; INTRAVENOUS
Status: DISCONTINUED | OUTPATIENT
Start: 2025-02-26 | End: 2025-02-26 | Stop reason: HOSPADM

## 2025-02-26 RX ORDER — SODIUM CHLORIDE 9 MG/ML
INJECTION, SOLUTION INTRAVENOUS
Status: COMPLETED | OUTPATIENT
Start: 2025-02-26 | End: 2025-02-26

## 2025-02-26 RX ORDER — SODIUM CHLORIDE 0.9 % (FLUSH) 0.9 %
10 SYRINGE (ML) INJECTION AS NEEDED
Status: DISCONTINUED | OUTPATIENT
Start: 2025-02-26 | End: 2025-02-27 | Stop reason: HOSPADM

## 2025-02-26 RX ORDER — MIDAZOLAM HYDROCHLORIDE 1 MG/ML
INJECTION, SOLUTION INTRAMUSCULAR; INTRAVENOUS
Status: DISCONTINUED | OUTPATIENT
Start: 2025-02-26 | End: 2025-02-26 | Stop reason: HOSPADM

## 2025-02-26 RX ADMIN — HYDROCODONE BITARTRATE AND ACETAMINOPHEN 1 TABLET: 10; 325 TABLET ORAL at 17:44

## 2025-02-26 RX ADMIN — Medication 10 ML: at 21:07

## 2025-02-26 NOTE — INTERVAL H&P NOTE
H&P reviewed. The patient was examined and there are no changes to the H&P.    ROS as above otherwise negative  Exam limited as this is a peripheral procedure

## 2025-02-26 NOTE — Clinical Note
IVL device used: SHOCKWAVE M5 IVL 6X60-135;   20 total pulses delivered;   Maximum Pressure achieved was 5 raj and   maximum inflation time of 30 seconds.   Total number of inflations: 10.

## 2025-02-26 NOTE — PLAN OF CARE
Problem: Adult Inpatient Plan of Care  Goal: Plan of Care Review  Outcome: Progressing  Flowsheets (Taken 2/26/2025 1548)  Progress: no change  Plan of Care Reviewed With: patient  Goal: Patient-Specific Goal (Individualized)  Outcome: Progressing  Goal: Absence of Hospital-Acquired Illness or Injury  Outcome: Progressing  Intervention: Identify and Manage Fall Risk  Recent Flowsheet Documentation  Taken 2/26/2025 1500 by Deena Guevara, RN  Safety Promotion/Fall Prevention:   activity supervised   assistive device/personal items within reach   clutter free environment maintained   fall prevention program maintained   nonskid shoes/slippers when out of bed   room organization consistent   safety round/check completed  Intervention: Prevent Infection  Recent Flowsheet Documentation  Taken 2/26/2025 1500 by Deena Guevara RN  Infection Prevention: rest/sleep promoted  Goal: Optimal Comfort and Wellbeing  Outcome: Progressing  Goal: Readiness for Transition of Care  Outcome: Progressing  Intervention: Mutually Develop Transition Plan  Recent Flowsheet Documentation  Taken 2/26/2025 1406 by Deena Guevara, RN  Equipment Currently Used at Home:   cane, straight   walker, standard   wheelchair  Transportation Anticipated: family or friend will provide  Transportation Concerns: none  Patient/Family Anticipated Services at Transition: none  Patient/Family Anticipates Transition to: home with family     Problem: Comorbidity Management  Goal: Blood Pressure in Desired Range  Outcome: Progressing     Problem: Fall Injury Risk  Goal: Absence of Fall and Fall-Related Injury  Outcome: Progressing  Intervention: Promote Injury-Free Environment  Recent Flowsheet Documentation  Taken 2/26/2025 1500 by Deena Guevara RN  Safety Promotion/Fall Prevention:   activity supervised   assistive device/personal items within reach   clutter free environment maintained   fall prevention program maintained   nonskid  shoes/slippers when out of bed   room organization consistent   safety round/check completed     Problem: Skin Injury Risk Increased  Goal: Skin Health and Integrity  Outcome: Progressing   Goal Outcome Evaluation:  Plan of Care Reviewed With: patient        Progress: no change

## 2025-02-27 VITALS
OXYGEN SATURATION: 100 % | HEIGHT: 63 IN | SYSTOLIC BLOOD PRESSURE: 152 MMHG | WEIGHT: 182.54 LBS | RESPIRATION RATE: 8 BRPM | TEMPERATURE: 98.5 F | DIASTOLIC BLOOD PRESSURE: 75 MMHG | BODY MASS INDEX: 32.34 KG/M2 | HEART RATE: 71 BPM

## 2025-02-27 LAB
ANION GAP SERPL CALCULATED.3IONS-SCNC: 6.6 MMOL/L (ref 5–15)
BUN SERPL-MCNC: 14 MG/DL (ref 8–23)
BUN/CREAT SERPL: 13.5 (ref 7–25)
CALCIUM SPEC-SCNC: 8.8 MG/DL (ref 8.6–10.5)
CHLORIDE SERPL-SCNC: 106 MMOL/L (ref 98–107)
CHOLEST SERPL-MCNC: 113 MG/DL (ref 0–200)
CO2 SERPL-SCNC: 30.4 MMOL/L (ref 22–29)
CREAT SERPL-MCNC: 1.04 MG/DL (ref 0.57–1)
DEPRECATED RDW RBC AUTO: 57.8 FL (ref 37–54)
EGFRCR SERPLBLD CKD-EPI 2021: 58.3 ML/MIN/1.73
ERYTHROCYTE [DISTWIDTH] IN BLOOD BY AUTOMATED COUNT: 18.1 % (ref 12.3–15.4)
GLUCOSE SERPL-MCNC: 144 MG/DL (ref 65–99)
HBA1C MFR BLD: 6.9 % (ref 4.8–5.6)
HCT VFR BLD AUTO: 33.7 % (ref 34–46.6)
HDLC SERPL-MCNC: 24 MG/DL (ref 40–60)
HGB BLD-MCNC: 10.1 G/DL (ref 12–15.9)
LDLC SERPL CALC-MCNC: 36 MG/DL (ref 0–100)
LDLC/HDLC SERPL: 0.71 {RATIO}
MCH RBC QN AUTO: 26.4 PG (ref 26.6–33)
MCHC RBC AUTO-ENTMCNC: 30 G/DL (ref 31.5–35.7)
MCV RBC AUTO: 88.2 FL (ref 79–97)
PLATELET # BLD AUTO: 262 10*3/MM3 (ref 140–450)
PMV BLD AUTO: 9.4 FL (ref 6–12)
POTASSIUM SERPL-SCNC: 3.8 MMOL/L (ref 3.5–5.2)
RBC # BLD AUTO: 3.82 10*6/MM3 (ref 3.77–5.28)
SODIUM SERPL-SCNC: 143 MMOL/L (ref 136–145)
TRIGL SERPL-MCNC: 360 MG/DL (ref 0–150)
VLDLC SERPL-MCNC: 53 MG/DL (ref 5–40)
WBC NRBC COR # BLD AUTO: 7.63 10*3/MM3 (ref 3.4–10.8)

## 2025-02-27 PROCEDURE — G0378 HOSPITAL OBSERVATION PER HR: HCPCS

## 2025-02-27 PROCEDURE — 85027 COMPLETE CBC AUTOMATED: CPT | Performed by: INTERNAL MEDICINE

## 2025-02-27 PROCEDURE — 83036 HEMOGLOBIN GLYCOSYLATED A1C: CPT | Performed by: INTERNAL MEDICINE

## 2025-02-27 PROCEDURE — A9270 NON-COVERED ITEM OR SERVICE: HCPCS | Performed by: INTERNAL MEDICINE

## 2025-02-27 PROCEDURE — 80048 BASIC METABOLIC PNL TOTAL CA: CPT | Performed by: INTERNAL MEDICINE

## 2025-02-27 PROCEDURE — 80061 LIPID PANEL: CPT | Performed by: INTERNAL MEDICINE

## 2025-02-27 PROCEDURE — 99214 OFFICE O/P EST MOD 30 MIN: CPT | Performed by: NURSE PRACTITIONER

## 2025-02-27 PROCEDURE — 63710000001 HYDROCODONE-ACETAMINOPHEN 10-325 MG TABLET: Performed by: INTERNAL MEDICINE

## 2025-02-27 RX ORDER — LIDOCAINE 4 G/G
1 PATCH TOPICAL EVERY 24 HOURS
Status: CANCELLED | OUTPATIENT
Start: 2025-02-27

## 2025-02-27 RX ORDER — ALPRAZOLAM 0.25 MG
0.25 TABLET ORAL 2 TIMES DAILY PRN
Status: CANCELLED | OUTPATIENT
Start: 2025-02-27

## 2025-02-27 RX ORDER — SODIUM CHLORIDE 9 MG/ML
40 INJECTION, SOLUTION INTRAVENOUS AS NEEDED
Status: DISCONTINUED | OUTPATIENT
Start: 2025-02-27 | End: 2025-02-27 | Stop reason: HOSPADM

## 2025-02-27 RX ORDER — FAMOTIDINE 20 MG/1
40 TABLET, FILM COATED ORAL 2 TIMES DAILY
Status: CANCELLED | OUTPATIENT
Start: 2025-02-27

## 2025-02-27 RX ORDER — PROMETHAZINE HYDROCHLORIDE 25 MG/1
25 TABLET ORAL EVERY 6 HOURS PRN
Status: CANCELLED | OUTPATIENT
Start: 2025-02-27

## 2025-02-27 RX ORDER — DICYCLOMINE HCL 20 MG
20 TABLET ORAL 3 TIMES DAILY PRN
Status: CANCELLED | OUTPATIENT
Start: 2025-02-27

## 2025-02-27 RX ORDER — AMLODIPINE BESYLATE 10 MG/1
10 TABLET ORAL DAILY
Status: CANCELLED | OUTPATIENT
Start: 2025-02-27

## 2025-02-27 RX ORDER — CLOPIDOGREL BISULFATE 75 MG/1
75 TABLET ORAL DAILY
Status: CANCELLED | OUTPATIENT
Start: 2025-02-27

## 2025-02-27 RX ORDER — FUROSEMIDE 20 MG/1
20 TABLET ORAL DAILY
Status: CANCELLED | OUTPATIENT
Start: 2025-02-27

## 2025-02-27 RX ORDER — SODIUM CHLORIDE 0.9 % (FLUSH) 0.9 %
10 SYRINGE (ML) INJECTION AS NEEDED
Status: DISCONTINUED | OUTPATIENT
Start: 2025-02-27 | End: 2025-02-27 | Stop reason: HOSPADM

## 2025-02-27 RX ORDER — INSULIN GLARGINE 100 [IU]/ML
30 INJECTION, SOLUTION SUBCUTANEOUS NIGHTLY
Status: CANCELLED | OUTPATIENT
Start: 2025-02-27

## 2025-02-27 RX ORDER — VALSARTAN 80 MG/1
320 TABLET ORAL DAILY
Status: CANCELLED | OUTPATIENT
Start: 2025-02-27

## 2025-02-27 RX ORDER — POTASSIUM CHLORIDE 1500 MG/1
10 TABLET, EXTENDED RELEASE ORAL DAILY
Status: CANCELLED | OUTPATIENT
Start: 2025-02-27

## 2025-02-27 RX ORDER — SODIUM CHLORIDE 0.9 % (FLUSH) 0.9 %
10 SYRINGE (ML) INJECTION EVERY 12 HOURS SCHEDULED
Status: DISCONTINUED | OUTPATIENT
Start: 2025-02-27 | End: 2025-02-27 | Stop reason: HOSPADM

## 2025-02-27 RX ORDER — CARVEDILOL 3.12 MG/1
3.12 TABLET ORAL 2 TIMES DAILY WITH MEALS
Status: CANCELLED | OUTPATIENT
Start: 2025-02-27

## 2025-02-27 RX ORDER — GABAPENTIN 300 MG/1
600 CAPSULE ORAL EVERY 8 HOURS SCHEDULED
Status: CANCELLED | OUTPATIENT
Start: 2025-02-27

## 2025-02-27 RX ORDER — TRAMADOL HYDROCHLORIDE 50 MG/1
50 TABLET ORAL 2 TIMES DAILY PRN
Status: CANCELLED | OUTPATIENT
Start: 2025-02-27

## 2025-02-27 RX ORDER — ROSUVASTATIN CALCIUM 20 MG/1
20 TABLET, COATED ORAL DAILY
Status: CANCELLED | OUTPATIENT
Start: 2025-02-27

## 2025-02-27 RX ADMIN — HYDROCODONE BITARTRATE AND ACETAMINOPHEN 1 TABLET: 10; 325 TABLET ORAL at 01:09

## 2025-02-27 RX ADMIN — Medication 10 ML: at 08:59

## 2025-02-27 NOTE — PLAN OF CARE
Problem: Adult Inpatient Plan of Care  Goal: Plan of Care Review  Outcome: Met  Flowsheets (Taken 2/27/2025 0915)  Outcome Evaluation: Patient being discharged  Goal: Patient-Specific Goal (Individualized)  Outcome: Met  Goal: Absence of Hospital-Acquired Illness or Injury  Outcome: Met  Goal: Optimal Comfort and Wellbeing  Outcome: Met  Goal: Readiness for Transition of Care  Outcome: Met     Problem: Comorbidity Management  Goal: Blood Pressure in Desired Range  Outcome: Met     Problem: Fall Injury Risk  Goal: Absence of Fall and Fall-Related Injury  Outcome: Met     Problem: Skin Injury Risk Increased  Goal: Skin Health and Integrity  Outcome: Met   Goal Outcome Evaluation:              Outcome Evaluation: Patient being discharged

## 2025-02-27 NOTE — PLAN OF CARE
Problem: Adult Inpatient Plan of Care  Goal: Plan of Care Review  Outcome: Progressing  Goal: Patient-Specific Goal (Individualized)  Outcome: Progressing  Goal: Absence of Hospital-Acquired Illness or Injury  Outcome: Progressing  Intervention: Identify and Manage Fall Risk  Recent Flowsheet Documentation  Taken 2/27/2025 0305 by Zuleyma Vazquez RN  Safety Promotion/Fall Prevention: safety round/check completed  Taken 2/27/2025 0300 by Zuleyma Vazquez RN  Safety Promotion/Fall Prevention: safety round/check completed  Taken 2/27/2025 0100 by Zuleyma Vazquez RN  Safety Promotion/Fall Prevention: safety round/check completed  Taken 2/26/2025 2300 by Zuleyma Vazquez RN  Safety Promotion/Fall Prevention: safety round/check completed  Taken 2/26/2025 2100 by Zuleyma Vazquez RN  Safety Promotion/Fall Prevention: safety round/check completed  Taken 2/26/2025 1930 by Zuleyma Vazquez RN  Safety Promotion/Fall Prevention: safety round/check completed  Taken 2/26/2025 1900 by Zuleyma Vazquez RN  Safety Promotion/Fall Prevention: safety round/check completed  Intervention: Prevent and Manage VTE (Venous Thromboembolism) Risk  Recent Flowsheet Documentation  Taken 2/27/2025 0305 by Zuleyma Vazquez RN  VTE Prevention/Management: (see MAR) other (see comments)  Taken 2/26/2025 1930 by Zuleyma Vazquez RN  VTE Prevention/Management: (see MAR) other (see comments)  Goal: Optimal Comfort and Wellbeing  Outcome: Progressing  Intervention: Monitor Pain and Promote Comfort  Recent Flowsheet Documentation  Taken 2/27/2025 0109 by Zuleyma Vazquez RN  Pain Management Interventions:   pain medication given   pillow support provided   position adjusted  Intervention: Provide Person-Centered Care  Recent Flowsheet Documentation  Taken 2/27/2025 0305 by Zuleyma Vazquez RN  Trust Relationship/Rapport:   care explained   choices provided  Taken 2/26/2025 1930 by Zuleyma Vazquez RN  Trust Relationship/Rapport:   care explained    choices provided  Goal: Readiness for Transition of Care  Outcome: Progressing     Problem: Comorbidity Management  Goal: Blood Pressure in Desired Range  Outcome: Progressing     Problem: Fall Injury Risk  Goal: Absence of Fall and Fall-Related Injury  Outcome: Progressing  Intervention: Promote Injury-Free Environment  Recent Flowsheet Documentation  Taken 2/27/2025 0305 by Zuleyma Vazquez RN  Safety Promotion/Fall Prevention: safety round/check completed  Taken 2/27/2025 0300 by Zuleyma Vazquez RN  Safety Promotion/Fall Prevention: safety round/check completed  Taken 2/27/2025 0100 by Zuleyma Vazqeuz RN  Safety Promotion/Fall Prevention: safety round/check completed  Taken 2/26/2025 2300 by Zuleyma Vazquez RN  Safety Promotion/Fall Prevention: safety round/check completed  Taken 2/26/2025 2100 by Zuleyma Vazquez RN  Safety Promotion/Fall Prevention: safety round/check completed  Taken 2/26/2025 1930 by Zuleyma Vazquez RN  Safety Promotion/Fall Prevention: safety round/check completed  Taken 2/26/2025 1900 by Zuleyma Vazquez RN  Safety Promotion/Fall Prevention: safety round/check completed     Problem: Skin Injury Risk Increased  Goal: Skin Health and Integrity  Outcome: Progressing  Intervention: Optimize Skin Protection  Recent Flowsheet Documentation  Taken 2/27/2025 0305 by Zuleyma Vazquez RN  Activity Management: activity encouraged  Pressure Reduction Techniques: frequent weight shift encouraged  Pressure Reduction Devices: pressure-redistributing mattress utilized  Taken 2/26/2025 1930 by Zuleyma Vazquez RN  Activity Management: bedrest  Pressure Reduction Techniques: frequent weight shift encouraged  Pressure Reduction Devices: pressure-redistributing mattress utilized   Goal Outcome Evaluation:

## 2025-02-27 NOTE — NURSING NOTE
Cardiac Rehab referral received on patient. After reviewing patient information there is no qualifying diagnosis noted at this time .Qualifications for Cardiac Rehab include Coronary Stenting, AMI (NSTEMI Type 1, STEMI), Stable Angina, CABG, Heart Valve Repair/Replacement, Heart Transplant or Stable Chronic Heart Failure (with these specific qualifications, Left Ventricular Ejection Fraction of 35% or less, NYHA class II to IV symptoms despite optimal heart failure therapy for at least 6 weeks and has not had a recent (less than or equal to 6 weeks) or planned (less than or equal to 6 months) major cardiovascular hospitalizations or procedures. Due to patient being in the hospital, does not meet criteria at this time) Please contact us at [phone] with questions.    If the diagnosis is CHF when the patient meets the CHF criteria for Cardiac Rehab with a new order we will gladly schedule them.

## 2025-02-27 NOTE — DISCHARGE SUMMARY
"Patient Identification  Name:  Monae Chauhan  Age:  69 y.o.  Sex:  female  :  1955  MRN:  [id number]  Visit Number:  [id number]    Date of Admission: 2025  Date of Discharge: 2025    PCP: Zachary Sullivan MD    DISCHARGE DIAGNOSIS  For all arterial disease  Hypertension  Hyperlipidemia  Insulin-dependent type 2 diabetes  Smoking    CONSULTS   None placed    PROCEDURES PERFORMED  PROCEDURE PERFORMED:  Ultrasound-guided access of the right femoral artery  Right femoral angiogram with runoff  Left iliac angiogram with runoff  Subselective left popliteal angiogram  Rotational atherectomy of the left SFA  Shockwave lithotripsy of the left SFA  IVUS of the left SFA  Drug-coated balloon angioplasty of the left popliteal and SFA    HOSPITAL COURSE  Patient is a 69 y.o. female with coronary artery disease, peripheral arterial disease, hypertension, hyperlipidemia, insulin-dependent type 2 diabetes, and smoking.  Please see the admitting history and physical for further details.  She presented as an outpatient for peripheral arteriogram with possible intervention.  Please see procedure results below.  Her postprocedural course was unremarkable.  She has been hemodynamically stable.  Her rest pain that she had been experiencing in her left lower extremity has completely resolved.  Plan for follow-up in clinic in 2 weeks.    CONDITION ON DISCHARGE  Stable.    VITAL SIGNS  /56   Pulse 66   Temp 98.4 °F (36.9 °C) (Oral)   Resp 10   Ht 160 cm (63\")   Wt 82.8 kg (182 lb 8.7 oz)   SpO2 95%   BMI 32.34 kg/m²     DISCHARGE PHYSICAL EXAM  Vitals and nursing note reviewed.   Constitutional:       Appearance: Not in distress.   Eyes:      Conjunctiva/sclera: Conjunctivae normal.   Pulmonary:      Effort: Pulmonary effort is normal.      Breath sounds: Normal breath sounds.   Cardiovascular:      Normal rate. Regular rhythm.      Murmurs: There is no murmur.      Comments: Bilateral lower extremities " are warm to touch with brisk cap refill  Edema:     Peripheral edema absent.   Skin:     General: Skin is warm and dry.      Comments: Right femoral cath access site dressing clean, dry, and intact.  The area is soft to touch and without evidence of hematoma or ecchymosis.   Neurological:      Mental Status: Alert.         RESULTS REVIEW  I reviewed the patient's new clinical results.  Results for orders placed during the hospital encounter of 02/26/25    Cardiac Catheterization/Vascular Study    Conclusion  PERIPHERAL ARTERIOGRAM / INTERVENTION REPORT    DATE OF PROCEDURE: 02/26/25    INDICATION FOR PROCEDURE:    PROCEDURE PERFORMED:  Ultrasound-guided access of the right femoral artery  Right femoral angiogram with runoff  Left iliac angiogram with runoff  Subselective left popliteal angiogram  Rotational atherectomy of the left SFA  Shockwave lithotripsy of the left SFA  IVUS of the left SFA  Drug-coated balloon angioplasty of the left popliteal and SFA    PROCEDURE COMMENTS:    Patient was brought to the cath lab and placed on the cardiac catherization table  Regurgitation realized for 85 minutes duration procedure supervised administration of fentanyl and Versed was given independently by the registered nurse and supervised by me  We obtained access with a 6 [place] sheath in the right femoral artery after our second attempt using a the J-wire as the micropuncture sheath would not go due to scar tissue.  We did an angiogram with runoff and subsequently went up and over with a Glidewire advantage placing our long sheath into the left iliac.  We used a 6 x 55 cm Risk Ident sheath.  We took a Glidewire vantage and a CXI crossing catheter all the way down into the distal reentering into the popliteal advancing our catheter into the popliteal doing an angiogram confirming that we were in true lumen.  We placed an 014 Glidewire vantage of the distal left popliteal and then perform IVUS of the vessel appears to be  soft plaque but areas of calcified plaque as well in the popliteal.  We used the Rota Rosas catheter performing thrombectomy and atherectomy in the left SFA however we could not cross the midportion.  We then used a 5 x 120 mm drug-coated balloon [place] in the distal popliteal and then a 6 x 200 in the mid SFA and a 6 x 40 mm drug-coated balloon in the ostial left SFA.  There is residual stenosis in the popliteal hence we used a 5 x 80 mm [place] balloon inflating for 2 raj each.  Angiographically there is significantly improved flow and minimal residual stenosis.  We did deliver 300 pulses using a 6 x 80 shockwave lithotripsy balloon given the heavy calcification and inadequately expanded stents in the midportion    Findings:    Right SFA her stents are patent with mild irregularities in the popliteal but three-vessel runoff on the right.  Left ostial SFA is 100% occluded.  Three-vessel runoff on the left.  The reconstitution of the popliteal.  IVUS shows poorly expanded stents with heavy calcification in the left mid and proximal SFA    CONCLUSION:  Successful rotational atherectomy shockwave lithotripsy and IVUS guided drug-coated balloon angioplasty of the left SFA.  There is residual stenosis of approximately 30% in the mid left SFA.  If this restenosis says we would consider percutaneous bypass with the Detour system      EBL: 20 ML  No specimens were removed.      Jaiden Doyle MD    @[facility]@        Results from last 7 days   Lab Units 02/27/25  0107 02/26/25  0947   WBC 10*3/mm3 7.63 7.75   HEMOGLOBIN g/dL 10.1* 11.0*   PLATELETS 10*3/mm3 262 329     Results from last 7 days   Lab Units 02/27/25 0107 02/26/25  0947   SODIUM mmol/L 143 141   POTASSIUM mmol/L 3.8 3.8   CHLORIDE mmol/L 106 103   CO2 mmol/L 30.4* 30.4*   BUN mg/dL 14 14   CREATININE mg/dL 1.04* 0.99   CALCIUM mg/dL 8.8 8.8   GLUCOSE mg/dL 144* 123*     Lab Results   Component Value Date    INR 0.96 02/22/2024    INR 1.0  "07/01/2023    INR 1.0 06/30/2023    INR 1.0 06/30/2023    INR 1.0 06/30/2023    INR 1.1 06/30/2023    INR 1.0 06/30/2023     Lab Results   Component Value Date    MG 1.6 (L) 06/30/2023    MG 1.7 (L) 06/30/2023    MG 2.0 06/30/2023     Lab Results   Component Value Date    TRIG 360 (H) 02/27/2025    HDL 24 (L) 02/27/2025    LDL 36 02/27/2025      No results found for: \"PROBNP\"    DISCHARGE DISPOSITION   Home or Self Care    DISCHARGE MEDICATIONS     Discharge Medications        Continue These Medications        Instructions Start Date   ALPRAZolam 0.25 MG tablet  Commonly known as: XANAX   0.25 mg, 2 Times Daily PRN      amLODIPine 10 MG tablet  Commonly known as: NORVASC   10 mg, Oral, Daily      carvedilol 3.125 MG tablet  Commonly known as: COREG   3.125 mg, 2 Times Daily With Meals      clopidogrel 75 MG tablet  Commonly known as: PLAVIX   75 mg, Oral, Daily      dicyclomine 20 MG tablet  Commonly known as: BENTYL   20 mg, Oral, 3 Times Daily PRN      famotidine 40 MG tablet  Commonly known as: PEPCID   40 mg, 2 Times Daily      furosemide 20 MG tablet  Commonly known as: LASIX   1 tablet, Daily      gabapentin 800 MG tablet  Commonly known as: NEURONTIN   800 mg, Every 6 Hours PRN      HYDROcodone-acetaminophen  MG per tablet  Commonly known as: NORCO   1 tablet, Every 8 Hours PRN      insulin glargine 100 UNIT/ML injection  Commonly known as: LANTUS SEMGLEE   30 Units, Nightly      Insulin Lispro (1 Unit Dial) 100 UNIT/ML solution pen-injector  Commonly known as: HUMALOG   10 Units, Subcutaneous, Nightly      lidocaine 5 %  Commonly known as: LIDODERM   1 patch, Transdermal, Every 24 Hours, Remove & Discard patch within 12 hours or as directed by MD      potassium chloride 10 MEQ CR capsule  Commonly known as: MICRO-K   Take 1 capsule by mouth Daily.      promethazine 25 MG tablet  Commonly known as: PHENERGAN   1 tablet, Every 6 Hours PRN      rosuvastatin 20 MG tablet  Commonly known as: CRESTOR   20 " mg, Daily      Tirzepatide 5 MG/0.5ML solution auto-injector   5 mg, Subcutaneous, Weekly      traMADol 50 MG tablet  Commonly known as: ULTRAM   Take 1 tablet by mouth 2 (Two) Times a Day As Needed for Moderate Pain.      valsartan 320 MG tablet  Commonly known as: DIOVAN   1 tablet, Daily                    Activity Instructions       Bathing Restrictions      Type of Restriction: Bathing    Bathing Restrictions: No Tub Bath    Lifting Restrictions      Type of Restriction: Lifting    Lifting Restrictions: Lifting Restriction (Indicate Limit)    Weight Limit (Pounds): 5    Length of Lifting Restriction: 2 weeks                    TEST  RESULTS PENDING AT DISCHARGE     none        Time: greater than 30 minutes.

## 2025-02-27 NOTE — DISCHARGE INSTR - APPOINTMENTS
Follow up appointments:  PCP- March 6th @3:00pm   [phone]  Cardiology- March 7th @11:15am  [phone]

## 2025-03-07 ENCOUNTER — OFFICE VISIT (OUTPATIENT)
Dept: CARDIOLOGY | Facility: CLINIC | Age: 70
End: 2025-03-07
Payer: MEDICARE

## 2025-03-07 ENCOUNTER — HOSPITAL ENCOUNTER (OUTPATIENT)
Dept: CT IMAGING | Facility: HOSPITAL | Age: 70
Discharge: HOME | End: 2025-03-07
Payer: MEDICARE

## 2025-03-07 VITALS
OXYGEN SATURATION: 93 % | DIASTOLIC BLOOD PRESSURE: 82 MMHG | BODY MASS INDEX: 30.65 KG/M2 | HEIGHT: 63 IN | WEIGHT: 173 LBS | HEART RATE: 66 BPM | SYSTOLIC BLOOD PRESSURE: 134 MMHG

## 2025-03-07 DIAGNOSIS — E78.2 MIXED HYPERLIPIDEMIA: Chronic | ICD-10-CM

## 2025-03-07 DIAGNOSIS — I73.9 PAD (PERIPHERAL ARTERY DISEASE): Primary | ICD-10-CM

## 2025-03-07 DIAGNOSIS — I70.223 CRITICAL LIMB ISCHEMIA OF BOTH LOWER EXTREMITIES WITH REST PAIN: ICD-10-CM

## 2025-03-07 DIAGNOSIS — I70.223 CRITICAL LIMB ISCHEMIA OF BOTH LOWER EXTREMITIES WITH REST PAIN: Primary | ICD-10-CM

## 2025-03-07 DIAGNOSIS — F17.200 SMOKER: ICD-10-CM

## 2025-03-07 DIAGNOSIS — Z79.4 TYPE 2 DIABETES MELLITUS WITH DIABETIC PERIPHERAL ANGIOPATHY WITHOUT GANGRENE, WITH LONG-TERM CURRENT USE OF INSULIN: ICD-10-CM

## 2025-03-07 DIAGNOSIS — I73.9 PAD (PERIPHERAL ARTERY DISEASE): ICD-10-CM

## 2025-03-07 DIAGNOSIS — I10 ESSENTIAL HYPERTENSION: Chronic | ICD-10-CM

## 2025-03-07 DIAGNOSIS — E11.51 TYPE 2 DIABETES MELLITUS WITH DIABETIC PERIPHERAL ANGIOPATHY WITHOUT GANGRENE, WITH LONG-TERM CURRENT USE OF INSULIN: ICD-10-CM

## 2025-03-07 PROCEDURE — 25510000001 IOPAMIDOL PER 1 ML: Performed by: NURSE PRACTITIONER

## 2025-03-07 PROCEDURE — 75635 CT ANGIO ABDOMINAL ARTERIES: CPT

## 2025-03-07 RX ORDER — IOPAMIDOL 755 MG/ML
100 INJECTION, SOLUTION INTRAVASCULAR
Status: COMPLETED | OUTPATIENT
Start: 2025-03-07 | End: 2025-03-07

## 2025-03-07 RX ADMIN — IOPAMIDOL 90 ML: 755 INJECTION, SOLUTION INTRAVENOUS at 16:20

## 2025-03-07 NOTE — PROGRESS NOTES
Chief Complaint  Follow-up (Denies CP, no cardiac symptoms. Complains of mild leg swelling in the evening and sore feet.) and Med Management (Tolerating all current medications.)    Subjective          Monae Chauhan presents to [facility] for follow up.    History of Present Illness    Ms. Chauhan presents for follow-up after having undergone rotational arthrectomy of the left SFA and shockwave lithotripsy of the left SFA and drug-coated balloon angioplasty of the left popliteal and SFA with Dr. Dyole 02/27/2025.  History of Present Illness  She reports persistent pain in her left leg, which improved after her procedure but has recently worsened.. She experienced a period of immobility on the Thursday following the procedure but was able to walk on Friday and Saturday. Despite these brief periods of relief, the overall condition of her leg remains unchanged. She describes the sensation in her leg as more akin to numbness than pain. She also reports that her foot is warm and she does not have pain in it like she used to. She has been informed that a bypass may be necessary for further treatment of her condition.    Additionally, she mentions a long-standing wound on the top of her foot, which had previously healed following a procedure in [place] but has since reappeared. Her primary care physician prescribed clindamycin, which has resulted in the wound drying out. She reports no pain or discomfort from the wound, which is not tender upon palpation. She also notes the presence of a bone spur directly below the wound, for which she has been using a pain patch. She is unable to take Motrin due to its potential interaction with her blood thinners.        Tobacco Use: Medium Risk (3/15/2025)    Patient History     Smoking Tobacco Use: Former     Smokeless Tobacco Use: Never     Passive Exposure: Not on file       Objective     Vital Signs:   /82 (BP Location: Left arm, Patient  "Position: Sitting, Cuff Size: Adult)   Pulse 66   Ht 160 cm (63\")   Wt 78.5 kg (173 lb) Comment: wt. per patient  SpO2 93%   BMI 30.65 kg/m²       Physical Exam  Vitals and nursing note reviewed.   Constitutional:       General: She is not in acute distress.     Appearance: She is obese. She is ill-appearing (Chronically).   HENT:      Head: Normocephalic and atraumatic.   Neck:      Vascular: No carotid bruit.   Cardiovascular:      Rate and Rhythm: Normal rate and regular rhythm.      Pulses:           Dorsalis pedis pulses are detected w/ Doppler on the left side.      Heart sounds: No murmur heard.     No friction rub. No gallop.      Comments: Left PT pulse is only faintly auscultated by Doppler.  Left foot is cooler to touch than the right.  Cap refill delayed  Pulmonary:      Effort: Pulmonary effort is normal.      Breath sounds: Normal breath sounds.   Musculoskeletal:      Right lower leg: No edema.      Left lower leg: No edema.      Comments: In a wheelchair   Skin:     General: Skin is warm and dry.      Comments: Scabbed subcentimeter wound on the dorsum of the left foot.  No evidence of infection   Neurological:      General: No focal deficit present.      Mental Status: She is alert.   Psychiatric:         Mood and Affect: Mood normal.         Behavior: Behavior normal.             Result Review :   The following data was reviewed by: DENIS Young on 03/07/2025:    Data reviewed : Cardiology studies as detailed below      Last Cardiac Cath  Results for orders placed during the hospital encounter of 02/26/25    Intravascular Ultrasound    Conclusion  PERIPHERAL ARTERIOGRAM / INTERVENTION REPORT    DATE OF PROCEDURE: 02/26/25    INDICATION FOR PROCEDURE:    PROCEDURE PERFORMED:  Ultrasound-guided access of the right femoral artery  Right femoral angiogram with runoff  Left iliac angiogram with runoff  Subselective left popliteal angiogram  Rotational atherectomy of the left SFA  Shockwave " lithotripsy of the left SFA  IVUS of the left SFA  Drug-coated balloon angioplasty of the left popliteal and SFA    PROCEDURE COMMENTS:    Patient was brought to the cath lab and placed on the cardiac catherization table  Regurgitation realized for 85 minutes duration procedure supervised administration of fentanyl and Versed was given independently by the registered nurse and supervised by me  We obtained access with a 6 [place] sheath in the right femoral artery after our second attempt using a the J-wire as the micropuncture sheath would not go due to scar tissue.  We did an angiogram with runoff and subsequently went up and over with a Glidewire advantage placing our long sheath into the left iliac.  We used a 6 x 55 cm [employer]S ([employer]) sheath.  We took a Glidewire vantage and a CXI crossing catheter all the way down into the distal reentering into the popliteal advancing our catheter into the popliteal doing an angiogram confirming that we were in true lumen.  We placed an 014 Glidewire vantage of the distal left popliteal and then perform IVUS of the vessel appears to be soft plaque but areas of calcified plaque as well in the popliteal.  We used the Rota Rosas catheter performing thrombectomy and atherectomy in the left SFA however we could not cross the midportion.  We then used a 5 x 120 mm drug-coated balloon [place] in the distal popliteal and then a 6 x 200 in the mid SFA and a 6 x 40 mm drug-coated balloon in the ostial left SFA.  There is residual stenosis in the popliteal hence we used a 5 x 80 mm [place] balloon inflating for 2 raj each.  Angiographically there is significantly improved flow and minimal residual stenosis.  We did deliver 300 pulses using a 6 x 80 shockwave lithotripsy balloon given the heavy calcification and inadequately expanded stents in the midportion    Findings:    Right SFA her stents are patent with mild irregularities in the popliteal but three-vessel runoff on the right.  Left ostial  SFA is 100% occluded.  Three-vessel runoff on the left.  The reconstitution of the popliteal.  IVUS shows poorly expanded stents with heavy calcification in the left mid and proximal SFA    CONCLUSION:  Successful rotational atherectomy shockwave lithotripsy and IVUS guided drug-coated balloon angioplasty of the left SFA.  There is residual stenosis of approximately 30% in the mid left SFA.  If this restenosis says we would consider percutaneous bypass with the Detour system      EBL: 20 ML  No specimens were removed.      Jaiden Doyle MD    CTA aorta bilateral iliofemoral with runoff    CT Angio Abdominal Aorta Bilateral Iliofem Runoff 03/07/2025    Narrative  EXAMINATION: CT ANGIO ABDOMINAL AORTA BILAT ILIOFEM RUNOFF-    CLINICAL INDICATION: cold foot, diminished pulse, increased pain, known  PAD; I73.9-Peripheral vascular disease, unspecified;  I70.223-Atherosclerosis of native arteries of extremities with rest  pain, bilateral legs      COMPARISON: 1/10/2025    Radiation dose reduction techniques were utilized per ALARA protocol.  Automated exposure control was initiated through either or Qeexo or  DoseRight software packages by  protocol.      PROCEDURE:  Thin cut axial images were acquired through the abdomen, pelvis, and  lower extremities during the rapid infusion of IV contrast    FINDINGS:  Abdominal aorta shows no evidence of aneurysm    Plaque is noted diffusely    No significant stenosis in the celiac or superior mesenteric artery  origins. There is peripheral plaque, particularly in the celiac artery  with at least moderate stenosis.    Graft in the left common iliac vein extending into the inferior vena  cava.    Plaque in the common iliac arteries bilaterally    Plaque in the external iliac arteries bilaterally but no significant  stenosis.    Moderate stenosis in the right common femoral artery.    Grafts are seen in the superficial femoral arteries bilaterally with  narrowing on  the right and occlusion of the left superficial femoral  artery.    Reconstitution of the popliteal arteries bilaterally with two-vessel  runoff to both ankles.    Liver homogeneous    No adrenal enlargement    No free fluid.    Impression  1. Multifocal plaque.  2. Narrowing in the midportion of the celiac artery approximately 50%.  3. Occlusion of the graft in the left superficial femoral artery  completely with reconstitution in the popliteal artery on the left  4. Narrowing in the graft in the right superficial femoral artery with  high-grade stenosis distally but no complete occlusion is identified on  the right.      This report was finalized on 3/7/2025 5:30 PM by Dr. Johnathan Alarcon MD.    Last Stress test  Results for orders placed during the hospital encounter of 08/20/20    Stress Test With Myocardial Perfusion (1 Day)    Interpretation Summary  · A pharmacological stress test was performed using regadenoson without low-level exercise.  · Findings consistent with a normal ECG stress test.  · Myocardial perfusion imaging indicates a normal myocardial perfusion study with no evidence of ischemia.  · Normal LV cavity size. Normal LV wall motion noted.  · Left ventricular ejection fraction is hyperdynamic (Calculated EF > 70%).  · Impressions are consistent with a low risk study.       Last Echo  Results for orders placed during the hospital encounter of 01/08/17    Adult Transthoracic Echo Complete    Interpretation Summary  · Left ventricular wall thickness is consistent with mild concentric hypertrophy.  · Left ventricular function is normal.  · Estimated EF appears to be in the range of 66 - 70%  · All left ventricular wall segments contract normally.  · Left ventricular diastolic dysfunction (grade I) consistent with impaired relaxation.  · All valves appear normal in structure and function.  · Pericardium: There is no evidence of pericardial effusion.             Current Outpatient Medications    Medication Sig Dispense Refill    ALPRAZolam (XANAX) 0.25 MG tablet Take 1 tablet by mouth 2 (Two) Times a Day As Needed for Anxiety.      amLODIPine (NORVASC) 10 MG tablet Take 1 tablet by mouth Daily. 30 tablet 2    carvedilol (COREG) 3.125 MG tablet Take 1 tablet by mouth 2 (Two) Times a Day With Meals.      clopidogrel (PLAVIX) 75 MG tablet Take 1 tablet by mouth Daily. 30 tablet 11    dicyclomine (BENTYL) 20 MG tablet Take 1 tablet by mouth 3 (Three) Times a Day As Needed for Abdominal Cramping.      furosemide (LASIX) 20 MG tablet Take 1 tablet by mouth Daily.      gabapentin (NEURONTIN) 800 MG tablet Take 1 tablet by mouth Every 6 (Six) Hours As Needed (nerve pain).      HYDROcodone-acetaminophen (NORCO)  MG per tablet Take 1 tablet by mouth Every 8 (Eight) Hours As Needed for Moderate Pain.      insulin glargine (LANTUS, SEMGLEE) 100 UNIT/ML injection Inject 30 Units under the skin into the appropriate area as directed Every Night.      Insulin Lispro, 1 Unit Dial, (HUMALOG) 100 UNIT/ML solution pen-injector Inject 10 Units under the skin into the appropriate area as directed Every Night.      lidocaine (LIDODERM) 5 % Place 1 patch on the skin as directed by provider Daily. Remove & Discard patch within 12 hours or as directed by MD 3 patch 3    potassium chloride (MICRO-K) 10 MEQ CR capsule Take 1 capsule by mouth Daily.      promethazine (PHENERGAN) 25 MG tablet Take 1 tablet by mouth Every 6 (Six) Hours As Needed for Nausea or Vomiting.      rosuvastatin (CRESTOR) 20 MG tablet Take 1 tablet by mouth Daily.      Tirzepatide 5 MG/0.5ML solution auto-injector Inject 5 mg under the skin into the appropriate area as directed 1 (One) Time Per Week.      traMADol (ULTRAM) 50 MG tablet Take 1 tablet by mouth 2 (Two) Times a Day As Needed for Moderate Pain.      valsartan (DIOVAN) 320 MG tablet Take 1 tablet by mouth Daily.      famotidine (PEPCID) 40 MG tablet Take 1 tablet by mouth 2 (Two) Times a Day.  (Patient not taking: Reported on 3/7/2025)       No current facility-administered medications for this visit.            Assessment and Plan    Problem List Items Addressed This Visit       Essential hypertension (Chronic)    Mixed hyperlipidemia (Chronic)    Type 2 diabetes mellitus    PAD (peripheral artery disease) (Chronic)    Overview   1. CTA 6-12-14    Long segment of occlusion in the right superficial femoral artery  with reconstitution distally.   Smaller segment of occlusion in the distal left superficial femoral  artery.    2. Stent to Left lower extremity         Relevant Orders    Lipoprotein A (LPA)    Smoker    Critical limb ischemia of both lower extremities with rest pain - Primary    Relevant Orders    Lipoprotein A (LPA)     Diagnoses and all orders for this visit:    1. Critical limb ischemia of both lower extremities with rest pain (Primary)  -     Cancel: CT Angio Abdominal Aorta Bilateral Iliofem Runoff; Future  -     Lipoprotein A (LPA); Future    2. PAD (peripheral artery disease)  -     Cancel: CT Angio Abdominal Aorta Bilateral Iliofem Runoff; Future  -     Lipoprotein A (LPA); Future    3. Mixed hyperlipidemia    4. Essential hypertension    5. Type 2 diabetes mellitus with diabetic peripheral angiopathy without gangrene, with long-term current use of insulin    6. Smoker    CTA results discussed in detail with Dr. Doyle.  He recommends repeat peripheral arteriogram.  Assessment & Plan  1. Post-procedural status following angiogram and intervention on the left leg.  The patient reports persistent pain in the left leg following the procedure, which has not improved. The pain is described as numbness rather than typical pain. The patient also reports that the foot is warm and there is no pain in it compared to before the procedure. Physical examination reveals faint pulses in the left leg. A CTA will be ordered to assess the current state of the patient's vasculature post-procedure.   Jun will be consulted for further evaluation and management based on the CTA results.    2. Wound on the top of the foot.  The patient has a long-standing wound on the top of the foot, which is currently dry and not causing pain. The wound had previously healed after a procedure in [place] but has since reappeared. The patient was prescribed clindamycin by the family doctor, which has helped in keeping the wound dry and non-painful.             Follow Up     No follow-ups on file.    Patient was given instructions and counseling regarding her condition or for health maintenance advice. Please see specific information pulled into the AVS if appropriate.       Electronically signed by DENIS Young, 03/15/25, 5:07 PM EDT.    Patient or patient representative verbalized consent for the use of Ambient Listening during the visit with  DENIS Young for chart documentation. 3/15/2025  17:07 EDT     Dictated Utilizing Dragon Dictation: Part of this note may be an electronic transcription/translation of spoken language to printed text using the Dragon Dictation System

## 2025-03-07 NOTE — H&P (VIEW-ONLY)
Chief Complaint  Follow-up (Denies CP, no cardiac symptoms. Complains of mild leg swelling in the evening and sore feet.) and Med Management (Tolerating all current medications.)    Subjective          Monae Chauhan presents to [facility] CARDIOLOG[facility] for follow up.    History of Present Illness    Ms. Chauhan presents for follow-up after having undergone rotational arthrectomy of the left SFA and shockwave lithotripsy of the left SFA and drug-coated balloon angioplasty of the left popliteal and SFA with Dr. Doyle 02/27/2025.  History of Present Illness  She reports persistent pain in her left leg, which improved after her procedure but has recently worsened.. She experienced a period of immobility on the Thursday following the procedure but was able to walk on Friday and Saturday. Despite these brief periods of relief, the overall condition of her leg remains unchanged. She describes the sensation in her leg as more akin to numbness than pain. She also reports that her foot is warm and she does not have pain in it like she used to. She has been informed that a bypass may be necessary for further treatment of her condition.    Additionally, she mentions a long-standing wound on the top of her foot, which had previously healed following a procedure in [place] but has since reappeared. Her primary care physician prescribed clindamycin, which has resulted in the wound drying out. She reports no pain or discomfort from the wound, which is not tender upon palpation. She also notes the presence of a bone spur directly below the wound, for which she has been using a pain patch. She is unable to take Motrin due to its potential interaction with her blood thinners.        Tobacco Use: Medium Risk (3/15/2025)    Patient History     Smoking Tobacco Use: Former     Smokeless Tobacco Use: Never     Passive Exposure: Not on file       Objective     Vital Signs:   /82 (BP Location: Left arm, Patient  "Position: Sitting, Cuff Size: Adult)   Pulse 66   Ht 160 cm (63\")   Wt 78.5 kg (173 lb) Comment: wt. per patient  SpO2 93%   BMI 30.65 kg/m²       Physical Exam  Vitals and nursing note reviewed.   Constitutional:       General: She is not in acute distress.     Appearance: She is obese. She is ill-appearing (Chronically).   HENT:      Head: Normocephalic and atraumatic.   Neck:      Vascular: No carotid bruit.   Cardiovascular:      Rate and Rhythm: Normal rate and regular rhythm.      Pulses:           Dorsalis pedis pulses are detected w/ Doppler on the left side.      Heart sounds: No murmur heard.     No friction rub. No gallop.      Comments: Left PT pulse is only faintly auscultated by Doppler.  Left foot is cooler to touch than the right.  Cap refill delayed  Pulmonary:      Effort: Pulmonary effort is normal.      Breath sounds: Normal breath sounds.   Musculoskeletal:      Right lower leg: No edema.      Left lower leg: No edema.      Comments: In a wheelchair   Skin:     General: Skin is warm and dry.      Comments: Scabbed subcentimeter wound on the dorsum of the left foot.  No evidence of infection   Neurological:      General: No focal deficit present.      Mental Status: She is alert.   Psychiatric:         Mood and Affect: Mood normal.         Behavior: Behavior normal.             Result Review :   The following data was reviewed by: DENIS Young on 03/07/2025:    Data reviewed : Cardiology studies as detailed below      Last Cardiac Cath  Results for orders placed during the hospital encounter of 02/26/25    Intravascular Ultrasound    Conclusion  PERIPHERAL ARTERIOGRAM / INTERVENTION REPORT    DATE OF PROCEDURE: 02/26/25    INDICATION FOR PROCEDURE:    PROCEDURE PERFORMED:  Ultrasound-guided access of the right femoral artery  Right femoral angiogram with runoff  Left iliac angiogram with runoff  Subselective left popliteal angiogram  Rotational atherectomy of the left SFA  Shockwave " lithotripsy of the left SFA  IVUS of the left SFA  Drug-coated balloon angioplasty of the left popliteal and SFA    PROCEDURE COMMENTS:    Patient was brought to the cath lab and placed on the cardiac catherization table  Regurgitation realized for 85 minutes duration procedure supervised administration of fentanyl and Versed was given independently by the registered nurse and supervised by me  We obtained access with a 6 Arabic sheath in the right femoral artery after our second attempt using a the J-wire as the micropuncture sheath would not go due to scar tissue.  We did an angiogram with runoff and subsequently went up and over with a Glidewire advantage placing our long sheath into the left iliac.  We used a 6 x 55 cm Ini3 Digital sheath.  We took a Glidewire vantage and a CXI crossing catheter all the way down into the distal reentering into the popliteal advancing our catheter into the popliteal doing an angiogram confirming that we were in true lumen.  We placed an 014 Glidewire vantage of the distal left popliteal and then perform IVUS of the vessel appears to be soft plaque but areas of calcified plaque as well in the popliteal.  We used the Rota Rosas catheter performing thrombectomy and atherectomy in the left SFA however we could not cross the midportion.  We then used a 5 x 120 mm drug-coated balloon Milton in the distal popliteal and then a 6 x 200 in the mid SFA and a 6 x 40 mm drug-coated balloon in the ostial left SFA.  There is residual stenosis in the popliteal hence we used a 5 x 80 mm Milton balloon inflating for 2 raj each.  Angiographically there is significantly improved flow and minimal residual stenosis.  We did deliver 300 pulses using a 6 x 80 shockwave lithotripsy balloon given the heavy calcification and inadequately expanded stents in the midportion    Findings:    Right SFA her stents are patent with mild irregularities in the popliteal but three-vessel runoff on the right.  Left ostial  SFA is 100% occluded.  Three-vessel runoff on the left.  The reconstitution of the popliteal.  IVUS shows poorly expanded stents with heavy calcification in the left mid and proximal SFA    CONCLUSION:  Successful rotational atherectomy shockwave lithotripsy and IVUS guided drug-coated balloon angioplasty of the left SFA.  There is residual stenosis of approximately 30% in the mid left SFA.  If this restenosis says we would consider percutaneous bypass with the Detour system      EBL: 20 ML  No specimens were removed.      Jaiden Doyle MD    CTA aorta bilateral iliofemoral with runoff    CT Angio Abdominal Aorta Bilateral Iliofem Runoff 03/07/2025    Narrative  EXAMINATION: CT ANGIO ABDOMINAL AORTA BILAT ILIOFEM RUNOFF-    CLINICAL INDICATION: cold foot, diminished pulse, increased pain, known  PAD; I73.9-Peripheral vascular disease, unspecified;  I70.223-Atherosclerosis of native arteries of extremities with rest  pain, bilateral legs      COMPARISON: 1/10/2025    Radiation dose reduction techniques were utilized per ALARA protocol.  Automated exposure control was initiated through either or Semmle or  DoseRight software packages by  protocol.      PROCEDURE:  Thin cut axial images were acquired through the abdomen, pelvis, and  lower extremities during the rapid infusion of IV contrast    FINDINGS:  Abdominal aorta shows no evidence of aneurysm    Plaque is noted diffusely    No significant stenosis in the celiac or superior mesenteric artery  origins. There is peripheral plaque, particularly in the celiac artery  with at least moderate stenosis.    Graft in the left common iliac vein extending into the inferior vena  cava.    Plaque in the common iliac arteries bilaterally    Plaque in the external iliac arteries bilaterally but no significant  stenosis.    Moderate stenosis in the right common femoral artery.    Grafts are seen in the superficial femoral arteries bilaterally with  narrowing on  the right and occlusion of the left superficial femoral  artery.    Reconstitution of the popliteal arteries bilaterally with two-vessel  runoff to both ankles.    Liver homogeneous    No adrenal enlargement    No free fluid.    Impression  1. Multifocal plaque.  2. Narrowing in the midportion of the celiac artery approximately 50%.  3. Occlusion of the graft in the left superficial femoral artery  completely with reconstitution in the popliteal artery on the left  4. Narrowing in the graft in the right superficial femoral artery with  high-grade stenosis distally but no complete occlusion is identified on  the right.      This report was finalized on 3/7/2025 5:30 PM by Dr. Johnathan Alarcon MD.    Last Stress test  Results for orders placed during the hospital encounter of 08/20/20    Stress Test With Myocardial Perfusion (1 Day)    Interpretation Summary  · A pharmacological stress test was performed using regadenoson without low-level exercise.  · Findings consistent with a normal ECG stress test.  · Myocardial perfusion imaging indicates a normal myocardial perfusion study with no evidence of ischemia.  · Normal LV cavity size. Normal LV wall motion noted.  · Left ventricular ejection fraction is hyperdynamic (Calculated EF > 70%).  · Impressions are consistent with a low risk study.       Last Echo  Results for orders placed during the hospital encounter of 01/08/17    Adult Transthoracic Echo Complete    Interpretation Summary  · Left ventricular wall thickness is consistent with mild concentric hypertrophy.  · Left ventricular function is normal.  · Estimated EF appears to be in the range of 66 - 70%  · All left ventricular wall segments contract normally.  · Left ventricular diastolic dysfunction (grade I) consistent with impaired relaxation.  · All valves appear normal in structure and function.  · Pericardium: There is no evidence of pericardial effusion.             Current Outpatient Medications    Medication Sig Dispense Refill    ALPRAZolam (XANAX) 0.25 MG tablet Take 1 tablet by mouth 2 (Two) Times a Day As Needed for Anxiety.      amLODIPine (NORVASC) 10 MG tablet Take 1 tablet by mouth Daily. 30 tablet 2    carvedilol (COREG) 3.125 MG tablet Take 1 tablet by mouth 2 (Two) Times a Day With Meals.      clopidogrel (PLAVIX) 75 MG tablet Take 1 tablet by mouth Daily. 30 tablet 11    dicyclomine (BENTYL) 20 MG tablet Take 1 tablet by mouth 3 (Three) Times a Day As Needed for Abdominal Cramping.      furosemide (LASIX) 20 MG tablet Take 1 tablet by mouth Daily.      gabapentin (NEURONTIN) 800 MG tablet Take 1 tablet by mouth Every 6 (Six) Hours As Needed (nerve pain).      HYDROcodone-acetaminophen (NORCO)  MG per tablet Take 1 tablet by mouth Every 8 (Eight) Hours As Needed for Moderate Pain.      insulin glargine (LANTUS, SEMGLEE) 100 UNIT/ML injection Inject 30 Units under the skin into the appropriate area as directed Every Night.      Insulin Lispro, 1 Unit Dial, (HUMALOG) 100 UNIT/ML solution pen-injector Inject 10 Units under the skin into the appropriate area as directed Every Night.      lidocaine (LIDODERM) 5 % Place 1 patch on the skin as directed by provider Daily. Remove & Discard patch within 12 hours or as directed by MD 3 patch 3    potassium chloride (MICRO-K) 10 MEQ CR capsule Take 1 capsule by mouth Daily.      promethazine (PHENERGAN) 25 MG tablet Take 1 tablet by mouth Every 6 (Six) Hours As Needed for Nausea or Vomiting.      rosuvastatin (CRESTOR) 20 MG tablet Take 1 tablet by mouth Daily.      Tirzepatide 5 MG/0.5ML solution auto-injector Inject 5 mg under the skin into the appropriate area as directed 1 (One) Time Per Week.      traMADol (ULTRAM) 50 MG tablet Take 1 tablet by mouth 2 (Two) Times a Day As Needed for Moderate Pain.      valsartan (DIOVAN) 320 MG tablet Take 1 tablet by mouth Daily.      famotidine (PEPCID) 40 MG tablet Take 1 tablet by mouth 2 (Two) Times a Day.  (Patient not taking: Reported on 3/7/2025)       No current facility-administered medications for this visit.            Assessment and Plan    Problem List Items Addressed This Visit       Essential hypertension (Chronic)    Mixed hyperlipidemia (Chronic)    Type 2 diabetes mellitus    PAD (peripheral artery disease) (Chronic)    Overview   1. CTA 6-12-14    Long segment of occlusion in the right superficial femoral artery  with reconstitution distally.   Smaller segment of occlusion in the distal left superficial femoral  artery.    2. Stent to Left lower extremity         Relevant Orders    Lipoprotein A (LPA)    Smoker    Critical limb ischemia of both lower extremities with rest pain - Primary    Relevant Orders    Lipoprotein A (LPA)     Diagnoses and all orders for this visit:    1. Critical limb ischemia of both lower extremities with rest pain (Primary)  -     Cancel: CT Angio Abdominal Aorta Bilateral Iliofem Runoff; Future  -     Lipoprotein A (LPA); Future    2. PAD (peripheral artery disease)  -     Cancel: CT Angio Abdominal Aorta Bilateral Iliofem Runoff; Future  -     Lipoprotein A (LPA); Future    3. Mixed hyperlipidemia    4. Essential hypertension    5. Type 2 diabetes mellitus with diabetic peripheral angiopathy without gangrene, with long-term current use of insulin    6. Smoker    CTA results discussed in detail with Dr. Doyle.  He recommends repeat peripheral arteriogram.  Assessment & Plan  1. Post-procedural status following angiogram and intervention on the left leg.  The patient reports persistent pain in the left leg following the procedure, which has not improved. The pain is described as numbness rather than typical pain. The patient also reports that the foot is warm and there is no pain in it compared to before the procedure. Physical examination reveals faint pulses in the left leg. A CTA will be ordered to assess the current state of the patient's vasculature post-procedure.   Jun will be consulted for further evaluation and management based on the CTA results.    2. Wound on the top of the foot.  The patient has a long-standing wound on the top of the foot, which is currently dry and not causing pain. The wound had previously healed after a procedure in [place] but has since reappeared. The patient was prescribed clindamycin by the family doctor, which has helped in keeping the wound dry and non-painful.             Follow Up     No follow-ups on file.    Patient was given instructions and counseling regarding her condition or for health maintenance advice. Please see specific information pulled into the AVS if appropriate.       Electronically signed by DENIS Young, 03/15/25, 5:07 PM EDT.    Patient or patient representative verbalized consent for the use of Ambient Listening during the visit with  DENIS Young for chart documentation. 3/15/2025  17:07 EDT     Dictated Utilizing Dragon Dictation: Part of this note may be an electronic transcription/translation of spoken language to printed text using the Dragon Dictation System

## 2025-03-10 ENCOUNTER — TELEPHONE (OUTPATIENT)
Dept: CARDIOLOGY | Facility: CLINIC | Age: 70
End: 2025-03-10
Payer: MEDICARE

## 2025-03-10 NOTE — TELEPHONE ENCOUNTER
I called to discuss the results of the patient's recent peripheral CTA.  It does reveal occlusion of the left SFA with reconstitution in the popliteal area.  Dr. Estevez reviewed the films with me and recommends referral to vascular surgeon.  Ms. Chauhan would like to consider her options and will let us know if she agrees to a referral for consultation about bypass.  I did  that if her pain increases or her foot becomes cold, she should seek emergency care.    Electronically signed by DENIS Young, 03/10/25, 10:37 AM EDT.

## 2025-03-11 ENCOUNTER — TELEPHONE (OUTPATIENT)
Dept: CARDIOLOGY | Facility: CLINIC | Age: 70
End: 2025-03-11
Payer: MEDICARE

## 2025-03-11 DIAGNOSIS — I70.202 FEMORAL ARTERY OCCLUSION, LEFT: ICD-10-CM

## 2025-03-11 DIAGNOSIS — I70.223 CRITICAL LIMB ISCHEMIA OF BOTH LOWER EXTREMITIES WITH REST PAIN: Primary | ICD-10-CM

## 2025-03-11 NOTE — TELEPHONE ENCOUNTER
Patient called in and states that per her visit she is agreeable to be referred to [facility] regarding her legs. Patient will be notified of appointment.

## 2025-03-11 NOTE — TELEPHONE ENCOUNTER
I discussed with her Dr. Doyle's recommendation for repeat peripheral intervention March 24, 2025.  She much prefers to do that then to proceed with vascular consult.  Orders placed.  Electronically signed by DENIS Young, 03/11/25, 12:04 PM EDT.

## 2025-03-15 PROBLEM — I73.9 PVD (PERIPHERAL VASCULAR DISEASE): Status: RESOLVED | Noted: 2025-02-06 | Resolved: 2025-03-15

## 2025-03-15 PROBLEM — I73.9 PERIPHERAL ARTERY DISEASE: Status: RESOLVED | Noted: 2025-02-26 | Resolved: 2025-03-15

## 2025-03-15 PROBLEM — I10 ESSENTIAL HYPERTENSION: Chronic | Status: ACTIVE | Noted: 2018-10-29

## 2025-03-15 PROBLEM — I73.9 PAD (PERIPHERAL ARTERY DISEASE): Chronic | Status: ACTIVE | Noted: 2018-10-29

## 2025-03-15 PROBLEM — E78.2 MIXED HYPERLIPIDEMIA: Chronic | Status: ACTIVE | Noted: 2018-10-29

## 2025-03-20 ENCOUNTER — TELEPHONE (OUTPATIENT)
Dept: CARDIOLOGY | Facility: CLINIC | Age: 70
End: 2025-03-20

## 2025-03-20 NOTE — TELEPHONE ENCOUNTER
Patient called in and states she has some questions regarding a procedure and that she would like to speak to Deborah BARRIOS.

## 2025-03-21 ENCOUNTER — TELEPHONE (OUTPATIENT)
Dept: CARDIOLOGY | Facility: CLINIC | Age: 70
End: 2025-03-21
Payer: MEDICARE

## 2025-03-21 NOTE — TELEPHONE ENCOUNTER
Spoke to patient regarding peripheral angiogram scheduled for Monday, March, 24. Patient understood instructions and agreed to stop Eliquis after today's dose. Patient was informed that cath lab would call her with an arrival time for Monday.

## 2025-03-24 ENCOUNTER — HOSPITAL ENCOUNTER (OUTPATIENT)
Facility: HOSPITAL | Age: 70
Discharge: HOME | End: 2025-03-24
Attending: INTERNAL MEDICINE | Admitting: INTERNAL MEDICINE
Payer: MEDICARE

## 2025-03-24 VITALS
SYSTOLIC BLOOD PRESSURE: 165 MMHG | BODY MASS INDEX: 30.65 KG/M2 | DIASTOLIC BLOOD PRESSURE: 65 MMHG | OXYGEN SATURATION: 99 % | HEART RATE: 64 BPM | HEIGHT: 63 IN | TEMPERATURE: 98.5 F | RESPIRATION RATE: 18 BRPM | WEIGHT: 173 LBS

## 2025-03-24 DIAGNOSIS — I25.10 CORONARY ARTERY DISEASE INVOLVING NATIVE CORONARY ARTERY OF NATIVE HEART WITHOUT ANGINA PECTORIS: Primary | ICD-10-CM

## 2025-03-24 DIAGNOSIS — I70.223 CRITICAL LIMB ISCHEMIA OF BOTH LOWER EXTREMITIES WITH REST PAIN: ICD-10-CM

## 2025-03-24 DIAGNOSIS — I25.10 CORONARY ARTERY DISEASE INVOLVING NATIVE CORONARY ARTERY OF NATIVE HEART WITHOUT ANGINA PECTORIS: ICD-10-CM

## 2025-03-24 DIAGNOSIS — I70.202 FEMORAL ARTERY OCCLUSION, LEFT: ICD-10-CM

## 2025-03-24 LAB
ACT BLD: 210 SECONDS (ref 82–152)
ANION GAP SERPL CALCULATED.3IONS-SCNC: 8.7 MMOL/L (ref 5–15)
BUN SERPL-MCNC: 10 MG/DL (ref 8–23)
BUN/CREAT SERPL: 10.9 (ref 7–25)
CALCIUM SPEC-SCNC: 9.2 MG/DL (ref 8.6–10.5)
CHLORIDE SERPL-SCNC: 102 MMOL/L (ref 98–107)
CO2 SERPL-SCNC: 28.3 MMOL/L (ref 22–29)
CREAT SERPL-MCNC: 0.92 MG/DL (ref 0.57–1)
DEPRECATED RDW RBC AUTO: 57.2 FL (ref 37–54)
EGFRCR SERPLBLD CKD-EPI 2021: 67.5 ML/MIN/1.73
ERYTHROCYTE [DISTWIDTH] IN BLOOD BY AUTOMATED COUNT: 17.9 % (ref 12.3–15.4)
GLUCOSE SERPL-MCNC: 168 MG/DL (ref 65–99)
HCT VFR BLD AUTO: 36.2 % (ref 34–46.6)
HGB BLD-MCNC: 10.7 G/DL (ref 12–15.9)
MCH RBC QN AUTO: 26 PG (ref 26.6–33)
MCHC RBC AUTO-ENTMCNC: 29.6 G/DL (ref 31.5–35.7)
MCV RBC AUTO: 87.9 FL (ref 79–97)
PLATELET # BLD AUTO: 291 10*3/MM3 (ref 140–450)
PMV BLD AUTO: 8.9 FL (ref 6–12)
POTASSIUM SERPL-SCNC: 4.2 MMOL/L (ref 3.5–5.2)
RBC # BLD AUTO: 4.12 10*6/MM3 (ref 3.77–5.28)
SODIUM SERPL-SCNC: 139 MMOL/L (ref 136–145)
WBC NRBC COR # BLD AUTO: 5.79 10*3/MM3 (ref 3.4–10.8)

## 2025-03-24 PROCEDURE — C1753 CATH, INTRAVAS ULTRASOUND: HCPCS | Performed by: INTERNAL MEDICINE

## 2025-03-24 PROCEDURE — 25010000002 LIDOCAINE 2% SOLUTION: Performed by: INTERNAL MEDICINE

## 2025-03-24 PROCEDURE — 25810000003 SODIUM CHLORIDE 0.9 % SOLUTION: Performed by: INTERNAL MEDICINE

## 2025-03-24 PROCEDURE — C1724 CATH, TRANS ATHEREC,ROTATION: HCPCS | Performed by: INTERNAL MEDICINE

## 2025-03-24 PROCEDURE — 76937 US GUIDE VASCULAR ACCESS: CPT

## 2025-03-24 PROCEDURE — C1894 INTRO/SHEATH, NON-LASER: HCPCS | Performed by: INTERNAL MEDICINE

## 2025-03-24 PROCEDURE — 25010000002 FENTANYL CITRATE (PF) 50 MCG/ML SOLUTION: Performed by: INTERNAL MEDICINE

## 2025-03-24 PROCEDURE — C1725 CATH, TRANSLUMIN NON-LASER: HCPCS | Performed by: INTERNAL MEDICINE

## 2025-03-24 PROCEDURE — C1769 GUIDE WIRE: HCPCS | Performed by: INTERNAL MEDICINE

## 2025-03-24 PROCEDURE — 85027 COMPLETE CBC AUTOMATED: CPT | Performed by: INTERNAL MEDICINE

## 2025-03-24 PROCEDURE — 25510000001 IOPAMIDOL 61 % SOLUTION: Performed by: INTERNAL MEDICINE

## 2025-03-24 PROCEDURE — 99152 MOD SED SAME PHYS/QHP 5/>YRS: CPT | Performed by: INTERNAL MEDICINE

## 2025-03-24 PROCEDURE — 75710 ARTERY X-RAYS ARM/LEG: CPT | Performed by: INTERNAL MEDICINE

## 2025-03-24 PROCEDURE — C1760 CLOSURE DEV, VASC: HCPCS | Performed by: INTERNAL MEDICINE

## 2025-03-24 PROCEDURE — A9270 NON-COVERED ITEM OR SERVICE: HCPCS | Performed by: INTERNAL MEDICINE

## 2025-03-24 PROCEDURE — C1887 CATHETER, GUIDING: HCPCS | Performed by: INTERNAL MEDICINE

## 2025-03-24 PROCEDURE — 37225 PR REVSC OPN/PRQ FEM/POP W/ATHRC/ANGIOP SM VSL: CPT | Performed by: INTERNAL MEDICINE

## 2025-03-24 PROCEDURE — 85347 COAGULATION TIME ACTIVATED: CPT

## 2025-03-24 PROCEDURE — 25010000002 MIDAZOLAM PER 1 MG: Performed by: INTERNAL MEDICINE

## 2025-03-24 PROCEDURE — C2623 CATH, TRANSLUMIN, DRUG-COAT: HCPCS | Performed by: INTERNAL MEDICINE

## 2025-03-24 PROCEDURE — 25010000002 HEPARIN (PORCINE) PER 1000 UNITS: Performed by: INTERNAL MEDICINE

## 2025-03-24 PROCEDURE — 63710000001 HYDROCODONE-ACETAMINOPHEN 10-325 MG TABLET: Performed by: INTERNAL MEDICINE

## 2025-03-24 PROCEDURE — 37252 INTRVASC US NONCORONARY 1ST: CPT | Performed by: INTERNAL MEDICINE

## 2025-03-24 PROCEDURE — 99153 MOD SED SAME PHYS/QHP EA: CPT | Performed by: INTERNAL MEDICINE

## 2025-03-24 PROCEDURE — 80048 BASIC METABOLIC PNL TOTAL CA: CPT | Performed by: INTERNAL MEDICINE

## 2025-03-24 RX ORDER — MIDAZOLAM HYDROCHLORIDE 1 MG/ML
INJECTION, SOLUTION INTRAMUSCULAR; INTRAVENOUS
Status: DISCONTINUED | OUTPATIENT
Start: 2025-03-24 | End: 2025-03-24 | Stop reason: HOSPADM

## 2025-03-24 RX ORDER — HYDROCODONE BITARTRATE AND ACETAMINOPHEN 10; 325 MG/1; MG/1
1 TABLET ORAL ONCE
Refills: 0 | Status: COMPLETED | OUTPATIENT
Start: 2025-03-24 | End: 2025-03-24

## 2025-03-24 RX ORDER — FENTANYL CITRATE 50 UG/ML
INJECTION, SOLUTION INTRAMUSCULAR; INTRAVENOUS
Status: DISCONTINUED | OUTPATIENT
Start: 2025-03-24 | End: 2025-03-24 | Stop reason: HOSPADM

## 2025-03-24 RX ORDER — NITROGLYCERIN 0.4 MG/1
0.4 TABLET SUBLINGUAL
Status: DISCONTINUED | OUTPATIENT
Start: 2025-03-24 | End: 2025-03-24 | Stop reason: HOSPADM

## 2025-03-24 RX ORDER — ACETAMINOPHEN 325 MG/1
650 TABLET ORAL EVERY 4 HOURS PRN
Status: DISCONTINUED | OUTPATIENT
Start: 2025-03-24 | End: 2025-03-24 | Stop reason: HOSPADM

## 2025-03-24 RX ORDER — LIDOCAINE HYDROCHLORIDE 20 MG/ML
INJECTION, SOLUTION INFILTRATION; PERINEURAL
Status: DISCONTINUED | OUTPATIENT
Start: 2025-03-24 | End: 2025-03-24 | Stop reason: HOSPADM

## 2025-03-24 RX ORDER — HEPARIN SODIUM 1000 [USP'U]/ML
INJECTION, SOLUTION INTRAVENOUS; SUBCUTANEOUS
Status: DISCONTINUED | OUTPATIENT
Start: 2025-03-24 | End: 2025-03-24 | Stop reason: HOSPADM

## 2025-03-24 RX ORDER — SODIUM CHLORIDE 9 MG/ML
INJECTION, SOLUTION INTRAVENOUS
Status: COMPLETED | OUTPATIENT
Start: 2025-03-24 | End: 2025-03-24

## 2025-03-24 RX ORDER — IOPAMIDOL 612 MG/ML
INJECTION, SOLUTION INTRAVASCULAR
Status: DISCONTINUED | OUTPATIENT
Start: 2025-03-24 | End: 2025-03-24 | Stop reason: HOSPADM

## 2025-03-24 RX ADMIN — HYDROCODONE BITARTRATE AND ACETAMINOPHEN 1 TABLET: 10; 325 TABLET ORAL at 17:18

## 2025-03-24 NOTE — Clinical Note
After Visit Summary   7/17/2017    Shira Guthrie    MRN: [id number]           Patient Information     Date Of Birth          1953        Visit Information        Provider Department      7/17/2017 1:50 PM Sally Pierre MD [facility]        Today's Diagnoses     Chronic pain syndrome    -  1    Other polyneuropathy (H)        Primary osteoarthritis involving multiple joints          Care Instructions         Personal Care Plan for Chronic Pain    1.  Personal Goals:                * take less medication              * lose weight: goal of losing 15 to 20 pounds.              * continue working on keeping thoughts positive through [employer]              * to feel confident enough that when someone gives me a compliment I can simply say thank you    2.  Sleep:                 *  Basic sleep plan:                          *reduce or eliminate caffeine and daytime naps                          * relaxation before bed                          * limit screen time 1-2 hours prior to bed                          * establish dark/quiet sleep environment                          * go to bed at target bedtime:   pm                          * keep consistent wake time:   am.              *  Minimize switching days and nights by staying active throughout the day.              * We'll talk more about a consistent sleep plan when we meet next time.                3.  Physical Activity:                 * Formal physical rehabilitation:                          HOME PT for shoulder              * Home/community based activity:                          * Home based exercises given by lymphedema nurse with breathing exercises built in as well - daily                          * Aerobic exercise/endurance exercise:  elliptical machine - 5 minute intervals with sit ups in between for 30 minutes - daily.  Has a  in the builiding. Has exercise tape in her apartment.                          * Cleaning and  ACT = 210 (sec). ACT result was completed at 15:20 EDT. baking with body awareness and stretching              * Listen to your body.  Pace yourself for success.  Don't over-do it.  Plan to get PCA back to help with cleaning and laundry so as to not overdo it.                4.  Nutrition/Weight:                 * Keep a food journal in a notebook at home and bring this to your next visit with Dr. Sky.  Eat small frequent meals.              * Review your I Can Prevent Diabetes materials.              * Limit processed foods and foods high in sugar, sodium and fat.              * Keep up the good work eating many healthy foods.              * When you make a less healthy choice approach yourself with kindness and compassion.  Try to understand what contributed to that choice:  Was I too hungry?  Was I too tired?  Stressed?  Worried?  Use this information to help support healthier choices in the future.    5.  Mood/Stress Management:     SELF COMPASSION!              * Formal interventions:                        * schedule follow up with Dr. Sky in about month or so.              * Home/community based interventions:                          * Regular contact with family  * Relaxation techniques - breathing exercises  * Create your pyramid to focus you on your strengths and hopes.  * Movies  * Meditation  * Yoga  * Creative activity  * Spiritual /prayer:  Prayer at home, attending services at [facility], wellness auxiliary group  * Service-based activity.              * Medications: Cymbalta, Hydroxyzine as needed.    6.  Tobacco/Alcohol/Drug Use:                               * Congratulations on quitting smoking and staying quit!  Keep up the good work managing stress/anxiety in other ways (prayer, talking it out, deep breaths, exercise, etc..                         * Maintain healthy relationship with alcohol                          * For women this would be no more than 1 drink per day                          * For men, this  would be no more than 2 drinks per day              * Eliminate recreational drugs    7.  Pain:                 * Non-medication treatments:                          * ice/heat: use heating pad as you are doing.                          * massage                          * acupuncture  Y[facility]GA is planned for when you return.  PT for shoulder when you return.                           8.  Pain Medications: Gabapentin 3 at night, Tramadol one pill three times day.  Tylenol arthritis as needed for break through.          The Warmth of other Suns: The Epic Story of [place]'s Great Migration   Author Laura Wong  Pulitzer Prize Winner            Follow-ups after your visit        Follow-up notes from your care team     Return in about 4 weeks (around 2017) for for pain sooner if needed..      Who to contact     Please call your clinic at [phone] to:    Ask questions about your health    Make or cancel appointments    Discuss your medicines    Learn about your test results    Speak to your doctor   If you have compliments or concerns about an experience at your clinic, or if you wish to file a complaint, please contact [facility] Patient Relations at [phone] or email us at [email or web address]         Additional Information About Your Visit        [employer] Information     [employer] is an electronic gateway that provides easy, online access to your medical records. With [employer], you can request a clinic appointment, read your test results, renew a prescription or communicate with your care team.     To sign up for [employer] visit the website at [email or web address]   You will be asked to enter the access code listed below, as well as some personal information. Please follow the directions to create your username and password.     Your access code is: [email or web address]  Expires: 10/15/2017  3:30 PM     Your access code will  in 90 days. If you need help or a new  code, please contact your [facility] or call [phone] for assistance.        Care EveryWhere ID     This is your Care EveryWhere ID. This could be used by other organizations to access your [place] medical records  [id number]        Your Vitals Were     Pulse Temperature Pulse Oximetry BMI (Body Mass Index)          76 97.7  F (36.5  C) (Oral) 96% 31.12 kg/m2         Blood Pressure from Last 3 Encounters:   07/17/17 115/71   06/07/17 129/70   04/14/17 134/82    Weight from Last 3 Encounters:   07/17/17 183 lb 6.4 oz (83.2 kg)   06/07/17 176 lb 6.4 oz (80 kg)   04/14/17 177 lb 12.8 oz (80.6 kg)              We Performed the Following     Rapid Urine Drug Screen ([facility])          Where to get your medicines      Some of these will need a paper prescription and others can be bought over the counter.  Ask your nurse if you have questions.     Bring a paper prescription for each of these medications     traMADol 50 MG tablet          Primary Care Provider Office Phone # Fax #    Sally Pierre [phone]       [place] [address]        Equal Access to Services     KASANDRA VALLECILLO : Raj paulinoo Sodonna, waaxda luhcikiadaha, qaybta kaalmada ademumtazyada, una tafoya. [facility] [phone].    ATENCIÓN: Si habla español, tiene a monson disposición servicios gratuitos de asistencia lingüística. Llame al [phone].    We comply with applicable federal civil rights laws and [place] laws. We do not discriminate on the basis of race, color, national origin, age, disability sex, sexual orientation or gender identity.            Thank you!     Thank you for choosing [facility]  for your care. Our goal is always to provide you with excellent care. Hearing back from our patients is one way we can continue to improve our services. Please take a few minutes to complete the written survey that you may receive in  the mail after your visit with us. Thank you!             Your Updated Medication List - Protect others around you: Learn how to safely use, store and throw away your medicines at www.disposemymeds.org.          This list is accurate as of: 7/17/17  3:30 PM.  Always use your most recent med list.                   Brand Name Dispense Instructions for use Diagnosis    aspirin 81 MG EC tablet     90 tablet    Take 1 tablet (81 mg) by mouth daily    Coronary artery disease involving native coronary artery of native heart with angina pectoris (H)       CANE, ANY MATERIAL     1 Device    1 Device by Device route as needed    Peripheral neuropathy (H), Leg edema, left, Stroke (H)       clopidogrel 75 MG tablet    PLAVIX    90 tablet    Take 1 tablet (75 mg) by mouth daily . Give name brand Plavix. Approved by insurance through 2/2/1016.    Chronic ischemic heart disease       dexlansoprazole 30 MG Cpdr CR capsule    DEXILANT    90 capsule    Take 1 capsule (30 mg) by mouth daily    Esophageal stricture       DULoxetine 60 MG EC capsule    CYMBALTA    30 capsule    Take 1 capsule (60 mg) by mouth daily    Major depressive disorder, recurrent, severe without psychotic features (H)       furosemide 20 MG tablet    LASIX    30 tablet    Take 1 tablet (20mg) every other day as needed.    Lymphedema of left leg       gabapentin 300 MG capsule    NEURONTIN    90 capsule    Take 1 capsule (300 mg) by mouth 3 times daily    Other polyneuropathy (H)       Heating Pads Pads     1 each    Apply to painful areas prn.    Stroke (H), Peripheral neuropathy (H), Leg edema, left       hydrOXYzine 25 MG tablet    ATARAX    100 tablet    1 to 2 tablets three times a day as needed.    Major depressive disorder, recurrent episode, moderate (H), Anxiety       isosorbide mononitrate 30 MG 24 hr tablet    IMDUR    90 tablet    Take 1 tablet (30 mg) by mouth daily    Chronic ischemic heart disease       linaclotide 290 MCG capsule    LINZESS    90  capsule    Take 1 capsule (290 mcg) by mouth every morning (before breakfast)    Chronic constipation       metoprolol 25 MG 24 hr tablet    TOPROL-XL    90 tablet    Take 1 tablet (25 mg) by mouth daily    Benign essential hypertension       nitroGLYcerin 0.4 MG sublingual tablet    NITROSTAT    30 tablet    Place 1 tablet (0.4 mg) under the tongue every 5 minutes as needed    Anginal pain (H)       order for DME     2 Device    Equipment being ordered: Jobst stockings thigh high 15-20mmHg.  Please measure for fit. 2 pairs.    Leg edema, left       polyethylene glycol powder    MIRALAX    510 g    Take 17 g (1 capful) by mouth 2 times daily    Chronic constipation       rosuvastatin 20 MG tablet    CRESTOR    90 tablet    Take 1 tablet (20 mg) by mouth daily    Coronary artery disease involving native coronary artery of native heart with angina pectoris (H)       traMADol 50 MG tablet    ULTRAM    90 tablet    Take 1 tablet (50 mg) by mouth 3 times daily as needed    Chronic pain syndrome, Other polyneuropathy (H), Primary osteoarthritis involving multiple joints       TYLENOL 325 MG tablet   Generic drug:  acetaminophen     100 tablet    Take 2 tablets (650 mg) by mouth daily           The patient is a 45y Male complaining of fall.

## 2025-03-24 NOTE — Clinical Note
Balloon was inflated. Max pressure = 6 raj. Total duration = 120 seconds. LEFT POPLITEAL AND DISTAL SFA

## 2025-03-24 NOTE — NURSING NOTE
Pt being discharged home today on room air. TONY Samuel made aware the discharge is complete. Family to provide transportation.

## 2025-03-24 NOTE — PLAN OF CARE
Goal Outcome Evaluation:    Pt is being discharged this shift. Pt right femoral site is C/D/I with no issues noted. IV removed. Telemetry removed. Completed discharge instructions at this time.

## 2025-03-31 NOTE — INTERVAL H&P NOTE
H&P reviewed. The patient was examined and there are no changes to the H&P.    ROS as above otherwise negative

## 2025-04-08 ENCOUNTER — TELEPHONE (OUTPATIENT)
Dept: CARDIOLOGY | Facility: CLINIC | Age: 70
End: 2025-04-08

## 2025-04-08 NOTE — TELEPHONE ENCOUNTER
Patient called to report that after a brief 24 hours of improvement in her symptoms they again returned.  Postprocedure she had been able to ambulate around her apartment building which was a marked improvement.  She is now only able to walk a few feet due to pain.  The pain awakens her through the night.  In discussing of options, I reminded her that Dr. Estevez had suggested a referral to a vascular surgeon.  She would like to discuss with Dr. Doyle to see what his recommendations are.  She is willing to go to [facility] if necessary.  I have messaged Dr. Doyle and will get back with her as soon as I have a response from him.    Electronically signed by DENIS Young, 04/08/25, 4:22 PM EDT.

## 2025-04-24 DIAGNOSIS — I70.223 REST PAIN OF BOTH LOWER EXTREMITIES DUE TO ATHEROSCLEROSIS: ICD-10-CM

## 2025-04-24 DIAGNOSIS — I70.223 CRITICAL LIMB ISCHEMIA OF BOTH LOWER EXTREMITIES WITH REST PAIN: Primary | ICD-10-CM

## 2025-04-25 ENCOUNTER — TELEPHONE (OUTPATIENT)
Dept: CARDIOLOGY | Facility: CLINIC | Age: 70
End: 2025-04-25
Payer: MEDICARE

## 2025-04-25 NOTE — TELEPHONE ENCOUNTER
PT CALLED IN STATING HER FOOT WAS DISCOLORED AND TURNING BLACK. PT QUESTIONED IF SHE COULD GO TO [place] TO SEE DR. VILLATORO FOR HIM TO POSSIBLY PERFORM THE PROCEDURE. INFORMED PROVIDER PERRY COREA OF THE SITUATION. PERRY PROCEEDED TO CALL  TO INFORM AND RECEIVE APPROVAL ON HER ARRIVING TO [facility] ED FOR EVALUATION AND TREATMENT. INFORMED PATIENT AND PATIENT WILL BE TRAVELING TO [facility].

## 2025-04-25 NOTE — TELEPHONE ENCOUNTER
PROVIDER ASKED ME TO INFORM PT OF THE IMPORTANCE IT IS TO SEE AN ED DUE TO THE SEVERITY OF HER CONDITION. PT VERBALIZED UNDERSTANDING AND THAT IT IS CRUCIAL FOR HER TO SEEK MEDICAL ATTENTION. PT STATED SHE IS STILL TRYING TO FIND A RIDE TO [facility].

## 2025-05-29 ENCOUNTER — OFFICE VISIT (OUTPATIENT)
Dept: CARDIOLOGY | Facility: CLINIC | Age: 70
End: 2025-05-29
Payer: MEDICARE

## 2025-05-29 VITALS
OXYGEN SATURATION: 94 % | WEIGHT: 173 LBS | DIASTOLIC BLOOD PRESSURE: 79 MMHG | SYSTOLIC BLOOD PRESSURE: 154 MMHG | HEIGHT: 63 IN | BODY MASS INDEX: 30.65 KG/M2 | HEART RATE: 70 BPM

## 2025-05-29 DIAGNOSIS — I70.223 ATHEROSCLEROSIS OF NATIVE ARTERIES OF EXTREMITIES WITH REST PAIN, BILATERAL LEGS: Primary | ICD-10-CM

## 2025-05-29 PROCEDURE — 99214 OFFICE O/P EST MOD 30 MIN: CPT | Performed by: INTERNAL MEDICINE

## 2025-05-29 PROCEDURE — 3078F DIAST BP <80 MM HG: CPT | Performed by: INTERNAL MEDICINE

## 2025-05-29 PROCEDURE — 3077F SYST BP >= 140 MM HG: CPT | Performed by: INTERNAL MEDICINE

## 2025-05-29 NOTE — PROGRESS NOTES
[facility] CARDIOLOGY  [address] [place] [place] [address]  Phone: [phone]  Fax: [phone]    05/29/2025    Chief Complaint   Patient presents with    Follow-up     Pain, swelling left foot and leg         History:   Monae Chauhan is a 69 y.o. female seen in consultation, she is very well-known to me  She has had previous interventions on her legs and has had previous stenting placed on the left SFA which were occluded when she came to us.  We we have done multiple interventions to recannulized the SFA however recently this year in March she had an intervention however reoccluded and was sent to [facility] where we did another intervene on her with laser atherectomy and drug-coated balloon angioplasty   Has had poor wound healing.   She states her ulceration is getting larger however her foot feels better after recent intervention at [facility]          Past Medical History:   Diagnosis Date    Arthritis     Coronary artery disease     Depression     Diabetes mellitus     Elevated cholesterol     Full dentures     GERD (gastroesophageal reflux disease)     Hyperlipidemia     Hypertension     Myocardial infarction     Peripheral vascular disease     Psoriasis        Past Surgical History:   Procedure Laterality Date    APPENDECTOMY      CARDIAC CATHETERIZATION N/A 01/09/2017    Procedure: Left Heart Cath;  Surgeon: David Diane MD;  Location:  [facility] CATH INVASIVE LOCATION;  Service:     CARDIAC CATHETERIZATION Left 5/20/2024    Procedure: Peripheral angiography;  Surgeon: Jaiden Doyle MD;  Location: [facility] CATH INVASIVE LOCATION;  Service: Cardiovascular;  Laterality: Left;  Left Venogram -    CARDIAC CATHETERIZATION N/A 2/6/2025    Procedure: Peripheral angiography;  Surgeon: Fran Etsevez MD;  Location:  [facility] CATH INVASIVE LOCATION;  Service: Cardiovascular;  Laterality: N/A;    CARDIAC CATHETERIZATION N/A 2/26/2025    Procedure:  Peripheral angiography;  Surgeon: Jaiden Doyle MD;  Location: [facility] CATH INVASIVE LOCATION;  Service: Cardiovascular;  Laterality: N/A;    CARDIAC CATHETERIZATION N/A 2/26/2025    Procedure: Atherectomy-peripheral;  Surgeon: Jaiden Doyle MD;  Location: [facility] CATH INVASIVE LOCATION;  Service: Cardiovascular;  Laterality: N/A;    CARDIAC CATHETERIZATION N/A 2/26/2025    Procedure: Angioplasty-peripheral;  Surgeon: Jaiden Doyle MD;  Location: [facility] CATH INVASIVE LOCATION;  Service: Cardiovascular;  Laterality: N/A;    CARDIAC CATHETERIZATION N/A 3/24/2025    Procedure: Peripheral angiography;  Surgeon: Jaiden Doyle MD;  Location: [facility] CATH INVASIVE LOCATION;  Service: Cardiovascular;  Laterality: N/A;    CARDIAC CATHETERIZATION N/A 3/24/2025    Procedure: Atherectomy-peripheral;  Surgeon: Jaiden Doyle MD;  Location: [facility] CATH INVASIVE LOCATION;  Service: Cardiovascular;  Laterality: N/A;    CORONARY STENT PLACEMENT      CYSTOSCOPY BOTOX INJECTION OF BLADDER N/A 10/20/2021    Procedure: Cystoscopy Botox injection of bladder;  Surgeon: Say Robles MD;  Location: [facility];  Service: Urology;  Laterality: N/A;    FOOT SURGERY Left     GALLBLADDER SURGERY      INTRAVASCULAR ULTRASOUND N/A 2/26/2025    Procedure: Intravascular Ultrasound;  Surgeon: Jaiden Doyle MD;  Location: [facility] CATH INVASIVE LOCATION;  Service: Cardiovascular;  Laterality: N/A;    INTRAVASCULAR ULTRASOUND N/A 3/24/2025    Procedure: Intravascular Ultrasound;  Surgeon: Jaiden Doyle MD;  Location: [facility] CATH INVASIVE LOCATION;  Service: Cardiovascular;  Laterality: N/A;    LEG SURGERY Left     TUBAL ABDOMINAL LIGATION      VASCULAR SURGERY Left     Lower Extremity Vascular Stent-[facility]        ROS  As above otherwise negative    Past Social History:  Social History     Socioeconomic History    Marital status:     Number of children: 2   Tobacco Use    Smoking status: Former     Current packs/day: 1.00     Average  packs/day: 1 pack/day for 43.9 years (43.9 ttl pk-yrs)     Types: Cigarettes     Start date: 6/25/1981    Smokeless tobacco: Never    Tobacco comments:     2-4 PPD off and on for the last 40 years   Vaping Use    Vaping status: Former    Substances: Nicotine, Flavoring    Devices: Refillable tank   Substance and Sexual Activity    Alcohol use: No    Drug use: No    Sexual activity: Defer       Past Family History:  Family History   Problem Relation Age of Onset    Breast cancer Maternal Grandmother 70    Cancer Mother     Diabetes Mother     Cancer Father     Diabetes Father        Current Outpatient Medications   Medication Sig Dispense Refill    ALPRAZolam (XANAX) 0.25 MG tablet Take 1 tablet by mouth 2 (Two) Times a Day As Needed for Anxiety.      amLODIPine (NORVASC) 10 MG tablet Take 1 tablet by mouth Daily. 30 tablet 2    apixaban (ELIQUIS) 5 MG tablet tablet Take 1 tablet by mouth 2 (Two) Times a Day.      carvedilol (COREG) 3.125 MG tablet Take 1 tablet by mouth 2 (Two) Times a Day With Meals.      clopidogrel (PLAVIX) 75 MG tablet Take 1 tablet by mouth Daily. 30 tablet 11    dicyclomine (BENTYL) 20 MG tablet Take 1 tablet by mouth 3 (Three) Times a Day As Needed for Abdominal Cramping.      furosemide (LASIX) 20 MG tablet Take 1 tablet by mouth Daily.      gabapentin (NEURONTIN) 800 MG tablet Take 1 tablet by mouth Every 6 (Six) Hours As Needed (nerve pain).      HYDROcodone-acetaminophen (NORCO)  MG per tablet Take 1 tablet by mouth Every 8 (Eight) Hours As Needed for Moderate Pain.      insulin glargine (LANTUS, SEMGLEE) 100 UNIT/ML injection Inject 30 Units under the skin into the appropriate area as directed Every Night.      Insulin Lispro, 1 Unit Dial, (HUMALOG) 100 UNIT/ML solution pen-injector Inject 10 Units under the skin into the appropriate area as directed Every Night.      lidocaine (LIDODERM) 5 % Place 1 patch on the skin as directed by provider Daily. Remove & Discard patch within 12  hours or as directed by MD 3 patch 3    potassium chloride (MICRO-K) 10 MEQ CR capsule Take 1 capsule by mouth Daily.      promethazine (PHENERGAN) 25 MG tablet Take 1 tablet by mouth Every 6 (Six) Hours As Needed for Nausea or Vomiting.      rosuvastatin (CRESTOR) 20 MG tablet Take 1 tablet by mouth Daily.      Tirzepatide 5 MG/0.5ML solution auto-injector Inject 5 mg under the skin into the appropriate area as directed 1 (One) Time Per Week.      traMADol (ULTRAM) 50 MG tablet Take 1 tablet by mouth 2 (Two) Times a Day As Needed for Moderate Pain.      valsartan (DIOVAN) 320 MG tablet Take 1 tablet by mouth Daily.       No current facility-administered medications for this visit.        Allergies   Allergen Reactions    Amoxicillin Rash    Penicillins Rash         Objective:  Vitals:    05/29/25 1543   BP: 154/79   Pulse: 70   SpO2: 94%       Physical Exam  Left foot is erythematous with active ulceration present.  There are 2 ulcers left anterior shin and more distal on the foot as well.  Limited exam as this is a peripheral evaluation    DATA:  Results for orders placed during the hospital encounter of 01/08/17    SCANNED - TELEMETRY     Results for orders placed during the hospital encounter of 01/08/17    Adult Transthoracic Echo Complete    Interpretation Summary  · Left ventricular wall thickness is consistent with mild concentric hypertrophy.  · Left ventricular function is normal.  · Estimated EF appears to be in the range of 66 - 70%  · All left ventricular wall segments contract normally.  · Left ventricular diastolic dysfunction (grade I) consistent with impaired relaxation.  · All valves appear normal in structure and function.  · Pericardium: There is no evidence of pericardial effusion.   Results for orders placed during the hospital encounter of 08/20/20    Stress Test With Myocardial Perfusion (1 Day)    Interpretation Summary  · A pharmacological stress test was performed using regadenoson without  low-level exercise.  · Findings consistent with a normal ECG stress test.  · Myocardial perfusion imaging indicates a normal myocardial perfusion study with no evidence of ischemia.  · Normal LV cavity size. Normal LV wall motion noted.  · Left ventricular ejection fraction is hyperdynamic (Calculated EF > 70%).  · Impressions are consistent with a low risk study.   Results for orders placed during the hospital encounter of 08/20/20    Stress Test With Myocardial Perfusion (1 Day)    Interpretation Summary  · A pharmacological stress test was performed using regadenoson without low-level exercise.  · Findings consistent with a normal ECG stress test.  · Myocardial perfusion imaging indicates a normal myocardial perfusion study with no evidence of ischemia.  · Normal LV cavity size. Normal LV wall motion noted.  · Left ventricular ejection fraction is hyperdynamic (Calculated EF > 70%).  · Impressions are consistent with a low risk study.   Results for orders placed during the hospital encounter of 03/24/25    Intravascular Ultrasound    Conclusion  PERIPHERAL ARTERIOGRAM / INTERVENTION REPORT    DATE OF PROCEDURE: 03/24/25    INDICATION FOR PROCEDURE:  Rest leg pain in the left with recent intervention and CTA showing occluded stents    PROCEDURE PERFORMED:  Ultrasound-guided access of the right femoral artery  Selective left iliac angiogram the runoff  Selective left anterior tibial angiogram with runoff  Moderate conscious sedation  IVUS of the left SFA and left anterior tibial  Atherectomy with Rota Rosas system of the left SFA  Balloon angioplasty plasty of the left anterior tibial  Drug-coated balloon angioplasty of the left popliteal and left SFA  Shockwave lithotripsy of the left mid and proximal SFA with a 6 mm balloon      PROCEDURE COMMENTS:    Patient was brought to the cath lab and placed on the cardiac catherization table  Moderate conscious sedation was utilized for 75 minutes for duration procedure and  supervised administration fentanyl Versed that was given independently by Minor fuentes registered nurse and supervised by me.  We obtained ultrasound-guided access a hardcopy of the images stored permanent record after confirming patency of the vessel and flow we inserted a wire and however had to use a flex sheath but removed to get in as the micropuncture sheath would not go.  We inserted the sheath and subsequently then went in with the Omni Flush going contralateral with a Glidewire advantage into the left iliac down into the SFA.  We then exchanged out for a Terumo 6 Algerian 45 cm destination sheath placing in the left iliac to do an angiogram with runoff.  We used a Lendinero spex catheter to advance down into the stent using a Glidewire advantage.  We advanced all the way into the anterior tibial and confirmed with injection and anterior tibial that was that we were true lumen.  We exchanged out for a 014 wire and subsequently performed IVUS.  There is heavy calcification and an adequate stent expansion in the mid SFA again seen. We first used a Rotarex inserting it into the left SFA to perform atherectomy .   We used a 6 mm shockwave balloon delivering 200 pulses in the mid and 100 pulses in the proximal.  We subsequently then went with a 2.5 x 220 balloon in the anterior tibial.  We then subsequently performed balloon angioplasty with a 5 mm balloon in the popliteal along with a 4 mm drug-coated balloon and then a 6 mm balloon in the stents using a [place] balloon.  We inflated for 2  2 minutes at 7 raj.  Angiographically there is minimal residual stenosis.  We subsequent deployed an Angio-Seal in the right femoral artery    Findings:      Left SFA is 100% occluded.  There is an 80 to 90% stenosis in the left anterior tibial in the proximal and the midportion of the vessel.  Mild disease in the PT trunk        CONCLUSION:  Successful revascularization of the left anterior tibial.  There is inadequately expanded  "stent in the SFA despite shockwave lithotripsy.  Consider detour percutaneous bypass if she reoccludes      EBL: 20 ML  No specimens were removed.      Jaiden Doyle MD   Results for orders placed during the hospital encounter of 01/26/24    US Ankle / Brachial Indices Extremity Limited    Narrative  PROCEDURE: US ANKLE / BRACHIAL INDICES EXTREMITY LIMITED-    HISTORY: wound swelling; I73.9-Peripheral vascular disease, unspecified    PROCEDURE: Brachial and ankle pressures were obtained bilaterally. JUNIOR's  were calculated.    FINDINGS: The right JUNIOR measures 0.87.    The left JUNIOR measures 0.85.    Impression  Mildly reduced ankle-brachial indices.        This report was finalized on 1/26/2024 10:13 AM by Gloria Hanson M.D..      Procedures     Lab Results   Component Value Date    CHOL 113 02/27/2025    CHOL 92 02/07/2025    CHOL 251 (H) 01/08/2017     Lab Results   Component Value Date    TRIG 360 (H) 02/27/2025    TRIG 113 02/07/2025    TRIG 475 (H) 01/08/2017     Lab Results   Component Value Date    HDL 24 (L) 02/27/2025    HDL 28 (L) 02/07/2025    HDL 22 (L) 01/08/2017     Lab Results   Component Value Date    LDL 36 02/27/2025    LDL 43 02/07/2025    LDL  01/08/2017      Comment:      Unable to calculate       No results found for: \"TSH\"          Invalid input(s): \"LABALBU\", \"PROT\"            Assessment and Plan:    Monae Chauhan  reports that she has quit smoking. Her smoking use included cigarettes. She started smoking about 43 years ago. She has a 43.9 pack-year smoking history. She has never used smokeless tobacco.   Obtain arterial ultrasound and if it has reoccluded then consider referral for detour procedure on the left leg.   Thank you for allowing me to participate in the care of Monae Chauhan. Feel free to contact me directly with any further questions or concerns.            Jaiden Doyle MD, FACC, [facility]  Interventional Cardiology     "

## 2025-06-02 ENCOUNTER — PATIENT OUTREACH (OUTPATIENT)
Dept: CASE MANAGEMENT | Facility: OTHER | Age: 70
End: 2025-06-02
Payer: MEDICARE

## 2025-06-02 NOTE — OUTREACH NOTE
AMBULATORY CASE MANAGEMENT NOTE    Names and Relationships of Patient/Support Persons: Contact: Monae Chauhan; Relationship: Self -     Patient Outreach    RN-ACM outreach with patient proactively identified for participation in High Risk Case Management.  Patient is agreeable to engagement and occasional outreach.  Nursing assessment and care plan initiated.    Patient has questions this date about obtaining crutches.  Per her report, she discussed the need during a visit with her primary care a few weeks ago but does not know where the request stalled.  RN-NADIAM will pursue.    Care Coordination    RN-NADIAM care coordination with reception staff at [facility].  Message left re: request.  Clinical staff were not available to review and were asked to return a call to RN-ANDREA.      Leigha BLAKELY  Ambulatory Case Management    6/2/2025, 15:22 EDT

## 2025-06-03 ENCOUNTER — PATIENT OUTREACH (OUTPATIENT)
Dept: CASE MANAGEMENT | Facility: OTHER | Age: 70
End: 2025-06-03
Payer: MEDICARE

## 2025-06-03 NOTE — OUTREACH NOTE
AMBULATORY CASE MANAGEMENT NOTE    Names and Relationships of Patient/Support Persons: Contact: [facility]; Relationship:  -     Care Coordination    Per PCP clinical staff, an order for forearm crutches was sent April 11, 2025. Potential the script was sent to [facility] ([phone]) instead of [facility].    Per [facility], the script was not received at their location.    Per staff at [facility], the order was received and noted as patient was going to stop by their [place] location to  the crutches.  Patient has not done so.  The agency can deliver to patient's home tomorrow if patient wishes.  The cost of the crutches is $94. It is anticipated that [facility] will pay 80% of the cost.  Patient is noted by [facility] as having Medicaid as supplemental insurance which could potential cover the co-pay.  Agency staff will await patient's phone call confirming desire for delivery before running the claim.    Patient Outreach    RN-ACM outreach to patient.  The call was not answered.  RN-ACM left a message requesting a return call to [phone].    Care Coordination    RN-ACM updated staff at [facility].       Leigha BLAKELY  Ambulatory Case Management    6/3/2025, 14:15 EDT

## 2025-06-05 ENCOUNTER — PATIENT OUTREACH (OUTPATIENT)
Dept: CASE MANAGEMENT | Facility: OTHER | Age: 70
End: 2025-06-05
Payer: MEDICARE

## 2025-06-05 NOTE — OUTREACH NOTE
AMBULATORY CASE MANAGEMENT NOTE    Names and Relationships of Patient/Support Persons: Contact: Monae Chauhan; Relationship: Self -     Patient Outreach    RN-ACM outreach with patient engaged in High Risk Case Management.  Information re: crutches relayed to patient.  [facility] is awaiting patient confirmation before delivering the DME to patient's home.  RN-ACM provided patient with the telephone number and encouraged her to call timely.  Patient voiced understanding.       Leigha BLAKELY  Ambulatory Case Management    6/5/2025, 09:18 EDT

## 2025-06-12 ENCOUNTER — HOSPITAL ENCOUNTER (OUTPATIENT)
Dept: ULTRASOUND IMAGING | Facility: HOSPITAL | Age: 70
Discharge: HOME | End: 2025-06-12
Admitting: INTERNAL MEDICINE
Payer: MEDICARE

## 2025-06-12 DIAGNOSIS — I70.223 ATHEROSCLEROSIS OF NATIVE ARTERIES OF EXTREMITIES WITH REST PAIN, BILATERAL LEGS: ICD-10-CM

## 2025-06-12 PROCEDURE — 93925 LOWER EXTREMITY STUDY: CPT

## 2025-06-19 ENCOUNTER — OFFICE VISIT (OUTPATIENT)
Dept: SURGERY | Facility: CLINIC | Age: 70
End: 2025-06-19
Payer: MEDICARE

## 2025-06-19 ENCOUNTER — DOCUMENTATION (OUTPATIENT)
Dept: SURGERY | Facility: CLINIC | Age: 70
End: 2025-06-19
Payer: MEDICARE

## 2025-06-19 VITALS
SYSTOLIC BLOOD PRESSURE: 148 MMHG | WEIGHT: 173.2 LBS | DIASTOLIC BLOOD PRESSURE: 82 MMHG | BODY MASS INDEX: 30.69 KG/M2 | HEIGHT: 63 IN

## 2025-06-19 DIAGNOSIS — Z72.0 TOBACCO USE: Primary | ICD-10-CM

## 2025-06-19 DIAGNOSIS — L97.522 NON-PRESSURE CHRONIC ULCER OF OTHER PART OF LEFT FOOT WITH FAT LAYER EXPOSED: ICD-10-CM

## 2025-06-19 NOTE — PROGRESS NOTES
Subjective   Monae Chauhan is a 69 y.o. female.     Chief Complaint: ischemic foot ulcers    History of Present Illness She is a 69 yo smoker who refused to stop until just recently, and has PAD. She apparently has had more than one angioplasty/stent and was recommended for another but refused initially but has had a couple of procedures that now have failed.  She had develop a red spot on the dorsal lateral mid left foot  that has evolved in to a large necrotic ulcer as well as one on the anterior left lower leg. She had been on antibiotics but it has not improved. She had a MRI that is read as possible osteomyelitis.     The following portions of the patient's history were reviewed and updated as appropriate: current medications, past family history, past medical history, past social history, past surgical history and problem list.    Review of Systems   Constitutional:  Negative for activity change, appetite change, chills, fever and unexpected weight change.   HENT:  Negative for congestion, facial swelling and sore throat.    Eyes:  Negative for photophobia and visual disturbance.   Respiratory:  Negative for chest tightness, shortness of breath and wheezing.    Cardiovascular:  Negative for chest pain, palpitations and leg swelling.   Gastrointestinal:  Negative for abdominal distention, abdominal pain, anal bleeding, blood in stool, constipation, diarrhea, nausea, rectal pain and vomiting.   Endocrine: Negative for cold intolerance, heat intolerance, polydipsia and polyuria.   Genitourinary:  Negative for difficulty urinating, dysuria, flank pain and urgency.   Musculoskeletal:  Negative for back pain and myalgias.   Skin:  Positive for color change and wound. Negative for rash.   Allergic/Immunologic: Negative for immunocompromised state.   Neurological:  Positive for weakness. Negative for dizziness, seizures, syncope, light-headedness, numbness and headaches.   Hematological:  Negative for adenopathy.  Does not bruise/bleed easily.   Psychiatric/Behavioral:  Negative for behavioral problems and confusion. The patient is not nervous/anxious.        Objective   Physical Exam  Vitals reviewed.   Constitutional:       General: She is not in acute distress.     Appearance: She is well-developed. She is ill-appearing.      Comments: 5 cm necrotic ulcer on the dorsum of the left lateral foot, and 6 cm one on the anterior left lower leg about half way up   HENT:      Head: Normocephalic. No laceration. Hair is normal.      Right Ear: Hearing and ear canal normal.      Left Ear: Hearing and ear canal normal.      Nose: Nose normal.      Right Sinus: No maxillary sinus tenderness or frontal sinus tenderness.      Left Sinus: No maxillary sinus tenderness or frontal sinus tenderness.   Eyes:      General: Lids are normal.      Conjunctiva/sclera: Conjunctivae normal.      Pupils: Pupils are equal, round, and reactive to light.   Neck:      Thyroid: No thyroid mass or thyromegaly.      Vascular: No JVD.      Trachea: No tracheal tenderness or tracheal deviation.   Cardiovascular:      Rate and Rhythm: Normal rate and regular rhythm.      Heart sounds: No murmur heard.     No gallop.   Pulmonary:      Effort: Pulmonary effort is normal.      Breath sounds: No stridor. Wheezing present.   Chest:      Chest wall: No tenderness.   Abdominal:      General: Bowel sounds are normal. There is no distension.      Palpations: Abdomen is soft. There is no mass.      Tenderness: There is no abdominal tenderness. There is no guarding or rebound.      Hernia: No hernia is present.   Musculoskeletal:         General: No deformity.      Cervical back: Normal range of motion.   Lymphadenopathy:      Cervical: No cervical adenopathy.      Upper Body:      Right upper body: No supraclavicular adenopathy.      Left upper body: No supraclavicular adenopathy.   Skin:     General: Skin is warm and dry.      Coloration: Skin is not pale.       Findings: Erythema and lesion present. No rash.   Neurological:      Mental Status: She is alert and oriented to person, place, and time.      Motor: No abnormal muscle tone.   Psychiatric:         Behavior: Behavior normal.         Thought Content: Thought content normal.         Past Medical History:   Diagnosis Date    Arthritis     Coronary artery disease     Depression     Diabetes mellitus     Elevated cholesterol     Full dentures     GERD (gastroesophageal reflux disease)     Hyperlipidemia     Hypertension     Myocardial infarction     Peripheral vascular disease     Psoriasis        Family History   Problem Relation Age of Onset    Breast cancer Maternal Grandmother 70    Cancer Mother     Diabetes Mother     Cancer Father     Diabetes Father        Social History     Tobacco Use    Smoking status: Former     Current packs/day: 1.00     Average packs/day: 1 pack/day for 44.0 years (44.0 ttl pk-yrs)     Types: Cigarettes     Start date: 6/25/1981    Smokeless tobacco: Never    Tobacco comments:     2-4 PPD off and on for the last 40 years   Vaping Use    Vaping status: Former    Substances: Nicotine, Flavoring    Devices: [employer] tank   Substance Use Topics    Alcohol use: No    Drug use: No       Past Surgical History:   Procedure Laterality Date    APPENDECTOMY      CARDIAC CATHETERIZATION N/A 01/09/2017    Procedure: Left Heart Cath;  Surgeon: David Diane MD;  Location:  [facility] CATH INVASIVE [facility];  Service:     CARDIAC CATHETERIZATION Left 5/20/2024    Procedure: Peripheral angiography;  Surgeon: Jaiden Doyle MD;  Location: [facility] CATH INVASIVE LOCATION;  Service: Cardiovascular;  Laterality: Left;  Left Venogram -    CARDIAC CATHETERIZATION N/A 2/6/2025    Procedure: Peripheral angiography;  Surgeon: Fran Estevez MD;  Location:  [facility] CATH INVASIVE LOCATION;  Service: Cardiovascular;  Laterality: N/A;    CARDIAC CATHETERIZATION N/A 2/26/2025    Procedure: Peripheral angiography;   Surgeon: Jaiden Doyle MD;  Location:  [facility] CATH INVASIVE LOCATION;  Service: Cardiovascular;  Laterality: N/A;    CARDIAC CATHETERIZATION N/A 2/26/2025    Procedure: Atherectomy-peripheral;  Surgeon: Jaiden Doyle MD;  Location:  [facility] CATH INVASIVE LOCATION;  Service: Cardiovascular;  Laterality: N/A;    CARDIAC CATHETERIZATION N/A 2/26/2025    Procedure: Angioplasty-peripheral;  Surgeon: Jaiden Doyle MD;  Location:  [facility] CATH INVASIVE LOCATION;  Service: Cardiovascular;  Laterality: N/A;    CARDIAC CATHETERIZATION N/A 3/24/2025    Procedure: Peripheral angiography;  Surgeon: Jaiden Doyle MD;  Location:  [facility] CATH INVASIVE LOCATION;  Service: Cardiovascular;  Laterality: N/A;    CARDIAC CATHETERIZATION N/A 3/24/2025    Procedure: Atherectomy-peripheral;  Surgeon: Jaiden Doyle MD;  Location:  [facility] CATH INVASIVE LOCATION;  Service: Cardiovascular;  Laterality: N/A;    CORONARY STENT PLACEMENT      CYSTOSCOPY BOTOX INJECTION OF BLADDER N/A 10/20/2021    Procedure: Cystoscopy Botox injection of bladder;  Surgeon: Say Robles MD;  Location: [facility];  Service: Urology;  Laterality: N/A;    FOOT SURGERY Left     GALLBLADDER SURGERY      INTRAVASCULAR ULTRASOUND N/A 2/26/2025    Procedure: Intravascular Ultrasound;  Surgeon: Jaiden Doyle MD;  Location:  [facility] CATH INVASIVE LOCATION;  Service: Cardiovascular;  Laterality: N/A;    INTRAVASCULAR ULTRASOUND N/A 3/24/2025    Procedure: Intravascular Ultrasound;  Surgeon: Jaiden Doyle MD;  Location: [facility] CATH INVASIVE LOCATION;  Service: Cardiovascular;  Laterality: N/A;    LEG SURGERY Left     TUBAL ABDOMINAL LIGATION      VASCULAR SURGERY Left     Lower Extremity Vascular Stent-[facility]       Current Outpatient Medications   Medication Instructions    ALPRAZolam (XANAX) 0.25 mg, 2 Times Daily PRN    amLODIPine (NORVASC) 10 mg, Oral, Daily    apixaban (ELIQUIS) 5 mg, 2 Times Daily    carvedilol (COREG) 3.125 mg, 2 Times Daily With Meals     clopidogrel (PLAVIX) 75 mg, Oral, Daily    dicyclomine (BENTYL) 20 mg, 3 Times Daily PRN    furosemide (LASIX) 20 MG tablet 1 tablet, Daily    gabapentin (NEURONTIN) 800 mg, Every 6 Hours PRN    HYDROcodone-acetaminophen (NORCO)  MG per tablet 1 tablet, Every 8 Hours PRN    insulin glargine (LANTUS, SEMGLEE) 30 Units, Nightly    Insulin Lispro (1 Unit Dial) (HUMALOG) 10 Units, Nightly    lidocaine (LIDODERM) 5 % 1 patch, Transdermal, Every 24 Hours, Remove & Discard patch within 12 hours or as directed by MD    potassium chloride (MICRO-K) 10 MEQ CR capsule Take 1 capsule by mouth Daily.    promethazine (PHENERGAN) 25 MG tablet 1 tablet, Every 6 Hours PRN    rosuvastatin (CRESTOR) 20 mg, Daily    Tirzepatide 5 mg, Weekly    traMADol (ULTRAM) 50 MG tablet Take 1 tablet by mouth 2 (Two) Times a Day As Needed for Moderate Pain.    valsartan (DIOVAN) 320 MG tablet 1 tablet, Daily         Assessment & Plan   Diagnoses and all orders for this visit:    1. Tobacco use (Primary)    2. 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    Schedule left AKA             This document has been electronically signed by Taran Harding MD   June 19, 2025 13:17 EDT

## 2025-06-19 NOTE — H&P (VIEW-ONLY)
Subjective   Monae Chauhan is a 69 y.o. female.     Chief Complaint: ischemic foot ulcers    History of Present Illness She is a 67 yo smoker who refused to stop until just recently, and has PAD. She apparently has had more than one angioplasty/stent and was recommended for another but refused initially but has had a couple of procedures that now have failed.  She had develop a red spot on the dorsal lateral mid left foot  that has evolved in to a large necrotic ulcer as well as one on the anterior left lower leg. She had been on antibiotics but it has not improved. She had a MRI that is read as possible osteomyelitis.     The following portions of the patient's history were reviewed and updated as appropriate: current medications, past family history, past medical history, past social history, past surgical history and problem list.    Review of Systems   Constitutional:  Negative for activity change, appetite change, chills, fever and unexpected weight change.   HENT:  Negative for congestion, facial swelling and sore throat.    Eyes:  Negative for photophobia and visual disturbance.   Respiratory:  Negative for chest tightness, shortness of breath and wheezing.    Cardiovascular:  Negative for chest pain, palpitations and leg swelling.   Gastrointestinal:  Negative for abdominal distention, abdominal pain, anal bleeding, blood in stool, constipation, diarrhea, nausea, rectal pain and vomiting.   Endocrine: Negative for cold intolerance, heat intolerance, polydipsia and polyuria.   Genitourinary:  Negative for difficulty urinating, dysuria, flank pain and urgency.   Musculoskeletal:  Negative for back pain and myalgias.   Skin:  Positive for color change and wound. Negative for rash.   Allergic/Immunologic: Negative for immunocompromised state.   Neurological:  Positive for weakness. Negative for dizziness, seizures, syncope, light-headedness, numbness and headaches.   Hematological:  Negative for adenopathy.  Does not bruise/bleed easily.   Psychiatric/Behavioral:  Negative for behavioral problems and confusion. The patient is not nervous/anxious.        Objective   Physical Exam  Vitals reviewed.   Constitutional:       General: She is not in acute distress.     Appearance: She is well-developed. She is ill-appearing.      Comments: 5 cm necrotic ulcer on the dorsum of the left lateral foot, and 6 cm one on the anterior left lower leg about half way up   HENT:      Head: Normocephalic. No laceration. Hair is normal.      Right Ear: Hearing and ear canal normal.      Left Ear: Hearing and ear canal normal.      Nose: Nose normal.      Right Sinus: No maxillary sinus tenderness or frontal sinus tenderness.      Left Sinus: No maxillary sinus tenderness or frontal sinus tenderness.   Eyes:      General: Lids are normal.      Conjunctiva/sclera: Conjunctivae normal.      Pupils: Pupils are equal, round, and reactive to light.   Neck:      Thyroid: No thyroid mass or thyromegaly.      Vascular: No JVD.      Trachea: No tracheal tenderness or tracheal deviation.   Cardiovascular:      Rate and Rhythm: Normal rate and regular rhythm.      Heart sounds: No murmur heard.     No gallop.   Pulmonary:      Effort: Pulmonary effort is normal.      Breath sounds: No stridor. Wheezing present.   Chest:      Chest wall: No tenderness.   Abdominal:      General: Bowel sounds are normal. There is no distension.      Palpations: Abdomen is soft. There is no mass.      Tenderness: There is no abdominal tenderness. There is no guarding or rebound.      Hernia: No hernia is present.   Musculoskeletal:         General: No deformity.      Cervical back: Normal range of motion.   Lymphadenopathy:      Cervical: No cervical adenopathy.      Upper Body:      Right upper body: No supraclavicular adenopathy.      Left upper body: No supraclavicular adenopathy.   Skin:     General: Skin is warm and dry.      Coloration: Skin is not pale.       Findings: Erythema and lesion present. No rash.   Neurological:      Mental Status: She is alert and oriented to person, place, and time.      Motor: No abnormal muscle tone.   Psychiatric:         Behavior: Behavior normal.         Thought Content: Thought content normal.         Past Medical History:   Diagnosis Date    Arthritis     Coronary artery disease     Depression     Diabetes mellitus     Elevated cholesterol     Full dentures     GERD (gastroesophageal reflux disease)     Hyperlipidemia     Hypertension     Myocardial infarction     Peripheral vascular disease     Psoriasis        Family History   Problem Relation Age of Onset    Breast cancer Maternal Grandmother 70    Cancer Mother     Diabetes Mother     Cancer Father     Diabetes Father        Social History     Tobacco Use    Smoking status: Former     Current packs/day: 1.00     Average packs/day: 1 pack/day for 44.0 years (44.0 ttl pk-yrs)     Types: Cigarettes     Start date: 6/25/1981    Smokeless tobacco: Never    Tobacco comments:     2-4 PPD off and on for the last 40 years   Vaping Use    Vaping status: Former    Substances: Nicotine, Flavoring    Devices: [employer] tank   Substance Use Topics    Alcohol use: No    Drug use: No       Past Surgical History:   Procedure Laterality Date    APPENDECTOMY      CARDIAC CATHETERIZATION N/A 01/09/2017    Procedure: Left Heart Cath;  Surgeon: David Diane MD;  Location:  [facility] CATH INVAS[facility] LOC[facility];  Service:     CARDIAC CATHETERIZATION Left 5/20/2024    Procedure: Peripheral angiography;  Surgeon: Jaiden Doyle MD;  Location: [facility] CATH INVASIVE LOCATION;  Service: Cardiovascular;  Laterality: Left;  Left Venogram -    CARDIAC CATHETERIZATION N/A 2/6/2025    Procedure: Peripheral angiography;  Surgeon: Fran Estevez MD;  Location:  [facility] CATH INVASIVE LOCATION;  Service: Cardiovascular;  Laterality: N/A;    CARDIAC CATHETERIZATION N/A 2/26/2025    Procedure: Peripheral angiography;   Surgeon: Jaiden Doyle MD;  Location:  [facility] CATH INVASIVE LOCATION;  Service: Cardiovascular;  Laterality: N/A;    CARDIAC CATHETERIZATION N/A 2/26/2025    Procedure: Atherectomy-peripheral;  Surgeon: Jaiden Doyle MD;  Location:  [facility] CATH INVASIVE LOCATION;  Service: Cardiovascular;  Laterality: N/A;    CARDIAC CATHETERIZATION N/A 2/26/2025    Procedure: Angioplasty-peripheral;  Surgeon: Jaiden Doyle MD;  Location:  [facility] CATH INVASIVE LOCATION;  Service: Cardiovascular;  Laterality: N/A;    CARDIAC CATHETERIZATION N/A 3/24/2025    Procedure: Peripheral angiography;  Surgeon: Jaiden Doyle MD;  Location:  [facility]ATH INVASIVE LOC[facility];  Service: Cardiovascular;  Laterality: N/A;    CARDIAC CATHETERIZATION N/A 3/24/2025    Procedure: Atherectomy-peripheral;  Surgeon: Jaiden Doyle MD;  Location:  [facility] CATH INVASIVE LOCATION;  Service: Cardiovascular;  Laterality: N/A;    CORONARY STENT PLACEMENT      CYSTOSCOPY BOTOX INJECTION OF BLADDER N/A 10/20/2021    Procedure: Cystoscopy Botox injection of bladder;  Surgeon: Say Robles MD;  Location: [facility];  Service: Urology;  Laterality: N/A;    FOOT SURGERY Left     GALLBLADDER SURGERY      INTRAVASCULAR ULTRASOUND N/A 2/26/2025    Procedure: Intravascular Ultrasound;  Surgeon: Jaiden Doyle MD;  Location:  [facility] CATH INVASIVE LOCATION;  Service: Cardiovascular;  Laterality: N/A;    INTRAVASCULAR ULTRASOUND N/A 3/24/2025    Procedure: Intravascular Ultrasound;  Surgeon: Jaiden Doyle MD;  Location: [facility] CATH INVASIVE LOCATION;  Service: Cardiovascular;  Laterality: N/A;    LEG SURGERY Left     TUBAL ABDOMINAL LIGATION      VASCULAR SURGERY Left     Lower Extremity [facility] Stent-[facility]       Current Outpatient Medications   Medication Instructions    ALPRAZolam (XANAX) 0.25 mg, 2 Times Daily PRN    amLODIPine (NORVASC) 10 mg, Oral, Daily    apixaban (ELIQUIS) 5 mg, 2 Times Daily    carvedilol (COREG) 3.125 mg, 2 Times Daily With Meals     clopidogrel (PLAVIX) 75 mg, Oral, Daily    dicyclomine (BENTYL) 20 mg, 3 Times Daily PRN    furosemide (LASIX) 20 MG tablet 1 tablet, Daily    gabapentin (NEURONTIN) 800 mg, Every 6 Hours PRN    HYDROcodone-acetaminophen (NORCO)  MG per tablet 1 tablet, Every 8 Hours PRN    insulin glargine (LANTUS, SEMGLEE) 30 Units, Nightly    Insulin Lispro (1 Unit Dial) (HUMALOG) 10 Units, Nightly    lidocaine (LIDODERM) 5 % 1 patch, Transdermal, Every 24 Hours, Remove & Discard patch within 12 hours or as directed by MD    potassium chloride (MICRO-K) 10 MEQ CR capsule Take 1 capsule by mouth Daily.    promethazine (PHENERGAN) 25 MG tablet 1 tablet, Every 6 Hours PRN    rosuvastatin (CRESTOR) 20 mg, Daily    Tirzepatide 5 mg, Weekly    traMADol (ULTRAM) 50 MG tablet Take 1 tablet by mouth 2 (Two) Times a Day As Needed for Moderate Pain.    valsartan (DIOVAN) 320 MG tablet 1 tablet, Daily         Assessment & Plan   Diagnoses and all orders for this visit:    1. Tobacco use (Primary)    2. 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    Schedule left AKA             This document has been electronically signed by Taran Harding MD   June 19, 2025 13:17 EDT

## 2025-06-19 NOTE — PROGRESS NOTES
Patient was offered surgery date of 6/26 or 6/27. Patient refused and stated that she needed a little bit of time to think about the surgery and how her life will be.   Patient is to call the office she would like to come in sooner.      Patient picked 7/10/25 as a surgery date.

## 2025-07-10 ENCOUNTER — HOSPITAL ENCOUNTER (INPATIENT)
Facility: HOSPITAL | Age: 70
LOS: 12 days | Discharge: HOME HEALTH | DRG: 240 | End: 2025-07-22
Attending: SURGERY | Admitting: SURGERY
Payer: MEDICARE

## 2025-07-10 ENCOUNTER — ANESTHESIA EVENT (OUTPATIENT)
Dept: PERIOP | Facility: HOSPITAL | Age: 70
End: 2025-07-10
Payer: MEDICARE

## 2025-07-10 ENCOUNTER — ANESTHESIA (OUTPATIENT)
Dept: PERIOP | Facility: HOSPITAL | Age: 70
End: 2025-07-10
Payer: MEDICARE

## 2025-07-10 DIAGNOSIS — Z89.9 STATUS POST AMPUTATION: Primary | ICD-10-CM

## 2025-07-10 DIAGNOSIS — L97.522 NON-PRESSURE CHRONIC ULCER OF OTHER PART OF LEFT FOOT WITH FAT LAYER EXPOSED: ICD-10-CM

## 2025-07-10 PROBLEM — I70.202 FEMORAL ARTERY OCCLUSION, LEFT: Chronic | Status: ACTIVE | Noted: 2025-03-11

## 2025-07-10 PROBLEM — E11.9 TYPE 2 DIABETES MELLITUS: Chronic | Status: ACTIVE | Noted: 2018-10-29

## 2025-07-10 PROBLEM — F17.200 SMOKER: Chronic | Status: ACTIVE | Noted: 2018-10-29

## 2025-07-10 PROBLEM — I25.10 CAD (CORONARY ARTERY DISEASE): Chronic | Status: ACTIVE | Noted: 2018-10-29

## 2025-07-10 LAB
ALBUMIN SERPL-MCNC: 2.7 G/DL (ref 3.5–5.2)
ALBUMIN/GLOB SERPL: 0.7 G/DL
ALP SERPL-CCNC: 97 U/L (ref 39–117)
ALT SERPL W P-5'-P-CCNC: <5 U/L (ref 1–33)
ANION GAP SERPL CALCULATED.3IONS-SCNC: 9.7 MMOL/L (ref 5–15)
AST SERPL-CCNC: 13 U/L (ref 1–32)
BILIRUB SERPL-MCNC: 0.2 MG/DL (ref 0–1.2)
BUN SERPL-MCNC: 13.8 MG/DL (ref 8–23)
BUN/CREAT SERPL: 13.8 (ref 7–25)
CALCIUM SPEC-SCNC: 8.3 MG/DL (ref 8.6–10.5)
CHLORIDE SERPL-SCNC: 104 MMOL/L (ref 98–107)
CO2 SERPL-SCNC: 21.3 MMOL/L (ref 22–29)
CREAT SERPL-MCNC: 1 MG/DL (ref 0.57–1)
DEPRECATED RDW RBC AUTO: 59.8 FL (ref 37–54)
EGFRCR SERPLBLD CKD-EPI 2021: 61.1 ML/MIN/1.73
ERYTHROCYTE [DISTWIDTH] IN BLOOD BY AUTOMATED COUNT: 18.8 % (ref 12.3–15.4)
GLOBULIN UR ELPH-MCNC: 3.7 GM/DL
GLUCOSE BLDC GLUCOMTR-MCNC: 198 MG/DL (ref 70–130)
GLUCOSE BLDC GLUCOMTR-MCNC: 214 MG/DL (ref 70–130)
GLUCOSE BLDC GLUCOMTR-MCNC: 216 MG/DL (ref 70–130)
GLUCOSE BLDC GLUCOMTR-MCNC: 270 MG/DL (ref 70–130)
GLUCOSE SERPL-MCNC: 220 MG/DL (ref 65–99)
HBA1C MFR BLD: 6.74 % (ref 4.8–5.6)
HCT VFR BLD AUTO: 28.1 % (ref 34–46.6)
HGB BLD-MCNC: 8.3 G/DL (ref 12–15.9)
MAGNESIUM SERPL-MCNC: 1.9 MG/DL (ref 1.6–2.4)
MCH RBC QN AUTO: 25.5 PG (ref 26.6–33)
MCHC RBC AUTO-ENTMCNC: 29.5 G/DL (ref 31.5–35.7)
MCV RBC AUTO: 86.5 FL (ref 79–97)
PHOSPHATE SERPL-MCNC: 2.5 MG/DL (ref 2.5–4.5)
PLATELET # BLD AUTO: 340 10*3/MM3 (ref 140–450)
PMV BLD AUTO: 8.5 FL (ref 6–12)
POTASSIUM SERPL-SCNC: 4.6 MMOL/L (ref 3.5–5.2)
PROT SERPL-MCNC: 6.4 G/DL (ref 6–8.5)
RBC # BLD AUTO: 3.25 10*6/MM3 (ref 3.77–5.28)
SODIUM SERPL-SCNC: 135 MMOL/L (ref 136–145)
TSH SERPL DL<=0.05 MIU/L-ACNC: 1.52 UIU/ML (ref 0.27–4.2)
WBC NRBC COR # BLD AUTO: 8.44 10*3/MM3 (ref 3.4–10.8)

## 2025-07-10 PROCEDURE — 25010000002 BUPIVACAINE LIPOSOME 1.3 % SUSPENSION: Performed by: SURGERY

## 2025-07-10 PROCEDURE — 25010000002 HYDROMORPHONE PER 4 MG: Performed by: SURGERY

## 2025-07-10 PROCEDURE — 25810000003 LACTATED RINGERS PER 1000 ML: Performed by: ANESTHESIOLOGY

## 2025-07-10 PROCEDURE — 82948 REAGENT STRIP/BLOOD GLUCOSE: CPT

## 2025-07-10 PROCEDURE — 84100 ASSAY OF PHOSPHORUS: CPT | Performed by: INTERNAL MEDICINE

## 2025-07-10 PROCEDURE — 80053 COMPREHEN METABOLIC PANEL: CPT

## 2025-07-10 PROCEDURE — 84443 ASSAY THYROID STIM HORMONE: CPT | Performed by: INTERNAL MEDICINE

## 2025-07-10 PROCEDURE — 93005 ELECTROCARDIOGRAM TRACING: CPT | Performed by: INTERNAL MEDICINE

## 2025-07-10 PROCEDURE — 99222 1ST HOSP IP/OBS MODERATE 55: CPT

## 2025-07-10 PROCEDURE — 25010000002 ONDANSETRON PER 1 MG: Performed by: NURSE ANESTHETIST, CERTIFIED REGISTERED

## 2025-07-10 PROCEDURE — 27590 AMPUTATE LEG AT THIGH: CPT | Performed by: SURGERY

## 2025-07-10 PROCEDURE — 85027 COMPLETE CBC AUTOMATED: CPT

## 2025-07-10 PROCEDURE — 25010000002 FENTANYL CITRATE (PF) 50 MCG/ML SOLUTION: Performed by: NURSE ANESTHETIST, CERTIFIED REGISTERED

## 2025-07-10 PROCEDURE — 25010000002 DEXAMETHASONE PER 1 MG: Performed by: NURSE ANESTHETIST, CERTIFIED REGISTERED

## 2025-07-10 PROCEDURE — 83735 ASSAY OF MAGNESIUM: CPT | Performed by: INTERNAL MEDICINE

## 2025-07-10 PROCEDURE — 25010000002 PROPOFOL 200 MG/20ML EMULSION: Performed by: NURSE ANESTHETIST, CERTIFIED REGISTERED

## 2025-07-10 PROCEDURE — 93010 ELECTROCARDIOGRAM REPORT: CPT | Performed by: INTERNAL MEDICINE

## 2025-07-10 PROCEDURE — 63710000001 INSULIN GLARGINE PER 5 UNITS: Performed by: SURGERY

## 2025-07-10 PROCEDURE — 25010000002 BUPIVACAINE 0.5 % SOLUTION: Performed by: SURGERY

## 2025-07-10 PROCEDURE — 25010000002 FAMOTIDINE 10 MG/ML SOLUTION: Performed by: NURSE ANESTHETIST, CERTIFIED REGISTERED

## 2025-07-10 PROCEDURE — 0Y6D0Z3 DETACHMENT AT LEFT UPPER LEG, LOW, OPEN APPROACH: ICD-10-PCS | Performed by: SURGERY

## 2025-07-10 PROCEDURE — 63710000001 INSULIN LISPRO (HUMAN) PER 5 UNITS: Performed by: INTERNAL MEDICINE

## 2025-07-10 PROCEDURE — 83036 HEMOGLOBIN GLYCOSYLATED A1C: CPT | Performed by: INTERNAL MEDICINE

## 2025-07-10 RX ORDER — AMLODIPINE BESYLATE 10 MG/1
10 TABLET ORAL DAILY
Status: DISCONTINUED | OUTPATIENT
Start: 2025-07-10 | End: 2025-07-12

## 2025-07-10 RX ORDER — ALPRAZOLAM 0.25 MG
0.25 TABLET ORAL 2 TIMES DAILY PRN
Status: DISCONTINUED | OUTPATIENT
Start: 2025-07-10 | End: 2025-07-12

## 2025-07-10 RX ORDER — GABAPENTIN 400 MG/1
800 CAPSULE ORAL DAILY
Status: DISCONTINUED | OUTPATIENT
Start: 2025-07-10 | End: 2025-07-10

## 2025-07-10 RX ORDER — NICOTINE POLACRILEX 4 MG
15 LOZENGE BUCCAL
Status: CANCELLED | OUTPATIENT
Start: 2025-07-10

## 2025-07-10 RX ORDER — DEXTROSE MONOHYDRATE 25 G/50ML
25 INJECTION, SOLUTION INTRAVENOUS
Status: CANCELLED | OUTPATIENT
Start: 2025-07-10

## 2025-07-10 RX ORDER — CARVEDILOL 3.12 MG/1
3.12 TABLET ORAL 2 TIMES DAILY WITH MEALS
Status: CANCELLED | OUTPATIENT
Start: 2025-07-10

## 2025-07-10 RX ORDER — GABAPENTIN 400 MG/1
400 CAPSULE ORAL EVERY 8 HOURS SCHEDULED
Status: DISCONTINUED | OUTPATIENT
Start: 2025-07-10 | End: 2025-07-12

## 2025-07-10 RX ORDER — SODIUM CHLORIDE 0.9 % (FLUSH) 0.9 %
10 SYRINGE (ML) INJECTION EVERY 12 HOURS SCHEDULED
Status: DISCONTINUED | OUTPATIENT
Start: 2025-07-10 | End: 2025-07-22 | Stop reason: HOSPADM

## 2025-07-10 RX ORDER — GLUCAGON 1 MG/ML
1 KIT INJECTION
Status: DISCONTINUED | OUTPATIENT
Start: 2025-07-10 | End: 2025-07-22 | Stop reason: HOSPADM

## 2025-07-10 RX ORDER — AMLODIPINE BESYLATE 10 MG/1
10 TABLET ORAL DAILY
Status: CANCELLED | OUTPATIENT
Start: 2025-07-10

## 2025-07-10 RX ORDER — ONDANSETRON 2 MG/ML
INJECTION INTRAMUSCULAR; INTRAVENOUS AS NEEDED
Status: DISCONTINUED | OUTPATIENT
Start: 2025-07-10 | End: 2025-07-10 | Stop reason: SURG

## 2025-07-10 RX ORDER — INSULIN LISPRO 100 [IU]/ML
12 INJECTION, SOLUTION INTRAVENOUS; SUBCUTANEOUS NIGHTLY
Status: CANCELLED | OUTPATIENT
Start: 2025-07-10

## 2025-07-10 RX ORDER — ONDANSETRON 2 MG/ML
4 INJECTION INTRAMUSCULAR; INTRAVENOUS AS NEEDED
Status: DISCONTINUED | OUTPATIENT
Start: 2025-07-10 | End: 2025-07-10 | Stop reason: HOSPADM

## 2025-07-10 RX ORDER — LIDOCAINE 4 G/G
1 PATCH TOPICAL EVERY 24 HOURS
Status: CANCELLED | OUTPATIENT
Start: 2025-07-10

## 2025-07-10 RX ORDER — VALSARTAN 80 MG/1
320 TABLET ORAL DAILY
Status: DISCONTINUED | OUTPATIENT
Start: 2025-07-10 | End: 2025-07-22 | Stop reason: HOSPADM

## 2025-07-10 RX ORDER — DEXTROSE MONOHYDRATE 25 G/50ML
25 INJECTION, SOLUTION INTRAVENOUS
Status: DISCONTINUED | OUTPATIENT
Start: 2025-07-10 | End: 2025-07-22 | Stop reason: HOSPADM

## 2025-07-10 RX ORDER — TRAMADOL HYDROCHLORIDE 50 MG/1
50 TABLET ORAL 2 TIMES DAILY PRN
Status: DISCONTINUED | OUTPATIENT
Start: 2025-07-10 | End: 2025-07-12

## 2025-07-10 RX ORDER — HYDROCODONE BITARTRATE AND ACETAMINOPHEN 10; 325 MG/1; MG/1
1 TABLET ORAL EVERY 6 HOURS PRN
Refills: 0 | Status: CANCELLED | OUTPATIENT
Start: 2025-07-10

## 2025-07-10 RX ORDER — GLUCAGON 1 MG/ML
1 KIT INJECTION
Status: CANCELLED | OUTPATIENT
Start: 2025-07-10

## 2025-07-10 RX ORDER — SODIUM CHLORIDE 9 MG/ML
40 INJECTION, SOLUTION INTRAVENOUS AS NEEDED
Status: DISCONTINUED | OUTPATIENT
Start: 2025-07-10 | End: 2025-07-10 | Stop reason: HOSPADM

## 2025-07-10 RX ORDER — SODIUM CHLORIDE, SODIUM LACTATE, POTASSIUM CHLORIDE, CALCIUM CHLORIDE 600; 310; 30; 20 MG/100ML; MG/100ML; MG/100ML; MG/100ML
100 INJECTION, SOLUTION INTRAVENOUS ONCE AS NEEDED
Status: DISCONTINUED | OUTPATIENT
Start: 2025-07-10 | End: 2025-07-10 | Stop reason: HOSPADM

## 2025-07-10 RX ORDER — SODIUM CHLORIDE 0.9 % (FLUSH) 0.9 %
10 SYRINGE (ML) INJECTION AS NEEDED
Status: DISCONTINUED | OUTPATIENT
Start: 2025-07-10 | End: 2025-07-10 | Stop reason: HOSPADM

## 2025-07-10 RX ORDER — POLYETHYLENE GLYCOL 3350 17 G/17G
17 POWDER, FOR SOLUTION ORAL DAILY PRN
Status: DISCONTINUED | OUTPATIENT
Start: 2025-07-10 | End: 2025-07-22 | Stop reason: HOSPADM

## 2025-07-10 RX ORDER — NICOTINE 21 MG/24HR
1 PATCH, TRANSDERMAL 24 HOURS TRANSDERMAL
Status: DISCONTINUED | OUTPATIENT
Start: 2025-07-10 | End: 2025-07-22 | Stop reason: HOSPADM

## 2025-07-10 RX ORDER — IPRATROPIUM BROMIDE AND ALBUTEROL SULFATE 2.5; .5 MG/3ML; MG/3ML
3 SOLUTION RESPIRATORY (INHALATION) ONCE AS NEEDED
Status: DISCONTINUED | OUTPATIENT
Start: 2025-07-10 | End: 2025-07-10 | Stop reason: HOSPADM

## 2025-07-10 RX ORDER — LIDOCAINE HYDROCHLORIDE 20 MG/ML
JELLY TOPICAL 2 TIMES DAILY
Status: CANCELLED | OUTPATIENT
Start: 2025-07-10

## 2025-07-10 RX ORDER — MIDAZOLAM HYDROCHLORIDE 1 MG/ML
0.5 INJECTION, SOLUTION INTRAMUSCULAR; INTRAVENOUS
Status: DISCONTINUED | OUTPATIENT
Start: 2025-07-10 | End: 2025-07-10 | Stop reason: HOSPADM

## 2025-07-10 RX ORDER — CLOPIDOGREL BISULFATE 75 MG/1
75 TABLET ORAL DAILY
Status: DISCONTINUED | OUTPATIENT
Start: 2025-07-10 | End: 2025-07-22 | Stop reason: HOSPADM

## 2025-07-10 RX ORDER — NICOTINE POLACRILEX 4 MG
15 LOZENGE BUCCAL
Status: DISCONTINUED | OUTPATIENT
Start: 2025-07-10 | End: 2025-07-22 | Stop reason: HOSPADM

## 2025-07-10 RX ORDER — ROSUVASTATIN CALCIUM 20 MG/1
20 TABLET, COATED ORAL DAILY
Status: CANCELLED | OUTPATIENT
Start: 2025-07-10

## 2025-07-10 RX ORDER — SODIUM CHLORIDE, SODIUM LACTATE, POTASSIUM CHLORIDE, CALCIUM CHLORIDE 600; 310; 30; 20 MG/100ML; MG/100ML; MG/100ML; MG/100ML
125 INJECTION, SOLUTION INTRAVENOUS ONCE
Status: COMPLETED | OUTPATIENT
Start: 2025-07-10 | End: 2025-07-10

## 2025-07-10 RX ORDER — INSULIN LISPRO 100 [IU]/ML
2-9 INJECTION, SOLUTION INTRAVENOUS; SUBCUTANEOUS
Status: CANCELLED | OUTPATIENT
Start: 2025-07-10

## 2025-07-10 RX ORDER — ALPRAZOLAM 0.25 MG
0.25 TABLET ORAL 2 TIMES DAILY PRN
Status: CANCELLED | OUTPATIENT
Start: 2025-07-10

## 2025-07-10 RX ORDER — HYDROXYZINE PAMOATE 25 MG/1
25 CAPSULE ORAL EVERY 6 HOURS PRN
Status: DISCONTINUED | OUTPATIENT
Start: 2025-07-10 | End: 2025-07-10

## 2025-07-10 RX ORDER — ONDANSETRON 4 MG/1
4 TABLET, ORALLY DISINTEGRATING ORAL EVERY 6 HOURS PRN
Status: DISCONTINUED | OUTPATIENT
Start: 2025-07-10 | End: 2025-07-22 | Stop reason: HOSPADM

## 2025-07-10 RX ORDER — SODIUM CHLORIDE 0.9 % (FLUSH) 0.9 %
10 SYRINGE (ML) INJECTION EVERY 12 HOURS SCHEDULED
Status: DISCONTINUED | OUTPATIENT
Start: 2025-07-10 | End: 2025-07-10 | Stop reason: HOSPADM

## 2025-07-10 RX ORDER — FUROSEMIDE 20 MG/1
20 TABLET ORAL DAILY
Status: DISCONTINUED | OUTPATIENT
Start: 2025-07-10 | End: 2025-07-22 | Stop reason: HOSPADM

## 2025-07-10 RX ORDER — HYDROMORPHONE HYDROCHLORIDE 1 MG/ML
0.5 INJECTION, SOLUTION INTRAMUSCULAR; INTRAVENOUS; SUBCUTANEOUS
Status: DISCONTINUED | OUTPATIENT
Start: 2025-07-10 | End: 2025-07-12

## 2025-07-10 RX ORDER — AMOXICILLIN 250 MG
2 CAPSULE ORAL 2 TIMES DAILY
Status: DISCONTINUED | OUTPATIENT
Start: 2025-07-10 | End: 2025-07-22 | Stop reason: HOSPADM

## 2025-07-10 RX ORDER — FAMOTIDINE 10 MG/ML
INJECTION, SOLUTION INTRAVENOUS AS NEEDED
Status: DISCONTINUED | OUTPATIENT
Start: 2025-07-10 | End: 2025-07-10 | Stop reason: SURG

## 2025-07-10 RX ORDER — FUROSEMIDE 20 MG/1
20 TABLET ORAL DAILY
Status: CANCELLED | OUTPATIENT
Start: 2025-07-10

## 2025-07-10 RX ORDER — BISACODYL 10 MG
10 SUPPOSITORY, RECTAL RECTAL DAILY PRN
Status: DISCONTINUED | OUTPATIENT
Start: 2025-07-10 | End: 2025-07-22 | Stop reason: HOSPADM

## 2025-07-10 RX ORDER — OXYCODONE AND ACETAMINOPHEN 5; 325 MG/1; MG/1
1 TABLET ORAL EVERY 4 HOURS PRN
Status: DISCONTINUED | OUTPATIENT
Start: 2025-07-10 | End: 2025-07-12

## 2025-07-10 RX ORDER — HYDROCODONE BITARTRATE AND ACETAMINOPHEN 10; 325 MG/1; MG/1
1 TABLET ORAL EVERY 6 HOURS PRN
Status: DISCONTINUED | OUTPATIENT
Start: 2025-07-10 | End: 2025-07-12

## 2025-07-10 RX ORDER — INSULIN LISPRO 100 [IU]/ML
12 INJECTION, SOLUTION INTRAVENOUS; SUBCUTANEOUS NIGHTLY
Status: DISCONTINUED | OUTPATIENT
Start: 2025-07-10 | End: 2025-07-10

## 2025-07-10 RX ORDER — POTASSIUM CHLORIDE 750 MG/1
10 TABLET, FILM COATED, EXTENDED RELEASE ORAL DAILY
Status: CANCELLED | OUTPATIENT
Start: 2025-07-10

## 2025-07-10 RX ORDER — ROSUVASTATIN CALCIUM 20 MG/1
20 TABLET, COATED ORAL DAILY
Status: DISCONTINUED | OUTPATIENT
Start: 2025-07-10 | End: 2025-07-22 | Stop reason: HOSPADM

## 2025-07-10 RX ORDER — HYDROXYZINE PAMOATE 25 MG/1
25 CAPSULE ORAL EVERY 6 HOURS PRN
Status: CANCELLED | OUTPATIENT
Start: 2025-07-10

## 2025-07-10 RX ORDER — PROMETHAZINE HYDROCHLORIDE 25 MG/1
25 TABLET ORAL EVERY 6 HOURS PRN
Status: CANCELLED | OUTPATIENT
Start: 2025-07-10

## 2025-07-10 RX ORDER — GABAPENTIN 400 MG/1
800 CAPSULE ORAL EVERY 6 HOURS PRN
Status: CANCELLED | OUTPATIENT
Start: 2025-07-10

## 2025-07-10 RX ORDER — HYDROXYZINE PAMOATE 25 MG/1
25 CAPSULE ORAL EVERY 6 HOURS PRN
COMMUNITY

## 2025-07-10 RX ORDER — TRAMADOL HYDROCHLORIDE 50 MG/1
50 TABLET ORAL 2 TIMES DAILY PRN
Status: CANCELLED | OUTPATIENT
Start: 2025-07-10

## 2025-07-10 RX ORDER — SODIUM CHLORIDE 9 MG/ML
40 INJECTION, SOLUTION INTRAVENOUS AS NEEDED
Status: DISCONTINUED | OUTPATIENT
Start: 2025-07-10 | End: 2025-07-22 | Stop reason: HOSPADM

## 2025-07-10 RX ORDER — DEXAMETHASONE SODIUM PHOSPHATE 4 MG/ML
INJECTION, SOLUTION INTRA-ARTICULAR; INTRALESIONAL; INTRAMUSCULAR; INTRAVENOUS; SOFT TISSUE AS NEEDED
Status: DISCONTINUED | OUTPATIENT
Start: 2025-07-10 | End: 2025-07-10 | Stop reason: SURG

## 2025-07-10 RX ORDER — MAGNESIUM HYDROXIDE 1200 MG/15ML
LIQUID ORAL AS NEEDED
Status: DISCONTINUED | OUTPATIENT
Start: 2025-07-10 | End: 2025-07-10 | Stop reason: HOSPADM

## 2025-07-10 RX ORDER — FENTANYL CITRATE 50 UG/ML
INJECTION, SOLUTION INTRAMUSCULAR; INTRAVENOUS AS NEEDED
Status: DISCONTINUED | OUTPATIENT
Start: 2025-07-10 | End: 2025-07-10 | Stop reason: SURG

## 2025-07-10 RX ORDER — HYDROXYZINE HYDROCHLORIDE 25 MG/1
25 TABLET, FILM COATED ORAL EVERY 6 HOURS PRN
Status: DISCONTINUED | OUTPATIENT
Start: 2025-07-10 | End: 2025-07-22 | Stop reason: HOSPADM

## 2025-07-10 RX ORDER — SODIUM CHLORIDE 0.9 % (FLUSH) 0.9 %
10 SYRINGE (ML) INJECTION AS NEEDED
Status: DISCONTINUED | OUTPATIENT
Start: 2025-07-10 | End: 2025-07-22 | Stop reason: HOSPADM

## 2025-07-10 RX ORDER — PROPOFOL 10 MG/ML
INJECTION, EMULSION INTRAVENOUS AS NEEDED
Status: DISCONTINUED | OUTPATIENT
Start: 2025-07-10 | End: 2025-07-10 | Stop reason: SURG

## 2025-07-10 RX ORDER — INSULIN LISPRO 100 [IU]/ML
3-14 INJECTION, SOLUTION INTRAVENOUS; SUBCUTANEOUS
Status: DISCONTINUED | OUTPATIENT
Start: 2025-07-10 | End: 2025-07-12

## 2025-07-10 RX ORDER — FENTANYL CITRATE 50 UG/ML
50 INJECTION, SOLUTION INTRAMUSCULAR; INTRAVENOUS
Status: DISCONTINUED | OUTPATIENT
Start: 2025-07-10 | End: 2025-07-10 | Stop reason: HOSPADM

## 2025-07-10 RX ORDER — BUPIVACAINE HYDROCHLORIDE 5 MG/ML
INJECTION, SOLUTION PERINEURAL AS NEEDED
Status: DISCONTINUED | OUTPATIENT
Start: 2025-07-10 | End: 2025-07-10 | Stop reason: HOSPADM

## 2025-07-10 RX ORDER — PROMETHAZINE HYDROCHLORIDE 25 MG/1
25 TABLET ORAL EVERY 6 HOURS PRN
Status: DISCONTINUED | OUTPATIENT
Start: 2025-07-10 | End: 2025-07-22 | Stop reason: HOSPADM

## 2025-07-10 RX ORDER — BISACODYL 5 MG/1
5 TABLET, DELAYED RELEASE ORAL DAILY PRN
Status: DISCONTINUED | OUTPATIENT
Start: 2025-07-10 | End: 2025-07-22 | Stop reason: HOSPADM

## 2025-07-10 RX ORDER — MEPERIDINE HYDROCHLORIDE 25 MG/ML
12.5 INJECTION INTRAMUSCULAR; INTRAVENOUS; SUBCUTANEOUS
Status: DISCONTINUED | OUTPATIENT
Start: 2025-07-10 | End: 2025-07-10 | Stop reason: HOSPADM

## 2025-07-10 RX ORDER — CLOPIDOGREL BISULFATE 75 MG/1
75 TABLET ORAL DAILY
Status: CANCELLED | OUTPATIENT
Start: 2025-07-11

## 2025-07-10 RX ORDER — CARVEDILOL 3.12 MG/1
3.12 TABLET ORAL 2 TIMES DAILY WITH MEALS
Status: DISCONTINUED | OUTPATIENT
Start: 2025-07-10 | End: 2025-07-22 | Stop reason: HOSPADM

## 2025-07-10 RX ORDER — OXYCODONE AND ACETAMINOPHEN 5; 325 MG/1; MG/1
1 TABLET ORAL ONCE AS NEEDED
Status: DISCONTINUED | OUTPATIENT
Start: 2025-07-10 | End: 2025-07-10 | Stop reason: HOSPADM

## 2025-07-10 RX ORDER — VALSARTAN 160 MG/1
320 TABLET ORAL DAILY
Status: CANCELLED | OUTPATIENT
Start: 2025-07-10

## 2025-07-10 RX ADMIN — OXYCODONE HYDROCHLORIDE AND ACETAMINOPHEN 1 TABLET: 5; 325 TABLET ORAL at 21:36

## 2025-07-10 RX ADMIN — FENTANYL CITRATE 50 MCG: 50 INJECTION, SOLUTION INTRAMUSCULAR; INTRAVENOUS at 10:32

## 2025-07-10 RX ADMIN — PROPOFOL 140 MG: 10 INJECTION, EMULSION INTRAVENOUS at 09:03

## 2025-07-10 RX ADMIN — HYDROMORPHONE HYDROCHLORIDE 0.5 MG: 1 INJECTION, SOLUTION INTRAMUSCULAR; INTRAVENOUS; SUBCUTANEOUS at 12:16

## 2025-07-10 RX ADMIN — OXYCODONE HYDROCHLORIDE AND ACETAMINOPHEN 1 TABLET: 5; 325 TABLET ORAL at 11:17

## 2025-07-10 RX ADMIN — DOCUSATE SODIUM 50 MG AND SENNOSIDES 8.6 MG 2 TABLET: 8.6; 5 TABLET, FILM COATED ORAL at 11:17

## 2025-07-10 RX ADMIN — INSULIN LISPRO 5 UNITS: 100 INJECTION, SOLUTION INTRAVENOUS; SUBCUTANEOUS at 16:36

## 2025-07-10 RX ADMIN — ONDANSETRON 4 MG: 2 INJECTION, SOLUTION INTRAMUSCULAR; INTRAVENOUS at 08:59

## 2025-07-10 RX ADMIN — AMLODIPINE BESYLATE 10 MG: 10 TABLET ORAL at 11:17

## 2025-07-10 RX ADMIN — FENTANYL CITRATE 50 MCG: 50 INJECTION INTRAMUSCULAR; INTRAVENOUS at 09:03

## 2025-07-10 RX ADMIN — HYDROCODONE BITARTRATE AND ACETAMINOPHEN 1 TABLET: 10; 325 TABLET ORAL at 17:16

## 2025-07-10 RX ADMIN — ROSUVASTATIN CALCIUM 20 MG: 20 TABLET, FILM COATED ORAL at 11:17

## 2025-07-10 RX ADMIN — FAMOTIDINE 20 MG: 10 INJECTION INTRAVENOUS at 08:59

## 2025-07-10 RX ADMIN — Medication 10 ML: at 11:19

## 2025-07-10 RX ADMIN — CARVEDILOL 3.12 MG: 3.12 TABLET, FILM COATED ORAL at 11:17

## 2025-07-10 RX ADMIN — NICOTINE TRANSDERMAL SYSTEM 1 PATCH: 21 PATCH, EXTENDED RELEASE TRANSDERMAL at 16:36

## 2025-07-10 RX ADMIN — HYDROMORPHONE HYDROCHLORIDE 0.5 MG: 1 INJECTION, SOLUTION INTRAMUSCULAR; INTRAVENOUS; SUBCUTANEOUS at 18:15

## 2025-07-10 RX ADMIN — GABAPENTIN 800 MG: 400 CAPSULE ORAL at 11:17

## 2025-07-10 RX ADMIN — FENTANYL CITRATE 50 MCG: 50 INJECTION, SOLUTION INTRAMUSCULAR; INTRAVENOUS at 10:21

## 2025-07-10 RX ADMIN — INSULIN LISPRO 8 UNITS: 100 INJECTION, SOLUTION INTRAVENOUS; SUBCUTANEOUS at 21:24

## 2025-07-10 RX ADMIN — CLOPIDOGREL BISULFATE 75 MG: 75 TABLET, FILM COATED ORAL at 11:17

## 2025-07-10 RX ADMIN — SODIUM CHLORIDE, POTASSIUM CHLORIDE, SODIUM LACTATE AND CALCIUM CHLORIDE: 600; 310; 30; 20 INJECTION, SOLUTION INTRAVENOUS at 08:59

## 2025-07-10 RX ADMIN — DEXAMETHASONE SODIUM PHOSPHATE 4 MG: 4 INJECTION, SOLUTION INTRA-ARTICULAR; INTRALESIONAL; INTRAMUSCULAR; INTRAVENOUS; SOFT TISSUE at 09:05

## 2025-07-10 RX ADMIN — FUROSEMIDE 20 MG: 20 TABLET ORAL at 11:17

## 2025-07-10 RX ADMIN — FENTANYL CITRATE 50 MCG: 50 INJECTION INTRAMUSCULAR; INTRAVENOUS at 09:05

## 2025-07-10 RX ADMIN — GABAPENTIN 400 MG: 400 CAPSULE ORAL at 21:24

## 2025-07-10 RX ADMIN — INSULIN LISPRO 3 UNITS: 100 INJECTION, SOLUTION INTRAVENOUS; SUBCUTANEOUS at 13:45

## 2025-07-10 RX ADMIN — Medication 10 ML: at 21:27

## 2025-07-10 RX ADMIN — VALSARTAN 320 MG: 80 TABLET, FILM COATED ORAL at 11:17

## 2025-07-10 RX ADMIN — Medication 10 ML: at 11:18

## 2025-07-10 RX ADMIN — INSULIN GLARGINE 30 UNITS: 100 INJECTION, SOLUTION SUBCUTANEOUS at 21:24

## 2025-07-10 NOTE — DISCHARGE PLACEMENT REQUEST
"Monae Chauhan (69 y.o. Female)       Date of Birth   1955    Social Security Number       Address   [address] [place] [address]    Home Phone       MRN   [id number]       Muslim   Samaritan    Marital Status                               Admission Date   7/10/2025    Admission Type   Elective    Admitting Provider   Taran Harding MD    Attending Provider   Taran Harding MD    Department, Room/Bed   [address], 3339/1P       Discharge Date       Discharge Disposition       Discharge Destination                                 Attending Provider: Taran Harding MD    Allergies: Amoxicillin, Penicillins    Isolation: None   Infection: None   Code Status: CPR    Ht: 160 cm (63\")   Wt: 77.2 kg (170 lb 3.1 oz)    Admission Cmt: None   Principal Problem: 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 [L97.522]                   Active Insurance as of 7/10/2025       Primary Coverage       Payor Plan Insurance Group Employer/Plan Group    HUMANA MEDICARE REPLACEMENT HU[employer]IC[facility]ANTAGE [id number]       Payor Plan Address Payor Plan Phone Number Payor Plan Fax Number Effective Dates    [address] [address] [phone]  3/1/2025 - None Entered    [facility] [id number]         Subscriber Name Subscriber Birth Date Member ID       MONAE CHAUHAN 1955 [id number]                     Emergency Contacts        (Rel.) Home Phone Work Phone Mobile Phone    kathya jarquin (Relative) [phone] -- [phone]    OLENA CRUZ (Son) [phone] -- [phone]                 History & Physical        Taran aHrding MD at 07/10/25 0813            H&P reviewed. The patient was examined and there are no changes to the H&P.          Electronically signed by Taran Harding MD at 07/10/25 0814   Source Note: H&P (View-Only)          Subjective   Monae Chauhan is a 69 y.o. female.     Chief Complaint: " ischemic foot ulcers    History of Present Illness She is a 69 yo smoker who refused to stop until just recently, and has PAD. She apparently has had more than one angioplasty/stent and was recommended for another but refused initially but has had a couple of procedures that now have failed.  She had develop a red spot on the dorsal lateral mid left foot  that has evolved in to a large necrotic ulcer as well as one on the anterior left lower leg. She had been on antibiotics but it has not improved. She had a MRI that is read as possible osteomyelitis.     The following portions of the patient's history were reviewed and updated as appropriate: current medications, past family history, past medical history, past social history, past surgical history and problem list.    Review of Systems   Constitutional:  Negative for activity change, appetite change, chills, fever and unexpected weight change.   HENT:  Negative for congestion, facial swelling and sore throat.    Eyes:  Negative for photophobia and visual disturbance.   Respiratory:  Negative for chest tightness, shortness of breath and wheezing.    Cardiovascular:  Negative for chest pain, palpitations and leg swelling.   Gastrointestinal:  Negative for abdominal distention, abdominal pain, anal bleeding, blood in stool, constipation, diarrhea, nausea, rectal pain and vomiting.   Endocrine: Negative for cold intolerance, heat intolerance, polydipsia and polyuria.   Genitourinary:  Negative for difficulty urinating, dysuria, flank pain and urgency.   Musculoskeletal:  Negative for back pain and myalgias.   Skin:  Positive for color change and wound. Negative for rash.   Allergic/Immunologic: Negative for immunocompromised state.   Neurological:  Positive for weakness. Negative for dizziness, seizures, syncope, light-headedness, numbness and headaches.   Hematological:  Negative for adenopathy. Does not bruise/bleed easily.   Psychiatric/Behavioral:  Negative for  behavioral problems and confusion. The patient is not nervous/anxious.        Objective   Physical Exam  Vitals reviewed.   Constitutional:       General: She is not in acute distress.     Appearance: She is well-developed. She is ill-appearing.      Comments: 5 cm necrotic ulcer on the dorsum of the left lateral foot, and 6 cm one on the anterior left lower leg about half way up   HENT:      Head: Normocephalic. No laceration. Hair is normal.      Right Ear: Hearing and ear canal normal.      Left Ear: Hearing and ear canal normal.      Nose: Nose normal.      Right Sinus: No maxillary sinus tenderness or frontal sinus tenderness.      Left Sinus: No maxillary sinus tenderness or frontal sinus tenderness.   Eyes:      General: Lids are normal.      Conjunctiva/sclera: Conjunctivae normal.      Pupils: Pupils are equal, round, and reactive to light.   Neck:      Thyroid: No thyroid mass or thyromegaly.      Vascular: No JVD.      Trachea: No tracheal tenderness or tracheal deviation.   Cardiovascular:      Rate and Rhythm: Normal rate and regular rhythm.      Heart sounds: No murmur heard.     No gallop.   Pulmonary:      Effort: Pulmonary effort is normal.      Breath sounds: No stridor. Wheezing present.   Chest:      Chest wall: No tenderness.   Abdominal:      General: Bowel sounds are normal. There is no distension.      Palpations: Abdomen is soft. There is no mass.      Tenderness: There is no abdominal tenderness. There is no guarding or rebound.      Hernia: No hernia is present.   Musculoskeletal:         General: No deformity.      Cervical back: Normal range of motion.   Lymphadenopathy:      Cervical: No cervical adenopathy.      Upper Body:      Right upper body: No supraclavicular adenopathy.      Left upper body: No supraclavicular adenopathy.   Skin:     General: Skin is warm and dry.      Coloration: Skin is not pale.      Findings: Erythema and lesion present. No rash.   Neurological:      Mental  Status: She is alert and oriented to person, place, and time.      Motor: No abnormal muscle tone.   Psychiatric:         Behavior: Behavior normal.         Thought Content: Thought content normal.         Past Medical History:   Diagnosis Date    Arthritis     Coronary artery disease     Depression     Diabetes mellitus     Elevated cholesterol     Full dentures     GERD (gastroesophageal reflux disease)     Hyperlipidemia     Hypertension     Myocardial infarction     Peripheral vascular disease     Psoriasis        Family History   Problem Relation Age of Onset    Breast cancer Maternal Grandmother 70    Cancer Mother     Diabetes Mother     Cancer Father     Diabetes Father        Social History     Tobacco Use    Smoking status: Former     Current packs/day: 1.00     Average packs/day: 1 pack/day for 44.0 years (44.0 ttl pk-yrs)     Types: Cigarettes     Start date: 6/25/1981    Smokeless tobacco: Never    Tobacco comments:     2-4 PPD off and on for the last 40 years   Vaping Use    Vaping status: Former    Substances: Nicotine, Flavoring    Devices: Refillable tank   Substance Use Topics    Alcohol use: No    Drug use: No       Past Surgical History:   Procedure Laterality Date    APPENDECTOMY      CARDIAC CATHETERIZATION N/A 01/09/2017    Procedure: Left Heart Cath;  Surgeon: David Diane MD;  Location: [facility] CATH INVASIVE LOCATION;  Service:     CARDIAC CATHETERIZATION Left 5/20/2024    Procedure: Peripheral angiography;  Surgeon: Jaiden Doyle MD;  Location: [facility] CATH INVASIVE LOCATION;  Service: Cardiovascular;  Laterality: Left;  Left Venogram -    CARDIAC CATHETERIZATION N/A 2/6/2025    Procedure: Peripheral angiography;  Surgeon: Fran Estevez MD;  [facility] CATH INVASIVE LOC[facility];  Service: Cardiovascular;  Laterality: N/A;    CARDIAC CATHETERIZATION N/A 2/26/2025    Procedure: Peripheral angiography;  Surgeon: Jaiden Doyle MD;  Location: [facility] CATH INVASIVE LOCATION;   Service: Cardiovascular;  Laterality: N/A;    CARDIAC CATHETERIZATION N/A 2/26/2025    Procedure: Atherectomy-peripheral;  Surgeon: Jaiden Doyle MD;  Location: [facility] CATH INVASIVE LOCATION;  Service: Cardiovascular;  Laterality: N/A;    CARDIAC CATHETERIZATION N/A 2/26/2025    Procedure: Angioplasty-peripheral;  Surgeon: Jaiden Doyle MD;  [facility] CATH INVASIVE LOC[facility];  Service: Cardiovascular;  Laterality: N/A;    CARDIAC CATHETERIZATION N/A 3/24/2025    Procedure: Peripheral angiography;  Surgeon: Jaiden Doyle MD;  [facility]ATH INVAS[facility];  Service: Cardiovascular;  Laterality: N/A;    CARDIAC CATHETERIZATION N/A 3/24/2025    Procedure: Atherectomy-peripheral;  Surgeon: Jaiden Doyle MD;  Location: [facility] CATH INVASIVE LOCATION;  Service: Cardiovascular;  Laterality: N/A;    CORONARY STENT PLACEMENT      CYSTOSCOPY BOTOX INJECTION OF BLADDER N/A 10/20/2021    Procedure: Cystoscopy Botox injection of bladder;  Surgeon: Say Robles MD;  Location: [facility];  Service: Urology;  Laterality: N/A;    FOOT SURGERY Left     GALLBLADDER SURGERY      INTRAVASCULAR ULTRASOUND N/A 2/26/2025    Procedure: Intravascular Ultrasound;  Surgeon: Jaiden Doyle MD;  Location: [facility] CATH INVASIVE LOCATION;  Service: Cardiovascular;  Laterality: N/A;    INTRAVASCULAR ULTRASOUND N/A 3/24/2025    Procedure: Intravascular Ultrasound;  Surgeon: Jaiden Doyle MD;  Location: [facility] CATH INVASIVE LOCATION;  Service: Cardiovascular;  Laterality: N/A;    LEG SURGERY Left     TUBAL ABDOMINAL LIGATION      VASCULAR SURGERY Left     Lower Extremity Vascular Stent-[facility]       Current Outpatient Medications   Medication Instructions    ALPRAZolam (XANAX) 0.25 mg, 2 Times Daily PRN    amLODIPine (NORVASC) 10 mg, Oral, Daily    apixaban (ELIQUIS) 5 mg, 2 Times Daily    carvedilol (COREG) 3.125 mg, 2 Times Daily With Meals    clopidogrel (PLAVIX) 75 mg, Oral, Daily    dicyclomine (BENTYL) 20 mg, 3 Times  Daily PRN    furosemide (LASIX) 20 MG tablet 1 tablet, Daily    gabapentin (NEURONTIN) 800 mg, Every 6 Hours PRN    HYDROcodone-acetaminophen (NORCO)  MG per tablet 1 tablet, Every 8 Hours PRN    insulin glargine (LANTUS, SEMGLEE) 30 Units, Nightly    Insulin Lispro (1 Unit Dial) (HUMALOG) 10 Units, Nightly    lidocaine (LIDODERM) 5 % 1 patch, Transdermal, Every 24 Hours, Remove & Discard patch within 12 hours or as directed by MD    potassium chloride (MICRO-K) 10 MEQ CR capsule Take 1 capsule by mouth Daily.    promethazine (PHENERGAN) 25 MG tablet 1 tablet, Every 6 Hours PRN    rosuvastatin (CRESTOR) 20 mg, Daily    Tirzepatide 5 mg, Weekly    traMADol (ULTRAM) 50 MG tablet Take 1 tablet by mouth 2 (Two) Times a Day As Needed for Moderate Pain.    valsartan (DIOVAN) 320 MG tablet 1 tablet, Daily         Assessment & Plan   Diagnoses and all orders for this visit:    1. Tobacco use (Primary)    2. 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 female.     Chief Complaint: ischemic foot ulcers    History of Present Illness She is a 69 yo smoker who refused to stop until just recently, and has PAD. She apparently has had more than one angioplasty/stent and was recommended for another but refused initially but has had a couple of procedures that now have failed.  She had develop a red spot on the dorsal lateral mid left foot  that has evolved in to a large necrotic ulcer as well as one on the anterior left lower leg. She had been on antibiotics but it has not improved. She had a MRI that is read as possible osteomyelitis.     The following portions of the patient's history were reviewed  and updated as appropriate: current medications, past family history, past medical history, past social history, past surgical history and problem list.    Review of Systems   Constitutional:  Negative for activity change, appetite change, chills, fever and unexpected weight change.   HENT:  Negative for congestion, facial swelling and sore throat.    Eyes:  Negative for photophobia and visual disturbance.   Respiratory:  Negative for chest tightness, shortness of breath and wheezing.    Cardiovascular:  Negative for chest pain, palpitations and leg swelling.   Gastrointestinal:  Negative for abdominal distention, abdominal pain, anal bleeding, blood in stool, constipation, diarrhea, nausea, rectal pain and vomiting.   Endocrine: Negative for cold intolerance, heat intolerance, polydipsia and polyuria.   Genitourinary:  Negative for difficulty urinating, dysuria, flank pain and urgency.   Musculoskeletal:  Negative for back pain and myalgias.   Skin:  Positive for color change and wound. Negative for rash.   Allergic/Immunologic: Negative for immunocompromised state.   Neurological:  Positive for weakness. Negative for dizziness, seizures, syncope, light-headedness, numbness and headaches.   Hematological:  Negative for adenopathy. Does not bruise/bleed easily.   Psychiatric/Behavioral:  Negative for behavioral problems and confusion. The patient is not nervous/anxious.        Objective   Physical Exam  Vitals reviewed.   Constitutional:       General: She is not in acute distress.     Appearance: She is well-developed. She is ill-appearing.      Comments: 5 cm necrotic ulcer on the dorsum of the left lateral foot, and 6 cm one on the anterior left lower leg about half way up   HENT:      Head: Normocephalic. No laceration. Hair is normal.      Right Ear: Hearing and ear canal normal.      Left Ear: Hearing and ear canal normal.      Nose: Nose normal.      Right Sinus: No maxillary sinus tenderness or frontal sinus  tenderness.      Left Sinus: No maxillary sinus tenderness or frontal sinus tenderness.   Eyes:      General: Lids are normal.      Conjunctiva/sclera: Conjunctivae normal.      Pupils: Pupils are equal, round, and reactive to light.   Neck:      Thyroid: No thyroid mass or thyromegaly.      Vascular: No JVD.      Trachea: No tracheal tenderness or tracheal deviation.   Cardiovascular:      Rate and Rhythm: Normal rate and regular rhythm.      Heart sounds: No murmur heard.     No gallop.   Pulmonary:      Effort: Pulmonary effort is normal.      Breath sounds: No stridor. Wheezing present.   Chest:      Chest wall: No tenderness.   Abdominal:      General: Bowel sounds are normal. There is no distension.      Palpations: Abdomen is soft. There is no mass.      Tenderness: There is no abdominal tenderness. There is no guarding or rebound.      Hernia: No hernia is present.   Musculoskeletal:         General: No deformity.      Cervical back: Normal range of motion.   Lymphadenopathy:      Cervical: No cervical adenopathy.      Upper Body:      Right upper body: No supraclavicular adenopathy.      Left upper body: No supraclavicular adenopathy.   Skin:     General: Skin is warm and dry.      Coloration: Skin is not pale.      Findings: Erythema and lesion present. No rash.   Neurological:      Mental Status: She is alert and oriented to person, place, and time.      Motor: No abnormal muscle tone.   Psychiatric:         Behavior: Behavior normal.         Thought Content: Thought content normal.         Past Medical History:   Diagnosis Date    Arthritis     Coronary artery disease     Depression     Diabetes mellitus     Elevated cholesterol     Full dentures     GERD (gastroesophageal reflux disease)     Hyperlipidemia     Hypertension     Myocardial infarction     Peripheral vascular disease     Psoriasis        Family History   Problem Relation Age of Onset    Breast cancer Maternal Grandmother 70    Cancer Mother      Diabetes Mother     Cancer Father     Diabetes Father        Social History     Tobacco Use    Smoking status: Former     Current packs/day: 1.00     Average packs/day: 1 pack/day for 44.0 years (44.0 ttl pk-yrs)     Types: Cigarettes     Start date: 6/25/1981    Smokeless tobacco: Never    Tobacco comments:     2-4 PPD off and on for the last 40 years   Vaping Use    Vaping status: Former    Substances: Nicotine, Flavoring    Devices: Refillable tank   Substance Use Topics    Alcohol use: No    Drug use: No       Past Surgical History:   Procedure Laterality Date    APPENDECTOMY      CARDIAC CATHETERIZATION N/A 01/09/2017    Procedure: Left Heart Cath;  Surgeon: David Diane MD;  Location:  [facility] CATH INVASIVE LOCATION;  Service:     CARDIAC CATHETERIZATION Left 5/20/2024    Procedure: Peripheral angiography;  Surgeon: Jaiden Doyle MD;  Location: [facility] CATH INVASIVE LOCATION;  Service: Cardiovascular;  Laterality: Left;  Left Venogram -    CARDIAC CATHETERIZATION N/A 2/6/2025    Procedure: Peripheral angiography;  Surgeon: Fran Estevez MD;  Location: [facility] CATH INVASIVE LOCATION;  Service: Cardiovascular;  Laterality: N/A;    CARDIAC CATHETERIZATION N/A 2/26/2025    Procedure: Peripheral angiography;  Surgeon: Jaiden Doyle MD;  Location:  [facility] CATH INVASIVE LOCATION;  Service: Cardiovascular;  Laterality: N/A;    CARDIAC CATHETERIZATION N/A 2/26/2025    Procedure: Atherectomy-peripheral;  Surgeon: Jaiden Doyle MD;  Location:  [facility] CATH INVASIVE LOCATION;  Service: Cardiovascular;  Laterality: N/A;    CARDIAC CATHETERIZATION N/A 2/26/2025    Procedure: Angioplasty-peripheral;  Surgeon: Jaiden Doyle MD;  Location:  [facility] CATH INVASIVE LOCATION;  Service: Cardiovascular;  Laterality: N/A;    CARDIAC CATHETERIZATION N/A 3/24/2025    Procedure: Peripheral angiography;  Surgeon: Jaiden Doyle MD;  Location:  [facility] CATH INVASIVE LOCATION;  Service: Cardiovascular;  Laterality: N/A;    CARDIAC  CATHETERIZATION N/A 3/24/2025    Procedure: Atherectomy-peripheral;  Surgeon: Jaiden Doyle MD;  Location:  [facility] CATH INVASIVE LOCATION;  Service: Cardiovascular;  Laterality: N/A;    CORONARY STENT PLACEMENT      CYSTOSCOPY BOTOX INJECTION OF BLADDER N/A 10/20/2021    Procedure: Cystoscopy Botox injection of bladder;  Surgeon: Say Robles MD;  Location:  [facility] OR;  Service: Urology;  Laterality: N/A;    FOOT SURGERY Left     GALLBLADDER SURGERY      INTRAVASCULAR ULTRASOUND N/A 2/26/2025    Procedure: Intravascular Ultrasound;  Surgeon: Jaiden Doyle MD;  Location:  [facility] CATH INVASIVE LOCATION;  Service: Cardiovascular;  Laterality: N/A;    INTRAVASCULAR ULTRASOUND N/A 3/24/2025    Procedure: Intravascular Ultrasound;  Surgeon: Jaiden Doyle MD;  Location:  [facility] CATH INVASIVE LOCATION;  Service: Cardiovascular;  Laterality: N/A;    LEG SURGERY Left     TUBAL ABDOMINAL LIGATION      VASCULAR SURGERY Left     Lower Extremity Vascular Stent-[facility]       Current Outpatient Medications   Medication Instructions    ALPRAZolam (XANAX) 0.25 mg, 2 Times Daily PRN    amLODIPine (NORVASC) 10 mg, Oral, Daily    apixaban (ELIQUIS) 5 mg, 2 Times Daily    carvedilol (COREG) 3.125 mg, 2 Times Daily With Meals    clopidogrel (PLAVIX) 75 mg, Oral, Daily    dicyclomine (BENTYL) 20 mg, 3 Times Daily PRN    furosemide (LASIX) 20 MG tablet 1 tablet, Daily    gabapentin (NEURONTIN) 800 mg, Every 6 Hours PRN    HYDROcodone-acetaminophen (NORCO)  MG per tablet 1 tablet, Every 8 Hours PRN    insulin glargine (LANTUS, SEMGLEE) 30 Units, Nightly    Insulin Lispro (1 Unit Dial) (HUMALOG) 10 Units, Nightly    lidocaine (LIDODERM) 5 % 1 patch, Transdermal, Every 24 Hours, Remove & Discard patch within 12 hours or as directed by MD    potassium chloride (MICRO-K) 10 MEQ CR capsule Take 1 capsule by mouth Daily.    promethazine (PHENERGAN) 25 MG tablet 1 tablet, Every 6 Hours PRN    rosuvastatin (CRESTOR) 20 mg, Daily     Tirzepatide 5 mg, Weekly    traMADol (ULTRAM) 50 MG tablet Take 1 tablet by mouth 2 (Two) Times a Day As Needed for Moderate Pain.    valsartan (DIOVAN) 320 MG tablet 1 tablet, Daily         Assessment & Plan   Diagnoses and all orders for this visit:    1. Tobacco use (Primary)    2. 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    Schedule left AKA             This document has been electronically signed by Taran Harding MD   June 19, 2025 13:17 EDT    Electronically signed by Taran Harding MD at 06/19/25 1333       Vital Signs (last day)       Date/Time Temp Temp src Pulse Resp BP Patient Position SpO2    07/10/25 1330 97.8 (36.6) Oral -- 18 109/52 Lying --    07/10/25 1230 97.8 (36.6) Oral -- 20 112/48 Lying 90    07/10/25 1217 -- -- -- -- 136/97 -- --    07/10/25 1200 98 (36.7) Oral -- 18 136/97 Lying --    07/10/25 1130 98 (36.7) Oral -- 20 142/80 Lying --    07/10/25 1117 -- -- 64 -- 131/66 -- --    07/10/25 1115 97.7 (36.5) Oral -- 20 150/60 Lying --    07/10/25 1100 97.8 (36.6) Oral 64 20 131/66 Lying 92    07/10/25 1045 97.7 (36.5) Oral 63 18 143/51 Lying 95    07/10/25 1036 -- -- 64 17 131/62 Lying 95    07/10/25 1031 -- -- 64 15 123/55 Lying 96    07/10/25 1026 -- -- 64 18 129/57 Lying 95    07/10/25 1021 -- -- 63 14 121/57 Lying 93    07/10/25 1016 -- -- 63 11 123/54 Lying 94    07/10/25 1011 -- -- 61 11 121/54 Lying 95    07/10/25 1006 97.6 (36.4) Tympanic 61 10 127/51 Lying 96    07/10/25 0844 98 (36.7) Oral 58 16 109/69 Lying 97          Lines, Drains & Airways       Active LDAs       Name Placement date Placement time Site Days    Peripheral IV 07/10/25 0845 20 G Left;Posterior Wrist 07/10/25  0845  Wrist  less than 1    External Urinary Catheter 07/10/25  1140  --  less than 1                  Current Facility-Administered Medications   Medication Dose Route Frequency Provider Last Rate Last Admin    ALPRAZolam (XANAX) tablet 0.25 mg  0.25 mg Oral BID PRN  Taran Harding MD        amLODIPine (NORVASC) tablet 10 mg  10 mg Oral Daily Taran Harding MD   10 mg at 07/10/25 1117    [START ON 7/11/2025] apixaban (ELIQUIS) tablet 5 mg  5 mg Oral BID Taran Harding MD        sennosides-docusate (PERICOLACE) 8.6-50 MG per tablet 2 tablet  2 tablet Oral BID Taran Harding MD   2 tablet at 07/10/25 1117    And    polyethylene glycol (MIRALAX) packet 17 g  17 g Oral Daily PRN Taran Harding MD        And    bisacodyl (DULCOLAX) EC tablet 5 mg  5 mg Oral Daily PRN Taran Harding MD        And    bisacodyl (DULCOLAX) suppository 10 mg  10 mg Rectal Daily PRN Taran Harding MD        carvedilol (COREG) tablet 3.125 mg  3.125 mg Oral BID With Meals Taran Harding MD   3.125 mg at 07/10/25 1117    clopidogrel (PLAVIX) tablet 75 mg  75 mg Oral Daily Taran Harding MD   75 mg at 07/10/25 1117    dextrose (D50W) (25 g/50 mL) IV injection 25 g  25 g Intravenous Q15 Min PRN Marco Rasheed MD        dextrose (GLUTOSE) oral gel 15 g  15 g Oral Q15 Min PRN Marco Rasheed MD        [Held by provider] furosemide (LASIX) tablet 20 mg  20 mg Oral Daily Taran Harding MD   20 mg at 07/10/25 1117    gabapentin (NEURONTIN) capsule 400 mg  400 mg Oral Q8H Marco Rasheed MD        glucagon HCl (Diagnostic) injection 1 mg  1 mg Intramuscular Q15 Min PRN Marco Rasheed MD        HYDROcodone-acetaminophen (NORCO)  MG per tablet 1 tablet  1 tablet Oral Q6H PRN Taran Harding MD        HYDROmorphone (DILAUDID) injection 0.5 mg  0.5 mg Intravenous Q2H PRN Taran Harding MD   0.5 mg at 07/10/25 1216    hydrOXYzine pamoate (VISTARIL) capsule 25 mg  25 mg Oral Q6H PRN Taran Harding MD        insulin glargine (LANTUS, SEMGLEE) injection 30 Units  30 Units Subcutaneous Nightly Taran Harding MD        Insulin Lispro (humaLOG) injection 3-14 Units  3-14 Units Subcutaneous 4x Daily AC & at Bedtime Marco Rasheed MD   3 Units at 07/10/25 1345    nicotine  (NICODERM CQ) 21 MG/24HR patch 1 patch  1 patch Transdermal Q24H Marco Rasheed MD        ondansetron ODT (ZOFRAN-ODT) disintegrating tablet 4 mg  4 mg Oral Q6H PRN Taran Harding MD        oxyCODONE-acetaminophen (PERCOCET) 5-325 MG per tablet 1 tablet  1 tablet Oral Q4H PRN Taran Harding MD   1 tablet at 07/10/25 1117    promethazine (PHENERGAN) tablet 25 mg  25 mg Oral Q6H PRN Taran Harding MD        rosuvastatin (CRESTOR) tablet 20 mg  20 mg Oral Daily Taran Harding MD   20 mg at 07/10/25 1117    sodium chloride 0.9 % flush 10 mL  10 mL Intravenous Q12H Taran Harding MD   10 mL at 07/10/25 1119    sodium chloride 0.9 % flush 10 mL  10 mL Intravenous PRN Taran Harding MD        sodium chloride 0.9 % infusion 40 mL  40 mL Intravenous PRN Taran Harding MD        traMADol (ULTRAM) tablet 50 mg  50 mg Oral BID PRN Taran Harding MD        [Held by provider] valsartan (DIOVAN) tablet 320 mg  320 mg Oral Daily Taran Harding MD   320 mg at 07/10/25 1117        Operative/Procedure Notes (most recent note)        Taran Harding MD at 07/10/25 0913          AMPUTATION ABOVE KNEE  Procedure Note    Monae Chauhan  7/10/2025    Pre-op Diagnosis:   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    Staff:   Circulator: Basilia Henderson RN  Scrub Person: Lorena Simons  Assistant: Taran Andrade    Estimated Blood Loss: 200ml    Specimens:                Order Name Source Comment Collection Info Order Time   TISSUE EXAM, [facility] ([facility]) Leg, Left  Collected By: Taran Harding MD 7/10/2025  9:15 AM     Release to patient   Routine Release              Drains: * No LDAs found *    Procedure: The left leg was prepped and aped. The incision was made above the knee with the scalpel   and cautery used to go down through the subcutaneous tissue, fascia, and muscle. Vicryl sutures and ties were used on bleeders. The bone was cleared off proximally and powered saw used to divide the bone. The wound was irrigated and the fascia and muscle closed over the bone. The skin was closed with nylon sutures. The wound was closed with local and a dressing applied.     Findings:             Complications: none   Grafts / Implants N/A    Taran Harding MD     Date: 7/10/2025  Time: 09:58 EDT    Electronically signed by Taran Harding MD at 07/10/25 1001       Physician Progress Notes (most recent note)    No notes of this type exist for this encounter.          Consult Notes (most recent note)        Tamica Flores PA-C at 07/10/25 1141        Consult Orders    1. Inpatient Hospitalist Consult [[id number]] ordered by Taran Harding MD                                            [facility]  CONSULT NOTE     Inpatient Hospitalist Consult  Consult performed by: Tamica Flores PA-C  Consult ordered by: Taran Harding MD          Patient Identification:  Name:  Monae Chauhan  Age:  69 y.o.  Sex:  female  :  1955  MRN:  [id number]  Visit Number:  [id number]  Primary care provider:  Zachary Sullivan MD    Length of stay:  0    Subjective       History of presenting illness:     Monae Chauhan is a 69 y.o. female admitted by general surgery.  Hospitalist services were consulted for medical management.  Past medical history significant for diabetes, CAD, tobacco abuse, hypertension, hyperlipidemia, PAD, left lower extremity ulcer.  Patient examined at bedside.  Patient states she takes the long-acting insulin 30 units each night, as well as a dose of Humalog 12 units nightly.  She states this is how her PCP directed her to take the Humalog.  She states she checks her sugar at least once a day at home and it is usually around the 150s even postprandial.   She denies any recent medication changes.  She does states that she takes three 800 mg tablets of gabapentin each night instead of 3 times daily.  She also reports that this is how she was directed to take it.  She states the Lasix that she is prescribed was for edema of her feet, denies any history of heart failure.    ---------------------------------------------------------------------------------------------------------------------  Review of Systems     Otherwise 10-system ROS reviewed and is negative except as mentioned in the HPI.  ---------------------------------------------------------------------------------------------------------------------   Past History:  Past Medical History:   Diagnosis Date    Arthritis     Coronary artery disease     Depression     Diabetes mellitus     Elevated cholesterol     Full dentures     GERD (gastroesophageal reflux disease)     Hyperlipidemia     Hypertension     Myocardial infarction     Peripheral vascular disease     Psoriasis      Past Surgical History:   Procedure Laterality Date    APPENDECTOMY      CARDIAC CATHETERIZATION N/A 01/09/2017    Procedure: Left Heart Cath;  Surgeon: David Diane MD;  Location:  [facility];  Service:     CARDIAC CATHETERIZATION Left 05/20/2024    Procedure: Peripheral angiography;  Surgeon: Jaiden Doyle MD;  Location:  [facility];  Service: Cardiovascular;  Laterality: Left;  Left Venogram -    CARDIAC CATHETERIZATION N/A 02/06/2025    Procedure: Peripheral angiography;  Surgeon: Fran Estevez MD;  Location:  [facility] CATH INVASIVE [facility];  Service: Cardiovascular;  Laterality: N/A;    CARDIAC CATHETERIZATION N/A 02/26/2025    Procedure: Peripheral angiography;  Surgeon: Jaiden Doyle MD;  Location: [facility] CATH INVASIVE LOCATION;  Service: Cardiovascular;  Laterality: N/A;    CARDIAC CATHETERIZATION N/A 02/26/2025    Procedure: Atherectomy-peripheral;  Surgeon: Jaiden Doyle MD;   Location:  [facility] CATH INVASIVE [facility];  Service: Cardiovascular;  Laterality: N/A;    CARDIAC CATHETERIZATION N/A 02/26/2025    Procedure: Angioplasty-peripheral;  Surgeon: Jaiden Doyle MD;  Location:  [facility]ATH INV[facility]IVE [facility];  Service: Cardiovascular;  Laterality: N/A;    CARDIAC CATHETERIZATION N/A 03/24/2025    Procedure: Peripheral angiography;  Surgeon: Jaiden Doyle MD;  Location:  [facility] CATH INVASIVE [facility];  Service: Cardiovascular;  Laterality: N/A;    CARDIAC CATHETERIZATION N/A 03/24/2025    Procedure: Atherectomy-peripheral;  Surgeon: Jaiden Doyle MD;  Location:  [facility] CATH INVASIVE LOC[facility];  Service: Cardiovascular;  Laterality: N/A;    COLONOSCOPY      CORONARY STENT PLACEMENT      CYSTOSCOPY BOTOX INJECTION OF BLADDER N/A 10/20/2021    Procedure: Cystoscopy Botox injection of bladder;  Surgeon: Say Robles MD;  Location: [facility];  Service: Urology;  Laterality: N/A;    ENDOSCOPY      FOOT SURGERY Left     GALLBLADDER SURGERY      INTRAVASCULAR ULTRASOUND N/A 02/26/2025    Procedure: Intravascular Ultrasound;  Surgeon: Jaiden Doyle MD;  Location: [facility] CATH INVASIVE LOCATION;  Service: Cardiovascular;  Laterality: N/A;    INTRAVASCULAR ULTRASOUND N/A 03/24/2025    Procedure: Intravascular Ultrasound;  Surgeon: Jaiden Doyle MD;  Location: [facility] CATH INVASIVE LOCATION;  Service: Cardiovascular;  Laterality: N/A;    LEG SURGERY Left     TUBAL ABDOMINAL LIGATION      VASCULAR SURGERY Left     Lower Extremity Vascular Stent-[facility]     Family History   Problem Relation Age of Onset    Breast cancer Maternal Grandmother 70    Cancer Mother     Diabetes Mother     Cancer Father     Diabetes Father      Social History     Socioeconomic History    Marital status:     Number of children: 2   Tobacco Use    Smoking status: Every Day     Current packs/day: 1.00     Average packs/day: 1 pack/day for 44.0 years (44.0 ttl pk-yrs)     Types: Cigarettes     Start date: 6/25/1981     Smokeless tobacco: Never    Tobacco comments:     2-4 PPD off and on for the last 40 years   Vaping Use    Vaping status: Some Days    Substances: Nicotine, Flavoring    Devices: Refillable tank   Substance and Sexual Activity    Alcohol use: No    Drug use: No    Sexual activity: Defer     ---------------------------------------------------------------------------------------------------------------------   Allergies:  Amoxicillin and Penicillins  ---------------------------------------------------------------------------------------------------------------------   Prior to Admission Medications       Prescriptions Last Dose Informant Patient Reported? Taking?    ALPRAZolam (XANAX) 0.25 MG tablet 7/9/2025 Family Member, Other Yes Yes    Take 1 tablet by mouth 2 (Two) Times a Day As Needed for Anxiety.    amLODIPine (NORVASC) 10 MG tablet 7/9/2025 Family Member, Other No Yes    Take 1 tablet by mouth Daily.    carvedilol (COREG) 3.125 MG tablet 7/9/2025 Family Member, Other Yes Yes    Take 1 tablet by mouth 2 (Two) Times a Day With Meals.    furosemide (LASIX) 20 MG tablet 7/9/2025 Family Member, Other Yes Yes    Take 1 tablet by mouth Daily.    gabapentin (NEURONTIN) 800 MG tablet 7/9/2025 Family Member, Other Yes Yes    Take 1 tablet by mouth Every 6 (Six) Hours As Needed (nerve pain).    HYDROcodone-acetaminophen (NORCO)  MG per tablet 7/9/2025 Family Member, Other Yes Yes    Take 1 tablet by mouth Every 6 (Six) Hours As Needed for Moderate Pain.    hydrOXYzine pamoate (VISTARIL) 25 MG capsule Past Week  Yes Yes    Take 1 capsule by mouth Every 6 (Six) Hours As Needed for Anxiety.    Insulin Glargine (LANTUS SOLOSTAR) 100 UNIT/ML injection pen 7/9/2025  Yes Yes    Inject 30 Units under the skin into the appropriate area as directed Every Night.    Insulin Lispro, 1 Unit Dial, (HUMALOG) 100 UNIT/ML solution pen-injector 7/9/2025 Family Member, Other Yes Yes    Inject 12 Units under the skin into the  appropriate area as directed Every Night.    lidocaine (LIDODERM) 5 % 7/9/2025 Family Member, Other No Yes    Place 1 patch on the skin as directed by provider Daily. Remove & Discard patch within 12 hours or as directed by MD    lidocaine (XYLOCAINE) 2 % jelly 7/9/2025  Yes Yes    Apply 1 g topically to the appropriate area as directed 2 (Two) Times a Day.    potassium chloride (MICRO-K) 10 MEQ CR capsule 7/9/2025 Family Member, Other Yes Yes    Take 1 capsule by mouth Daily.    promethazine (PHENERGAN) 25 MG tablet Past Month Family Member, Other Yes Yes    Take 1 tablet by mouth Every 6 (Six) Hours As Needed for Nausea or Vomiting.    rosuvastatin (CRESTOR) 20 MG tablet 7/9/2025 Self, Family Member, Other Yes Yes    Take 1 tablet by mouth Daily.    traMADol (ULTRAM) 50 MG tablet Past Week Family Member, Other Yes Yes    Take 1 tablet by mouth 2 (Two) Times a Day As Needed for Moderate Pain.    valsartan (DIOVAN) 320 MG tablet 7/9/2025 Family Member, Other Yes Yes    Take 1 tablet by mouth Daily.    apixaban (ELIQUIS) 5 MG tablet tablet 7/7/2025 Self Yes No    Take 1 tablet by mouth 2 (Two) Times a Day.    clopidogrel (PLAVIX) 75 MG tablet 7/7/2025 Family Member, Other No No    Take 1 tablet by mouth Daily.    Tirzepatide 5 MG/0.5ML solution auto-injector 6/22/2025 Family Member, Other Yes No    Inject 5 mg under the skin into the appropriate area as directed 1 (One) Time Per Week.          ---------------------------------------------------------------------------------------------------------------------     Objective      Procedures:  [facility] Meds:  amLODIPine, 10 mg, Oral, Daily  [START ON 7/11/2025] apixaban, 5 mg, Oral, BID  carvedilol, 3.125 mg, Oral, BID With Meals  clopidogrel, 75 mg, Oral, Daily  [Held by provider] furosemide, 20 mg, Oral, Daily  gabapentin, 400 mg, Oral, Q8H  insulin glargine, 30 Units, Subcutaneous, Nightly  insulin lispro, 3-14 Units, Subcutaneous, 4x Daily AC & at  Bedtime  rosuvastatin, 20 mg, Oral, Daily  senna-docusate sodium, 2 tablet, Oral, BID  sodium chloride, 10 mL, Intravenous, Q12H  [Held by provider] valsartan, 320 mg, Oral, Daily         ---------------------------------------------------------------------------------------------------------------------   Vital Signs:  Temp:  [97.6 °F (36.4 °C)-98 °F (36.7 °C)] 97.8 °F (36.6 °C)  Heart Rate:  [58-64] 64  Resp:  [10-20] 20  BP: (109-150)/(48-97) 112/48  Mean Arterial Pressure (Non-Invasive) for the past 24 hrs (Last 3 readings):   Noninvasive MAP (mmHg)   07/10/25 1036 97   07/10/25 1031 80   07/10/25 1021 89     SpO2 Percentage    07/10/25 1045 07/10/25 1100 07/10/25 1230   SpO2: 95% 92% 90%     SpO2:  [90 %-97 %] 90 %  on   ;   Device (Oxygen Therapy): room air    Body mass index is 30.65 kg/m².  Wt Readings from Last 3 Encounters:   07/10/25 78.5 kg (173 lb)   06/19/25 78.6 kg (173 lb 3.2 oz)   05/29/25 78.5 kg (173 lb)        Intake/Output Summary (Last 24 hours) at 7/10/2025 1321  Last data filed at 7/10/2025 1230  Gross per 24 hour   Intake 1160 ml   Output --   Net 1160 ml     Diet: Diabetic; Consistent Carbohydrate; Fluid Consistency: Thin (IDDSI 0)  ---------------------------------------------------------------------------------------------------------------------   Physical exam:  Physical Exam  Constitutional:       General: She is not in acute distress.     Appearance: Normal appearance.   HENT:      Head: Normocephalic and atraumatic.      Right Ear: External ear normal.      Left Ear: External ear normal.      Nose: No nasal deformity.      Mouth/Throat:      Lips: Pink.      Mouth: Mucous membranes are moist.   Eyes:      Conjunctiva/sclera: Conjunctivae normal.      Pupils: Pupils are equal, round, and reactive to light.   Cardiovascular:      Rate and Rhythm: Normal rate and regular rhythm.      Pulses:           Dorsalis pedis pulses are 2+ on the right side.      Heart sounds: Normal heart  "sounds.   Pulmonary:      Effort: Pulmonary effort is normal.      Breath sounds: Normal breath sounds. No wheezing, rhonchi or rales.   Abdominal:      General: Abdomen is flat. Bowel sounds are normal.      Palpations: Abdomen is soft.      Tenderness: There is no guarding or rebound.   Genitourinary:     Comments: No sheth catheter in place   Musculoskeletal:      Cervical back: Neck supple. Normal range of motion.      Right lower leg: No edema.      Left Lower Extremity: Left leg is amputated above knee.   Feet:      Comments: Dressing over left leg stump  Skin:     General: Skin is warm and dry.   Neurological:      General: No focal deficit present.      Mental Status: She is alert and oriented to person, place, and time.   Psychiatric:         Mood and Affect: Mood normal.         Behavior: Behavior normal.       ---------------------------------------------------------------------------------------------------------------------             Results from last 7 days   Lab Units 07/10/25  1248   WBC 10*3/mm3 8.44   HEMOGLOBIN g/dL 8.3*   HEMATOCRIT % 28.1*   MCV fL 86.5   MCHC g/dL 29.5*   PLATELETS 10*3/mm3 340         Invalid input(s): \"PROT\"CrCl cannot be calculated (Patient's most recent lab result is older than the maximum 30 days allowed.).  No results found for: \"AMMONIA\"    Glucose   Date/Time Value Ref Range Status   07/10/2025 0844 216 (H) 70 - 130 mg/dL Final     Comment:     Serial Number: [id number]:  [id number]     Lab Results   Component Value Date    HGBA1C 6.90 (H) 02/27/2025     No results found for: \"TSH\", \"FREET4\"    No results found for: \"BLOODCX\"  No results found for: \"URINECX\"  No results found for: \"WOUNDCX\"  No results found for: \"STOOLCX\"  No results found for: \"RESPCX\"  Pain Management Panel           No data to display              I have personally reviewed the above laboratory results. "   ---------------------------------------------------------------------------------------------------------------------  Imaging Results (Last 7 Days)       ** No results found for the last 168 hours. **          I have personally reviewed the above radiology results.   ----------------------------------------------------------------------------------------------------------------------    Assessment/Plan     PAD with previous stenting and reocclusion of SFA  Left lower extremity ulcer s/p AKA  Continue treatment plan per primary care team  Resume gabapentin at 400 mg 3 times daily.  Patient takes 800mg, all 3 doses at once each night. So she takes 2400mg nightly. Educated patient that this is a very large dose and we will be scheduling it TID.   Type II DM  Continue Lantus.  Sliding scale insulin while inpatient.  Hold any oral hypoglycemics to prevent hypoglycemia. Will review home diabetes medications once available per pharmacy.   Hypoglycemia protocol in place if necessary.   AccuCheks before meals and at bedtime.  Ongoing tobacco abuse   Strongly encourage cessation   NRT available   Essential hypertension  BP appears well controlled   Restart coreg and Norvasc for now, can resume other medications in home regimen if pt becomes hypertensive    Closely monitor BP per hospital protocol, titrate medications as necessary  Hyperlipidemia   CAD  Continue Plavix and Crestor   Atrial fibrillation  Continue anticoagulation with Eliquis  Currently rate controlled.  Continue Coreg    -----------------------  Thank you for the consult and allowing us to participate in the care of your patient, Hospitalist Services will continue to follow. Please do not hesitate to call with any questions or concerns.      Tamica Flores PA-C  Hospitalist Service -- [facility]       07/10/25  13:21 EDT    Attestation: I personally discussed the patient's history of presenting illness, physical examination, assessment, and plan  with my attending physician, Dr. Rasheed     Electronically signed by Tamica Flores PA-C at 07/10/25 1325       Physical Therapy Notes (most recent note)    No notes exist for this encounter.       Occupational Therapy Notes (most recent note)    No notes exist for this encounter.

## 2025-07-10 NOTE — ANESTHESIA PREPROCEDURE EVALUATION
Anesthesia Evaluation     Patient summary reviewed and Nursing notes reviewed   no history of anesthetic complications:   NPO Solid Status: > 8 hours  NPO Liquid Status: > 8 hours           Airway   Mallampati: II  TM distance: >3 FB  Neck ROM: full  No difficulty expected  Dental    (+) upper dentures, edentulous and lower dentures    Pulmonary     breath sounds clear to auscultation  (+) pneumonia ,shortness of breath  Cardiovascular   Exercise tolerance: good (4-7 METS)    Rhythm: regular  Rate: normal    (+) hypertension, past MI , CAD, cardiac stents (january 2017) more than 12 months ago , BRIGGS, PVD, hyperlipidemia      Neuro/Psych  (+) psychiatric history  GI/Hepatic/Renal/Endo    (+) obesity, morbid obesity, GERD, diabetes mellitus    Musculoskeletal     Abdominal     Abdomen: soft.   Substance History      OB/GYN          Other   arthritis,                   Anesthesia Plan    ASA 4     general     intravenous induction     Anesthetic plan, risks, benefits, and alternatives have been provided, discussed and informed consent has been obtained with: patient.    Plan discussed with CRNA.

## 2025-07-10 NOTE — CONSULTS
[facility]  CONSULT NOTE     Inpatient Hospitalist Consult  Consult performed by: Tamica Flores PA-C  Consult ordered by: Taran Harding MD          Patient Identification:  Name:  Monae Chauhan  Age:  69 y.o.  Sex:  female  :  1955  MRN:  [id number]  Visit Number:  [id number]  Primary care provider:  Zachary Sullivan MD    Length of stay:  0    Subjective       History of presenting illness:     Monae Chauhan is a 69 y.o. female admitted by general surgery.  Hospitalist services were consulted for medical management.  Past medical history significant for diabetes, CAD, tobacco abuse, hypertension, hyperlipidemia, PAD, left lower extremity ulcer.  Patient examined at bedside.  Patient states she takes the long-acting insulin 30 units each night, as well as a dose of Humalog 12 units nightly.  She states this is how her PCP directed her to take the Humalog.  She states she checks her sugar at least once a day at home and it is usually around the 150s even postprandial.  She denies any recent medication changes.  She does states that she takes three 800 mg tablets of gabapentin each night instead of 3 times daily.  She also reports that this is how she was directed to take it.  She states the Lasix that she is prescribed was for edema of her feet, denies any history of heart failure.    ---------------------------------------------------------------------------------------------------------------------  Review of Systems     Otherwise 10-system ROS reviewed and is negative except as mentioned in the HPI.  ---------------------------------------------------------------------------------------------------------------------   Past History:  Past Medical History:   Diagnosis Date    Arthritis     Coronary artery disease     Depression     Diabetes mellitus     Elevated cholesterol     Full dentures     GERD (gastroesophageal reflux  disease)     Hyperlipidemia     Hypertension     Myocardial infarction     Peripheral vascular disease     Psoriasis      Past Surgical History:   Procedure Laterality Date    APPENDECTOMY      CARDIAC CATHETERIZATION N/A 01/09/2017    Procedure: Left Heart Cath;  Surgeon: David Diane MD;  Location:  [facility] CATH INVASIVE [facility];  Service:     CARDIAC CATHETERIZATION Left 05/20/2024    Procedure: Peripheral angiography;  Surgeon: Jaiden Doyle MD;  Location:  [facility]ATH [facility];  Service: Cardiovascular;  Laterality: Left;  Left Venogram -    CARDIAC CATHETERIZATION N/A 02/06/2025    Procedure: Peripheral angiography;  Surgeon: Fran Estevez MD;  Location:  [facility] CATH INV[facility]IVE [facility];  Service: Cardiovascular;  Laterality: N/A;    CARDIAC CATHETERIZATION N/A 02/26/2025    Procedure: Peripheral angiography;  Surgeon: Jaiden Doyle MD;  Location:  [facility];  Service: Cardiovascular;  Laterality: N/A;    CARDIAC CATHETERIZATION N/A 02/26/2025    Procedure: Atherectomy-peripheral;  Surgeon: Jaiden Doyle MD;  Location: [facility] CATH INVASIVE LOCATION;  Service: Cardiovascular;  Laterality: N/A;    CARDIAC CATHETERIZATION N/A 02/26/2025    Procedure: Angioplasty-peripheral;  Surgeon: Jaiden Doyle MD;  Location:  [facility] CATH INVASIVE LOC[facility];  Service: Cardiovascular;  Laterality: N/A;    CARDIAC CATHETERIZATION N/A 03/24/2025    Procedure: Peripheral angiography;  Surgeon: Jaiden Doyle MD;  Location:  [facility]ATH INVASIVE [facility];  Service: Cardiovascular;  Laterality: N/A;    CARDIAC CATHETERIZATION N/A 03/24/2025    Procedure: Atherectomy-peripheral;  Surgeon: Jaiden Doyle MD;  Location: [facility] CATH INVASIVE LOCATION;  Service: Cardiovascular;  Laterality: N/A;    COLONOSCOPY      CORONARY STENT PLACEMENT      CYSTOSCOPY BOTOX INJECTION OF BLADDER N/A 10/20/2021    Procedure: Cystoscopy Botox injection of bladder;  Surgeon: Say Robles MD;  Location: [facility]  OR;  Service: Urology;  Laterality: N/A;    ENDOSCOPY      FOOT SURGERY Left     GALLBLADDER SURGERY      INTRAVASCULAR ULTRASOUND N/A 02/26/2025    Procedure: Intravascular Ultrasound;  Surgeon: Jaiden Doyle MD;  Location:  [facility]ATH [facility]IVE [facility];  Service: Cardiovascular;  Laterality: N/A;    INTRAVASCULAR ULTRASOUND N/A 03/24/2025    Procedure: Intravascular Ultrasound;  Surgeon: Jaiden Doyle MD;  Location:  [facility] CATH INVASIVE LOC[facility];  Service: Cardiovascular;  Laterality: N/A;    LEG SURGERY Left     TUBAL ABDOMINAL LIGATION      VASCULAR SURGERY Left     Lower Extremity Vascular Stent-[facility]     Family History   Problem Relation Age of Onset    Breast cancer Maternal Grandmother 70    Cancer Mother     Diabetes Mother     Cancer Father     Diabetes Father      Social History     Socioeconomic History    Marital status:     Number of children: 2   Tobacco Use    Smoking status: Every Day     Current packs/day: 1.00     Average packs/day: 1 pack/day for 44.0 years (44.0 ttl pk-yrs)     Types: Cigarettes     Start date: 6/25/1981    Smokeless tobacco: Never    Tobacco comments:     2-4 PPD off and on for the last 40 years   Vaping Use    Vaping status: Some Days    Substances: Nicotine, Flavoring    Devices: [place]   Substance and Sexual Activity    Alcohol use: No    Drug use: No    Sexual activity: Defer     ---------------------------------------------------------------------------------------------------------------------   Allergies:  Amoxicillin and Penicillins  ---------------------------------------------------------------------------------------------------------------------   Prior to Admission Medications       Prescriptions Last Dose Informant Patient Reported? Taking?    ALPRAZolam (XANAX) 0.25 MG tablet 7/9/2025 Family Member, Other Yes Yes    Take 1 tablet by mouth 2 (Two) Times a Day As Needed for Anxiety.    amLODIPine (NORVASC) 10 MG tablet 7/9/2025 Family Member, Other  No Yes    Take 1 tablet by mouth Daily.    carvedilol (COREG) 3.125 MG tablet 7/9/2025 Family Member, Other Yes Yes    Take 1 tablet by mouth 2 (Two) Times a Day With Meals.    furosemide (LASIX) 20 MG tablet 7/9/2025 Family Member, Other Yes Yes    Take 1 tablet by mouth Daily.    gabapentin (NEURONTIN) 800 MG tablet 7/9/2025 Family Member, Other Yes Yes    Take 1 tablet by mouth Every 6 (Six) Hours As Needed (nerve pain).    HYDROcodone-acetaminophen (NORCO)  MG per tablet 7/9/2025 Family Member, Other Yes Yes    Take 1 tablet by mouth Every 6 (Six) Hours As Needed for Moderate Pain.    hydrOXYzine pamoate (VISTARIL) 25 MG capsule Past Week  Yes Yes    Take 1 capsule by mouth Every 6 (Six) Hours As Needed for Anxiety.    Insulin Glargine (LANTUS SOLOSTAR) 100 UNIT/ML injection pen 7/9/2025  Yes Yes    Inject 30 Units under the skin into the appropriate area as directed Every Night.    Insulin Lispro, 1 Unit Dial, (HUMALOG) 100 UNIT/ML solution pen-injector 7/9/2025 Family Member, Other Yes Yes    Inject 12 Units under the skin into the appropriate area as directed Every Night.    lidocaine (LIDODERM) 5 % 7/9/2025 Family Member, Other No Yes    Place 1 patch on the skin as directed by provider Daily. Remove & Discard patch within 12 hours or as directed by MD    lidocaine (XYLOCAINE) 2 % jelly 7/9/2025  Yes Yes    Apply 1 g topically to the appropriate area as directed 2 (Two) Times a Day.    potassium chloride (MICRO-K) 10 MEQ CR capsule 7/9/2025 Family Member, Other Yes Yes    Take 1 capsule by mouth Daily.    promethazine (PHENERGAN) 25 MG tablet Past Month Family Member, Other Yes Yes    Take 1 tablet by mouth Every 6 (Six) Hours As Needed for Nausea or Vomiting.    rosuvastatin (CRESTOR) 20 MG tablet 7/9/2025 Self, Family Member, Other Yes Yes    Take 1 tablet by mouth Daily.    traMADol (ULTRAM) 50 MG tablet Past Week Family Member, Other Yes Yes    Take 1 tablet by mouth 2 (Two) Times a Day As Needed  for Moderate Pain.    valsartan (DIOVAN) 320 MG tablet 7/9/2025 Family Member, Other Yes Yes    Take 1 tablet by mouth Daily.    apixaban (ELIQUIS) 5 MG tablet tablet 7/7/2025 Self Yes No    Take 1 tablet by mouth 2 (Two) Times a Day.    clopidogrel (PLAVIX) 75 MG tablet 7/7/2025 Family Member, Other No No    Take 1 tablet by mouth Daily.    Tirzepatide 5 MG/0.5ML solution auto-injector 6/22/2025 Family Member, Other Yes No    Inject 5 mg under the skin into the appropriate area as directed 1 (One) Time Per Week.          ---------------------------------------------------------------------------------------------------------------------     Objective      Procedures:  [facility] Meds:  amLODIPine, 10 mg, Oral, Daily  [START ON 7/11/2025] apixaban, 5 mg, Oral, BID  carvedilol, 3.125 mg, Oral, BID With Meals  clopidogrel, 75 mg, Oral, Daily  [Held by provider] furosemide, 20 mg, Oral, Daily  gabapentin, 400 mg, Oral, Q8H  insulin glargine, 30 Units, Subcutaneous, Nightly  insulin lispro, 3-14 Units, Subcutaneous, 4x Daily AC & at Bedtime  rosuvastatin, 20 mg, Oral, Daily  senna-docusate sodium, 2 tablet, Oral, BID  sodium chloride, 10 mL, Intravenous, Q12H  [Held by provider] valsartan, 320 mg, Oral, Daily         ---------------------------------------------------------------------------------------------------------------------   Vital Signs:  Temp:  [97.6 °F (36.4 °C)-98 °F (36.7 °C)] 97.8 °F (36.6 °C)  Heart Rate:  [58-64] 64  Resp:  [10-20] 20  BP: (109-150)/(48-97) 112/48  Mean Arterial Pressure (Non-Invasive) for the past 24 hrs (Last 3 readings):   Noninvasive MAP (mmHg)   07/10/25 1036 97   07/10/25 1031 80   07/10/25 1021 89     SpO2 Percentage    07/10/25 1045 07/10/25 1100 07/10/25 1230   SpO2: 95% 92% 90%     SpO2:  [90 %-97 %] 90 %  on   ;   Device (Oxygen Therapy): room air    Body mass index is 30.65 kg/m².  Wt Readings from Last 3 Encounters:   07/10/25 78.5 kg (173 lb)   06/19/25 78.6 kg  (173 lb 3.2 oz)   05/29/25 78.5 kg (173 lb)        Intake/Output Summary (Last 24 hours) at 7/10/2025 1321  Last data filed at 7/10/2025 1230  Gross per 24 hour   Intake 1160 ml   Output --   Net 1160 ml     Diet: Diabetic; Consistent Carbohydrate; Fluid Consistency: Thin (IDDSI 0)  ---------------------------------------------------------------------------------------------------------------------   Physical exam:  Physical Exam  Constitutional:       General: She is not in acute distress.     Appearance: Normal appearance.   HENT:      Head: Normocephalic and atraumatic.      Right Ear: External ear normal.      Left Ear: External ear normal.      Nose: No nasal deformity.      Mouth/Throat:      Lips: Pink.      Mouth: Mucous membranes are moist.   Eyes:      Conjunctiva/sclera: Conjunctivae normal.      Pupils: Pupils are equal, round, and reactive to light.   Cardiovascular:      Rate and Rhythm: Normal rate and regular rhythm.      Pulses:           Dorsalis pedis pulses are 2+ on the right side.      Heart sounds: Normal heart sounds.   Pulmonary:      Effort: Pulmonary effort is normal.      Breath sounds: Normal breath sounds. No wheezing, rhonchi or rales.   Abdominal:      General: Abdomen is flat. Bowel sounds are normal.      Palpations: Abdomen is soft.      Tenderness: There is no guarding or rebound.   Genitourinary:     Comments: No sheth catheter in place   Musculoskeletal:      Cervical back: Neck supple. Normal range of motion.      Right lower leg: No edema.      Left Lower Extremity: Left leg is amputated above knee.   Feet:      Comments: Dressing over left leg stump  Skin:     General: Skin is warm and dry.   Neurological:      General: No focal deficit present.      Mental Status: She is alert and oriented to person, place, and time.   Psychiatric:         Mood and Affect: Mood normal.         Behavior: Behavior normal.  "      ---------------------------------------------------------------------------------------------------------------------             Results from last 7 days   Lab Units 07/10/25  1248   WBC 10*3/mm3 8.44   HEMOGLOBIN g/dL 8.3*   HEMATOCRIT % 28.1*   MCV fL 86.5   MCHC g/dL 29.5*   PLATELETS 10*3/mm3 340         Invalid input(s): \"PROT\"CrCl cannot be calculated (Patient's most recent lab result is older than the maximum 30 days allowed.).  No results found for: \"AMMONIA\"    Glucose   Date/Time Value Ref Range Status   07/10/2025 0844 216 (H) 70 - 130 mg/dL Final     Comment:     Serial Number: [id number]:  [id number]     Lab Results   Component Value Date    HGBA1C 6.90 (H) 02/27/2025     No results found for: \"TSH\", \"FREET4\"    No results found for: \"BLOODCX\"  No results found for: \"URINECX\"  No results found for: \"WOUNDCX\"  No results found for: \"STOOLCX\"  No results found for: \"RESPCX\"  Pain Management Panel           No data to display              I have personally reviewed the above laboratory results.   ---------------------------------------------------------------------------------------------------------------------  Imaging Results (Last 7 Days)       ** No results found for the last 168 hours. **          I have personally reviewed the above radiology results.   ----------------------------------------------------------------------------------------------------------------------    Assessment/Plan     PAD with previous stenting and reocclusion of SFA  Left lower extremity ulcer s/p AKA  Continue treatment plan per primary care team  Resume gabapentin at 400 mg 3 times daily.  Patient takes 800mg, all 3 doses at once each night. So she takes 2400mg nightly. Educated patient that this is a very large dose and we will be scheduling it TID.   Type II DM  Continue Lantus.  Sliding scale insulin while inpatient.  Hold any oral hypoglycemics to prevent hypoglycemia. Will review home diabetes " medications once available per pharmacy.   Hypoglycemia protocol in place if necessary.   AccuCheks before meals and at bedtime.  Ongoing tobacco abuse   Strongly encourage cessation   NRT available   Essential hypertension  BP appears well controlled   Restart coreg and Norvasc for now, can resume other medications in home regimen if pt becomes hypertensive    Closely monitor BP per hospital protocol, titrate medications as necessary  Hyperlipidemia   CAD  Continue Plavix and Crestor   Atrial fibrillation  Continue anticoagulation with Eliquis  Currently rate controlled.  Continue Coreg    -----------------------  Thank you for the consult and allowing us to participate in the care of your patient, Hospitalist Services will continue to follow. Please do not hesitate to call with any questions or concerns.      Tamica Flores PA-C  Hospitalist Service -- [facility]       07/10/25  13:21 EDT    Attestation: I personally discussed the patient's history of presenting illness, physical examination, assessment, and plan with my attending physician, Dr. Rasheed

## 2025-07-10 NOTE — CASE MANAGEMENT/SOCIAL WORK
Discharge Planning Assessment   [facility]     Patient Name: Monae Chauhan  MRN: [id number]  Today's Date: 7/10/2025    Admit Date: 7/10/2025     Discharge Needs Assessment       Row Name 07/10/25 1511       Living Environment    People in Home alone    Current Living Arrangements home    Family Caregiver if Needed child(siri), adult    Family Caregiver Names Daughter Kaylyn, Son Jacinto and niryan Lovell    Quality of Family Relationships helpful;involved;supportive    Able to Return to Prior Arrangements yes       Discharge Needs Assessment    Equipment Currently Used at Home lift device;power chair,(recliner lift);walker, rolling;cane, straight;glucometer;commode    Concerns to be Addressed discharge planning                   Discharge Plan       Row Name 07/10/25 1513       Plan    Plan Pt was admitted on 07/10/25. SS received nursing consult for database questions. SS spoke with Pt at bedside on this date. Pt lives alone. Pt does not utilize home health services. Pt has a rollator, straight cane, wheelchair, lift device, and power chair via unknown provider. Pt's PCP is Zachary Sullivan. Pt does not have a POA or living will. Pt is agreeable to short term rehab placement. Pt prefers [facility]. SS faxed referral to [facility] EPIC basket. SS left message for Ruth making her aware that referral has been sent. SS to follow.                  Continued Care and Services - Admitted Since 7/10/2025       Destination       Service Provider Request Status Services Address Phone Fax Patient Preferred    [facility] Pending - Request Sent -- [address] [address] [phone] --                    TAIWO Levine

## 2025-07-10 NOTE — OP NOTE
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    Staff:   Circulator: Basilia Henderson RN  Scrub Person: Lorena Simons  Assistant: Taran Andrade    Estimated Blood Loss: 200ml    Specimens:                Order Name Source Comment Collection Info Order Time   TISSUE EXAM, [facility] ([facility]) Leg, Left  Collected By: Taran Harding MD 7/10/2025  9:15 AM     Release to patient   Routine Release              Drains: * No LDAs found *    Procedure: The left leg was prepped and draped. The incision was made above the knee with the scalpel  and cautery used to go down through the subcutaneous tissue, fascia, and muscle. Vicryl sutures and ties were used on bleeders. The bone was cleared off proximally and powered saw used to divide the bone. The wound was irrigated and the fascia and muscle closed over the bone. The skin was closed with nylon sutures. The wound was closed with local and a dressing applied.     Findings:             Complications: none   Grafts / Implants N/A    Taran Harding MD     Date: 7/10/2025  Time: 09:58 EDT

## 2025-07-10 NOTE — ANESTHESIA PROCEDURE NOTES
Airway  Reason: elective    Date/Time: 7/10/2025 9:04 AM  Airway not difficult    General Information and Staff    Patient location during procedure: OR  CRNA/CAA: Perico Pizano CRNA    Indications and Patient Condition    Preoxygenated: yes  MILS not maintained throughout    Mask difficulty assessment: 0 - not attempted    Final Airway Details    Final airway type: supraglottic airway      Successful airway: unique  Size: 4   Number of attempts at approach: 1  Assessment: lips, teeth, and gum same as pre-op and atraumatic intubation    Additional Comments  Atraumatic LMA placement, dentition unchanged.

## 2025-07-10 NOTE — ANESTHESIA POSTPROCEDURE EVALUATION
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        Post Anesthesia Care and Evaluation    Patient location during evaluation: PHASE II  Patient participation: complete - patient participated  Level of consciousness: awake and alert  Pain score: 1  Pain management: adequate    Airway patency: patent  Anesthetic complications: No anesthetic complications  PONV Status: controlled  Cardiovascular status: acceptable  Respiratory status: acceptable  Hydration status: acceptable

## 2025-07-10 NOTE — PLAN OF CARE
Goal Outcome Evaluation:  Plan of Care Reviewed With: patient        Progress: no change  Outcome Evaluation: Pt resting in bed. PRN pain medication given, see MAR. EKG completed per orders. VSS on RA. AOx4. Pt admitted from surgery for a L AKA. Will continue POC.

## 2025-07-11 LAB
ABO GROUP BLD: NORMAL
ABO GROUP BLD: NORMAL
ANION GAP SERPL CALCULATED.3IONS-SCNC: 9.2 MMOL/L (ref 5–15)
BLD GP AB SCN SERPL QL: NEGATIVE
BUN SERPL-MCNC: 13.8 MG/DL (ref 8–23)
BUN/CREAT SERPL: 13.4 (ref 7–25)
CALCIUM SPEC-SCNC: 8 MG/DL (ref 8.6–10.5)
CHLORIDE SERPL-SCNC: 104 MMOL/L (ref 98–107)
CO2 SERPL-SCNC: 22.8 MMOL/L (ref 22–29)
CREAT SERPL-MCNC: 1.03 MG/DL (ref 0.57–1)
DEPRECATED RDW RBC AUTO: 59.7 FL (ref 37–54)
EGFRCR SERPLBLD CKD-EPI 2021: 59 ML/MIN/1.73
ERYTHROCYTE [DISTWIDTH] IN BLOOD BY AUTOMATED COUNT: 18.6 % (ref 12.3–15.4)
GLUCOSE BLDC GLUCOMTR-MCNC: 117 MG/DL (ref 70–130)
GLUCOSE BLDC GLUCOMTR-MCNC: 158 MG/DL (ref 70–130)
GLUCOSE BLDC GLUCOMTR-MCNC: 171 MG/DL (ref 70–130)
GLUCOSE BLDC GLUCOMTR-MCNC: 186 MG/DL (ref 70–130)
GLUCOSE SERPL-MCNC: 251 MG/DL (ref 65–99)
HCT VFR BLD AUTO: 23.7 % (ref 34–46.6)
HCT VFR BLD AUTO: 28.9 % (ref 34–46.6)
HGB BLD-MCNC: 7 G/DL (ref 12–15.9)
HGB BLD-MCNC: 9 G/DL (ref 12–15.9)
MAGNESIUM SERPL-MCNC: 1.9 MG/DL (ref 1.6–2.4)
MCH RBC QN AUTO: 25.6 PG (ref 26.6–33)
MCHC RBC AUTO-ENTMCNC: 29.5 G/DL (ref 31.5–35.7)
MCV RBC AUTO: 86.8 FL (ref 79–97)
PHOSPHATE SERPL-MCNC: 2.8 MG/DL (ref 2.5–4.5)
PLATELET # BLD AUTO: 334 10*3/MM3 (ref 140–450)
PMV BLD AUTO: 8.6 FL (ref 6–12)
POTASSIUM SERPL-SCNC: 4 MMOL/L (ref 3.5–5.2)
QT INTERVAL: 422 MS
QTC INTERVAL: 431 MS
RBC # BLD AUTO: 2.73 10*6/MM3 (ref 3.77–5.28)
RH BLD: POSITIVE
RH BLD: POSITIVE
SODIUM SERPL-SCNC: 136 MMOL/L (ref 136–145)
T&S EXPIRATION DATE: NORMAL
WBC NRBC COR # BLD AUTO: 10 10*3/MM3 (ref 3.4–10.8)

## 2025-07-11 PROCEDURE — 99231 SBSQ HOSP IP/OBS SF/LOW 25: CPT | Performed by: INTERNAL MEDICINE

## 2025-07-11 PROCEDURE — 99024 POSTOP FOLLOW-UP VISIT: CPT | Performed by: SURGERY

## 2025-07-11 PROCEDURE — 80048 BASIC METABOLIC PNL TOTAL CA: CPT | Performed by: SURGERY

## 2025-07-11 PROCEDURE — 97162 PT EVAL MOD COMPLEX 30 MIN: CPT

## 2025-07-11 PROCEDURE — 86900 BLOOD TYPING SEROLOGIC ABO: CPT

## 2025-07-11 PROCEDURE — 85014 HEMATOCRIT: CPT | Performed by: INTERNAL MEDICINE

## 2025-07-11 PROCEDURE — 82948 REAGENT STRIP/BLOOD GLUCOSE: CPT | Performed by: INTERNAL MEDICINE

## 2025-07-11 PROCEDURE — 86901 BLOOD TYPING SEROLOGIC RH(D): CPT | Performed by: INTERNAL MEDICINE

## 2025-07-11 PROCEDURE — 97166 OT EVAL MOD COMPLEX 45 MIN: CPT

## 2025-07-11 PROCEDURE — 85018 HEMOGLOBIN: CPT | Performed by: INTERNAL MEDICINE

## 2025-07-11 PROCEDURE — 84100 ASSAY OF PHOSPHORUS: CPT | Performed by: INTERNAL MEDICINE

## 2025-07-11 PROCEDURE — 85027 COMPLETE CBC AUTOMATED: CPT | Performed by: SURGERY

## 2025-07-11 PROCEDURE — 25010000002 HYDROMORPHONE PER 4 MG: Performed by: SURGERY

## 2025-07-11 PROCEDURE — 86901 BLOOD TYPING SEROLOGIC RH(D): CPT

## 2025-07-11 PROCEDURE — 86900 BLOOD TYPING SEROLOGIC ABO: CPT | Performed by: INTERNAL MEDICINE

## 2025-07-11 PROCEDURE — 36430 TRANSFUSION BLD/BLD COMPNT: CPT

## 2025-07-11 PROCEDURE — 63710000001 INSULIN LISPRO (HUMAN) PER 5 UNITS: Performed by: INTERNAL MEDICINE

## 2025-07-11 PROCEDURE — 86923 COMPATIBILITY TEST ELECTRIC: CPT

## 2025-07-11 PROCEDURE — 63710000001 INSULIN GLARGINE PER 5 UNITS: Performed by: SURGERY

## 2025-07-11 PROCEDURE — P9016 RBC LEUKOCYTES REDUCED: HCPCS

## 2025-07-11 PROCEDURE — 83735 ASSAY OF MAGNESIUM: CPT | Performed by: INTERNAL MEDICINE

## 2025-07-11 PROCEDURE — 86850 RBC ANTIBODY SCREEN: CPT | Performed by: INTERNAL MEDICINE

## 2025-07-11 PROCEDURE — 82948 REAGENT STRIP/BLOOD GLUCOSE: CPT

## 2025-07-11 RX ORDER — INSULIN LISPRO 100 [IU]/ML
5 INJECTION, SOLUTION INTRAVENOUS; SUBCUTANEOUS
Status: DISCONTINUED | OUTPATIENT
Start: 2025-07-11 | End: 2025-07-12

## 2025-07-11 RX ADMIN — GABAPENTIN 400 MG: 400 CAPSULE ORAL at 21:20

## 2025-07-11 RX ADMIN — TRAMADOL HYDROCHLORIDE 50 MG: 50 TABLET, COATED ORAL at 13:08

## 2025-07-11 RX ADMIN — CARVEDILOL 3.12 MG: 3.12 TABLET, FILM COATED ORAL at 17:29

## 2025-07-11 RX ADMIN — MICONAZOLE NITRATE 1 APPLICATION: 2 POWDER TOPICAL at 13:09

## 2025-07-11 RX ADMIN — HYDROXYZINE HYDROCHLORIDE 25 MG: 25 TABLET, FILM COATED ORAL at 22:10

## 2025-07-11 RX ADMIN — APIXABAN 5 MG: 5 TABLET, FILM COATED ORAL at 21:18

## 2025-07-11 RX ADMIN — OXYCODONE HYDROCHLORIDE AND ACETAMINOPHEN 1 TABLET: 5; 325 TABLET ORAL at 08:22

## 2025-07-11 RX ADMIN — GABAPENTIN 400 MG: 400 CAPSULE ORAL at 05:47

## 2025-07-11 RX ADMIN — HYDROCODONE BITARTRATE AND ACETAMINOPHEN 1 TABLET: 10; 325 TABLET ORAL at 21:19

## 2025-07-11 RX ADMIN — GABAPENTIN 400 MG: 400 CAPSULE ORAL at 13:20

## 2025-07-11 RX ADMIN — HYDROCODONE BITARTRATE AND ACETAMINOPHEN 1 TABLET: 10; 325 TABLET ORAL at 15:33

## 2025-07-11 RX ADMIN — Medication 10 ML: at 09:06

## 2025-07-11 RX ADMIN — INSULIN LISPRO 5 UNITS: 100 INJECTION, SOLUTION INTRAVENOUS; SUBCUTANEOUS at 17:29

## 2025-07-11 RX ADMIN — HYDROMORPHONE HYDROCHLORIDE 0.5 MG: 1 INJECTION, SOLUTION INTRAMUSCULAR; INTRAVENOUS; SUBCUTANEOUS at 10:43

## 2025-07-11 RX ADMIN — Medication 10 ML: at 21:23

## 2025-07-11 RX ADMIN — INSULIN LISPRO 3 UNITS: 100 INJECTION, SOLUTION INTRAVENOUS; SUBCUTANEOUS at 17:29

## 2025-07-11 RX ADMIN — INSULIN GLARGINE 30 UNITS: 100 INJECTION, SOLUTION SUBCUTANEOUS at 21:23

## 2025-07-11 RX ADMIN — NICOTINE TRANSDERMAL SYSTEM 1 PATCH: 21 PATCH, EXTENDED RELEASE TRANSDERMAL at 08:24

## 2025-07-11 RX ADMIN — CLOPIDOGREL BISULFATE 75 MG: 75 TABLET, FILM COATED ORAL at 08:23

## 2025-07-11 RX ADMIN — MICONAZOLE NITRATE 1 APPLICATION: 2 POWDER TOPICAL at 21:23

## 2025-07-11 RX ADMIN — ROSUVASTATIN CALCIUM 20 MG: 20 TABLET, FILM COATED ORAL at 08:23

## 2025-07-11 RX ADMIN — ALPRAZOLAM 0.25 MG: 0.25 TABLET ORAL at 17:29

## 2025-07-11 RX ADMIN — INSULIN LISPRO 3 UNITS: 100 INJECTION, SOLUTION INTRAVENOUS; SUBCUTANEOUS at 08:23

## 2025-07-11 RX ADMIN — INSULIN LISPRO 3 UNITS: 100 INJECTION, SOLUTION INTRAVENOUS; SUBCUTANEOUS at 21:22

## 2025-07-11 RX ADMIN — HYDROCODONE BITARTRATE AND ACETAMINOPHEN 1 TABLET: 10; 325 TABLET ORAL at 05:47

## 2025-07-11 RX ADMIN — HYDROMORPHONE HYDROCHLORIDE 0.5 MG: 1 INJECTION, SOLUTION INTRAMUSCULAR; INTRAVENOUS; SUBCUTANEOUS at 22:10

## 2025-07-11 RX ADMIN — OXYCODONE HYDROCHLORIDE AND ACETAMINOPHEN 1 TABLET: 5; 325 TABLET ORAL at 23:57

## 2025-07-11 RX ADMIN — APIXABAN 5 MG: 5 TABLET, FILM COATED ORAL at 08:23

## 2025-07-11 NOTE — PLAN OF CARE
Goal Outcome Evaluation:  Plan of Care Reviewed With: patient        Progress: improving  Outcome Evaluation: Patient resting in bed at this time. VSS on RA. Patient had c/o pain this shift. PRN pain medication given. Patient voices no concerns at this time. Will continue with plan of care.

## 2025-07-11 NOTE — DISCHARGE PLACEMENT REQUEST
"Monae Chauhan (69 y.o. Female)       Date of Birth   1955    Social Security Number       Address   [address] [place] [address]    Home Phone       MRN   [id number]       Yarsani   Taoist    Marital Status                               Admission Date   7/10/2025    Admission Type   Elective    Admitting Provider   Taran Harding MD    Attending Provider   Taran Harding MD    Department, Room/Bed   [address], 3339/1P       Discharge Date       Discharge Disposition       Discharge Destination                                 Attending Provider: Taran Harding MD    Allergies: Amoxicillin, Penicillins    Isolation: None   Infection: None   Code Status: CPR    Ht: 160 cm (63\")   Wt: 77.2 kg (170 lb 3.1 oz)    Admission Cmt: None   Principal Problem: 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 [L97.522]                   Active Insurance as of 7/10/2025       Primary Coverage       Payor Plan Insurance Group Employer/Plan Group    HUMANA MEDICARE REPLACEMENT HU[employer] [facility]ICARE ADVANTAGE [facility] [id number]       Payor Plan Address Payor Plan Phone Number Payor Plan Fax Number Effective Dates    [address] [address] [phone]  3/1/2025 - None Entered    [facility] [id number]         Subscriber Name Subscriber Birth Date Member ID       MONAE CHAUHAN 1955 [id number]                     Emergency Contacts        (Rel.) Home Phone Work Phone Mobile Phone    kathya jarquin (Relative) [phone] -- [phone]    OLENA CRUZ (Son) [phone] -- [phone]                 History & Physical        Taran Harding MD at 07/10/25 0813            H&P reviewed. The patient was examined and there are no changes to the H&P.          Electronically signed by Taran Harding MD at 07/10/25 0814   Source Note: H&P (View-Only)          Subjective   Monae Chauhan is a 69 y.o. female.     Chief Complaint: " ischemic foot ulcers    History of Present Illness She is a 67 yo smoker who refused to stop until just recently, and has PAD. She apparently has had more than one angioplasty/stent and was recommended for another but refused initially but has had a couple of procedures that now have failed.  She had develop a red spot on the dorsal lateral mid left foot  that has evolved in to a large necrotic ulcer as well as one on the anterior left lower leg. She had been on antibiotics but it has not improved. She had a MRI that is read as possible osteomyelitis.     The following portions of the patient's history were reviewed and updated as appropriate: current medications, past family history, past medical history, past social history, past surgical history and problem list.    Review of Systems   Constitutional:  Negative for activity change, appetite change, chills, fever and unexpected weight change.   HENT:  Negative for congestion, facial swelling and sore throat.    Eyes:  Negative for photophobia and visual disturbance.   Respiratory:  Negative for chest tightness, shortness of breath and wheezing.    Cardiovascular:  Negative for chest pain, palpitations and leg swelling.   Gastrointestinal:  Negative for abdominal distention, abdominal pain, anal bleeding, blood in stool, constipation, diarrhea, nausea, rectal pain and vomiting.   Endocrine: Negative for cold intolerance, heat intolerance, polydipsia and polyuria.   Genitourinary:  Negative for difficulty urinating, dysuria, flank pain and urgency.   Musculoskeletal:  Negative for back pain and myalgias.   Skin:  Positive for color change and wound. Negative for rash.   Allergic/Immunologic: Negative for immunocompromised state.   Neurological:  Positive for weakness. Negative for dizziness, seizures, syncope, light-headedness, numbness and headaches.   Hematological:  Negative for adenopathy. Does not bruise/bleed easily.   Psychiatric/Behavioral:  Negative for  behavioral problems and confusion. The patient is not nervous/anxious.        Objective   Physical Exam  Vitals reviewed.   Constitutional:       General: She is not in acute distress.     Appearance: She is well-developed. She is ill-appearing.      Comments: 5 cm necrotic ulcer on the dorsum of the left lateral foot, and 6 cm one on the anterior left lower leg about half way up   HENT:      Head: Normocephalic. No laceration. Hair is normal.      Right Ear: Hearing and ear canal normal.      Left Ear: Hearing and ear canal normal.      Nose: Nose normal.      Right Sinus: No maxillary sinus tenderness or frontal sinus tenderness.      Left Sinus: No maxillary sinus tenderness or frontal sinus tenderness.   Eyes:      General: Lids are normal.      Conjunctiva/sclera: Conjunctivae normal.      Pupils: Pupils are equal, round, and reactive to light.   Neck:      Thyroid: No thyroid mass or thyromegaly.      Vascular: No JVD.      Trachea: No tracheal tenderness or tracheal deviation.   Cardiovascular:      Rate and Rhythm: Normal rate and regular rhythm.      Heart sounds: No murmur heard.     No gallop.   Pulmonary:      Effort: Pulmonary effort is normal.      Breath sounds: No stridor. Wheezing present.   Chest:      Chest wall: No tenderness.   Abdominal:      General: Bowel sounds are normal. There is no distension.      Palpations: Abdomen is soft. There is no mass.      Tenderness: There is no abdominal tenderness. There is no guarding or rebound.      Hernia: No hernia is present.   Musculoskeletal:         General: No deformity.      Cervical back: Normal range of motion.   Lymphadenopathy:      Cervical: No cervical adenopathy.      Upper Body:      Right upper body: No supraclavicular adenopathy.      Left upper body: No supraclavicular adenopathy.   Skin:     General: Skin is warm and dry.      Coloration: Skin is not pale.      Findings: Erythema and lesion present. No rash.   Neurological:      Mental  Status: She is alert and oriented to person, place, and time.      Motor: No abnormal muscle tone.   Psychiatric:         Behavior: Behavior normal.         Thought Content: Thought content normal.         Past Medical History:   Diagnosis Date    Arthritis     Coronary artery disease     Depression     Diabetes mellitus     Elevated cholesterol     Full dentures     GERD (gastroesophageal reflux disease)     Hyperlipidemia     Hypertension     Myocardial infarction     Peripheral vascular disease     Psoriasis        Family History   Problem Relation Age of Onset    Breast cancer Maternal Grandmother 70    Cancer Mother     Diabetes Mother     Cancer Father     Diabetes Father        Social History     Tobacco Use    Smoking status: Former     Current packs/day: 1.00     Average packs/day: 1 pack/day for 44.0 years (44.0 ttl pk-yrs)     Types: Cigarettes     Start date: 6/25/1981    Smokeless tobacco: Never    Tobacco comments:     2-4 PPD off and on for the last 40 years   Vaping Use    Vaping status: Former    Substances: Nicotine, Flavoring    Devices: Refillable tank   Substance Use Topics    Alcohol use: No    Drug use: No       Past Surgical History:   Procedure Laterality Date    APPENDECTOMY      CARDIAC CATHETERIZATION N/A 01/09/2017    Procedure: Left Heart Cath;  Surgeon: David Diane MD;  Location: [facility] CATH INVASIVE LOCATION;  Service:     CARDIAC CATHETERIZATION Left 5/20/2024    Procedure: Peripheral angiography;  Surgeon: Jaiden Doyle MD;  Location: [facility] CATH INVASIVE LOCATION;  Service: Cardiovascular;  Laterality: Left;  Left Venogram -    CARDIAC CATHETERIZATION N/A 2/6/2025    Procedure: Peripheral angiography;  Surgeon: Fran Estevez MD;  Location:  [facility] CATH INVASIVE LOCATION;  Service: Cardiovascular;  Laterality: N/A;    CARDIAC CATHETERIZATION N/A 2/26/2025    Procedure: Peripheral angiography;  Surgeon: Jaiden Doyle MD;  Location: [facility] CATH INVASIVE LOCATION;   Service: Cardiovascular;  Laterality: N/A;    CARDIAC CATHETERIZATION N/A 2/26/2025    Procedure: Atherectomy-peripheral;  Surgeon: Jaiden Doyle MD;  Location: [facility] CATH INVASIVE LOCATION;  Service: Cardiovascular;  Laterality: N/A;    CARDIAC CATHETERIZATION N/A 2/26/2025    Procedure: Angioplasty-peripheral;  Surgeon: Jaiden Doyle MD;  Location:  [facility] CATH INVASIVE LOCATION;  Service: Cardiovascular;  Laterality: N/A;    CARDIAC CATHETERIZATION N/A 3/24/2025    Procedure: Peripheral angiography;  Surgeon: Jaiden Doyle MD;  Location:  [facility] CATH INVASIVE LOCATION;  Service: Cardiovascular;  Laterality: N/A;    CARDIAC CATHETERIZATION N/A 3/24/2025    Procedure: Atherectomy-peripheral;  Surgeon: Jaiden Doyle MD;  Location: [facility] CATH INVASIVE LOCATION;  Service: Cardiovascular;  Laterality: N/A;    CORONARY STENT PLACEMENT      CYSTOSCOPY BOTOX INJECTION OF BLADDER N/A 10/20/2021    Procedure: Cystoscopy Botox injection of bladder;  Surgeon: Say Robles MD;  Location: [facility];  Service: Urology;  Laterality: N/A;    FOOT SURGERY Left     GALLBLADDER SURGERY      INTRAVASCULAR ULTRASOUND N/A 2/26/2025    Procedure: Intravascular Ultrasound;  Surgeon: Jaiden Doyle MD;  Location: [facility] CATH INVASIVE LOCATION;  Service: Cardiovascular;  Laterality: N/A;    INTRAVASCULAR ULTRASOUND N/A 3/24/2025    Procedure: Intravascular Ultrasound;  Surgeon: Jaiden Doyle MD;  Location: [facility] CATH INVASIVE LOCATION;  Service: Cardiovascular;  Laterality: N/A;    LEG SURGERY Left     TUBAL ABDOMINAL LIGATION      VASCULAR SURGERY Left     Lower Extremity Vascular Stent-[facility]       Current Outpatient Medications   Medication Instructions    ALPRAZolam (XANAX) 0.25 mg, 2 Times Daily PRN    amLODIPine (NORVASC) 10 mg, Oral, Daily    apixaban (ELIQUIS) 5 mg, 2 Times Daily    carvedilol (COREG) 3.125 mg, 2 Times Daily With Meals    clopidogrel (PLAVIX) 75 mg, Oral, Daily    dicyclomine (BENTYL) 20 mg, 3 Times  Daily PRN    furosemide (LASIX) 20 MG tablet 1 tablet, Daily    gabapentin (NEURONTIN) 800 mg, Every 6 Hours PRN    HYDROcodone-acetaminophen (NORCO)  MG per tablet 1 tablet, Every 8 Hours PRN    insulin glargine (LANTUS, SEMGLEE) 30 Units, Nightly    Insulin Lispro (1 Unit Dial) (HUMALOG) 10 Units, Nightly    lidocaine (LIDODERM) 5 % 1 patch, Transdermal, Every 24 Hours, Remove & Discard patch within 12 hours or as directed by MD    potassium chloride (MICRO-K) 10 MEQ CR capsule Take 1 capsule by mouth Daily.    promethazine (PHENERGAN) 25 MG tablet 1 tablet, Every 6 Hours PRN    rosuvastatin (CRESTOR) 20 mg, Daily    Tirzepatide 5 mg, Weekly    traMADol (ULTRAM) 50 MG tablet Take 1 tablet by mouth 2 (Two) Times a Day As Needed for Moderate Pain.    valsartan (DIOVAN) 320 MG tablet 1 tablet, Daily         Assessment & Plan   Diagnoses and all orders for this visit:    1. Tobacco use (Primary)    2. 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 female.     Chief Complaint: ischemic foot ulcers    History of Present Illness She is a 67 yo smoker who refused to stop until just recently, and has PAD. She apparently has had more than one angioplasty/stent and was recommended for another but refused initially but has had a couple of procedures that now have failed.  She had develop a red spot on the dorsal lateral mid left foot  that has evolved in to a large necrotic ulcer as well as one on the anterior left lower leg. She had been on antibiotics but it has not improved. She had a MRI that is read as possible osteomyelitis.     The following portions of the patient's history were reviewed  and updated as appropriate: current medications, past family history, past medical history, past social history, past surgical history and problem list.    Review of Systems   Constitutional:  Negative for activity change, appetite change, chills, fever and unexpected weight change.   HENT:  Negative for congestion, facial swelling and sore throat.    Eyes:  Negative for photophobia and visual disturbance.   Respiratory:  Negative for chest tightness, shortness of breath and wheezing.    Cardiovascular:  Negative for chest pain, palpitations and leg swelling.   Gastrointestinal:  Negative for abdominal distention, abdominal pain, anal bleeding, blood in stool, constipation, diarrhea, nausea, rectal pain and vomiting.   Endocrine: Negative for cold intolerance, heat intolerance, polydipsia and polyuria.   Genitourinary:  Negative for difficulty urinating, dysuria, flank pain and urgency.   Musculoskeletal:  Negative for back pain and myalgias.   Skin:  Positive for color change and wound. Negative for rash.   Allergic/Immunologic: Negative for immunocompromised state.   Neurological:  Positive for weakness. Negative for dizziness, seizures, syncope, light-headedness, numbness and headaches.   Hematological:  Negative for adenopathy. Does not bruise/bleed easily.   Psychiatric/Behavioral:  Negative for behavioral problems and confusion. The patient is not nervous/anxious.        Objective   Physical Exam  Vitals reviewed.   Constitutional:       General: She is not in acute distress.     Appearance: She is well-developed. She is ill-appearing.      Comments: 5 cm necrotic ulcer on the dorsum of the left lateral foot, and 6 cm one on the anterior left lower leg about half way up   HENT:      Head: Normocephalic. No laceration. Hair is normal.      Right Ear: Hearing and ear canal normal.      Left Ear: Hearing and ear canal normal.      Nose: Nose normal.      Right Sinus: No maxillary sinus tenderness or frontal sinus  tenderness.      Left Sinus: No maxillary sinus tenderness or frontal sinus tenderness.   Eyes:      General: Lids are normal.      Conjunctiva/sclera: Conjunctivae normal.      Pupils: Pupils are equal, round, and reactive to light.   Neck:      Thyroid: No thyroid mass or thyromegaly.      Vascular: No JVD.      Trachea: No tracheal tenderness or tracheal deviation.   Cardiovascular:      Rate and Rhythm: Normal rate and regular rhythm.      Heart sounds: No murmur heard.     No gallop.   Pulmonary:      Effort: Pulmonary effort is normal.      Breath sounds: No stridor. Wheezing present.   Chest:      Chest wall: No tenderness.   Abdominal:      General: Bowel sounds are normal. There is no distension.      Palpations: Abdomen is soft. There is no mass.      Tenderness: There is no abdominal tenderness. There is no guarding or rebound.      Hernia: No hernia is present.   Musculoskeletal:         General: No deformity.      Cervical back: Normal range of motion.   Lymphadenopathy:      Cervical: No cervical adenopathy.      Upper Body:      Right upper body: No supraclavicular adenopathy.      Left upper body: No supraclavicular adenopathy.   Skin:     General: Skin is warm and dry.      Coloration: Skin is not pale.      Findings: Erythema and lesion present. No rash.   Neurological:      Mental Status: She is alert and oriented to person, place, and time.      Motor: No abnormal muscle tone.   Psychiatric:         Behavior: Behavior normal.         Thought Content: Thought content normal.         Past Medical History:   Diagnosis Date    Arthritis     Coronary artery disease     Depression     Diabetes mellitus     Elevated cholesterol     Full dentures     GERD (gastroesophageal reflux disease)     Hyperlipidemia     Hypertension     Myocardial infarction     Peripheral vascular disease     Psoriasis        Family History   Problem Relation Age of Onset    Breast cancer Maternal Grandmother 70    Cancer Mother      Diabetes Mother     Cancer Father     Diabetes Father        Social History     Tobacco Use    Smoking status: Former     Current packs/day: 1.00     Average packs/day: 1 pack/day for 44.0 years (44.0 ttl pk-yrs)     Types: Cigarettes     Start date: 6/25/1981    Smokeless tobacco: Never    Tobacco comments:     2-4 PPD off and on for the last 40 years   Vaping Use    Vaping status: Former    Substances: Nicotine, Flavoring    Devices: Refillable tank   Substance Use Topics    Alcohol use: No    Drug use: No       Past Surgical History:   Procedure Laterality Date    APPENDECTOMY      CARDIAC CATHETERIZATION N/A 01/09/2017    Procedure: Left Heart Cath;  Surgeon: David Diane MD;  Location:  [facility] CATH INVASIVE LOCATION;  Service:     CARDIAC CATHETERIZATION Left 5/20/2024    Procedure: Peripheral angiography;  Surgeon: Jaiden Doyle MD;  Location: [facility] CATH INVASIVE LOCATION;  Service: Cardiovascular;  Laterality: Left;  Left Venogram -    CARDIAC CATHETERIZATION N/A 2/6/2025    Procedure: Peripheral angiography;  Surgeon: Fran Estevez MD;  Location: [facility] CATH INVASIVE LOCATION;  Service: Cardiovascular;  Laterality: N/A;    CARDIAC CATHETERIZATION N/A 2/26/2025    Procedure: Peripheral angiography;  Surgeon: Jaiden Doyle MD;  Location:  [facility] CATH INVASIVE LOCATION;  Service: Cardiovascular;  Laterality: N/A;    CARDIAC CATHETERIZATION N/A 2/26/2025    Procedure: Atherectomy-peripheral;  Surgeon: Jaiden Doyle MD;  Location:  [facility] CATH INVASIVE LOCATION;  Service: Cardiovascular;  Laterality: N/A;    CARDIAC CATHETERIZATION N/A 2/26/2025    Procedure: Angioplasty-peripheral;  Surgeon: Jaiden Doyle MD;  Location:  [facility] CATH INVASIVE LOCATION;  Service: Cardiovascular;  Laterality: N/A;    CARDIAC CATHETERIZATION N/A 3/24/2025    Procedure: Peripheral angiography;  Surgeon: Jaiden Doyle MD;  Location:  [facility] CATH INVASIVE LOCATION;  Service: Cardiovascular;  Laterality: N/A;    CARDIAC  CATHETERIZATION N/A 3/24/2025    Procedure: Atherectomy-peripheral;  Surgeon: Jaiden Doyle MD;  Location:  [facility] CATH INVASIVE LOCATION;  Service: Cardiovascular;  Laterality: N/A;    CORONARY STENT PLACEMENT      CYSTOSCOPY BOTOX INJECTION OF BLADDER N/A 10/20/2021    Procedure: Cystoscopy Botox injection of bladder;  Surgeon: Say Robles MD;  Location:  [facility];  Service: Urology;  Laterality: N/A;    FOOT SURGERY Left     GALLBLADDER SURGERY      INTRAVASCULAR ULTRASOUND N/A 2/26/2025    Procedure: Intravascular Ultrasound;  Surgeon: Jaiden Doyle MD;  Location:  [facility] CATH INVASIVE LOCATION;  Service: Cardiovascular;  Laterality: N/A;    INTRAVASCULAR ULTRASOUND N/A 3/24/2025    Procedure: Intravascular Ultrasound;  Surgeon: Jaiden Doyle MD;  Location:  [facility] CATH INVASIVE LOCATION;  Service: Cardiovascular;  Laterality: N/A;    LEG SURGERY Left     TUBAL ABDOMINAL LIGATION      VASCULAR SURGERY Left     Lower Extremity [facility]       Current Outpatient Medications   Medication Instructions    ALPRAZolam (XANAX) 0.25 mg, 2 Times Daily PRN    amLODIPine (NORVASC) 10 mg, Oral, Daily    apixaban (ELIQUIS) 5 mg, 2 Times Daily    carvedilol (COREG) 3.125 mg, 2 Times Daily With Meals    clopidogrel (PLAVIX) 75 mg, Oral, Daily    dicyclomine (BENTYL) 20 mg, 3 Times Daily PRN    furosemide (LASIX) 20 MG tablet 1 tablet, Daily    gabapentin (NEURONTIN) 800 mg, Every 6 Hours PRN    HYDROcodone-acetaminophen (NORCO)  MG per tablet 1 tablet, Every 8 Hours PRN    insulin glargine (LANTUS, SEMGLEE) 30 Units, Nightly    Insulin Lispro (1 Unit Dial) (HUMALOG) 10 Units, Nightly    lidocaine (LIDODERM) 5 % 1 patch, Transdermal, Every 24 Hours, Remove & Discard patch within 12 hours or as directed by MD    potassium chloride (MICRO-K) 10 MEQ CR capsule Take 1 capsule by mouth Daily.    promethazine (PHENERGAN) 25 MG tablet 1 tablet, Every 6 Hours PRN    rosuvastatin (CRESTOR) 20 mg, Daily     Tirzepatide 5 mg, Weekly    traMADol (ULTRAM) 50 MG tablet Take 1 tablet by mouth 2 (Two) Times a Day As Needed for Moderate Pain.    valsartan (DIOVAN) 320 MG tablet 1 tablet, Daily         Assessment & Plan   Diagnoses and all orders for this visit:    1. Tobacco use (Primary)    2. 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    Schedule left AKA             This document has been electronically signed by Taran Harding MD   June 19, 2025 13:17 EDT    Electronically signed by Taran Harding MD at 06/19/25 1333       Vital Signs (last day)       Date/Time Temp Temp src Pulse Resp BP Patient Position SpO2    07/11/25 1038 -- -- -- -- 118/50 Lying --    07/11/25 0700 98.1 (36.7) Oral 61 18 107/53 Lying 90    07/11/25 0305 98.5 (36.9) Oral -- 18 122/62 Lying --    07/10/25 2209 98.6 (37) Oral -- 18 111/63 Lying --    07/10/25 1914 98.5 (36.9) Oral -- 18 101/55 Lying --    07/10/25 1630 98.4 (36.9) Oral -- 20 110/60 Lying --    07/10/25 1530 98 (36.7) Oral -- 18 102/60 Lying --    07/10/25 1430 98.2 (36.8) Oral -- 20 118/60 Lying --    07/10/25 1330 97.8 (36.6) Oral -- 18 109/52 Lying --    07/10/25 1230 97.8 (36.6) Oral -- 20 112/48 Lying 90    07/10/25 1217 -- -- -- -- 136/97 -- --    07/10/25 1200 98 (36.7) Oral -- 18 136/97 Lying --    07/10/25 1130 98 (36.7) Oral -- 20 142/80 Lying --    07/10/25 1117 -- -- 64 -- 131/66 -- --    07/10/25 1115 97.7 (36.5) Oral -- 20 150/60 Lying --    07/10/25 1100 97.8 (36.6) Oral 64 20 131/66 Lying 92    07/10/25 1045 97.7 (36.5) Oral 63 18 143/51 Lying 95    07/10/25 1036 -- -- 64 17 131/62 Lying 95    07/10/25 1031 -- -- 64 15 123/55 Lying 96    07/10/25 1026 -- -- 64 18 129/57 Lying 95    07/10/25 1021 -- -- 63 14 121/57 Lying 93    07/10/25 1016 -- -- 63 11 123/54 Lying 94    07/10/25 1011 -- -- 61 11 121/54 Lying 95    07/10/25 1006 97.6 (36.4) Tympanic 61 10 127/51 Lying 96    07/10/25 0844 98 (36.7) Oral 58 16 109/69 Lying 97           Lines, Drains & Airways       Active LDAs       Name Placement date Placement time Site Days    Peripheral IV 07/10/25 0845 20 G Left;Posterior Wrist 07/10/25  0845  Wrist  1    External Urinary Catheter 07/10/25  1140  --  1                  Current Facility-Administered Medications   Medication Dose Route Frequency Provider Last Rate Last Admin    ALPRAZolam (XANAX) tablet 0.25 mg  0.25 mg Oral BID PRN Taran Harding MD        amLODIPine (NORVASC) tablet 10 mg  10 mg Oral Daily Taran Harding MD   10 mg at 07/10/25 1117    apixaban (ELIQUIS) tablet 5 mg  5 mg Oral BID Taran Harding MD   5 mg at 07/11/25 0823    sennosides-docusate (PERICOLACE) 8.6-50 MG per tablet 2 tablet  2 tablet Oral BID Taran Harding MD   2 tablet at 07/10/25 1117    And    polyethylene glycol (MIRALAX) packet 17 g  17 g Oral Daily PRN Taran Harding MD        And    bisacodyl (DULCOLAX) EC tablet 5 mg  5 mg Oral Daily PRN Taran Harding MD        And    bisacodyl (DULCOLAX) suppository 10 mg  10 mg Rectal Daily PRN Taran Harding MD        carvedilol (COREG) tablet 3.125 mg  3.125 mg Oral BID With Meals Taran Harding MD   3.125 mg at 07/10/25 1117    clopidogrel (PLAVIX) tablet 75 mg  75 mg Oral Daily Taran Harding MD   75 mg at 07/11/25 0823    dextrose (D50W) (25 g/50 mL) IV injection 25 g  25 g Intravenous Q15 Min PRN Marco Rasheed MD        dextrose (GLUTOSE) oral gel 15 g  15 g Oral Q15 Min PRN Marco Rasheed MD        [Held by provider] furosemide (LASIX) tablet 20 mg  20 mg Oral Daily Taran Harding MD   20 mg at 07/10/25 1117    gabapentin (NEURONTIN) capsule 400 mg  400 mg Oral Q8H Marco Rasheed MD   400 mg at 07/11/25 0547    glucagon HCl (Diagnostic) injection 1 mg  1 mg Intramuscular Q15 Min PRN Marco Rasheed MD        HYDROcodone-acetaminophen (NORCO)  MG per tablet 1 tablet  1 tablet Oral Q6H PRN Taran Harding MD   1 tablet at 07/11/25 0569    HYDROmorphone (DILAUDID)  injection 0.5 mg  0.5 mg Intravenous Q2H PRN Taran Harding MD   0.5 mg at 07/11/25 1043    hydrOXYzine (ATARAX) tablet 25 mg  25 mg Oral Q6H PRN Taran Harding MD        insulin glargine (LANTUS, SEMGLEE) injection 30 Units  30 Units Subcutaneous Nightly Taran Harding MD   30 Units at 07/10/25 2124    Insulin Lispro (humaLOG) injection 3-14 Units  3-14 Units Subcutaneous 4x Daily AC & at Bedtime Marco Rasheed MD   3 Units at 07/11/25 0823    Insulin Lispro (humaLOG) injection 5 Units  5 Units Subcutaneous TID With Meals Marco Rasheed MD        miconazole (MICOTIN) 2 % powder 1 Application  1 Application Topical Q12H Marco Rasheed MD        nicotine (NICODERM CQ) 21 MG/24HR patch 1 patch  1 patch Transdermal Q24H Marco Rasheed MD   1 patch at 07/11/25 0824    ondansetron ODT (ZOFRAN-ODT) disintegrating tablet 4 mg  4 mg Oral Q6H PRN Taran Harding MD        oxyCODONE-acetaminophen (PERCOCET) 5-325 MG per tablet 1 tablet  1 tablet Oral Q4H PRN Taran Harding MD   1 tablet at 07/11/25 0822    promethazine (PHENERGAN) tablet 25 mg  25 mg Oral Q6H PRN Taran Harding MD        rosuvastatin (CRESTOR) tablet 20 mg  20 mg Oral Daily Taran Harding MD   20 mg at 07/11/25 0823    sodium chloride 0.9 % flush 10 mL  10 mL Intravenous Q12H Taran Harding MD   10 mL at 07/11/25 0906    sodium chloride 0.9 % flush 10 mL  10 mL Intravenous PRN Taran Harding MD        sodium chloride 0.9 % infusion 40 mL  40 mL Intravenous PRN Taran Harding MD        traMADol (ULTRAM) tablet 50 mg  50 mg Oral BID PRN Taran Harding MD        [Held by provider] valsartan (DIOVAN) tablet 320 mg  320 mg Oral Daily Taran Harding MD   320 mg at 07/10/25 1117        Operative/Procedure Notes (most recent note)        Taran Harding MD at 07/10/25 0913          AMPUTATION ABOVE KNEE  Procedure Note    Monae Chauhan  7/10/2025    Pre-op Diagnosis:   Non-pressure chronic ulcer of other part of left foot  with fat layer exposed [L97.522]    Post-op Diagnosis: same        Procedure(s):  AMPUTATION ABOVE KNEE    Surgeon(s):  Taran Harding MD    Anesthesia: Anesthesia type not filed in the log.    Staff:   Circulator: Basilia Henderson RN  Scrub Person: Lorena Simons  Assistant: Taran Andrade    Estimated Blood Loss: 200ml    Specimens:                Order Name Source Comment Collection Info Order Time   TISSUE EXAM, [facility] ([facility], [facility]) Leg, Left  Collected By: Taran Harding MD 7/10/2025  9:15 AM     Release to patient   Routine Release              Drains: * No LDAs found *    Procedure: The left leg was prepped and draped. The incision was made above the knee with the scalpel  and cautery used to go down through the subcutaneous tissue, fascia, and muscle. Vicryl sutures and ties were used on bleeders. The bone was cleared off proximally and powered saw used to divide the bone. The wound was irrigated and the fascia and muscle closed over the bone. The skin was closed with nylon sutures. The wound was closed with local and a dressing applied.     Findings:             Complications: none   Grafts / Implants N/A    Taran Harding MD     Date: 7/10/2025  Time: 09:58 EDT    Electronically signed by Taran Harding MD at 07/10/25 1001          Physician Progress Notes (most recent note)        Taran Harding MD at 07/11/25 0903          POD#1 left AKA    She is doing well post AKA. Afeb and vss. Her leg dressing is clean and dry. She is going to try to get rehab at a SNF. And has a rehab/OT consult.    Electronically signed by Taran Harding MD at 07/11/25 0904          Consult Notes (most recent note)        Tamica Flores PA-C at 07/10/25 1141        Consult Orders    1. Inpatient Hospitalist Consult [[id number]] ordered by Taran Harding MD              Attestation signed by Marco Rasheed MD at 07/10/25 3984      I have reviewed this documentation and agree.  I have discussed and  formulated the assessment and plan with STAN Davey.  I have also reviewed the patient's past medical history, vital signs, labs, imaging, and address the admission medication reconciliation.  I ordered an EKG and I have reviewed it and compared it to 2 old EKGs dated 2025 and 2024; all 3 EKGs look similar.  The patient is currently in normal sinus rhythm with a heart rate of 63 and a QTc of 431 ms.  There is an inverted T wave in V1 but nowhere else.  We will continue to monitor the patient closely and repeat the blood work in the morning.                                                 [facility]  CONSULT NOTE     Inpatient Hospitalist Consult  Consult performed by: Tamica Flores PA-C  Consult ordered by: Taran Harding MD          Patient Identification:  Name:  Monae Chauhan  Age:  69 y.o.  Sex:  female  :  1955  MRN:  [id number]  Visit Number:  [id number]  Primary care provider:  Zachary Sullivan MD    Length of stay:  0    Subjective       History of presenting illness:     Monae Chauhan is a 69 y.o. female admitted by general surgery.  Hospitalist services were consulted for medical management.  Past medical history significant for diabetes, CAD, tobacco abuse, hypertension, hyperlipidemia, PAD, left lower extremity ulcer.  Patient examined at bedside.  Patient states she takes the long-acting insulin 30 units each night, as well as a dose of Humalog 12 units nightly.  She states this is how her PCP directed her to take the Humalog.  She states she checks her sugar at least once a day at home and it is usually around the 150s even postprandial.  She denies any recent medication changes.  She does states that she takes three 800 mg tablets of gabapentin each night instead of 3 times daily.  She also reports that this is how she was directed to take it.  She states the Lasix that she is prescribed was for edema of her feet, denies any  history of heart failure.    ---------------------------------------------------------------------------------------------------------------------  Review of Systems     Otherwise 10-system ROS reviewed and is negative except as mentioned in the HPI.  ---------------------------------------------------------------------------------------------------------------------   Past History:  Past Medical History:   Diagnosis Date    Arthritis     Coronary artery disease     Depression     Diabetes mellitus     Elevated cholesterol     Full dentures     GERD (gastroesophageal reflux disease)     Hyperlipidemia     Hypertension     Myocardial infarction     Peripheral vascular disease     Psoriasis      Past Surgical History:   Procedure Laterality Date    APPENDECTOMY      CARDIAC CATHETERIZATION N/A 01/09/2017    Procedure: Left Heart Cath;  Surgeon: David Diane MD;  Location: [facility] CATH INVASIVE LOCATION;  Service:     CARDIAC CATHETERIZATION Left 05/20/2024    Procedure: Peripheral angiography;  Surgeon: Jaiden Doyle MD;  Location: [facility] CATH INVASIVE LOCATION;  Service: Cardiovascular;  Laterality: Left;  Left Venogram -    CARDIAC CATHETERIZATION N/A 02/06/2025    Procedure: Peripheral angiography;  Surgeon: Fran Estevez MD;  Location:  [facility] CATH INVASIVE LOCATION;  Service: Cardiovascular;  Laterality: N/A;    CARDIAC CATHETERIZATION N/A 02/26/2025    Procedure: Peripheral angiography;  Surgeon: Jaiden Doyle MD;  Location:  [facility] CATH INVASIVE LOCATION;  Service: Cardiovascular;  Laterality: N/A;    CARDIAC CATHETERIZATION N/A 02/26/2025    Procedure: Atherectomy-peripheral;  Surgeon: Jaiden Doyle MD;  Location:  [facility] CATH INVASIVE LOCATION;  Service: Cardiovascular;  Laterality: N/A;    CARDIAC CATHETERIZATION N/A 02/26/2025    Procedure: Angioplasty-peripheral;  Surgeon: Jaiden Doyle MD;  Location:  [facility] CATH INVASIVE LOCATION;  Service: Cardiovascular;  Laterality: N/A;    CARDIAC  CATHETERIZATION N/A 03/24/2025    Procedure: Peripheral angiography;  Surgeon: Jaiden Doyle MD;  Location: [facility] CATH INVASIVE LOCATION;  Service: Cardiovascular;  Laterality: N/A;    CARDIAC CATHETERIZATION N/A 03/24/2025    Procedure: Atherectomy-peripheral;  Surgeon: Jaiden Doyle MD;  Location: [facility] CATH INVASIVE LOCATION;  Service: Cardiovascular;  Laterality: N/A;    COLONOSCOPY      CORONARY STENT PLACEMENT      CYSTOSCOPY BOTOX INJECTION OF BLADDER N/A 10/20/2021    Procedure: Cystoscopy Botox injection of bladder;  Surgeon: Say Robles MD;  Location: [facility] OR;  Service: Urology;  Laterality: N/A;    ENDOSCOPY      FOOT SURGERY Left     GALLBLADDER SURGERY      INTRAVASCULAR ULTRASOUND N/A 02/26/2025    Procedure: Intravascular Ultrasound;  Surgeon: Jaiden Doyle MD;  Location: [facility] CATH INVASIVE LOCATION;  Service: Cardiovascular;  Laterality: N/A;    INTRAVASCULAR ULTRASOUND N/A 03/24/2025    Procedure: Intravascular Ultrasound;  Surgeon: Jaiden Doyle MD;  Location: [facility] CATH INVASIVE LOCATION;  Service: Cardiovascular;  Laterality: N/A;    LEG SURGERY Left     TUBAL ABDOMINAL LIGATION      VASCULAR SURGERY Left     Lower Extremity Vascular Stent-[facility]     Family History   Problem Relation Age of Onset    Breast cancer Maternal Grandmother 70    Cancer Mother     Diabetes Mother     Cancer Father     Diabetes Father      Social History     Socioeconomic History    Marital status:     Number of children: 2   Tobacco Use    Smoking status: Every Day     Current packs/day: 1.00     Average packs/day: 1 pack/day for 44.0 years (44.0 ttl pk-yrs)     Types: Cigarettes     Start date: 6/25/1981    Smokeless tobacco: Never    Tobacco comments:     2-4 PPD off and on for the last 40 years   Vaping Use    Vaping status: Some Days    Substances: Nicotine, Flavoring    Devices: Refillable tank   Substance and Sexual Activity    Alcohol use: No    Drug use: No    Sexual activity: Defer      ---------------------------------------------------------------------------------------------------------------------   Allergies:  Amoxicillin and Penicillins  ---------------------------------------------------------------------------------------------------------------------   Prior to Admission Medications       Prescriptions Last Dose Informant Patient Reported? Taking?    ALPRAZolam (XANAX) 0.25 MG tablet 7/9/2025 Family Member, Other Yes Yes    Take 1 tablet by mouth 2 (Two) Times a Day As Needed for Anxiety.    amLODIPine (NORVASC) 10 MG tablet 7/9/2025 Family Member, Other No Yes    Take 1 tablet by mouth Daily.    carvedilol (COREG) 3.125 MG tablet 7/9/2025 Family Member, Other Yes Yes    Take 1 tablet by mouth 2 (Two) Times a Day With Meals.    furosemide (LASIX) 20 MG tablet 7/9/2025 Family Member, Other Yes Yes    Take 1 tablet by mouth Daily.    gabapentin (NEURONTIN) 800 MG tablet 7/9/2025 Family Member, Other Yes Yes    Take 1 tablet by mouth Every 6 (Six) Hours As Needed (nerve pain).    HYDROcodone-acetaminophen (NORCO)  MG per tablet 7/9/2025 Family Member, Other Yes Yes    Take 1 tablet by mouth Every 6 (Six) Hours As Needed for Moderate Pain.    hydrOXYzine pamoate (VISTARIL) 25 MG capsule Past Week  Yes Yes    Take 1 capsule by mouth Every 6 (Six) Hours As Needed for Anxiety.    Insulin Glargine (LANTUS SOLOSTAR) 100 UNIT/ML injection pen 7/9/2025  Yes Yes    Inject 30 Units under the skin into the appropriate area as directed Every Night.    Insulin Lispro, 1 Unit Dial, (HUMALOG) 100 UNIT/ML solution pen-injector 7/9/2025 Family Member, Other Yes Yes    Inject 12 Units under the skin into the appropriate area as directed Every Night.    lidocaine (LIDODERM) 5 % 7/9/2025 Family Member, Other No Yes    Place 1 patch on the skin as directed by provider Daily. Remove & Discard patch within 12 hours or as directed by MD    lidocaine (XYLOCAINE) 2 % jelly 7/9/2025  Yes Yes    Apply 1 g  topically to the appropriate area as directed 2 (Two) Times a Day.    potassium chloride (MICRO-K) 10 MEQ CR capsule 7/9/2025 Family Member, Other Yes Yes    Take 1 capsule by mouth Daily.    promethazine (PHENERGAN) 25 MG tablet Past Month Family Member, Other Yes Yes    Take 1 tablet by mouth Every 6 (Six) Hours As Needed for Nausea or Vomiting.    rosuvastatin (CRESTOR) 20 MG tablet 7/9/2025 Self, Family Member, Other Yes Yes    Take 1 tablet by mouth Daily.    traMADol (ULTRAM) 50 MG tablet Past Week Family Member, Other Yes Yes    Take 1 tablet by mouth 2 (Two) Times a Day As Needed for Moderate Pain.    valsartan (DIOVAN) 320 MG tablet 7/9/2025 Family Member, Other Yes Yes    Take 1 tablet by mouth Daily.    apixaban (ELIQUIS) 5 MG tablet tablet 7/7/2025 Self Yes No    Take 1 tablet by mouth 2 (Two) Times a Day.    clopidogrel (PLAVIX) 75 MG tablet 7/7/2025 Family Member, Other No No    Take 1 tablet by mouth Daily.    Tirzepatide 5 MG/0.5ML solution auto-injector 6/22/2025 Family Member, Other Yes No    Inject 5 mg under the skin into the appropriate area as directed 1 (One) Time Per Week.          ---------------------------------------------------------------------------------------------------------------------     Objective      Procedures:  [facility] Meds:  amLODIPine, 10 mg, Oral, Daily  [START ON 7/11/2025] apixaban, 5 mg, Oral, BID  carvedilol, 3.125 mg, Oral, BID With Meals  clopidogrel, 75 mg, Oral, Daily  [Held by provider] furosemide, 20 mg, Oral, Daily  gabapentin, 400 mg, Oral, Q8H  insulin glargine, 30 Units, Subcutaneous, Nightly  insulin lispro, 3-14 Units, Subcutaneous, 4x Daily AC & at Bedtime  rosuvastatin, 20 mg, Oral, Daily  senna-docusate sodium, 2 tablet, Oral, BID  sodium chloride, 10 mL, Intravenous, Q12H  [Held by provider] valsartan, 320 mg, Oral, Daily          ---------------------------------------------------------------------------------------------------------------------   Vital Signs:  Temp:  [97.6 °F (36.4 °C)-98 °F (36.7 °C)] 97.8 °F (36.6 °C)  Heart Rate:  [58-64] 64  Resp:  [10-20] 20  BP: (109-150)/(48-97) 112/48  Mean Arterial Pressure (Non-Invasive) for the past 24 hrs (Last 3 readings):   Noninvasive MAP (mmHg)   07/10/25 1036 97   07/10/25 1031 80   07/10/25 1021 89     SpO2 Percentage    07/10/25 1045 07/10/25 1100 07/10/25 1230   SpO2: 95% 92% 90%     SpO2:  [90 %-97 %] 90 %  on   ;   Device (Oxygen Therapy): room air    Body mass index is 30.65 kg/m².  Wt Readings from Last 3 Encounters:   07/10/25 78.5 kg (173 lb)   06/19/25 78.6 kg (173 lb 3.2 oz)   05/29/25 78.5 kg (173 lb)        Intake/Output Summary (Last 24 hours) at 7/10/2025 1321  Last data filed at 7/10/2025 1230  Gross per 24 hour   Intake 1160 ml   Output --   Net 1160 ml     Diet: Diabetic; Consistent Carbohydrate; Fluid Consistency: Thin (IDDSI 0)  ---------------------------------------------------------------------------------------------------------------------   Physical exam:  Physical Exam  Constitutional:       General: She is not in acute distress.     Appearance: Normal appearance.   HENT:      Head: Normocephalic and atraumatic.      Right Ear: External ear normal.      Left Ear: External ear normal.      Nose: No nasal deformity.      Mouth/Throat:      Lips: Pink.      Mouth: Mucous membranes are moist.   Eyes:      Conjunctiva/sclera: Conjunctivae normal.      Pupils: Pupils are equal, round, and reactive to light.   Cardiovascular:      Rate and Rhythm: Normal rate and regular rhythm.      Pulses:           Dorsalis pedis pulses are 2+ on the right side.      Heart sounds: Normal heart sounds.   Pulmonary:      Effort: Pulmonary effort is normal.      Breath sounds: Normal breath sounds. No wheezing, rhonchi or rales.   Abdominal:      General: Abdomen is flat. Bowel sounds  "are normal.      Palpations: Abdomen is soft.      Tenderness: There is no guarding or rebound.   Genitourinary:     Comments: No sheth catheter in place   Musculoskeletal:      Cervical back: Neck supple. Normal range of motion.      Right lower leg: No edema.      Left Lower Extremity: Left leg is amputated above knee.   Feet:      Comments: Dressing over left leg stump  Skin:     General: Skin is warm and dry.   Neurological:      General: No focal deficit present.      Mental Status: She is alert and oriented to person, place, and time.   Psychiatric:         Mood and Affect: Mood normal.         Behavior: Behavior normal.       ---------------------------------------------------------------------------------------------------------------------             Results from last 7 days   Lab Units 07/10/25  1248   WBC 10*3/mm3 8.44   HEMOGLOBIN g/dL 8.3*   HEMATOCRIT % 28.1*   MCV fL 86.5   MCHC g/dL 29.5*   PLATELETS 10*3/mm3 340         Invalid input(s): \"PROT\"CrCl cannot be calculated (Patient's most recent lab result is older than the maximum 30 days allowed.).  No results found for: \"AMMONIA\"    Glucose   Date/Time Value Ref Range Status   07/10/2025 0844 216 (H) 70 - 130 mg/dL Final     Comment:     Serial Number: [id number]:  [id number]     Lab Results   Component Value Date    HGBA1C 6.90 (H) 02/27/2025     No results found for: \"TSH\", \"FREET4\"    No results found for: \"BLOODCX\"  No results found for: \"URINECX\"  No results found for: \"WOUNDCX\"  No results found for: \"STOOLCX\"  No results found for: \"RESPCX\"  Pain Management Panel           No data to display              I have personally reviewed the above laboratory results.   ---------------------------------------------------------------------------------------------------------------------  Imaging Results (Last 7 Days)       ** No results found for the last 168 hours. **          I have personally reviewed the above radiology results. "   ----------------------------------------------------------------------------------------------------------------------    Assessment/Plan     PAD with previous stenting and reocclusion of SFA  Left lower extremity ulcer s/p AKA  Continue treatment plan per primary care team  Resume gabapentin at 400 mg 3 times daily.  Patient takes 800mg, all 3 doses at once each night. So she takes 2400mg nightly. Educated patient that this is a very large dose and we will be scheduling it TID.   Type II DM  Continue Lantus.  Sliding scale insulin while inpatient.  Hold any oral hypoglycemics to prevent hypoglycemia. Will review home diabetes medications once available per pharmacy.   Hypoglycemia protocol in place if necessary.   AccuCheks before meals and at bedtime.  Ongoing tobacco abuse   Strongly encourage cessation   NRT available   Essential hypertension  BP appears well controlled   Restart coreg and Norvasc for now, can resume other medications in home regimen if pt becomes hypertensive    Closely monitor BP per hospital protocol, titrate medications as necessary  Hyperlipidemia   CAD  Continue Plavix and Crestor   Atrial fibrillation  Continue anticoagulation with Eliquis  Currently rate controlled.  Continue Coreg    -----------------------  Thank you for the consult and allowing us to participate in the care of your patient, Hospitalist Services will continue to follow. Please do not hesitate to call with any questions or concerns.      Tamica Flores PA-C  Hospitalist Service -- [facility]       07/10/25  13:21 EDT    Attestation: I personally discussed the patient's history of presenting illness, physical examination, assessment, and plan with my attending physician, Dr. Rasheed     Electronically signed by Marco Rasheed MD at 07/10/25 9644       Physical Therapy Notes (most recent note)    No notes exist for this encounter.       Occupational Therapy Notes (most recent note)    No notes exist for  this encounter.

## 2025-07-11 NOTE — CASE MANAGEMENT/SOCIAL WORK
Discharge Planning Assessment   [facility]     Patient Name: Monae Chauhan  MRN: [id number]  Today's Date: 7/11/2025    Admit Date: 7/10/2025         Discharge Plan       Row Name 07/11/25 1158       Plan    Plan SS spoke with [facility] per Ruth who states referral continues to be reviewed. Facility states they will have billing department review Pt's insurance. SS updated Patient at bedside. Pt is agreeable to [facility] bed if [facility] not [facility]. SS faxed referral to [facility] bed per Sara Moreno for review. SS to follow.    14:12pm: SS notified by [facility] per Ruth that Pt has been declined due to not being in network with Pt's insurance. SS spoke with [facility] bed per Sara Moreno who states she will need updated therapy notes before being able to make a decision. SS notified therapy services. SS to follow.     15:20pm: SS spoke with Physical therapist who states she recommends inpatient acute rehab. SS notified Provider. Provider ordered inpatient acute rehab consult. SS spoke with Pt at bedside. Pt is agreeable to  [facility]. SS notified  [facility] of consult. SS notified [facility] that inpatient rehab has been recommended instead. SS to follow.                   Continued Care and Services - Admitted Since 7/10/2025       Destination       Service Provider Request Status Services Address Phone Fax Patient Preferred    [facility] Declined, out of network  -- [address] [address] [phone] --    [facility] Pending - No Request Sent -- [address] [place] [address] [facility] [address] [phone] --                    TAIWO Levine

## 2025-07-11 NOTE — THERAPY EVALUATION
Acute Care - Physical Therapy Initial Evaluation   [place]     Patient Name: Monae Chauhan  : 1955  MRN: [id number]  Today's Date: 2025   Onset of Illness/Injury or Date of Surgery: 07/10/25 (admission date)  Visit Dx:     ICD-10-CM ICD-9-CM   1. 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  L97.522 707.15     Patient Active Problem List   Diagnosis    CAD (coronary artery disease)    Essential hypertension    Mixed hyperlipidemia    Type 2 diabetes mellitus    PAD (peripheral artery disease)    Smoker    Atherosclerosis of native arteries of extremities with rest pain, bilateral legs    Detrusor instability    Open wound    Diabetes mellitus    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    Critical limb ischemia of both lower extremities with rest pain    Tobacco use    Femoral artery occlusion, left    Rest pain of both lower extremities due to atherosclerosis    Status post amputation     Past Medical History:   Diagnosis Date    Arthritis     Coronary artery disease     Depression     Diabetes mellitus     Elevated cholesterol     Full dentures     GERD (gastroesophageal reflux disease)     Hyperlipidemia     Hypertension     Myocardial infarction     Peripheral vascular disease     Psoriasis      Past Surgical History:   Procedure Laterality Date    APPENDECTOMY      CARDIAC CATHETERIZATION N/A 2017    Procedure: Left Heart Cath;  Surgeon: David Diane MD;  Location: [facility] CATH INVASIVE LOCATION;  Service:     CARDIAC CATHETERIZATION Left 2024    Procedure: Peripheral angiography;  Surgeon: Jaiden Doyle MD;  Location: [facility] CATH INVASIVE LOCATION;  Service: Cardiovascular;  Laterality: Left;  Left Venogram -    CARDIAC CATHETERIZATION N/A 2025    Procedure: Peripheral angiography;  Surgeon: Fran Estevez MD;  Location: [facility] CATH INVASIVE LOCATION;  Service: Cardiovascular;  Laterality: N/A;     CARDIAC CATHETERIZATION N/A 02/26/2025    Procedure: Peripheral angiography;  Surgeon: Jaiden Doyle MD;  [facility] CATH INVASIVE LOCATION;  Service: Cardiovascular;  Laterality: N/A;    CARDIAC CATHETERIZATION N/A 02/26/2025    Procedure: Atherectomy-peripheral;  Surgeon: Jaiden Doyle MD;  Location:  [facility] CATH INVASIVE LOCATION;  Service: Cardiovascular;  Laterality: N/A;    CARDIAC CATHETERIZATION N/A 02/26/2025    Procedure: Angioplasty-peripheral;  Surgeon: Jaiden Doyle MD;  Location:  [facility] CATH INVASIVE LOCATION;  Service: Cardiovascular;  Laterality: N/A;    CARDIAC CATHETERIZATION N/A 03/24/2025    Procedure: Peripheral angiography;  Surgeon: Jaiden Doyle MD;  Location:  [facility] CATH INVASIVE LOCATION;  Service: Cardiovascular;  Laterality: N/A;    CARDIAC CATHETERIZATION N/A 03/24/2025    Procedure: Atherectomy-peripheral;  Surgeon: Jaiden Doyle MD;  Location: [facility] CATH INVASIVE LOCATION;  Service: Cardiovascular;  Laterality: N/A;    COLONOSCOPY      CORONARY STENT PLACEMENT      CYSTOSCOPY BOTOX INJECTION OF BLADDER N/A 10/20/2021    Procedure: Cystoscopy Botox injection of bladder;  Surgeon: Say Robles MD;  Location: [facility];  Service: Urology;  Laterality: N/A;    ENDOSCOPY      FOOT SURGERY Left     GALLBLADDER SURGERY      INTRAVASCULAR ULTRASOUND N/A 02/26/2025    Procedure: Intravascular Ultrasound;  Surgeon: Jaiden Doyle MD;  Location: [facility] CATH INVASIVE LOCATION;  Service: Cardiovascular;  Laterality: N/A;    INTRAVASCULAR ULTRASOUND N/A 03/24/2025    Procedure: Intravascular Ultrasound;  Surgeon: Jaiden Doyle MD;  Location: [facility] CATH INVASIVE LOCATION;  Service: Cardiovascular;  Laterality: N/A;    LEG SURGERY Left     TUBAL ABDOMINAL LIGATION      VASCULAR SURGERY Left     Lower Extremity Vascular Stent-[facility]     PT Assessment (Last 12 Hours)       PT Evaluation and Treatment       Row Name 07/11/25 9082          Physical Therapy Time and Intention     Document Type evaluation  -     Mode of Treatment physical therapy  -     Patient Effort adequate  -     Symptoms Noted During/After Treatment increased pain  -     Comment Pt seen for therapy evaluation this date, pleasant, tearful, cooperative with therapy.  -[facility] Name 07/11/25 1340          General Information    Patient Profile Reviewed yes  -     Onset of Illness/Injury or Date of Surgery 07/10/25  admission date  -     Patient Observations alert;cooperative;agree to therapy  -     General Observations of Patient Pt seen supine in hospital bed, pleasant and cooperative with therapy.  -     Prior Level of Function gait;independent:  based on discussion w/ pt  -     Pertinent History of Current Functional Problem L heel wound; recent amputation  -     Existing Precautions/Restrictions fall;non-weight bearing  -[facility] Name 07/11/25 1340          Living Environment    Current Living Arrangements apartment  -     Home Accessibility stairs within home  -     People in Home alone  -[facility] Name 07/11/25 1340          Stairs Within Home, Primary    Stairs Comment, Within Home, Primary 1 step to enter based on pt conversation  -KH       Row Name 07/11/25 1340          Pain    Additional Documentation Pain Scale: FACES Pre/Post-Treatment (Group)  -KH       Row Name 07/11/25 1340          Pain Scale: FACES Pre/Post-Treatment    Pain: FACES Scale, Pretreatment 2-->hurts little bit  -     Posttreatment Pain Rating 4-->hurts little more  -     Pre/Posttreatment Pain Comment Pt had increased pain during treatment  -[facility] Name 07/11/25 1340          Cognition    Personal Safety Interventions supervised activity  not assisted to bedside as pt was hesitant 2/2 pain  -KH       Row Name 07/11/25 1340          Strength Comprehensive (MMT)    Comment, General Manual Muscle Testing (MMT) Assessment Gross MMT: L hip abd trace appreciated, other hip planes no palpable or visual  contraction appreciated; R LE knee ext 4, hip flex 3+, knee flex 4, hip ad 3, hip abd 4  -KH       Row Name 07/11/25 1340          Bed Mobility    Bed Mobility supine-sit;rolling left  -KH     Rolling Left [place] (Bed Mobility) dependent (less than 25% patient effort);other (see comments)  pt able to initiate movement using bed rails, however pt limited d/t pain and full roll or supine to sit not demonstrated  -KH     Supine-Sit [place] (Bed Mobility) dependent (less than 25% patient effort);other (see comments)  pt able to initiate movement using bed rails, however pt limited d/t pain and full roll or supine to sit not demonstrated  -KH       Row Name             Wound 07/10/25 0919 Left anterior thigh Surgical Open Surgical Incision    Wound - Properties Group Placement Date: 07/10/25  -JG Placement Time: 0919 -JG Side: Left  -JG Orientation: anterior  -JG Location: thigh  -JG Primary Wound Type: Surgical  -JG Secondary Wound Type - Surgical: Open Surg  -JG    Retired Wound - Properties Group Placement Date: 07/10/25  -JG Placement Time: 0919 -JG Side: Left  -JG Orientation: anterior  -JG Location: thigh  -JG    Retired Wound - Properties Group Placement Date: 07/10/25  -JG Placement Time: 0919 -JG Side: Left  -JG Orientation: anterior  -JG Location: thigh  -JG    Retired Wound - Properties Group Date first assessed: 07/10/25  -JG Time first assessed: 0919  -JG Side: Left  -JG Location: thigh  -JG      Row Name             Wound 07/10/25 1401 Left anterior groin Other (Comments)    Wound - Properties Group Placement Date: 07/10/25  - Placement Time: 1401  -MH Present on Original Admission: Y  -MH Side: Left  -MH Orientation: anterior  -MH Location: groin  -MH Primary Wound Type: Other  -MH Secondary Wound Type - Other: --  -MH, Cardiac Cath site     Retired Wound - Properties Group Placement Date: 07/10/25  - Placement Time: 1401  -MH Present on Original Admission: Y  -MH Side: Left  -MH  Orientation: anterior  -MH Location: groin  -MH    Retired Wound - Properties Group Placement Date: 07/10/25  - Placement Time: 1401  -MH Present on Original Admission: Y  -MH Side: Left  -MH Orientation: anterior  -MH Location: groin  -MH    Retired Wound - Properties Group Date first assessed: 07/10/25  - Time first assessed: 1401  -MH Present on Original Admission: Y  -MH Side: Left  -MH Location: groin  -MH      Row Name 07/11/25 1340          Plan of Care Review    Outcome Evaluation Pt seen for therapy evaluation this date, pleasant and cooperative with therapy. Pt demonstrates mobility and recent amputation that may benefit from therapy interventions for stability and independence with functional mobility. Pt seen by PT and PT student this PM. Prognosis fair to good. Recommend IPR.  -       Row Name 07/11/25 1340          Positioning and Restraints    Pre-Treatment Position in bed  -     Post Treatment Position bed  -     In Bed supine;call light within reach;exit alarm on  -       Row Name 07/11/25 1340          Therapy Assessment/Plan (PT)    Functional Level at Time of Evaluation (PT) depA based on initiation of movement, limited 2/2 pain  -     PT Diagnosis (PT) s/p L AKA  -     Rehab Potential (PT) fair  -     Criteria for Skilled Interventions Met (PT) yes  -     Therapy Frequency (PT) 3 times/wk  add interventions PRN  -     Predicted Duration of Therapy Intervention (PT) hospital stay  -     Problem List (PT) mobility;pain  -       Row Name 07/11/25 1340          Physical Therapy Goals    Bed Mobility Goal Selection (PT) bed mobility, PT goal 1  -     Transfer Goal Selection (PT) transfer, PT goal 1  -     Gait Training Goal Selection (PT) gait training, PT goal 1  -       Row Name 07/11/25 1340          Bed Mobility Goal 1 (PT)    Activity/Assistive Device (Bed Mobility Goal 1, PT) supine to sit;sit to supine  -     [place] Level/Cues Needed (Bed Mobility Goal  1, PT) minimum assist (75% or more patient effort)  -     Time Frame (Bed Mobility Goal 1, PT) long term goal (LTG)  -       Row Name 07/11/25 1340          Transfer Goal 1 (PT)    Activity/Assistive Device (Transfer Goal 1, PT) sit-to-stand/stand-to-sit;bed-to-chair/chair-to-bed;toilet;walker, rolling  -     [place] Level/Cues Needed (Transfer Goal 1, PT) minimum assist (75% or more patient effort)  -     Time Frame (Transfer Goal 1, PT) long term goal (LTG)  -       Row Name 07/11/25 1340          Gait Training Goal 1 (PT)    Activity/Assistive Device (Gait Training Goal 1, PT) gait (walking locomotion);assistive device use;walker, rolling  -     [place] Level (Gait Training Goal 1, PT) minimum assist (75% or more patient effort)  -     Distance (Gait Training Goal 1, PT) 25'  -     Time Frame (Gait Training Goal 1, PT) long term goal (LTG)  -               User Key  (r) = Recorded By, (t) = Taken By, (c) = Cosigned By      Initials Name Provider Type    Basilia Johnson, RN Registered Nurse    Sujey Ovalle, PT Physical Therapist    Sara Godoy RN Registered Nurse                      PT Recommendation and Plan  Anticipated Discharge Disposition (PT): inpatient rehabilitation facility  Planned Therapy Interventions (PT): gait training, bed mobility training, balance training, transfer training, strengthening, other (see comments) (add interventions PRN, as appropriate)  Therapy Frequency (PT): 3 times/wk (add interventions PRN)  Outcome Evaluation: Pt seen for therapy evaluation this date, pleasant and cooperative with therapy. Pt demonstrates mobility and recent amputation that may benefit from therapy interventions for stability and independence with functional mobility. Pt seen by PT and PT student this PM. Prognosis fair to good. Recommend IPR.       Time Calculation:    PT Charges       Row Name 07/11/25 1502             Time Calculation    Start Time 1340  -       PT Received On 07/11/25  -JULIA      PT Goal Re-Cert Due Date 07/25/25  -JULIA                User Key  (r) = Recorded By, (t) = Taken By, (c) = Cosigned By      Initials Name Provider Type    Sujey Ovalle, PT Physical Therapist                  Therapy Charges for Today       Code Description Service Date Service Provider Modifiers Qty    85278245845 HC PT EVAL MOD COMPLEXITY 4 7/11/2025 Sujey Small, PT GP 1            PT G-Codes  AM-PAC 6 Clicks Score (PT): 10    Sujey Small, PT  7/11/2025

## 2025-07-11 NOTE — PLAN OF CARE
Goal Outcome Evaluation:         Pt A/O x 3. Intermittent confusion at time. VSS. No acute changes. Wound care done. Routine and prn meds given. Tolerated routine and prn meds. Pt requested for bed to be raised up to see out the window better. Pt educated on fall precautions due to bed being raised up. Will continue to monitor. Will continue POC.

## 2025-07-11 NOTE — NURSING NOTE
WOCN consulted for left groin. Assessed patient - the left leg has been amputated to above the knee. Dry, intact dressing in place - dressing changes/wound care orders are being managed by surgery.    The left groin is noted to be moist, maroon/purple with a linear opening within the fold just beneath the abdomen. It has creamy, malodorous drainage noted with a red, warm/yeast like appearance. 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 The right groin was assessed and noted to be moist, red, malodorous - appears to be MASD. Patient has had a cardia cath/vascular study to the left groin in March; however, the site does not appear to be infected - no drainage from the cath site. Order to assess every shift and gently cleanse with foam cleanser, pat dry and lightly apply miconazole powder BID and place ultrasorb dryer sheets within groin/abdominal folds for moisture control.     Adan score 19. PI prevention orders initiated.      07/11/25 0920   Wound 07/10/25 0919 Left anterior thigh Surgical Open Surgical Incision   Placement Date/Time: 07/10/25 0919   Side: Left  Orientation: anterior  Location: thigh  Primary Wound Type: Surgical  Secondary Wound Type - Surgical: Open Surgical Incision   Dressing Appearance dry;intact  (dressing and wound care orders managed per surgery)   Wound 07/10/25 1401 Left anterior groin Other (Comments)   Placement Date/Time: 07/10/25 1401   Present on Original Admission: Yes  Side: Left  Orientation: anterior  Location: groin  Primary Wound Type: Other (Comments)  Secondary Wound Type - Other: (c)    Pressure Injury Stage OTH  (not a pressure injury)   Dressing Appearance open to air   Closure None   Base moist;maroon/purple   Periwound redness;warm;yeast   Periwound Temperature warm   Periwound Skin Turgor soft   Drainage Characteristics/Odor malodorous;creamy   Drainage Amount scant

## 2025-07-11 NOTE — THERAPY EVALUATION
Acute Care - Occupational Therapy Initial Evaluation   [place]     Patient Name: Monae Chauhan  : 1955  MRN: [id number]  Today's Date: 2025  Onset of Illness/Injury or Date of Surgery: 07/10/25 (admission date)          Admit Date: 7/10/2025       ICD-10-CM ICD-9-CM   1. 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  L97.522 707.15     Patient Active Problem List   Diagnosis    CAD (coronary artery disease)    Essential hypertension    Mixed hyperlipidemia    Type 2 diabetes mellitus    PAD (peripheral artery disease)    Smoker    Atherosclerosis of native arteries of extremities with rest pain, bilateral legs    Detrusor instability    Open wound    Diabetes mellitus    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    Critical limb ischemia of both lower extremities with rest pain    Tobacco use    Femoral artery occlusion, left    Rest pain of both lower extremities due to atherosclerosis    Status post amputation     Past Medical History:   Diagnosis Date    Arthritis     Coronary artery disease     Depression     Diabetes mellitus     Elevated cholesterol     Full dentures     GERD (gastroesophageal reflux disease)     Hyperlipidemia     Hypertension     Myocardial infarction     Peripheral vascular disease     Psoriasis      Past Surgical History:   Procedure Laterality Date    APPENDECTOMY      CARDIAC CATHETERIZATION N/A 2017    Procedure: Left Heart Cath;  Surgeon: David Diane MD;  Location: [facility] CATH INVASIVE LOCATION;  Service:     CARDIAC CATHETERIZATION Left 2024    Procedure: Peripheral angiography;  Surgeon: Jaiden Doyle MD;  Location: [facility] CATH INVASIVE LOCATION;  Service: Cardiovascular;  Laterality: Left;  Left Venogram -    CARDIAC CATHETERIZATION N/A 2025    Procedure: Peripheral angiography;  Surgeon: Fran Estevez MD;  Location: [facility] CATH INVASIVE LOCATION;  Service:  Cardiovascular;  Laterality: N/A;    CARDIAC CATHETERIZATION N/A 02/26/2025    Procedure: Peripheral angiography;  Surgeon: Jaiden Doyle MD;  [facility] CATH INVASIVE LOCATION;  Service: Cardiovascular;  Laterality: N/A;    CARDIAC CATHETERIZATION N/A 02/26/2025    Procedure: Atherectomy-peripheral;  Surgeon: Jaiden Doyle MD;  Location: [facility] CATH INVASIVE LOCATION;  Service: Cardiovascular;  Laterality: N/A;    CARDIAC CATHETERIZATION N/A 02/26/2025    Procedure: Angioplasty-peripheral;  Surgeon: Jaiden Doyle MD;  Location:  [facility] CATH INVASIVE LOCATION;  Service: Cardiovascular;  Laterality: N/A;    CARDIAC CATHETERIZATION N/A 03/24/2025    Procedure: Peripheral angiography;  Surgeon: Jaiden Doyle MD;  Location:  [facility] CATH INVASIVE LOCATION;  Service: Cardiovascular;  Laterality: N/A;    CARDIAC CATHETERIZATION N/A 03/24/2025    Procedure: Atherectomy-peripheral;  Surgeon: Jaiden Doyle MD;  Location: [facility] CATH INVASIVE LOCATION;  Service: Cardiovascular;  Laterality: N/A;    COLONOSCOPY      CORONARY STENT PLACEMENT      CYSTOSCOPY BOTOX INJECTION OF BLADDER N/A 10/20/2021    Procedure: Cystoscopy Botox injection of bladder;  Surgeon: Say Robles MD;  Location: [facility];  Service: Urology;  Laterality: N/A;    ENDOSCOPY      FOOT SURGERY Left     GALLBLADDER SURGERY      INTRAVASCULAR ULTRASOUND N/A 02/26/2025    Procedure: Intravascular Ultrasound;  Surgeon: Jaiden Doyle MD;  Location: [facility] CATH INVASIVE LOCATION;  Service: Cardiovascular;  Laterality: N/A;    INTRAVASCULAR ULTRASOUND N/A 03/24/2025    Procedure: Intravascular Ultrasound;  Surgeon: Jaiden Doyle MD;  Location: [facility] CATH INVASIVE LOCATION;  Service: Cardiovascular;  Laterality: N/A;    LEG SURGERY Left     TUBAL ABDOMINAL LIGATION      VASCULAR SURGERY Left     Lower Extremity Vascular Stent-[facility]         OT ASSESSMENT FLOWSHEET (Last 12 Hours)       OT Evaluation and Treatment       Row Name 07/11/25 1529                    OT Time and Intention    Document Type evaluation  -KR        Mode of Treatment occupational therapy  -KR        Patient Effort fair  -KR           General Information    General Observations of Patient emotional/tearful  -KR           Living Environment    Current Living Arrangements home  -KR        People in Home alone  -KR        Primary Care Provided by self  -KR           Cognition    Affect/Mental Status (Cognition) anxious  -KR        Behavioral Issues (Cognition) overwhelmed easily  -KR        Orientation Status (Cognition) oriented x 3  -KR        Follows Commands (Cognition) WFL  -KR           Range of Motion Comprehensive    Comment, General Range of Motion BUE observed WFL  -KR           Strength Comprehensive (MMT)    Comment, General Manual Muscle Testing (MMT) Assessment BUE WFL to assist with simple self care tasks  -KR           Activities of Daily Living    BADL Assessment/Intervention bathing;upper body dressing;lower body dressing;grooming;feeding;toileting  -KR           Bathing Assessment/Intervention    [place] Level (Bathing) bathing skills;maximum assist (25% patient effort)  -KR           Upper Body Dressing Assessment/Training    [place] Level (Upper Body Dressing) upper body dressing skills;moderate assist (50% patient effort)  -KR           Lower Body Dressing Assessment/Training    [place] Level (Lower Body Dressing) lower body dressing skills;maximum assist (25% patient effort)  -KR           Grooming Assessment/Training    [place] Level (Grooming) grooming skills;minimum assist (75% patient effort)  -KR           Self-Feeding Assessment/Training    [place] Level (Feeding) feeding skills;set up  -KR           Toileting Assessment/Training    [place] Level (Toileting) toileting skills;maximum assist (25% patient effort);dependent (less than 25% patient effort)  -KR           Wound 07/10/25 0919 Left anterior thigh Surgical Open Surgical  Incision    Wound - Properties Group Placement Date: 07/10/25  -JG Placement Time: 0919 -JG Side: Left  -JG Orientation: anterior  -JG Location: thigh  -JG Primary Wound Type: Surgical  -JG Secondary Wound Type - Surgical: Open Surg  -JG    Retired Wound - Properties Group Placement Date: 07/10/25  -JG Placement Time: 0919 -JG Side: Left  -JG Orientation: anterior  -JG Location: thigh  -JG    Retired Wound - Properties Group Placement Date: 07/10/25  -JG Placement Time: 0919 -JG Side: Left  -JG Orientation: anterior  -JG Location: thigh  -JG    Retired Wound - Properties Group Date first assessed: 07/10/25  -JG Time first assessed: 0919 -JG Side: Left  -JG Location: thigh  -JG       Wound 07/10/25 1401 Left anterior groin Other (Comments)    Wound - Properties Group Placement Date: 07/10/25  -MH Placement Time: 1401  -MH Present on Original Admission: Y  -MH Side: Left  -MH Orientation: anterior  -MH Location: groin  -MH Primary Wound Type: Other  -MH Secondary Wound Type - Other: --  -MH, Cardiac Cath site     Retired Wound - Properties Group Placement Date: 07/10/25  -MH Placement Time: 1401  -MH Present on Original Admission: Y  -MH Side: Left  -MH Orientation: anterior  -MH Location: groin  -MH    Retired Wound - Properties Group Placement Date: 07/10/25  -MH Placement Time: 1401  -MH Present on Original Admission: Y  -MH Side: Left  -MH Orientation: anterior  -MH Location: groin  -MH    Retired Wound - Properties Group Date first assessed: 07/10/25  -MH Time first assessed: 1401  -MH Present on Original Admission: Y  -MH Side: Left  -MH Location: groin  -MH       Plan of Care Review    Plan of Care Reviewed With patient  -KR           Therapy Assessment/Plan (OT)    Planned Therapy Interventions (OT) activity tolerance training;adaptive equipment training;BADL retraining  -KR           Therapy Plan Review/Discharge Plan (OT)    Anticipated Discharge Disposition (OT) inpatient rehabilitation facility  -            OT Goals    Dressing Goal Selection (OT) dressing, OT goal 1  -KR        Activity Tolerance Goal Selection (OT) activity tolerance, OT goal 1  -KR           Dressing Goal 1 (OT)    Activity/Device (Dressing Goal 1, OT) dressing skills, all  -KR        [place]/Cues Needed (Dressing Goal 1, OT) minimum assist (75% or more patient effort)  -KR        Time Frame (Dressing Goal 1, OT) by discharge  -KR            Activity Tolerance Goal 1 (OT)    Activity Tolerance Goal 1 (OT) Increase to enhance ADL performance  -KR        Activity Level (Endurance Goal 1, OT) 15 min activity  -KR        Time Frame (Activity Tolerance Goal 1, OT) by discharge  -KR           Patient Education Goal (OT)    Activity (Patient Education Goal, OT) AE/DME training as needed to enhance safety/independence in home environment  -KR        [place]/Cues/Accuracy (Memory Goal 2, OT) verbalizes understanding  -KR        Time Frame (Patient Education Goal, OT) by discharge  -KR                  User Key  (r) = Recorded By, (t) = Taken By, (c) = Cosigned By      Initials Name Effective Dates    J Basilia Henderson, TONY 08/29/23 -     Kaleb Abreu OT 06/16/21 -      Sara Garcia RN 06/10/24 -                            OT Recommendation and Plan  Planned Therapy Interventions (OT): activity tolerance training, adaptive equipment training, BADL retraining  Plan of Care Review  Plan of Care Reviewed With: patient  Plan of Care Reviewed With: patient        Time Calculation:     Therapy Charges for Today       Code Description Service Date Service Provider Modifiers Qty    [id number]  OT EVAL MOD COMPLEXITY 4 7/11/2025 Kaleb Harding OT GO 1                 Kaleb Harding OT  7/11/2025

## 2025-07-11 NOTE — PROGRESS NOTES
[facility] ERIST HISTORY AND PHYSICAL    Patient Identification:  Name:  Monae Chauhan  Age:  69 y.o.  Sex:  female  :  1955  MRN:  [id number]   Visit Number:  [id number]  Admit Date: 7/10/2025   Room number:  3339/1P  Primary Care Physician:  Zachary Sullivan MD    Date of Admission: 7/10/2025    Today, the patient states that she is nervous but she overall is doing well.  She denies any chest pain, trouble breathing, nausea, vomiting, diarrhea.  The pain at her operative site is tolerable; today, she is had 1 Percocet.  On exam, the patient is alert and oriented x 4 and able to follow all commands.  Her speech is clear.  Her lungs are clear to auscultation bilaterally with no crackles and no wheezes.  Her heart is regular rate and rhythm without murmurs.  Left AKA is covered with an occlusive dressing and there is no discharge present.  Patient's hemoglobin had dropped 1.3 g/dL in 16 hours.  Thus, since her hemoglobin is at 7, I did transfuse her 1 unit of packed red blood cells.  The patient did not get her Norvasc today, but when she receives Norvasc and Coreg together, her blood pressures are under better control.  Therefore, we will continue with both the Norvasc and the Coreg.  I have added 5 units of Humalog with meals and I am continuing with the current Lantus and sliding scale Humalog.  Should the glucose levels be less than 150 prior to eating, that we may need to hold the mealtime Humalog.  We will continue to monitor her blood pressures, glucose levels, and labs closely.    ----------------------------------------------------------------------------------------------------------------------    ADMISSION DIAGNOSES  -PAD with previous stenting and reocclusion of SFA  -Left lower extremity ulcer s/p AKA    CHRONIC MEDICAL ISSUES  -Atrial fibrillation   -Hyperlipidemia   -CAD  -Essential hypertension     VTE Prophylaxis:  On Eliquis, per primary team    The patient is  high risk due to the following diagnoses/reasons: Left lower extremity AKA    Disposition: Per primary team, PT and OT have been consulted.  ----------------------------------------------------------------------------------------------------------------------  Vital Signs:  Temp:  [98 °F (36.7 °C)-98.6 °F (37 °C)] 98.3 °F (36.8 °C)  Heart Rate:  [61-71] 69  Resp:  [16-18] 16  BP: (107-155)/(50-70) 155/60    No data found.    SpO2:  [90 %-94 %] 94 %  on   ;   Device (Oxygen Therapy): room air  Body mass index is 30.15 kg/m².  --------------------------------------------------------------------------------------------------------------------  LABS:    CBC and coagulation:  Results from last 7 days   Lab Units 07/11/25  1737 07/11/25  0309 07/10/25  1248   WBC 10*3/mm3  --  10.00 8.44   HEMOGLOBIN g/dL 9.0* 7.0* 8.3*   HEMATOCRIT % 28.9* 23.7* 28.1*   MCV fL  --  86.8 86.5   MCHC g/dL  --  29.5* 29.5*   PLATELETS 10*3/mm3  --  334 340     Renal and electrolytes:  Results from last 7 days   Lab Units 07/11/25  0309 07/10/25  1248   SODIUM mmol/L 136 135*   POTASSIUM mmol/L 4.0 4.6   MAGNESIUM mg/dL 1.9 1.9   CHLORIDE mmol/L 104 104   CO2 mmol/L 22.8 21.3*   BUN mg/dL 13.8 13.8   CREATININE mg/dL 1.03* 1.00   CALCIUM mg/dL 8.0* 8.3*   PHOSPHORUS mg/dL 2.8 2.5   GLUCOSE mg/dL 251* 220*   ANION GAP mmol/L 9.2 9.7     Estimated Creatinine Clearance: 50.7 mL/min (A) (by C-G formula based on SCr of 1.03 mg/dL (H)).    Liver and pancreatic function:  Results from last 7 days   Lab Units 07/10/25  1248   ALBUMIN g/dL 2.7*   BILIRUBIN mg/dL 0.2   ALK PHOS U/L 97   AST (SGOT) U/L 13   ALT (SGPT) U/L <5     Endocrine function:  Lab Results   Component Value Date    HGBA1C 6.74 (H) 07/10/2025     Point of care bedside glucose levels:  Results from last 7 days   Lab Units 07/11/25  1938 07/11/25  1623 07/11/25  1024 07/11/25  0712 07/10/25  2000 07/10/25  1636 07/10/25  1344 07/10/25  0844   GLUCOSE mg/dL 186* 171* 117 158* 270*  "214* 198* 216*     Lab Results   Component Value Date    TSH 1.520 07/10/2025     Cardiac:        Cultures:  Lab Results   Component Value Date    COLORU Yellow 02/22/2024    CLARITYU Cloudy (A) 02/22/2024    SPECGRAV 1.025 06/09/2022    PHUR 8.5 (H) 02/22/2024    PROTEINUR Negative 06/22/2023    GLUCOSEU Negative 02/22/2024    KETONESU Negative 02/22/2024    BLOODU Trace (A) 02/22/2024    NITRITEU Negative 02/22/2024    LEUKOCYTESUR Negative 02/22/2024    BILIRUBINUR Negative 02/22/2024    UROBILINOGEN 0.2 E.U./dL 02/22/2024    RBCUA 3-5 (A) 02/22/2024    WBCUA 0-2 02/22/2024    BACTERIA None Seen 02/22/2024     Microbiology Results (last 10 days)       ** No results found for the last 240 hours. **            No results found for: \"PREGTESTUR\", \"PREGSERUM\", \"HCG\", \"HCGQUANT\"  Pain Management Panel           No data to display                I have personally looked at the labs and they are summarized above.  ----------------------------------------------------------------------------------------------------------------------   [facility] Meds:  amLODIPine, 10 mg, Oral, Daily  apixaban, 5 mg, Oral, BID  carvedilol, 3.125 mg, Oral, BID With Meals  clopidogrel, 75 mg, Oral, Daily  [Held by provider] furosemide, 20 mg, Oral, Daily  gabapentin, 400 mg, Oral, Q8H  insulin glargine, 30 Units, Subcutaneous, Nightly  insulin lispro, 3-14 Units, Subcutaneous, 4x Daily AC & at Bedtime  insulin lispro, 5 Units, Subcutaneous, TID With Meals  miconazole, 1 Application, Topical, Q12H  nicotine, 1 patch, Transdermal, Q24H  rosuvastatin, 20 mg, Oral, Daily  senna-docusate sodium, 2 tablet, Oral, BID  sodium chloride, 10 mL, Intravenous, Q12H  [Held by provider] valsartan, 320 mg, Oral, Daily       Current Antimicrobial Therapy:  Anti-Infectives (From admission, onward)      None          Current Diuretic Therapy:  Diuretics (From admission, onward)      Ordered     Dose/Rate Route Frequency Start Stop    07/10/25 9244  [Held " by provider]  furosemide (LASIX) tablet 20 mg        (On hold since yesterday at 1222 until manually unheld; held by Marco Rasheed MDHold Reason: Abnormal Vitals)   Ordering Provider: Taran Harding MD    20 mg Oral Daily 07/10/25 1200            ----------------------------------------------------------------------------------------------------------------------    Marco Rasheed MD  [facility]  07/11/25  20:23 EDT

## 2025-07-11 NOTE — NURSING NOTE
Pt stated that she has had suicidal thoughts due to the state of being at this time. Pt stated that she has no plan of killing herself. Pt stated it is more emotional depression due to her state of being. MD made aware of patient having suicidal thoughts.

## 2025-07-11 NOTE — PLAN OF CARE
Goal Outcome Evaluation:              Outcome Evaluation: Pt seen for therapy evaluation this date, pleasant and cooperative with therapy. Pt demonstrates mobility and recent amputation that may benefit from therapy interventions for stability and independence with functional mobility. Pt seen by PT and PT student this PM. Prognosis fair to good. Recommend IPR.    Anticipated Discharge Disposition (PT): inpatient rehabilitation facility

## 2025-07-11 NOTE — PROGRESS NOTES
POD#1 left AKA    She is doing well post AKA. Afeb and vss. Her leg dressing is clean and dry. She is going to try to get rehab at a SNF. And has a rehab/OT consult.

## 2025-07-12 LAB
ALBUMIN SERPL-MCNC: 2.5 G/DL (ref 3.5–5.2)
ALBUMIN/GLOB SERPL: 0.7 G/DL
ALP SERPL-CCNC: 89 U/L (ref 39–117)
ALT SERPL W P-5'-P-CCNC: 6 U/L (ref 1–33)
ANION GAP SERPL CALCULATED.3IONS-SCNC: 9.9 MMOL/L (ref 5–15)
AST SERPL-CCNC: 20 U/L (ref 1–32)
BASOPHILS # BLD AUTO: 0.04 10*3/MM3 (ref 0–0.2)
BASOPHILS NFR BLD AUTO: 0.4 % (ref 0–1.5)
BH BB BLOOD EXPIRATION DATE: NORMAL
BH BB BLOOD TYPE BARCODE: 5100
BH BB DISPENSE STATUS: NORMAL
BH BB PRODUCT CODE: NORMAL
BH BB UNIT NUMBER: NORMAL
BILIRUB SERPL-MCNC: 0.2 MG/DL (ref 0–1.2)
BUN SERPL-MCNC: 9.8 MG/DL (ref 8–23)
BUN/CREAT SERPL: 11.5 (ref 7–25)
CALCIUM SPEC-SCNC: 8 MG/DL (ref 8.6–10.5)
CHLORIDE SERPL-SCNC: 107 MMOL/L (ref 98–107)
CO2 SERPL-SCNC: 22.1 MMOL/L (ref 22–29)
CREAT SERPL-MCNC: 0.85 MG/DL (ref 0.57–1)
CROSSMATCH INTERPRETATION: NORMAL
DEPRECATED RDW RBC AUTO: 56.9 FL (ref 37–54)
EGFRCR SERPLBLD CKD-EPI 2021: 74.3 ML/MIN/1.73
EOSINOPHIL # BLD AUTO: 0.14 10*3/MM3 (ref 0–0.4)
EOSINOPHIL NFR BLD AUTO: 1.5 % (ref 0.3–6.2)
ERYTHROCYTE [DISTWIDTH] IN BLOOD BY AUTOMATED COUNT: 18.4 % (ref 12.3–15.4)
GLOBULIN UR ELPH-MCNC: 3.5 GM/DL
GLUCOSE BLDC GLUCOMTR-MCNC: 147 MG/DL (ref 70–130)
GLUCOSE BLDC GLUCOMTR-MCNC: 150 MG/DL (ref 70–130)
GLUCOSE BLDC GLUCOMTR-MCNC: 160 MG/DL (ref 70–130)
GLUCOSE BLDC GLUCOMTR-MCNC: 226 MG/DL (ref 70–130)
GLUCOSE BLDC GLUCOMTR-MCNC: 93 MG/DL (ref 70–130)
GLUCOSE BLDC GLUCOMTR-MCNC: 98 MG/DL (ref 70–130)
GLUCOSE SERPL-MCNC: 45 MG/DL (ref 65–99)
HCT VFR BLD AUTO: 29.3 % (ref 34–46.6)
HGB BLD-MCNC: 8.9 G/DL (ref 12–15.9)
IMM GRANULOCYTES # BLD AUTO: 0.06 10*3/MM3 (ref 0–0.05)
IMM GRANULOCYTES NFR BLD AUTO: 0.6 % (ref 0–0.5)
LYMPHOCYTES # BLD AUTO: 2.52 10*3/MM3 (ref 0.7–3.1)
LYMPHOCYTES NFR BLD AUTO: 27.3 % (ref 19.6–45.3)
MAGNESIUM SERPL-MCNC: 1.9 MG/DL (ref 1.6–2.4)
MCH RBC QN AUTO: 25.8 PG (ref 26.6–33)
MCHC RBC AUTO-ENTMCNC: 30.4 G/DL (ref 31.5–35.7)
MCV RBC AUTO: 84.9 FL (ref 79–97)
MONOCYTES # BLD AUTO: 0.78 10*3/MM3 (ref 0.1–0.9)
MONOCYTES NFR BLD AUTO: 8.4 % (ref 5–12)
NEUTROPHILS NFR BLD AUTO: 5.7 10*3/MM3 (ref 1.7–7)
NEUTROPHILS NFR BLD AUTO: 61.8 % (ref 42.7–76)
NRBC BLD AUTO-RTO: 0 /100 WBC (ref 0–0.2)
PHOSPHATE SERPL-MCNC: 2.6 MG/DL (ref 2.5–4.5)
PLATELET # BLD AUTO: 370 10*3/MM3 (ref 140–450)
PMV BLD AUTO: 8.5 FL (ref 6–12)
POTASSIUM SERPL-SCNC: 3.8 MMOL/L (ref 3.5–5.2)
PROT SERPL-MCNC: 6 G/DL (ref 6–8.5)
RBC # BLD AUTO: 3.45 10*6/MM3 (ref 3.77–5.28)
SODIUM SERPL-SCNC: 139 MMOL/L (ref 136–145)
UNIT  ABO: NORMAL
UNIT  RH: NORMAL
WBC NRBC COR # BLD AUTO: 9.24 10*3/MM3 (ref 3.4–10.8)

## 2025-07-12 PROCEDURE — 25010000002 HYDROMORPHONE PER 4 MG: Performed by: SURGERY

## 2025-07-12 PROCEDURE — 63710000001 INSULIN LISPRO (HUMAN) PER 5 UNITS: Performed by: INTERNAL MEDICINE

## 2025-07-12 PROCEDURE — 63710000001 INSULIN GLARGINE PER 5 UNITS: Performed by: INTERNAL MEDICINE

## 2025-07-12 PROCEDURE — 25010000002 HYDROMORPHONE 1 MG/ML SOLUTION: Performed by: INTERNAL MEDICINE

## 2025-07-12 PROCEDURE — 85025 COMPLETE CBC W/AUTO DIFF WBC: CPT | Performed by: INTERNAL MEDICINE

## 2025-07-12 PROCEDURE — 82948 REAGENT STRIP/BLOOD GLUCOSE: CPT

## 2025-07-12 PROCEDURE — 83735 ASSAY OF MAGNESIUM: CPT | Performed by: INTERNAL MEDICINE

## 2025-07-12 PROCEDURE — 99024 POSTOP FOLLOW-UP VISIT: CPT | Performed by: SURGERY

## 2025-07-12 PROCEDURE — 99231 SBSQ HOSP IP/OBS SF/LOW 25: CPT | Performed by: INTERNAL MEDICINE

## 2025-07-12 PROCEDURE — 84100 ASSAY OF PHOSPHORUS: CPT | Performed by: INTERNAL MEDICINE

## 2025-07-12 PROCEDURE — 82948 REAGENT STRIP/BLOOD GLUCOSE: CPT | Performed by: INTERNAL MEDICINE

## 2025-07-12 PROCEDURE — 90792 PSYCH DIAG EVAL W/MED SRVCS: CPT | Performed by: PSYCHIATRY & NEUROLOGY

## 2025-07-12 PROCEDURE — 80053 COMPREHEN METABOLIC PANEL: CPT | Performed by: INTERNAL MEDICINE

## 2025-07-12 RX ORDER — ALPRAZOLAM 0.25 MG
0.25 TABLET ORAL 2 TIMES DAILY
Status: DISCONTINUED | OUTPATIENT
Start: 2025-07-12 | End: 2025-07-22 | Stop reason: HOSPADM

## 2025-07-12 RX ORDER — OXYCODONE HYDROCHLORIDE 10 MG/1
10 TABLET ORAL EVERY 4 HOURS PRN
Status: DISPENSED | OUTPATIENT
Start: 2025-07-12 | End: 2025-07-19

## 2025-07-12 RX ORDER — INSULIN LISPRO 100 [IU]/ML
2-9 INJECTION, SOLUTION INTRAVENOUS; SUBCUTANEOUS
Status: DISCONTINUED | OUTPATIENT
Start: 2025-07-12 | End: 2025-07-22 | Stop reason: HOSPADM

## 2025-07-12 RX ORDER — METHOCARBAMOL 750 MG/1
750 TABLET, FILM COATED ORAL 4 TIMES DAILY
Status: DISCONTINUED | OUTPATIENT
Start: 2025-07-12 | End: 2025-07-22 | Stop reason: HOSPADM

## 2025-07-12 RX ORDER — HYDROCODONE BITARTRATE AND ACETAMINOPHEN 10; 325 MG/1; MG/1
1 TABLET ORAL EVERY 8 HOURS
Refills: 0 | Status: DISCONTINUED | OUTPATIENT
Start: 2025-07-13 | End: 2025-07-14

## 2025-07-12 RX ORDER — GABAPENTIN 300 MG/1
300 CAPSULE ORAL EVERY 8 HOURS SCHEDULED
Status: DISCONTINUED | OUTPATIENT
Start: 2025-07-12 | End: 2025-07-12

## 2025-07-12 RX ORDER — GABAPENTIN 300 MG/1
600 CAPSULE ORAL EVERY 8 HOURS SCHEDULED
Status: DISCONTINUED | OUTPATIENT
Start: 2025-07-12 | End: 2025-07-12

## 2025-07-12 RX ORDER — AMLODIPINE BESYLATE 5 MG/1
2.5 TABLET ORAL DAILY
Status: DISCONTINUED | OUTPATIENT
Start: 2025-07-13 | End: 2025-07-13

## 2025-07-12 RX ORDER — GABAPENTIN 400 MG/1
800 CAPSULE ORAL EVERY 8 HOURS SCHEDULED
Status: DISCONTINUED | OUTPATIENT
Start: 2025-07-12 | End: 2025-07-22 | Stop reason: HOSPADM

## 2025-07-12 RX ORDER — HYDROCODONE BITARTRATE AND ACETAMINOPHEN 10; 325 MG/1; MG/1
1 TABLET ORAL ONCE
Refills: 0 | Status: COMPLETED | OUTPATIENT
Start: 2025-07-12 | End: 2025-07-12

## 2025-07-12 RX ADMIN — GABAPENTIN 400 MG: 400 CAPSULE ORAL at 05:09

## 2025-07-12 RX ADMIN — OXYCODONE HYDROCHLORIDE 10 MG: 10 TABLET ORAL at 11:07

## 2025-07-12 RX ADMIN — METHOCARBAMOL TABLETS 750 MG: 750 TABLET, COATED ORAL at 17:32

## 2025-07-12 RX ADMIN — INSULIN GLARGINE 15 UNITS: 100 INJECTION, SOLUTION SUBCUTANEOUS at 20:57

## 2025-07-12 RX ADMIN — NICOTINE TRANSDERMAL SYSTEM 1 PATCH: 21 PATCH, EXTENDED RELEASE TRANSDERMAL at 08:18

## 2025-07-12 RX ADMIN — HYDROMORPHONE HYDROCHLORIDE 0.5 MG: 1 INJECTION, SOLUTION INTRAMUSCULAR; INTRAVENOUS; SUBCUTANEOUS at 08:12

## 2025-07-12 RX ADMIN — Medication 10 ML: at 20:57

## 2025-07-12 RX ADMIN — INSULIN LISPRO 2 UNITS: 100 INJECTION, SOLUTION INTRAVENOUS; SUBCUTANEOUS at 11:56

## 2025-07-12 RX ADMIN — ALPRAZOLAM 0.25 MG: 0.25 TABLET ORAL at 20:57

## 2025-07-12 RX ADMIN — GABAPENTIN 800 MG: 400 CAPSULE ORAL at 22:01

## 2025-07-12 RX ADMIN — METHOCARBAMOL TABLETS 750 MG: 750 TABLET, COATED ORAL at 11:07

## 2025-07-12 RX ADMIN — INSULIN LISPRO 2 UNITS: 100 INJECTION, SOLUTION INTRAVENOUS; SUBCUTANEOUS at 08:19

## 2025-07-12 RX ADMIN — OXYCODONE HYDROCHLORIDE 10 MG: 10 TABLET ORAL at 15:58

## 2025-07-12 RX ADMIN — GABAPENTIN 600 MG: 300 CAPSULE ORAL at 13:29

## 2025-07-12 RX ADMIN — APIXABAN 5 MG: 5 TABLET, FILM COATED ORAL at 20:55

## 2025-07-12 RX ADMIN — HYDROCODONE BITARTRATE AND ACETAMINOPHEN 1 TABLET: 10; 325 TABLET ORAL at 20:59

## 2025-07-12 RX ADMIN — HYDROMORPHONE HYDROCHLORIDE 1 MG: 1 INJECTION, SOLUTION INTRAMUSCULAR; INTRAVENOUS; SUBCUTANEOUS at 22:01

## 2025-07-12 RX ADMIN — ROSUVASTATIN CALCIUM 20 MG: 20 TABLET, FILM COATED ORAL at 08:11

## 2025-07-12 RX ADMIN — METHOCARBAMOL TABLETS 750 MG: 750 TABLET, COATED ORAL at 20:55

## 2025-07-12 RX ADMIN — HYDROMORPHONE HYDROCHLORIDE 0.5 MG: 1 INJECTION, SOLUTION INTRAMUSCULAR; INTRAVENOUS; SUBCUTANEOUS at 05:09

## 2025-07-12 RX ADMIN — APIXABAN 5 MG: 5 TABLET, FILM COATED ORAL at 08:11

## 2025-07-12 RX ADMIN — CLOPIDOGREL BISULFATE 75 MG: 75 TABLET, FILM COATED ORAL at 08:11

## 2025-07-12 RX ADMIN — MICONAZOLE NITRATE 1 APPLICATION: 2 POWDER TOPICAL at 20:57

## 2025-07-12 RX ADMIN — Medication 10 ML: at 08:14

## 2025-07-12 RX ADMIN — DOCUSATE SODIUM 50 MG AND SENNOSIDES 8.6 MG 2 TABLET: 8.6; 5 TABLET, FILM COATED ORAL at 08:11

## 2025-07-12 RX ADMIN — INSULIN LISPRO 4 UNITS: 100 INJECTION, SOLUTION INTRAVENOUS; SUBCUTANEOUS at 20:56

## 2025-07-12 NOTE — PROGRESS NOTES
Diagnosis: post op AKA for chronic wound    Patient reports significant pain this morning.  Also reports that she has been having suicidal thoughts.    Will consult psychiatry.  Wound dressing to be removed once pain control better today.    Rosa Ballard MD       General Surgeon  [facility]

## 2025-07-12 NOTE — PLAN OF CARE
Goal Outcome Evaluation:  Plan of Care Reviewed With: patient        Progress: improving  Outcome Evaluation: Pt resting in bed at this time. VSS on RA. PRN pain medication given as ordered. No acute changes. No concerns or requests at this time. Will continue plan of care.

## 2025-07-12 NOTE — NURSING NOTE
At bedside this AM with surgeon pt voiced suicidal ideation, psych consulted by MD. Consulted hospitalist made aware of pts ideations.

## 2025-07-12 NOTE — CONSULTS
Psychiatry Consult     Clinician: Carlos Valles MD  14:20 EDT 07/12/25    Reason for Consult: SI    HPI: 69 y.o. female post op AKA for chronic wound.  After consulted for suicidal thoughts.  I spoke to the patient and her sister who was in the room with her.  Her  was also briefly in the room.  Patient reports that she has been adjusting to the reality of the recent surgery and the life changes that will occur.  She has had some passive thoughts of wishing that she would die and go to heaven but is not actively suicidal.  She names numerous reasons to continue living including her kerri, her family, and no desire to kill herself.  She denies any prior history of suicide attempts and her sister confirms.  She reports that her mental health has been generally okay until a week ago when she started worrying about her surgery.  She has never seen a psychiatrist before and does not feel that she has been persistently and significantly depressed for an extended period of time.  She has been feeling down and distressed with the stress of the recent surgery and the accompanying changes but feels that this is a natural reaction to the circumstances.  Patient has a good social support system including her family and her Taoism and feels like she is going to be okay.      Available medical/psychiatric records reviewed and incorporated into the current document.     Past Psychiatric History: Denies prior psychiatric diagnoses or encounters.  No prior history of suicide attempts.      Substance Abuse History: Denies    Social History: Patient lives alone.  She has 2 children.  Her sister is with her in the room today and is supportive.  She is connected with her Taoism and has a good source of social support.  He understands that she is going to have to make some changes to her living situation and is accepting of that reality.    Family History   Problem Relation  Age of Onset    Breast cancer Maternal Grandmother 70    Cancer Mother     Diabetes Mother     Cancer Father     Diabetes Father         Allergies   Allergen Reactions    Amoxicillin Rash    Penicillins Rash     Beta lactam allergy details  Antibiotic reaction: (!) shortness of breath  Age at reaction: adult  Dose to reaction time: (!) hours  Reason for antibiotic: unknown  Epinephrine required for reaction?: unknown  Tolerated antibiotics: unknown           Past Medical History:   Diagnosis Date    Arthritis     Coronary artery disease     Depression     Diabetes mellitus     Elevated cholesterol     Full dentures     GERD (gastroesophageal reflux disease)     Hyperlipidemia     Hypertension     Myocardial infarction     Peripheral vascular disease     Psoriasis        Prior to Admission medications    Medication Sig Start Date End Date Taking? Authorizing Provider   ALPRAZolam (XANAX) 0.25 MG tablet Take 1 tablet by mouth 2 (Two) Times a Day As Needed for Anxiety.   Yes Sal Burdick MD   amLODIPine (NORVASC) 10 MG tablet Take 1 tablet by mouth Daily. 2/8/25  Yes Ofe Winslow APRN   carvedilol (COREG) 3.125 MG tablet Take 1 tablet by mouth 2 (Two) Times a Day With Meals.   Yes Sal Burdick MD   furosemide (LASIX) 20 MG tablet Take 1 tablet by mouth Daily. 4/16/24  Yes Sal Burdick MD   gabapentin (NEURONTIN) 800 MG tablet Take 1 tablet by mouth Every 6 (Six) Hours As Needed (nerve pain). 5/7/20  Yes Sal Burdick MD   HYDROcodone-acetaminophen (NORCO)  MG per tablet Take 1 tablet by mouth Every 6 (Six) Hours As Needed for Moderate Pain.   Yes Sal Burdick MD   hydrOXYzine pamoate (VISTARIL) 25 MG capsule Take 1 capsule by mouth Every 6 (Six) Hours As Needed for Anxiety.   Yes Sal Burdick MD   Insulin Glargine (LANTUS SOLOSTAR) 100 UNIT/ML injection pen Inject 30 Units under the skin into the appropriate area as directed Every Night.   Yes Heavenly  MD Sal   Insulin Lispro, 1 Unit Dial, (HUMALOG) 100 UNIT/ML solution pen-injector Inject 12 Units under the skin into the appropriate area as directed Every Night.   Yes Sal Burdick MD   lidocaine (LIDODERM) 5 % Place 1 patch on the skin as directed by provider Daily. Remove & Discard patch within 12 hours or as directed by MD 2/1/24  Yes Judith Hutson APRN   lidocaine (XYLOCAINE) 2 % jelly Apply 1 g topically to the appropriate area as directed 2 (Two) Times a Day.   Yes Sal Burdick MD   potassium chloride (MICRO-K) 10 MEQ CR capsule Take 1 capsule by mouth Daily. 4/16/24  Yes Sal Burdick MD   promethazine (PHENERGAN) 25 MG tablet Take 1 tablet by mouth Every 6 (Six) Hours As Needed for Nausea or Vomiting. 5/16/24  Yes Sal Burdick MD   rosuvastatin (CRESTOR) 20 MG tablet Take 1 tablet by mouth Daily.   Yes Sal Burdick MD   traMADol (ULTRAM) 50 MG tablet Take 1 tablet by mouth 2 (Two) Times a Day As Needed for Moderate Pain.   Yes Sal Burdick MD   valsartan (DIOVAN) 320 MG tablet Take 1 tablet by mouth Daily. 4/16/24  Yes Sal Burdick MD   apixaban (ELIQUIS) 5 MG tablet tablet Take 1 tablet by mouth 2 (Two) Times a Day.    Sal Burdick MD   clopidogrel (PLAVIX) 75 MG tablet Take 1 tablet by mouth Daily. 1/10/17   David Diane MD   Tirzepatide 5 MG/0.5ML solution auto-injector Inject 5 mg under the skin into the appropriate area as directed 1 (One) Time Per Week. 6/12/24   Sal Burdick MD        Temp:  [98 °F (36.7 °C)-98.4 °F (36.9 °C)] 98.4 °F (36.9 °C)  Heart Rate:  [65-69] 69  Resp:  [16-22] 20  BP: (133-156)/(52-70) 156/67    Mental Status Exam  Mood: euthymic  Affect: mood congruent  Thought Processes: linear  Thought Content: No Delusions voiced  Hallucinations: Denies  Suicidal Thoughts: Denies  Suicidal Plan/Intent: Denies      Interval Review of Systems:   General ROS: negative for - fever or  malaise  Endocrine ROS: negative for - palpitations  Respiratory ROS: no cough, shortness of breath, or wheezing  Cardiovascular ROS: no chest pain or dyspnea on exertion  Gastrointestinal ROS: no abdominal pain,no black or bloody stools  Neuro: negative  Skin: negative      Physical Exam:  Not indicated for a psychiatric consult      Diagnosis:  Adjustment Disorder with Depressed Mood    Recommendations:  Patient is not suicidal at this time.  She may follow up with the outpatient clinic at the [facility].  Call for appointment when the office opens on Monday.

## 2025-07-12 NOTE — PLAN OF CARE
Goal Outcome Evaluation:           Progress: no change  Outcome Evaluation: pt resting in bed,family at bedside throughout shift, pt has had prn pain medication per MAR, pts surgical site remains CDI, not removed due to pt still c/o pain, per surgeon pts dressing can be removed when pts pain is under control, will continue plan of care.

## 2025-07-12 NOTE — PROGRESS NOTES
[facility]    Patient Identification:  Name:  Monae Chauhan  Age:  69 y.o.  Sex:  female  :  1955  MRN:  [id number]   Visit Number:  [id number]  Admit Date: 7/10/2025   Room number:  3339/1P  Primary Care Physician:  Zachary Sullivan MD    Date of Admission: 7/10/2025    Today, the patient has been having a considerable amount of pain from her surgical site.  When I entered her room, the patient was in tears as she was trying to maneuver in her bed so that she can take pressure off of her left hip.  Patient currently denies chest pain and trouble breathing.  On exam, her heart is regular rate and rhythm with no murmurs.  Her lungs clear with no crackles and no wheezes.  She is alert and oriented x 4 and able to follow all commands.  The surgical dressing does not have any drainage on it.    I will increase the Neurontin to 800 mg 3 times a day for now; per the FAIZAN, the patient takes this 4 times per day.  She seems really anxious; I will now schedule the home Xanax instead of giving it prn.  Will provide her with a trapeze so that she can reposition herself.  Will add scheduled Norco 10/325 3 times a day, as the patient also takes 4 of these per day based on her Faizan report.  This way, we can offset the Neurontin and the Norco, so that she receives something every 4 hours by mouth.  I will then increase the Dilaudid as needed to 1 mg as needed every 2 hours.  We will monitor her closely for any respiratory depression.    The patient had some low glucose levels.  I suspect that she is not eating as much as she does at home since she is postoperative.  Therefore, I will cut the Lantus in half, discontinue the mealtime Humalog, and decrease the sliding scale Humalog.  We will repeat her blood work in the morning.  Her blood pressures are more elevated today, but this may be due to pain.  However, I will add Norvasc, but at one fourth to home dose, as  the patient is at risk of hypotensive side effects from the necessary postoperative pain medication.  Please note that I have written for the cardiac electrolyte replacement protocols; I will obtain a vitamin D 25 OH level tomorrow as well as an on the calcium level.    ----------------------------------------------------------------------------------------------------------------------    ADMISSION DIAGNOSES  -PAD with previous stenting and reocclusion of SFA  -Left lower extremity ulcer s/p AKA    CHRONIC MEDICAL ISSUES  -Atrial fibrillation   -Hyperlipidemia   -CAD  -Essential hypertension     VTE Prophylaxis:  On Eliquis, per primary team     The patient is high risk due to the following diagnoses/reasons: Left lower extremity AKA     Disposition: Per primary team, PT and OT have been consulted.  ----------------------------------------------------------------------------------------------------------------------  Vital Signs:  Temp:  [98 °F (36.7 °C)-98.3 °F (36.8 °C)] 98.3 °F (36.8 °C)  Heart Rate:  [65-71] 67  Resp:  [16-22] 22  BP: (113-155)/(50-70) 137/52    No data found.  SpO2:  [92 %-96 %] 96 %  on   ;   Device (Oxygen Therapy): room air  Body mass index is 30.15 kg/m².  --------------------------------------------------------------------------------------------------------------------  LABS:    CBC and coagulation:  Results from last 7 days   Lab Units 07/12/25 0150 07/11/25  1737 07/11/25  0309 07/10/25  1248   WBC 10*3/mm3 9.24  --  10.00 8.44   HEMOGLOBIN g/dL 8.9* 9.0* 7.0* 8.3*   HEMATOCRIT % 29.3* 28.9* 23.7* 28.1*   MCV fL 84.9  --  86.8 86.5   MCHC g/dL 30.4*  --  29.5* 29.5*   PLATELETS 10*3/mm3 370  --  334 340     Renal and electrolytes:  Results from last 7 days   Lab Units 07/12/25 0150 07/11/25  0309 07/10/25  1248   SODIUM mmol/L 139 136 135*   POTASSIUM mmol/L 3.8 4.0 4.6   MAGNESIUM mg/dL 1.9 1.9 1.9   CHLORIDE mmol/L 107 104 104   CO2 mmol/L 22.1 22.8 21.3*   BUN mg/dL 9.8 13.8 13.8    CREATININE mg/dL 0.85 1.03* 1.00   CALCIUM mg/dL 8.0* 8.0* 8.3*   PHOSPHORUS mg/dL 2.6 2.8 2.5   GLUCOSE mg/dL 45* 251* 220*   ANION GAP mmol/L 9.9 9.2 9.7     Estimated Creatinine Clearance: 61.4 mL/min (by C-G formula based on SCr of 0.85 mg/dL).    Liver and pancreatic function:  Results from last 7 days   Lab Units 07/12/25  0150 07/10/25  1248   ALBUMIN g/dL 2.5* 2.7*   BILIRUBIN mg/dL 0.2 0.2   ALK PHOS U/L 89 97   AST (SGOT) U/L 20 13   ALT (SGPT) U/L 6 <5     Endocrine function:  Lab Results   Component Value Date    HGBA1C 6.74 (H) 07/10/2025     Point of care bedside glucose levels:  Results from last 7 days   Lab Units 07/12/25  0640 07/12/25  0513 07/12/25  0309 07/11/25  1938 07/11/25  1623 07/11/25  1024 07/11/25  0712 07/10/25  2000 07/10/25  1636 07/10/25  1344 07/10/25  0844   GLUCOSE mg/dL 150* 147* 98 186* 171* 117 158* 270* 214* 198* 216*     Lab Results   Component Value Date    TSH 1.520 07/10/2025       I have personally looked at the labs and they are summarized above.  ----------------------------------------------------------------------------------------------------------------------   Current [facility] Meds:  amLODIPine, 10 mg, Oral, Daily  apixaban, 5 mg, Oral, BID  carvedilol, 3.125 mg, Oral, BID With Meals  clopidogrel, 75 mg, Oral, Daily  [Held by provider] furosemide, 20 mg, Oral, Daily  gabapentin, 400 mg, Oral, Q8H  insulin glargine, 30 Units, Subcutaneous, Nightly  insulin lispro, 3-14 Units, Subcutaneous, 4x Daily AC & at Bedtime  insulin lispro, 5 Units, Subcutaneous, TID With Meals  miconazole, 1 Application, Topical, Q12H  nicotine, 1 patch, Transdermal, Q24H  rosuvastatin, 20 mg, Oral, Daily  senna-docusate sodium, 2 tablet, Oral, BID  sodium chloride, 10 mL, Intravenous, Q12H  [Held by provider] valsartan, 320 mg, Oral, Daily       Current Antimicrobial Therapy:  Anti-Infectives (From admission, onward)      None          Current Diuretic Therapy:  Diuretics (From  admission, onward)      Ordered     Dose/Rate Route Frequency Start Stop    07/10/25 1047  [Held by provider]  furosemide (LASIX) tablet 20 mg        (On hold since Thu 7/10/2025 at 1222 until manually unheld; held by Marco Rasheed MDHold Reason: Abnormal Vitals)   Ordering Provider: Taran Harding MD    20 mg Oral Daily 07/10/25 1200            ----------------------------------------------------------------------------------------------------------------------    Marco Rasheed MD  [facility]  07/12/25  08:14 EDT

## 2025-07-13 LAB
25(OH)D3 SERPL-MCNC: 32.2 NG/ML (ref 30–100)
ANION GAP SERPL CALCULATED.3IONS-SCNC: 9.1 MMOL/L (ref 5–15)
BUN SERPL-MCNC: 9.5 MG/DL (ref 8–23)
BUN/CREAT SERPL: 12.5 (ref 7–25)
CA-I SERPL ISE-MCNC: 1.16 MMOL/L (ref 1.15–1.3)
CALCIUM SPEC-SCNC: 8 MG/DL (ref 8.6–10.5)
CHLORIDE SERPL-SCNC: 105 MMOL/L (ref 98–107)
CO2 SERPL-SCNC: 24.9 MMOL/L (ref 22–29)
CREAT SERPL-MCNC: 0.76 MG/DL (ref 0.57–1)
EGFRCR SERPLBLD CKD-EPI 2021: 84.9 ML/MIN/1.73
FOLATE SERPL-MCNC: 4.21 NG/ML (ref 4.78–24.2)
GLUCOSE BLDC GLUCOMTR-MCNC: 146 MG/DL (ref 70–130)
GLUCOSE BLDC GLUCOMTR-MCNC: 154 MG/DL (ref 70–130)
GLUCOSE BLDC GLUCOMTR-MCNC: 208 MG/DL (ref 70–130)
GLUCOSE BLDC GLUCOMTR-MCNC: 87 MG/DL (ref 70–130)
GLUCOSE SERPL-MCNC: 104 MG/DL (ref 65–99)
HCT VFR BLD AUTO: 28.8 % (ref 34–46.6)
HGB BLD-MCNC: 8.6 G/DL (ref 12–15.9)
MAGNESIUM SERPL-MCNC: 1.9 MG/DL (ref 1.6–2.4)
PHOSPHATE SERPL-MCNC: 2.9 MG/DL (ref 2.5–4.5)
POTASSIUM SERPL-SCNC: 3.7 MMOL/L (ref 3.5–5.2)
SODIUM SERPL-SCNC: 139 MMOL/L (ref 136–145)
VIT B12 BLD-MCNC: 342 PG/ML (ref 211–946)

## 2025-07-13 PROCEDURE — 63710000001 INSULIN LISPRO (HUMAN) PER 5 UNITS: Performed by: INTERNAL MEDICINE

## 2025-07-13 PROCEDURE — 85018 HEMOGLOBIN: CPT | Performed by: INTERNAL MEDICINE

## 2025-07-13 PROCEDURE — 82607 VITAMIN B-12: CPT | Performed by: INTERNAL MEDICINE

## 2025-07-13 PROCEDURE — 25010000002 MAGNESIUM SULFATE 4 GM/100ML SOLUTION: Performed by: INTERNAL MEDICINE

## 2025-07-13 PROCEDURE — 63710000001 INSULIN GLARGINE PER 5 UNITS: Performed by: INTERNAL MEDICINE

## 2025-07-13 PROCEDURE — 80048 BASIC METABOLIC PNL TOTAL CA: CPT | Performed by: INTERNAL MEDICINE

## 2025-07-13 PROCEDURE — 83735 ASSAY OF MAGNESIUM: CPT | Performed by: INTERNAL MEDICINE

## 2025-07-13 PROCEDURE — 82330 ASSAY OF CALCIUM: CPT | Performed by: INTERNAL MEDICINE

## 2025-07-13 PROCEDURE — 85014 HEMATOCRIT: CPT | Performed by: INTERNAL MEDICINE

## 2025-07-13 PROCEDURE — 82948 REAGENT STRIP/BLOOD GLUCOSE: CPT

## 2025-07-13 PROCEDURE — 99231 SBSQ HOSP IP/OBS SF/LOW 25: CPT | Performed by: INTERNAL MEDICINE

## 2025-07-13 PROCEDURE — 99024 POSTOP FOLLOW-UP VISIT: CPT | Performed by: SURGERY

## 2025-07-13 PROCEDURE — 82746 ASSAY OF FOLIC ACID SERUM: CPT | Performed by: INTERNAL MEDICINE

## 2025-07-13 PROCEDURE — 82306 VITAMIN D 25 HYDROXY: CPT | Performed by: INTERNAL MEDICINE

## 2025-07-13 PROCEDURE — 84100 ASSAY OF PHOSPHORUS: CPT | Performed by: INTERNAL MEDICINE

## 2025-07-13 RX ORDER — MAGNESIUM SULFATE HEPTAHYDRATE 40 MG/ML
4 INJECTION, SOLUTION INTRAVENOUS ONCE
Status: COMPLETED | OUTPATIENT
Start: 2025-07-13 | End: 2025-07-13

## 2025-07-13 RX ORDER — AMLODIPINE BESYLATE 5 MG/1
10 TABLET ORAL DAILY
Status: DISCONTINUED | OUTPATIENT
Start: 2025-07-13 | End: 2025-07-22 | Stop reason: HOSPADM

## 2025-07-13 RX ORDER — INSULIN LISPRO 100 [IU]/ML
2 INJECTION, SOLUTION INTRAVENOUS; SUBCUTANEOUS
Status: DISCONTINUED | OUTPATIENT
Start: 2025-07-13 | End: 2025-07-22 | Stop reason: HOSPADM

## 2025-07-13 RX ADMIN — INSULIN LISPRO 2 UNITS: 100 INJECTION, SOLUTION INTRAVENOUS; SUBCUTANEOUS at 09:38

## 2025-07-13 RX ADMIN — GABAPENTIN 800 MG: 400 CAPSULE ORAL at 21:02

## 2025-07-13 RX ADMIN — DOCUSATE SODIUM 50 MG AND SENNOSIDES 8.6 MG 2 TABLET: 8.6; 5 TABLET, FILM COATED ORAL at 21:02

## 2025-07-13 RX ADMIN — GABAPENTIN 800 MG: 400 CAPSULE ORAL at 15:12

## 2025-07-13 RX ADMIN — ROSUVASTATIN CALCIUM 20 MG: 20 TABLET, FILM COATED ORAL at 08:42

## 2025-07-13 RX ADMIN — AMLODIPINE BESYLATE 10 MG: 5 TABLET ORAL at 09:45

## 2025-07-13 RX ADMIN — METHOCARBAMOL TABLETS 750 MG: 750 TABLET, COATED ORAL at 11:55

## 2025-07-13 RX ADMIN — DOCUSATE SODIUM 50 MG AND SENNOSIDES 8.6 MG 2 TABLET: 8.6; 5 TABLET, FILM COATED ORAL at 08:41

## 2025-07-13 RX ADMIN — MAGNESIUM SULFATE IN WATER FOR 4 G: 40 INJECTION INTRAVENOUS at 03:07

## 2025-07-13 RX ADMIN — INSULIN LISPRO 4 UNITS: 100 INJECTION, SOLUTION INTRAVENOUS; SUBCUTANEOUS at 17:10

## 2025-07-13 RX ADMIN — CLOPIDOGREL BISULFATE 75 MG: 75 TABLET, FILM COATED ORAL at 08:41

## 2025-07-13 RX ADMIN — METHOCARBAMOL TABLETS 750 MG: 750 TABLET, COATED ORAL at 17:16

## 2025-07-13 RX ADMIN — METHOCARBAMOL TABLETS 750 MG: 750 TABLET, COATED ORAL at 21:02

## 2025-07-13 RX ADMIN — GABAPENTIN 800 MG: 400 CAPSULE ORAL at 05:15

## 2025-07-13 RX ADMIN — OXYCODONE HYDROCHLORIDE 10 MG: 10 TABLET ORAL at 03:07

## 2025-07-13 RX ADMIN — Medication 10 ML: at 21:03

## 2025-07-13 RX ADMIN — HYDROCODONE BITARTRATE AND ACETAMINOPHEN 1 TABLET: 10; 325 TABLET ORAL at 08:41

## 2025-07-13 RX ADMIN — Medication 10 ML: at 08:43

## 2025-07-13 RX ADMIN — HYDROCODONE BITARTRATE AND ACETAMINOPHEN 1 TABLET: 10; 325 TABLET ORAL at 01:14

## 2025-07-13 RX ADMIN — OXYCODONE HYDROCHLORIDE 10 MG: 10 TABLET ORAL at 21:02

## 2025-07-13 RX ADMIN — ALPRAZOLAM 0.25 MG: 0.25 TABLET ORAL at 21:02

## 2025-07-13 RX ADMIN — NICOTINE TRANSDERMAL SYSTEM 1 PATCH: 21 PATCH, EXTENDED RELEASE TRANSDERMAL at 08:42

## 2025-07-13 RX ADMIN — MICONAZOLE NITRATE 1 APPLICATION: 2 POWDER TOPICAL at 08:43

## 2025-07-13 RX ADMIN — MICONAZOLE NITRATE 1 APPLICATION: 2 POWDER TOPICAL at 21:03

## 2025-07-13 RX ADMIN — INSULIN GLARGINE 10 UNITS: 100 INJECTION, SOLUTION SUBCUTANEOUS at 21:03

## 2025-07-13 RX ADMIN — INSULIN LISPRO 2 UNITS: 100 INJECTION, SOLUTION INTRAVENOUS; SUBCUTANEOUS at 11:55

## 2025-07-13 RX ADMIN — METHOCARBAMOL TABLETS 750 MG: 750 TABLET, COATED ORAL at 08:41

## 2025-07-13 RX ADMIN — APIXABAN 5 MG: 5 TABLET, FILM COATED ORAL at 21:02

## 2025-07-13 RX ADMIN — INSULIN LISPRO 2 UNITS: 100 INJECTION, SOLUTION INTRAVENOUS; SUBCUTANEOUS at 17:12

## 2025-07-13 RX ADMIN — HYDROCODONE BITARTRATE AND ACETAMINOPHEN 1 TABLET: 10; 325 TABLET ORAL at 17:10

## 2025-07-13 RX ADMIN — INSULIN LISPRO 2 UNITS: 100 INJECTION, SOLUTION INTRAVENOUS; SUBCUTANEOUS at 21:03

## 2025-07-13 RX ADMIN — OXYCODONE HYDROCHLORIDE 10 MG: 10 TABLET ORAL at 10:41

## 2025-07-13 RX ADMIN — ALPRAZOLAM 0.25 MG: 0.25 TABLET ORAL at 08:41

## 2025-07-13 RX ADMIN — APIXABAN 5 MG: 5 TABLET, FILM COATED ORAL at 08:41

## 2025-07-13 NOTE — PLAN OF CARE
Goal Outcome Evaluation:   Pt resting in bed at this time. VSS on room air. PRN pain meds given per mar. Stood and pivoted into wheelchair. Voices no complaints or concerns at this time. Will continue plan of care.

## 2025-07-13 NOTE — PROGRESS NOTES
[facility]    Patient Identification:  Name:  Monae Chauhan  Age:  69 y.o.  Sex:  female  :  1955  MRN:  [id number]   Visit Number:  [id number]  Admit Date: 7/10/2025   Room number:  3339/1P  Primary Care Physician:  Zachary Sullivan MD    Date of Admission: 7/10/2025    Today, the patient's pain is under better control.  She currently denies chest pain, denies shortness of air, denies coughing, denies nausea.  On exam, she is alert, oriented x 4, can follow all commands, does not have crackles, does not have wheezing, no murmur, heart with regular rate and rhythm.  No right leg edema.  Left leg stump is covered with an occlusive dressing and there is no drainage on it.      Since the systolic is increasing, will restart the home Norvasc.  Will continue to hold the home Coreg as the heart rates are in the 60s.  Will continue to hold the home Lasix and valsartan.  Creatinine improving.  The glucose levels are not trending steadily; thus, will decrease the Lantus to 10 units at night and will add 2 units of Humalog with meals; will continue with the current dose of sliding scale Humalog.  Will continue with the same pain medication, as her pain is under better control.   ----------------------------------------------------------------------------------------------------------------------    ADMISSION DIAGNOSES  -PAD with previous stenting and reocclusion of SFA  -Left lower extremity ulcer s/p AKA     CHRONIC MEDICAL ISSUES  -Atrial fibrillation   -Hyperlipidemia   -CAD  -Essential hypertension      VTE Prophylaxis:  On Eliquis, per primary team     The patient is high risk due to the following diagnoses/reasons: Left lower extremity AKA     Disposition: Per primary team, PT and OT have been consulted.  ----------------------------------------------------------------------------------------------------------------------  Vital Signs:  Temp:  [98 °F (36.7  °C)-99.3 °F (37.4 °C)] 98.2 °F (36.8 °C)  Heart Rate:  [63-71] 64  Resp:  [20] 20  BP: (136-164)/(59-75) 164/75    Device (Oxygen Therapy): room air  Body mass index is 30.15 kg/m².  --------------------------------------------------------------------------------------------------------------------  LABS:    CBC and coagulation:  Results from last 7 days   Lab Units 07/13/25 0116 07/12/25 0150 07/11/25  1737 07/11/25  0309 07/10/25  1248   WBC 10*3/mm3  --  9.24  --  10.00 8.44   HEMOGLOBIN g/dL 8.6* 8.9* 9.0* 7.0* 8.3*   HEMATOCRIT % 28.8* 29.3* 28.9* 23.7* 28.1*   MCV fL  --  84.9  --  86.8 86.5   MCHC g/dL  --  30.4*  --  29.5* 29.5*   PLATELETS 10*3/mm3  --  370  --  334 340     Renal and electrolytes:  Results from last 7 days   Lab Units 07/13/25  0116 07/12/25  0150 07/11/25  0309 07/10/25  1248   SODIUM mmol/L 139 139 136 135*   POTASSIUM mmol/L 3.7 3.8 4.0 4.6   MAGNESIUM mg/dL 1.9 1.9 1.9 1.9   CHLORIDE mmol/L 105 107 104 104   CO2 mmol/L 24.9 22.1 22.8 21.3*   BUN mg/dL 9.5 9.8 13.8 13.8   CREATININE mg/dL 0.76 0.85 1.03* 1.00   CALCIUM mg/dL 8.0* 8.0* 8.0* 8.3*   IONIZED CALCIUM mmol/L 1.16  --   --   --    PHOSPHORUS mg/dL 2.9 2.6 2.8 2.5   GLUCOSE mg/dL 104* 45* 251* 220*   ANION GAP mmol/L 9.1 9.9 9.2 9.7     Estimated Creatinine Clearance: 68.7 mL/min (by C-G formula based on SCr of 0.76 mg/dL).    Liver and pancreatic function:  Results from last 7 days   Lab Units 07/12/25  0150 07/10/25  1248   ALBUMIN g/dL 2.5* 2.7*   BILIRUBIN mg/dL 0.2 0.2   ALK PHOS U/L 89 97   AST (SGOT) U/L 20 13   ALT (SGPT) U/L 6 <5     Endocrine function:  Lab Results   Component Value Date    HGBA1C 6.74 (H) 07/10/2025     Point of care bedside glucose levels:  Results from last 7 days   Lab Units 07/13/25  0652 07/12/25  2030 07/12/25  1637 07/12/25  1105 07/12/25  0640 07/12/25  0513 07/12/25  0309 07/11/25  1938 07/11/25  1623 07/11/25  1024 07/11/25  0712 07/10/25  2000 07/10/25  1636 07/10/25  1344   GLUCOSE mg/dL  87 226* 93 160* 150* 147* 98 186* 171* 117 158* 270* 214* 198*       I have personally looked at the labs and they are summarized above.  ----------------------------------------------------------------------------------------------------------------------   Current Hospital Meds:  ALPRAZolam, 0.25 mg, Oral, BID  amLODIPine, 2.5 mg, Oral, Daily  apixaban, 5 mg, Oral, BID  [Held by provider] carvedilol, 3.125 mg, Oral, BID With Meals  clopidogrel, 75 mg, Oral, Daily  [Held by provider] furosemide, 20 mg, Oral, Daily  gabapentin, 800 mg, Oral, Q8H  HYDROcodone-acetaminophen, 1 tablet, Oral, Q8H  insulin glargine, 15 Units, Subcutaneous, Nightly  insulin lispro, 2-9 Units, Subcutaneous, 4x Daily AC & at Bedtime  methocarbamol, 750 mg, Oral, 4x Daily  miconazole, 1 Application, Topical, Q12H  nicotine, 1 patch, Transdermal, Q24H  rosuvastatin, 20 mg, Oral, Daily  senna-docusate sodium, 2 tablet, Oral, BID  sodium chloride, 10 mL, Intravenous, Q12H  [Held by provider] valsartan, 320 mg, Oral, Daily    Pharmacy Consult,       Current Antimicrobial Therapy:  None    Current Diuretic Therapy:  Diuretics (From admission, onward)      Ordered     Dose/Rate Route Frequency Start Stop    07/10/25 1047  [Held by provider]  furosemide (LASIX) tablet 20 mg        (On hold since Thu 7/10/2025 at 1222 until manually unheld; held by Marco Rasheed MDHold Reason: Abnormal Vitals)   Ordering Provider: Taran Harding MD    20 mg Oral Daily 07/10/25 1200            ----------------------------------------------------------------------------------------------------------------------    Marco Rasheed MD  [facility]  07/13/25  08:17 EDT

## 2025-07-13 NOTE — PROGRESS NOTES
Diagnosis: s/p AKA    Resting well on examination today. Psychiatry consulted with no indication for intervention.     Awaiting placement.     Rosa Ballard MD       General Surgeon  [facility]

## 2025-07-14 LAB
ALBUMIN SERPL-MCNC: 2.5 G/DL (ref 3.5–5.2)
ALBUMIN/GLOB SERPL: 0.7 G/DL
ALP SERPL-CCNC: 97 U/L (ref 39–117)
ALT SERPL W P-5'-P-CCNC: 6 U/L (ref 1–33)
ANION GAP SERPL CALCULATED.3IONS-SCNC: 8.7 MMOL/L (ref 5–15)
ANISOCYTOSIS BLD QL: ABNORMAL
AST SERPL-CCNC: 13 U/L (ref 1–32)
BILIRUB SERPL-MCNC: <0.2 MG/DL (ref 0–1.2)
BUN SERPL-MCNC: 12.6 MG/DL (ref 8–23)
BUN/CREAT SERPL: 15.6 (ref 7–25)
CALCIUM SPEC-SCNC: 8 MG/DL (ref 8.6–10.5)
CHLORIDE SERPL-SCNC: 105 MMOL/L (ref 98–107)
CO2 SERPL-SCNC: 25.3 MMOL/L (ref 22–29)
CREAT SERPL-MCNC: 0.81 MG/DL (ref 0.57–1)
DEPRECATED RDW RBC AUTO: 56.3 FL (ref 37–54)
EGFRCR SERPLBLD CKD-EPI 2021: 78.7 ML/MIN/1.73
EOSINOPHIL # BLD MANUAL: 0.08 10*3/MM3 (ref 0–0.4)
EOSINOPHIL NFR BLD MANUAL: 1 % (ref 0.3–6.2)
ERYTHROCYTE [DISTWIDTH] IN BLOOD BY AUTOMATED COUNT: 18 % (ref 12.3–15.4)
GLOBULIN UR ELPH-MCNC: 3.5 GM/DL
GLUCOSE BLDC GLUCOMTR-MCNC: 130 MG/DL (ref 70–130)
GLUCOSE BLDC GLUCOMTR-MCNC: 146 MG/DL (ref 70–130)
GLUCOSE BLDC GLUCOMTR-MCNC: 186 MG/DL (ref 70–130)
GLUCOSE BLDC GLUCOMTR-MCNC: 197 MG/DL (ref 70–130)
GLUCOSE SERPL-MCNC: 161 MG/DL (ref 65–99)
HCT VFR BLD AUTO: 29.1 % (ref 34–46.6)
HGB BLD-MCNC: 8.8 G/DL (ref 12–15.9)
LYMPHOCYTES # BLD MANUAL: 2.41 10*3/MM3 (ref 0.7–3.1)
LYMPHOCYTES NFR BLD MANUAL: 2 % (ref 5–12)
MAGNESIUM SERPL-MCNC: 2.4 MG/DL (ref 1.6–2.4)
MCH RBC QN AUTO: 25.9 PG (ref 26.6–33)
MCHC RBC AUTO-ENTMCNC: 30.2 G/DL (ref 31.5–35.7)
MCV RBC AUTO: 85.6 FL (ref 79–97)
MONOCYTES # BLD: 0.16 10*3/MM3 (ref 0.1–0.9)
NEUTROPHILS # BLD AUTO: 5.37 10*3/MM3 (ref 1.7–7)
NEUTROPHILS NFR BLD MANUAL: 67 % (ref 42.7–76)
PHOSPHATE SERPL-MCNC: 3.1 MG/DL (ref 2.5–4.5)
PLATELET # BLD AUTO: 363 10*3/MM3 (ref 140–450)
PMV BLD AUTO: 8.4 FL (ref 6–12)
POTASSIUM SERPL-SCNC: 3.8 MMOL/L (ref 3.5–5.2)
PROT SERPL-MCNC: 6 G/DL (ref 6–8.5)
RBC # BLD AUTO: 3.4 10*6/MM3 (ref 3.77–5.28)
REF LAB TEST METHOD: NORMAL
SMALL PLATELETS BLD QL SMEAR: ADEQUATE
SODIUM SERPL-SCNC: 139 MMOL/L (ref 136–145)
VARIANT LYMPHS NFR BLD MANUAL: 1 % (ref 0–5)
VARIANT LYMPHS NFR BLD MANUAL: 29 % (ref 19.6–45.3)
WBC NRBC COR # BLD AUTO: 8.02 10*3/MM3 (ref 3.4–10.8)

## 2025-07-14 PROCEDURE — 85007 BL SMEAR W/DIFF WBC COUNT: CPT | Performed by: INTERNAL MEDICINE

## 2025-07-14 PROCEDURE — 97530 THERAPEUTIC ACTIVITIES: CPT

## 2025-07-14 PROCEDURE — 80053 COMPREHEN METABOLIC PANEL: CPT | Performed by: INTERNAL MEDICINE

## 2025-07-14 PROCEDURE — 83735 ASSAY OF MAGNESIUM: CPT | Performed by: INTERNAL MEDICINE

## 2025-07-14 PROCEDURE — 63710000001 INSULIN GLARGINE PER 5 UNITS: Performed by: INTERNAL MEDICINE

## 2025-07-14 PROCEDURE — 99232 SBSQ HOSP IP/OBS MODERATE 35: CPT | Performed by: STUDENT IN AN ORGANIZED HEALTH CARE EDUCATION/TRAINING PROGRAM

## 2025-07-14 PROCEDURE — 97110 THERAPEUTIC EXERCISES: CPT

## 2025-07-14 PROCEDURE — 99024 POSTOP FOLLOW-UP VISIT: CPT | Performed by: SURGERY

## 2025-07-14 PROCEDURE — 84100 ASSAY OF PHOSPHORUS: CPT | Performed by: INTERNAL MEDICINE

## 2025-07-14 PROCEDURE — 85025 COMPLETE CBC W/AUTO DIFF WBC: CPT | Performed by: INTERNAL MEDICINE

## 2025-07-14 PROCEDURE — 82948 REAGENT STRIP/BLOOD GLUCOSE: CPT

## 2025-07-14 PROCEDURE — 63710000001 INSULIN LISPRO (HUMAN) PER 5 UNITS: Performed by: INTERNAL MEDICINE

## 2025-07-14 PROCEDURE — 82948 REAGENT STRIP/BLOOD GLUCOSE: CPT | Performed by: INTERNAL MEDICINE

## 2025-07-14 PROCEDURE — 63710000001 ONDANSETRON ODT 4 MG TABLET DISPERSIBLE: Performed by: SURGERY

## 2025-07-14 RX ORDER — OXYCODONE AND ACETAMINOPHEN 10; 325 MG/1; MG/1
1 TABLET ORAL EVERY 4 HOURS PRN
Refills: 0 | Status: DISCONTINUED | OUTPATIENT
Start: 2025-07-14 | End: 2025-07-14

## 2025-07-14 RX ORDER — FENTANYL 12.5 UG/1
1 PATCH TRANSDERMAL
Refills: 0 | Status: DISCONTINUED | OUTPATIENT
Start: 2025-07-14 | End: 2025-07-22 | Stop reason: HOSPADM

## 2025-07-14 RX ADMIN — OXYCODONE HYDROCHLORIDE 10 MG: 10 TABLET ORAL at 17:21

## 2025-07-14 RX ADMIN — ALPRAZOLAM 0.25 MG: 0.25 TABLET ORAL at 08:33

## 2025-07-14 RX ADMIN — GABAPENTIN 800 MG: 400 CAPSULE ORAL at 21:55

## 2025-07-14 RX ADMIN — ONDANSETRON 4 MG: 4 TABLET, ORALLY DISINTEGRATING ORAL at 12:17

## 2025-07-14 RX ADMIN — INSULIN LISPRO 2 UNITS: 100 INJECTION, SOLUTION INTRAVENOUS; SUBCUTANEOUS at 17:22

## 2025-07-14 RX ADMIN — AMLODIPINE BESYLATE 10 MG: 5 TABLET ORAL at 08:32

## 2025-07-14 RX ADMIN — Medication 10 ML: at 21:29

## 2025-07-14 RX ADMIN — INSULIN GLARGINE 10 UNITS: 100 INJECTION, SOLUTION SUBCUTANEOUS at 21:55

## 2025-07-14 RX ADMIN — OXYCODONE HYDROCHLORIDE 10 MG: 10 TABLET ORAL at 05:42

## 2025-07-14 RX ADMIN — METHOCARBAMOL TABLETS 750 MG: 750 TABLET, COATED ORAL at 17:22

## 2025-07-14 RX ADMIN — APIXABAN 5 MG: 5 TABLET, FILM COATED ORAL at 08:33

## 2025-07-14 RX ADMIN — DOCUSATE SODIUM 50 MG AND SENNOSIDES 8.6 MG 2 TABLET: 8.6; 5 TABLET, FILM COATED ORAL at 08:33

## 2025-07-14 RX ADMIN — METHOCARBAMOL TABLETS 750 MG: 750 TABLET, COATED ORAL at 21:28

## 2025-07-14 RX ADMIN — INSULIN LISPRO 2 UNITS: 100 INJECTION, SOLUTION INTRAVENOUS; SUBCUTANEOUS at 12:17

## 2025-07-14 RX ADMIN — INSULIN LISPRO 2 UNITS: 100 INJECTION, SOLUTION INTRAVENOUS; SUBCUTANEOUS at 21:56

## 2025-07-14 RX ADMIN — INSULIN LISPRO 2 UNITS: 100 INJECTION, SOLUTION INTRAVENOUS; SUBCUTANEOUS at 08:33

## 2025-07-14 RX ADMIN — ROSUVASTATIN CALCIUM 20 MG: 20 TABLET, FILM COATED ORAL at 08:33

## 2025-07-14 RX ADMIN — HYDROCODONE BITARTRATE AND ACETAMINOPHEN 1 TABLET: 10; 325 TABLET ORAL at 08:33

## 2025-07-14 RX ADMIN — HYDROCODONE BITARTRATE AND ACETAMINOPHEN 1 TABLET: 10; 325 TABLET ORAL at 02:00

## 2025-07-14 RX ADMIN — METHOCARBAMOL TABLETS 750 MG: 750 TABLET, COATED ORAL at 12:17

## 2025-07-14 RX ADMIN — FENTANYL 1 PATCH: 12 PATCH TRANSDERMAL at 10:36

## 2025-07-14 RX ADMIN — Medication 10 ML: at 08:34

## 2025-07-14 RX ADMIN — MICONAZOLE NITRATE 1 APPLICATION: 2 POWDER TOPICAL at 08:34

## 2025-07-14 RX ADMIN — GABAPENTIN 800 MG: 400 CAPSULE ORAL at 05:42

## 2025-07-14 RX ADMIN — METHOCARBAMOL TABLETS 750 MG: 750 TABLET, COATED ORAL at 08:32

## 2025-07-14 RX ADMIN — GABAPENTIN 800 MG: 400 CAPSULE ORAL at 14:54

## 2025-07-14 RX ADMIN — CLOPIDOGREL BISULFATE 75 MG: 75 TABLET, FILM COATED ORAL at 08:33

## 2025-07-14 RX ADMIN — ALPRAZOLAM 0.25 MG: 0.25 TABLET ORAL at 21:28

## 2025-07-14 RX ADMIN — INSULIN LISPRO 2 UNITS: 100 INJECTION, SOLUTION INTRAVENOUS; SUBCUTANEOUS at 12:18

## 2025-07-14 RX ADMIN — MICONAZOLE NITRATE 1 APPLICATION: 2 POWDER TOPICAL at 21:29

## 2025-07-14 RX ADMIN — NICOTINE TRANSDERMAL SYSTEM 1 PATCH: 21 PATCH, EXTENDED RELEASE TRANSDERMAL at 08:32

## 2025-07-14 RX ADMIN — APIXABAN 5 MG: 5 TABLET, FILM COATED ORAL at 21:28

## 2025-07-14 NOTE — SIGNIFICANT NOTE
07/14/25 1551   Post Acute Pre-Cert Documentation   Request Submitted by Facility - Type: Post Acute   Post-Acute Authorization Type Submitted: IRF   Date Post Acute Pre-Cert Completed 07/14/25   Accepting Facility [facility]   Response Received from Insurance? P2P Requested   Response Communicated to:    Authorization Number: [id number]   Post Acute Pre-Cert Initiated Comment [employer] called stating the Medical Director says the case lacks support for an approval for Acute Rehab, but is offering a peer to peer.  To schedule a peer to peer, it can be called to [phone] with deadline 07/15 by 4:00pm.  To fax any updates for a peer to peer, the fax# [phone].

## 2025-07-14 NOTE — PLAN OF CARE
Goal Outcome Evaluation:   Pt resting in bed at this time. VSS on room air. PRN pain and nausea meds given. Worked with PT. Voices no complaints or concerns at this time. Will continue plan of care.

## 2025-07-14 NOTE — CASE MANAGEMENT/SOCIAL WORK
Discharge Planning Assessment   [place]     Patient Name: Monae Chauhan  MRN: [id number]  Today's Date: 7/14/2025    Admit Date: 7/10/2025       Discharge Plan       Row Name 07/14/25 1659       Plan    Plan SS spoke with Pt at bedside. Pt remains being agreeable to  inpatient acute rehab. SS spoke with  [facility] per Janae who states Pt's referral was sent for pre-auth with insurance. Per [facility],[employer] called stating the Medical Director says the case lacks support for an approval for Acute Rehab, but is offering a peer to peer. To schedule a peer to peer, it can be called to [phone] with deadline 07/15 by 4:00pm.  To fax any updates for a peer to peer, the fax# [phone]. SS notified Physician Advisor and placed in queue for review. SS to follow.                  Continued Care and Services - Admitted Since 7/10/2025       Destination       Service Provider Request Status Services Address Phone Fax Patient Preferred     [facility] Pending - No Request Sent -- [address] [place] [address] [phone] --    [facility] Declined  Insurance Denial -- [address] [facility] [address] [phone] --    [facility] Declined  PT rec'd [facility] instead -- [address] [place] [address] [facility] [address] [phone] --                    TAIWO Levine

## 2025-07-14 NOTE — THERAPY TREATMENT NOTE
Acute Care - Occupational Therapy Treatment Note   [facility]     Patient Name: Monae Chauhan  : 1955  MRN: [id number]  Today's Date: 2025  Onset of Illness/Injury or Date of Surgery: 07/10/25 (admission date)          Admit Date: 7/10/2025       ICD-10-CM ICD-9-CM   1. 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  L97.522 707.15     Patient Active Problem List   Diagnosis    CAD (coronary artery disease)    Essential hypertension    Mixed hyperlipidemia    Type 2 diabetes mellitus    PAD (peripheral artery disease)    Smoker    Atherosclerosis of native arteries of extremities with rest pain, bilateral legs    Detrusor instability    Open wound    Diabetes mellitus    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    Critical limb ischemia of both lower extremities with rest pain    Tobacco use    Femoral artery occlusion, left    Rest pain of both lower extremities due to atherosclerosis    Status post amputation     Past Medical History:   Diagnosis Date    Arthritis     Coronary artery disease     Depression     Diabetes mellitus     Elevated cholesterol     Full dentures     GERD (gastroesophageal reflux disease)     Hyperlipidemia     Hypertension     Myocardial infarction     Peripheral vascular disease     Psoriasis      Past Surgical History:   Procedure Laterality Date    APPENDECTOMY      CARDIAC CATHETERIZATION N/A 2017    Procedure: Left Heart Cath;  Surgeon: David Diane MD;  Location: [facility] CATH INVASIVE LOCATION;  Service:     CARDIAC CATHETERIZATION Left 2024    Procedure: Peripheral angiography;  Surgeon: Jaiden Doyle MD;  Location: [facility] CATH INVASIVE LOCATION;  Service: Cardiovascular;  Laterality: Left;  Left Venogram -    CARDIAC CATHETERIZATION N/A 2025    Procedure: Peripheral angiography;  Surgeon: Fran Estevez MD;  Location: [facility] CATH INVASIVE LOCATION;  Service: Cardiovascular;   Laterality: N/A;    CARDIAC CATHETERIZATION N/A 02/26/2025    Procedure: Peripheral angiography;  Surgeon: Jaiden Doyle MD;  [facility] CATH INVASIVE [facility];  Service: Cardiovascular;  Laterality: N/A;    CARDIAC CATHETERIZATION N/A 02/26/2025    Procedure: Atherectomy-peripheral;  Surgeon: Jaiden Doyle MD;  Location: [facility] CATH INVASIVE LOCATION;  Service: Cardiovascular;  Laterality: N/A;    CARDIAC CATHETERIZATION N/A 02/26/2025    Procedure: Angioplasty-peripheral;  Surgeon: Jaiden Doyle MD;  Location:  [facility] CATH INVASIVE LOCATION;  Service: Cardiovascular;  Laterality: N/A;    CARDIAC CATHETERIZATION N/A 03/24/2025    Procedure: Peripheral angiography;  Surgeon: Jaiden Doyle MD;  [facility] CATH INVASIVE LOCATION;  Service: Cardiovascular;  Laterality: N/A;    CARDIAC CATHETERIZATION N/A 03/24/2025    Procedure: Atherectomy-peripheral;  Surgeon: Jaiden Doyle MD;  Location: [facility] CATH INVASIVE LOCATION;  Service: Cardiovascular;  Laterality: N/A;    COLONOSCOPY      CORONARY STENT PLACEMENT      CYSTOSCOPY BOTOX INJECTION OF BLADDER N/A 10/20/2021    Procedure: Cystoscopy Botox injection of bladder;  Surgeon: Say Robles MD;  Location: [facility];  Service: Urology;  Laterality: N/A;    ENDOSCOPY      FOOT SURGERY Left     GALLBLADDER SURGERY      INTRAVASCULAR ULTRASOUND N/A 02/26/2025    Procedure: Intravascular Ultrasound;  Surgeon: Jaiden Doyle MD;  Location: [facility] CATH INVASIVE LOCATION;  Service: Cardiovascular;  Laterality: N/A;    INTRAVASCULAR ULTRASOUND N/A 03/24/2025    Procedure: Intravascular Ultrasound;  Surgeon: Jaiden Doyle MD;  Location: [facility] CATH INVASIVE LOCATION;  Service: Cardiovascular;  Laterality: N/A;    LEG SURGERY Left     TUBAL ABDOMINAL LIGATION      VASCULAR SURGERY Left     Lower Extremity Vascular Stent-[facility]         OT ASSESSMENT FLOWSHEET (Last 12 Hours)       OT Evaluation and Treatment       Row Name 07/14/25 1020                    OT Time and Intention    Document Type therapy note (daily note)  -KR        Mode of Treatment occupational therapy  -KR        Patient Effort adequate  -KR           Cognition    Affect/Mental Status (Cognition) flat/blunted affect  -KR        Behavioral Issues (Cognition) overwhelmed easily  -KR        Follows Commands (Cognition) WFL  -KR           Bathing Assessment/Intervention    Hays Level (Bathing) moderate assist (50% patient effort)  -KR           Upper Body Dressing Assessment/Training    Hays Level (Upper Body Dressing) moderate assist (50% patient effort)  -KR           Lower Body Dressing Assessment/Training    Hays Level (Lower Body Dressing) maximum assist (25% patient effort)  -KR           Grooming Assessment/Training    Hays Level (Grooming) minimum assist (75% patient effort)  -KR           Self-Feeding Assessment/Training    Hays Level (Feeding) set up  -KR           Toileting Assessment/Training    Hays Level (Toileting) maximum assist (25% patient effort)  -KR           Motor Skills    Therapeutic Exercise shoulder;elbow/forearm;hand  -KR           Shoulder (Therapeutic Exercise)    Shoulder (Therapeutic Exercise) AROM (active range of motion)  -KR        Shoulder AROM (Therapeutic Exercise) bilateral;flexion;extension  -KR           Elbow/Forearm (Therapeutic Exercise)    Elbow/Forearm (Therapeutic Exercise) AROM (active range of motion)  -KR        Elbow/Forearm AROM (Therapeutic Exercise) bilateral;flexion;extension  -KR           Hand (Therapeutic Exercise)    Hand (Therapeutic Exercise) AROM (active range of motion)  -KR        Hand AROM/AAROM (Therapeutic Exercise) bilateral;finger flexion;finger extension  -KR           Wound 07/10/25 0919 Left anterior thigh Surgical Open Surgical Incision    Wound - Properties Group Placement Date: 07/10/25  -JG Placement Time: 0919 -JG Side: Left  -JG Orientation: anterior  -JG Location: thigh  -JG  Primary Wound Type: Surgical  -JG Secondary Wound Type - Surgical: Open Surg  -JG    Retired Wound - Properties Group Placement Date: 07/10/25  -JG Placement Time: 0919 -JG Side: Left  -JG Orientation: anterior  -JG Location: thigh  -JG    Retired Wound - Properties Group Placement Date: 07/10/25  -JG Placement Time: 0919  -JG Side: Left  -JG Orientation: anterior  -JG Location: thigh  -JG    Retired Wound - Properties Group Date first assessed: 07/10/25  -JG Time first assessed: 0919 -JG Side: Left  -JG Location: thigh  -JG       Wound 07/10/25 1401 Left anterior groin Other (Comments)    Wound - Properties Group Placement Date: 07/10/25  -MH Placement Time: 1401  -MH Present on Original Admission: Y  -MH Side: Left  -MH Orientation: anterior  -MH Location: groin  -MH Primary Wound Type: Other  -MH Secondary Wound Type - Other: --  -MH, Cardiac Cath site     Retired Wound - Properties Group Placement Date: 07/10/25  -MH Placement Time: 1401  -MH Present on Original Admission: Y  -MH Side: Left  -MH Orientation: anterior  -MH Location: groin  -MH    Retired Wound - Properties Group Placement Date: 07/10/25  -MH Placement Time: 1401  -MH Present on Original Admission: Y  -MH Side: Left  -MH Orientation: anterior  -MH Location: groin  -MH    Retired Wound - Properties Group Date first assessed: 07/10/25  -MH Time first assessed: 1401  -MH Present on Original Admission: Y  -MH Side: Left  -MH Location: groin  -MH       Wound 07/13/25 1432 Left distal arm Other (Comments)    Wound - Properties Group Placement Date: 07/13/25  -TP Placement Time: 1432  -TP Present on Original Admission: N  -TP Side: Left  -TP Orientation: distal  -TP Location: arm  -TP Primary Wound Type: Other  -TP Secondary Wound Type - Other: --  -TP, skin tear     Retired Wound - Properties Group Placement Date: 07/13/25  -TP Placement Time: 1432  -TP Present on Original Admission: N  -TP Side: Left  -TP Orientation: distal  -TP Location: arm  -TP     Retired Wound - Properties Group Placement Date: 07/13/25  -TP Placement Time: 1432  -TP Present on Original Admission: N  -TP Side: Left  -TP Orientation: distal  -TP Location: arm  -TP    Retired Wound - Properties Group Date first assessed: 07/13/25  -TP Time first assessed: 1432  -TP Present on Original Admission: N  -TP Side: Left  -TP Location: arm  -TP       Plan of Care Review    Plan of Care Reviewed With patient  -KR           Dressing Goal 1 (OT)    Progress/Outcome (Dressing Goal 1, OT) continuing progress toward goal  -KR            Activity Tolerance Goal 1 (OT)    Progress/Outcome (Activity Tolerance Goal 1, OT) continuing progress toward goal  -KR           Patient Education Goal (OT)    Progress/Outcome (Patient Education Goal, OT) continuing progress toward goal  -KR                  User Key  (r) = Recorded By, (t) = Taken By, (c) = Cosigned By      Initials Name Effective Dates    Basilia Johnson RN 08/29/23 -     Kaleb Abreu OT 06/16/21 -     Sara Godoy RN 06/10/24 -     Jolie Lisa RN 01/15/25 -                            OT Recommendation and Plan  Planned Therapy Interventions (OT): activity tolerance training, adaptive equipment training, BADL retraining  Plan of Care Review  Plan of Care Reviewed With: patient  Plan of Care Reviewed With: patient        Time Calculation:     Therapy Charges for Today       Code Description Service Date Service Provider Modifiers Qty    [id number]  OT THERAPEUTIC ACT EA 15 MIN 7/14/2025 Kaleb Harding OT GO 1                 Kaleb Harding OT  7/14/2025

## 2025-07-14 NOTE — THERAPY TREATMENT NOTE
Acute Care - Physical Therapy Treatment Note  [facility]     Patient Name: Monae Chauhan  : 1955  MRN: [id number]  Today's Date: 2025   Onset of Illness/Injury or Date of Surgery: 07/10/25 (admission date)  Visit Dx:     ICD-10-CM ICD-9-CM   1. 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  L97.522 707.15     Patient Active Problem List   Diagnosis    CAD (coronary artery disease)    Essential hypertension    Mixed hyperlipidemia    Type 2 diabetes mellitus    PAD (peripheral artery disease)    Smoker    Atherosclerosis of native arteries of extremities with rest pain, bilateral legs    Detrusor instability    Open wound    Diabetes mellitus    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    Critical limb ischemia of both lower extremities with rest pain    Tobacco use    Femoral artery occlusion, left    Rest pain of both lower extremities due to atherosclerosis    Status post amputation     Past Medical History:   Diagnosis Date    Arthritis     Coronary artery disease     Depression     Diabetes mellitus     Elevated cholesterol     Full dentures     GERD (gastroesophageal reflux disease)     Hyperlipidemia     Hypertension     Myocardial infarction     Peripheral vascular disease     Psoriasis      Past Surgical History:   Procedure Laterality Date    ABOVE KNEE AMPUTATION Left 7/10/2025    Procedure: AMPUTATION ABOVE KNEE;  Surgeon: Taran Harding MD;  Location: [facility];  Service: General;  Laterality: Left;    APPENDECTOMY      CARDIAC CATHETERIZATION N/A 2017    Procedure: Left Heart Cath;  Surgeon: David Diane MD;  Location: [facility] CATH INVASIVE LOCATION;  Service:     CARDIAC CATHETERIZATION Left 2024    Procedure: Peripheral angiography;  Surgeon: Jaiden Doyle MD;  Location: [facility] CATH INVASIVE LOCATION;  Service: Cardiovascular;  Laterality: Left;  Left Venogram -    CARDIAC CATHETERIZATION N/A  02/06/2025    Procedure: Peripheral angiography;  Surgeon: Fran Estevez MD;  Location:  [facility] CATH INVASIVE LOCATION;  Service: Cardiovascular;  Laterality: N/A;    CARDIAC CATHETERIZATION N/A 02/26/2025    Procedure: Peripheral angiography;  Surgeon: Jaiden Doyle MD;  Location:  [facility] CATH INVASIVE LOCATION;  Service: Cardiovascular;  Laterality: N/A;    CARDIAC CATHETERIZATION N/A 02/26/2025    Procedure: Atherectomy-peripheral;  Surgeon: Jaiden Doyle MD;  Location: [facility] CATH INVASIVE LOCATION;  Service: Cardiovascular;  Laterality: N/A;    CARDIAC CATHETERIZATION N/A 02/26/2025    Procedure: Angioplasty-peripheral;  Surgeon: Jaiden Doyle MD;  Location: [facility] CATH INVASIVE LOCATION;  Service: Cardiovascular;  Laterality: N/A;    CARDIAC CATHETERIZATION N/A 03/24/2025    Procedure: Peripheral angiography;  Surgeon: Jaiden Doyle MD;  Location: [facility] CATH INVASIVE LOCATION;  Service: Cardiovascular;  Laterality: N/A;    CARDIAC CATHETERIZATION N/A 03/24/2025    Procedure: Atherectomy-peripheral;  Surgeon: Jaiden Doyle MD;  Location: [facility] CATH INVASIVE LOCATION;  Service: Cardiovascular;  Laterality: N/A;    COLONOSCOPY      CORONARY STENT PLACEMENT      CYSTOSCOPY BOTOX INJECTION OF BLADDER N/A 10/20/2021    Procedure: Cystoscopy Botox injection of bladder;  Surgeon: Say Robles MD;  Location: [facility];  Service: Urology;  Laterality: N/A;    ENDOSCOPY      FOOT SURGERY Left     GALLBLADDER SURGERY      INTRAVASCULAR ULTRASOUND N/A 02/26/2025    Procedure: Intravascular Ultrasound;  Surgeon: Jaiden Doyle MD;  Location: [facility] CATH INVASIVE LOCATION;  Service: Cardiovascular;  Laterality: N/A;    INTRAVASCULAR ULTRASOUND N/A 03/24/2025    Procedure: Intravascular Ultrasound;  Surgeon: Jaiden Doyle MD;  Location: [facility] CATH INVASIVE LOCATION;  Service: Cardiovascular;  Laterality: N/A;    LEG SURGERY Left     TUBAL ABDOMINAL LIGATION      VASCULAR SURGERY Left     Lower  Extremity Vascular Stent-[facility]     PT Assessment (Last 12 Hours)       PT Evaluation and Treatment       Row Name 07/14/25 1030          Physical Therapy Time and Intention    Subjective Information complains of;pain  -KH (r) DH (t) KH (c)     Document Type therapy note (daily note)  -KH (r) DH (t) KH (c)     Mode of Treatment physical therapy  -KH (r) DH (t) KH (c)     Patient Effort good  -KH (r) DH (t) KH (c)     Symptoms Noted During/After Treatment fatigue;increased pain  -KH (r) DH (t) KH (c)       Row Name 07/14/25 1030          General Information    Equipment Issued to Patient gait belt  -KH (r) DH (t) KH (c)       Row Name 07/14/25 1030          Pain Scale: FACES Pre/Post-Treatment    Pain: FACES Scale, Pretreatment 2-->hurts little bit  -KH (r) DH (t) KH (c)     Posttreatment Pain Rating 4-->hurts little more  -KH (r) DH (t) KH (c)     Pre/Posttreatment Pain Comment per observation  -KH (r) DH (t) KH (c)       Row Name 07/14/25 1030          Cognition    Orientation Status (Cognition) oriented x 4  -KH (r) DH (t) KH (c)     Follows Commands (Cognition) WNL  -KH (r) DH (t) KH (c)     Personal Safety Interventions supervised activity;nonskid shoes/slippers when out of bed;gait belt  -KH (r) DH (t) KH (c)       Row Name 07/14/25 1030          Bed Mobility    Bed Mobility supine-sit;rolling left;sit-supine  -     Rolling Left [place] (Bed Mobility) minimum assist (75% patient effort);1 person assist  -KH (r) DH (t) KH (c)     Supine-Sit [place] (Bed Mobility) moderate assist (50% patient effort);minimum assist (75% patient effort);1 person assist  -KH (r) DH (t) KH (c)     Sit-Supine [place] (Bed Mobility) standby assist;supervision;set up  -       Row Name 07/14/25 1030          Transfers    Transfers wheelchair transfer;chair-bed transfer  -[facility] Name 07/14/25 1030          Chair-Bed Transfer    Chair-Bed [place] (Transfers) contact guard  -     Assistive Device (Chair-Bed  Transfers) other (see comments)  transfer chair, bed  -       Row Name 07/14/25 1030          Wheelchair Transfer    Type (Wheelchair Transfer) stand pivot/stand step  -KH (r) DH (t) KH (c)     Dougherty Level (Wheelchair Transfer) modified independence;contact guard  -KH (r) DH (t) KH (c)     Assistive Device (Wheelchair Transfer) walker, front-wheeled  -KH (r) DH (t) KH (c)       Row Name 07/14/25 1030          Motor Skills    Therapeutic Exercise hip;knee  -KH (r) DH (t) KH (c)       Row Name 07/14/25 1030          Hip (Therapeutic Exercise)    Hip (Therapeutic Exercise) strengthening exercise  -KH (r) DH (t) KH (c)     Hip Strengthening (Therapeutic Exercise) aBduction;aDduction;left;10 repetitions;3 sets  -KH (r) DH (t) KH (c)       Row Name 07/14/25 1030          Knee (Therapeutic Exercise)    Knee (Therapeutic Exercise) strengthening exercise  -KH (r) DH (t) KH (c)     Knee Strengthening (Therapeutic Exercise) SAQ (short arc quad);10 repetitions;3 sets;left  -KH (r) DH (t) KH (c)       Row Name             Wound 07/10/25 0919 Left anterior thigh Surgical Open Surgical Incision    Wound - Properties Group Placement Date: 07/10/25  -JG Placement Time: 0919 -JG Side: Left  -JG Orientation: anterior  -JG Location: thigh  -JG Primary Wound Type: Surgical  -JG Secondary Wound Type - Surgical: Open Surg  -JG    Retired Wound - Properties Group Placement Date: 07/10/25  -JG Placement Time: 0919 -JG Side: Left  -JG Orientation: anterior  -JG Location: thigh  -JG    Retired Wound - Properties Group Placement Date: 07/10/25  -JG Placement Time: 0919 -JG Side: Left  -JG Orientation: anterior  -JG Location: thigh  -JG    Retired Wound - Properties Group Date first assessed: 07/10/25  -JG Time first assessed: 0919 -JG Side: Left  -JG Location: thigh  -JG      Row Name             Wound 07/10/25 1401 Left anterior groin Other (Comments)    Wound - Properties Group Placement Date: 07/10/25  - Placement Time: 1401   -MH Present on Original Admission: Y  -MH Side: Left  -MH Orientation: anterior  -MH Location: groin  -MH Primary Wound Type: Other  -MH Secondary Wound Type - Other: --  -MH, Cardiac Cath site     Retired Wound - Properties Group Placement Date: 07/10/25  -MH Placement Time: 1401  -MH Present on Original Admission: Y  -MH Side: Left  -MH Orientation: anterior  -MH Location: groin  -MH    Retired Wound - Properties Group Placement Date: 07/10/25  -MH Placement Time: 1401  -MH Present on Original Admission: Y  -MH Side: Left  -MH Orientation: anterior  -MH Location: groin  -MH    Retired Wound - Properties Group Date first assessed: 07/10/25  -MH Time first assessed: 1401  -MH Present on Original Admission: Y  -MH Side: Left  -MH Location: groin  -MH      Row Name             Wound 07/13/25 1432 Left distal arm Other (Comments)    Wound - Properties Group Placement Date: 07/13/25  -TP Placement Time: 1432  -TP Present on Original Admission: N  -TP Side: Left  -TP Orientation: distal  -TP Location: arm  -TP Primary Wound Type: Other  -TP Secondary Wound Type - Other: --  -TP, skin tear     Retired Wound - Properties Group Placement Date: 07/13/25  -TP Placement Time: 1432  -TP Present on Original Admission: N  -TP Side: Left  -TP Orientation: distal  -TP Location: arm  -TP    Retired Wound - Properties Group Placement Date: 07/13/25  -TP Placement Time: 1432  -TP Present on Original Admission: N  -TP Side: Left  -TP Orientation: distal  -TP Location: arm  -TP    Retired Wound - Properties Group Date first assessed: 07/13/25  -TP Time first assessed: 1432  -TP Present on Original Admission: N  -TP Side: Left  -TP Location: arm  -TP      Row Name 07/14/25 1030          Plan of Care Review    Plan of Care Reviewed With patient  -JULIA canseco) SATISH melo) JULIA (diamond)     Progress improving  -JULIA (pete) SATISH (jose) JULIA (diamond)       Row Name 07/14/25 1030          Positioning and Restraints    Pre-Treatment Position in bed  -JULIA canseco) SATISH melo) JULIA fernandez)      Post Treatment Position wheelchair  -KH (r) DH (t) KH (c)     In Bed call light within reach;encouraged to call for assist  -KH (r) DH (t) KH (c)     In Wheelchair sitting;notified nsg  Patient was transfered to wheelchair and left with staff  -JULIA (r) SATISH (t) JULIA (c)       Row Name 07/14/25 1030          Progress Summary (PT)    Daily Progress Summary (PT) Patient tolerated session well, with mild pain / fatigue and short rest breaks between sets. Patient was able to transfer from supine-to-sit with min-mod assistance from therapist and min assistance to stand pivot to wheelchair. Patient was left in wheelchair with staff. She is continuing to progress well.  -KH (r) DH (t) KH (c)               User Key  (r) = Recorded By, (t) = Taken By, (c) = Cosigned By      Initials Name Provider Type    Basilia Johnson RN Registered Nurse    Sujey Ovalle, PT Physical Therapist    Sara Godoy RN Registered Nurse    Jolie Lisa RN Registered Nurse    Neva Camejo, PT Student PT Student                      PT Recommendation and Plan  Anticipated Discharge Disposition (PT): inpatient rehabilitation facility  Planned Therapy Interventions (PT): gait training, bed mobility training, balance training, transfer training, strengthening, other (see comments) (add interventions PRN, as appropriate)  Therapy Frequency (PT): 3 times/wk (add interventions PRN)  Outcome Evaluation: Pt seen for therapy evaluation this date, pleasant and cooperative with therapy. Pt demonstrates mobility and recent amputation that may benefit from therapy interventions for stability and independence with functional mobility. Pt seen by PT and PT student this PM. Prognosis fair to good. Recommend IPR.       Time Calculation:    PT Charges       Row Name 07/14/25 3303             Time Calculation    Start Time 1030  -KH (r) DH (t) KH (c)      PT Received On 07/14/25  -KH (r) SATISH (t) KH (c)         Time Calculation- PT    Total  Timed Code Minutes- PT 38 minute(s)  -JULIA                User Key  (r) = Recorded By, (t) = Taken By, (c) = Cosigned By      Initials Name Provider Type    Sujey Ovalle, PT Physical Therapist     Neva Gonzalez, PT Student PT Student                  Therapy Charges for Today       Code Description Service Date Service Provider Modifiers Qty    [id number]  PT THERAPEUTIC ACT EA 15 MIN 7/14/2025 Sujey Small, PT GP 1            PT G-Codes  AM-PAC 6 Clicks Score (PT): 10    Sujey Small PT  7/14/2025

## 2025-07-14 NOTE — SIGNIFICANT NOTE
07/14/25 1218   Post Acute Pre-Cert Documentation   Request Submitted by Facility - Type: Post Acute   Post-Acute Authorization Type Submitted: IRF   Date Post Acute Pre-Cert Inititated per Facility 07/14/25   Verification from Payer Yes   Accepting Facility [facility]   All Clinicals Submitted? Yes   Had Accepting Facility at Time of Submission Yes   Response Communicated to:    Authorization Number: [id number]   Post Acute Pre-Cert Initiated Comment Prior auth request for Rehab has been submitted to [facility] Medicare via [employer] with reference# [id number].

## 2025-07-14 NOTE — PLAN OF CARE
Goal Outcome Evaluation:           Progress: improving  Outcome Evaluation: Alert and oriented x2. VSS. Ordered scheduled and PRN medications given for pain. Pt refused dressing to be taken off, educated.

## 2025-07-14 NOTE — PROGRESS NOTES
POD#4 left AKA    She is having a little less pain now. Afeb and vss. Her dressing was changed and the wound looks  very good. Follow up on PT/rehab. Psych felt she could follow up outpatient.

## 2025-07-15 LAB
GLUCOSE BLDC GLUCOMTR-MCNC: 114 MG/DL (ref 70–130)
GLUCOSE BLDC GLUCOMTR-MCNC: 135 MG/DL (ref 70–130)
GLUCOSE BLDC GLUCOMTR-MCNC: 138 MG/DL (ref 70–130)
GLUCOSE BLDC GLUCOMTR-MCNC: 159 MG/DL (ref 70–130)
GLUCOSE BLDC GLUCOMTR-MCNC: 163 MG/DL (ref 70–130)

## 2025-07-15 PROCEDURE — 99232 SBSQ HOSP IP/OBS MODERATE 35: CPT | Performed by: STUDENT IN AN ORGANIZED HEALTH CARE EDUCATION/TRAINING PROGRAM

## 2025-07-15 PROCEDURE — 63710000001 INSULIN LISPRO (HUMAN) PER 5 UNITS: Performed by: INTERNAL MEDICINE

## 2025-07-15 PROCEDURE — 63710000001 INSULIN GLARGINE PER 5 UNITS: Performed by: INTERNAL MEDICINE

## 2025-07-15 PROCEDURE — 82948 REAGENT STRIP/BLOOD GLUCOSE: CPT

## 2025-07-15 PROCEDURE — 99024 POSTOP FOLLOW-UP VISIT: CPT | Performed by: SURGERY

## 2025-07-15 RX ORDER — CYCLOBENZAPRINE HCL 10 MG
5 TABLET ORAL 3 TIMES DAILY PRN
Status: DISCONTINUED | OUTPATIENT
Start: 2025-07-15 | End: 2025-07-22 | Stop reason: HOSPADM

## 2025-07-15 RX ADMIN — Medication 10 ML: at 08:45

## 2025-07-15 RX ADMIN — HYDROXYZINE HYDROCHLORIDE 25 MG: 25 TABLET, FILM COATED ORAL at 21:26

## 2025-07-15 RX ADMIN — METHOCARBAMOL TABLETS 750 MG: 750 TABLET, COATED ORAL at 12:02

## 2025-07-15 RX ADMIN — CYCLOBENZAPRINE 5 MG: 10 TABLET, FILM COATED ORAL at 23:41

## 2025-07-15 RX ADMIN — ROSUVASTATIN CALCIUM 20 MG: 20 TABLET, FILM COATED ORAL at 08:44

## 2025-07-15 RX ADMIN — OXYCODONE HYDROCHLORIDE 10 MG: 10 TABLET ORAL at 23:41

## 2025-07-15 RX ADMIN — ALPRAZOLAM 0.25 MG: 0.25 TABLET ORAL at 08:43

## 2025-07-15 RX ADMIN — INSULIN GLARGINE 10 UNITS: 100 INJECTION, SOLUTION SUBCUTANEOUS at 21:29

## 2025-07-15 RX ADMIN — METHOCARBAMOL TABLETS 750 MG: 750 TABLET, COATED ORAL at 21:26

## 2025-07-15 RX ADMIN — APIXABAN 5 MG: 5 TABLET, FILM COATED ORAL at 08:44

## 2025-07-15 RX ADMIN — DOCUSATE SODIUM 50 MG AND SENNOSIDES 8.6 MG 2 TABLET: 8.6; 5 TABLET, FILM COATED ORAL at 21:26

## 2025-07-15 RX ADMIN — OXYCODONE HYDROCHLORIDE 10 MG: 10 TABLET ORAL at 10:50

## 2025-07-15 RX ADMIN — GABAPENTIN 800 MG: 400 CAPSULE ORAL at 21:26

## 2025-07-15 RX ADMIN — Medication 10 ML: at 21:35

## 2025-07-15 RX ADMIN — APIXABAN 5 MG: 5 TABLET, FILM COATED ORAL at 21:26

## 2025-07-15 RX ADMIN — METHOCARBAMOL TABLETS 750 MG: 750 TABLET, COATED ORAL at 17:11

## 2025-07-15 RX ADMIN — ALPRAZOLAM 0.25 MG: 0.25 TABLET ORAL at 21:26

## 2025-07-15 RX ADMIN — NICOTINE TRANSDERMAL SYSTEM 1 PATCH: 21 PATCH, EXTENDED RELEASE TRANSDERMAL at 08:45

## 2025-07-15 RX ADMIN — GABAPENTIN 800 MG: 400 CAPSULE ORAL at 05:21

## 2025-07-15 RX ADMIN — INSULIN LISPRO 2 UNITS: 100 INJECTION, SOLUTION INTRAVENOUS; SUBCUTANEOUS at 08:44

## 2025-07-15 RX ADMIN — AMLODIPINE BESYLATE 10 MG: 5 TABLET ORAL at 08:44

## 2025-07-15 RX ADMIN — INSULIN LISPRO 2 UNITS: 100 INJECTION, SOLUTION INTRAVENOUS; SUBCUTANEOUS at 17:11

## 2025-07-15 RX ADMIN — MICONAZOLE NITRATE 1 APPLICATION: 2 POWDER TOPICAL at 08:45

## 2025-07-15 RX ADMIN — OXYCODONE HYDROCHLORIDE 10 MG: 10 TABLET ORAL at 19:36

## 2025-07-15 RX ADMIN — INSULIN LISPRO 2 UNITS: 100 INJECTION, SOLUTION INTRAVENOUS; SUBCUTANEOUS at 12:02

## 2025-07-15 RX ADMIN — OXYCODONE HYDROCHLORIDE 10 MG: 10 TABLET ORAL at 05:49

## 2025-07-15 RX ADMIN — METHOCARBAMOL TABLETS 750 MG: 750 TABLET, COATED ORAL at 08:44

## 2025-07-15 RX ADMIN — CLOPIDOGREL BISULFATE 75 MG: 75 TABLET, FILM COATED ORAL at 08:44

## 2025-07-15 RX ADMIN — MICONAZOLE NITRATE 1 APPLICATION: 2 POWDER TOPICAL at 21:28

## 2025-07-15 NOTE — PROGRESS NOTES
[facility] HOSPITALIST PROGRESS NOTE     Patient Identification:  Name:  Monae Chauhan  Age:  69 y.o.  Sex:  female  :  1955  MRN:  [id number]  Visit Number:  [id number]  ROOM: [address] [place] [address]     Primary Care Provider:  Zachary Sullivan MD    Length of stay in inpatient status:  4    Subjective     History of Presenting Illness:    Patient seen and examined today, no family present at bedside. Per nursing staff patient was cooperative with PT/OT but has been withdrawn most of the time. Patient denied shortness of breath or pain anywhere at time of my exam, and her only complaint was feeling tired.    Objective     Current Hospital Meds:ALPRAZolam, 0.25 mg, Oral, BID  amLODIPine, 10 mg, Oral, Daily  apixaban, 5 mg, Oral, BID  [Held by provider] carvedilol, 3.125 mg, Oral, BID With Meals  fentaNYL, 1 patch, Transdermal, Q72H   And  [START ON 7/15/2025] Check Fentanyl Patch Placement, 1 each, Not Applicable, Q12H  clopidogrel, 75 mg, Oral, Daily  [Held by provider] furosemide, 20 mg, Oral, Daily  gabapentin, 800 mg, Oral, Q8H  insulin glargine, 10 Units, Subcutaneous, Nightly  insulin lispro, 2 Units, Subcutaneous, TID With Meals  insulin lispro, 2-9 Units, Subcutaneous, 4x Daily AC & at Bedtime  methocarbamol, 750 mg, Oral, 4x Daily  miconazole, 1 Application, Topical, Q12H  nicotine, 1 patch, Transdermal, Q24H  rosuvastatin, 20 mg, Oral, Daily  senna-docusate sodium, 2 tablet, Oral, BID  sodium chloride, 10 mL, Intravenous, Q12H  [Held by provider] valsartan, 320 mg, Oral, Daily    Pharmacy Consult,         Current Antimicrobial Therapy:  Anti-Infectives (From admission, onward)      None          Current Diuretic Therapy:  Diuretics (From admission, onward)      Ordered     Dose/Rate Route Frequency Start Stop    07/10/25 1047  [Held by provider]  furosemide (LASIX) tablet 20 mg        (On hold since Thu 7/10/2025 at 1222 until manually unheld; held by Marco Rasheed MDHold Reason:  Abnormal Vitals)   Ordering Provider: Taran Harding MD    20 mg Oral Daily 07/10/25 1200            ----------------------------------------------------------------------------------------------------------------------  Vital Signs:  Temp:  [97 °F (36.1 °C)-98.9 °F (37.2 °C)] 97 °F (36.1 °C)  Heart Rate:  [53-70] 70  Resp:  [18-20] 20  BP: (113-156)/(51-75) 156/75  SpO2:  [95 %-96 %] 96 %  on   ;   Device (Oxygen Therapy): room air  Body mass index is 30.15 kg/m².    Wt Readings from Last 3 Encounters:   07/10/25 77.2 kg (170 lb 3.1 oz)   06/19/25 78.6 kg (173 lb 3.2 oz)   05/29/25 78.5 kg (173 lb)     Intake & Output (last 3 days)         07/12 0701 07/13 0700 07/13 0701  07/14 0700 07/14 0701  07/15 0700    P.O. 1320 600 705    Blood       Total Intake(mL/kg) 1320 (17.1) 600 (7.8) 705 (9.1)    Urine (mL/kg/hr) 1250 (0.7) 1000 (0.5) 600 (0.6)    Total Output 1250 1000 600    Net +70 -400 +105                 Diet: Diabetic; Consistent Carbohydrate; Fluid Consistency: Thin (IDDSI 0)  ----------------------------------------------------------------------------------------------------------------------  Physical exam:   Constitutional:  Well-developed and well-nourished.  No acute distress.      HENT:  Head:  Normocephalic and atraumatic.    Cardiovascular:  Normal rate, regular rhythm   Pulmonary/Chest:  No respiratory distress, minimal wheezing bilaterally  Musculoskeletal: s/p left AKA  Neurological: Awake, alert, no focal deficit on gross examination. No slurred speech or facial droop.   Skin:  Skin is warm and dry. Amputation site is covered with bandage and appears clean and dry.  Peripheral vascular:  No cyanosis, no edema.  Psychiatric: Depressed mood, withdrawn  Edited by: Katarina Becker DO at 7/14/2025 2106  ----------------------------------------------------------------------------------------------------------------------  Results from last 7 days   Lab Units 07/14/25  0109 07/13/25  0116  "07/12/25  0150 07/11/25  1737 07/11/25 0309   WBC 10*3/mm3 8.02  --  9.24  --  10.00   HEMOGLOBIN g/dL 8.8* 8.6* 8.9*   < > 7.0*   HEMATOCRIT % 29.1* 28.8* 29.3*   < > 23.7*   MCV fL 85.6  --  84.9  --  86.8   MCHC g/dL 30.2*  --  30.4*  --  29.5*   PLATELETS 10*3/mm3 363  --  370  --  334    < > = values in this interval not displayed.         Results from last 7 days   Lab Units 07/14/25  0109 07/13/25  0116 07/12/25  0150 07/11/25  0309 07/10/25  1248   SODIUM mmol/L 139 139 139   < > 135*   POTASSIUM mmol/L 3.8 3.7 3.8   < > 4.6   MAGNESIUM mg/dL 2.4 1.9 1.9   < > 1.9   CHLORIDE mmol/L 105 105 107   < > 104   CO2 mmol/L 25.3 24.9 22.1   < > 21.3*   BUN mg/dL 12.6 9.5 9.8   < > 13.8   CREATININE mg/dL 0.81 0.76 0.85   < > 1.00   CALCIUM mg/dL 8.0* 8.0* 8.0*   < > 8.3*   IONIZED CALCIUM mmol/L  --  1.16  --   --   --    PHOSPHORUS mg/dL 3.1 2.9 2.6   < > 2.5   GLUCOSE mg/dL 161* 104* 45*   < > 220*   ALBUMIN g/dL 2.5*  --  2.5*  --  2.7*   BILIRUBIN mg/dL <0.2  --  0.2  --  0.2   ALK PHOS U/L 97  --  89  --  97   AST (SGOT) U/L 13  --  20  --  13   ALT (SGPT) U/L 6  --  6  --  <5    < > = values in this interval not displayed.   Estimated Creatinine Clearance: 64.5 mL/min (by C-G formula based on SCr of 0.81 mg/dL).  No results found for: \"AMMONIA\"              Glucose   Date/Time Value Ref Range Status   07/14/2025 1644 146 (H) 70 - 130 mg/dL Final     Comment:     Serial Number: [id number]:  [id number]   07/14/2025 1044 186 (H) 70 - 130 mg/dL Final     Comment:     Serial Number: [id number]:  [id number]   07/14/2025 0711 130 70 - 130 mg/dL Final     Comment:     Serial Number: [id number]:  [id number]   07/13/2025 1959 154 (H) 70 - 130 mg/dL Final     Comment:     Serial Number: [id number]:  [id number]   07/13/2025 1623 208 (H) 70 - 130 mg/dL Final     Comment:     Serial Number: [id number]:  [id number]   07/13/2025 1038 146 (H) 70 - 130 mg/dL Final     Comment:     Serial Number: " "[id number]:  [id number]   07/13/2025 0652 87 70 - 130 mg/dL Final     Comment:     Serial Number: [id number]:  [id number]   07/12/2025 2030 226 (H) 70 - 130 mg/dL Final     Comment:     Serial Number: [id number]:  [id number]     Lab Results   Component Value Date    TSH 1.520 07/10/2025     No results found for: \"PREGTESTUR\", \"PREGSERUM\", \"HCG\", \"HCGQUANT\"  Pain Management Panel           No data to display              Brief Urine Lab Results       None          I have personally looked at the labs and they are summarized above.  ----------------------------------------------------------------------------------------------------------------------  Detailed radiology reports for the last 24 hours:  Imaging Results (Last 24 Hours)       ** No results found for the last 24 hours. **          Assessment & Plan      #PAD with previous stenting and reocclusion of SFA  #Left lower extremity ulcer s/p AKA  - Management per primary team, General Surgery  - Continue PT/OT  - Dressing changes per surgery    #Afib   #Hyperlipidemia  #Coronary artery disease  #Hypertension  - Continue amlodipine, eliquis, plavix, statin  - Valsartan, lasix, and coreg have been held due to low BP/HR. BP has now begun trending up. Consider restarting valsartan if BP is staying above goal of SBP<140. HR has been in the 50s-60s so continue holding carvedilol for now. Monitor VS per protocol.     #Adjustment disorder with depressed mood  - Appreciate Psychiatry consult. Recommendation was to follow up outpatient as she denied active suicidality.    Edited by: Katarina Becker DO at 7/14/2025 2109    Active VTE Prophylaxis  Pharmacologic:        Start     Dose Route Frequency Stop    07/11/25 0900  apixaban (ELIQUIS) tablet 5 mg         5 mg PO 2 Times Daily --                    Dispo: pending inpatient rehab     Katarina Becker DO  [facility]  07/14/25  21:06 EDT   "

## 2025-07-15 NOTE — NURSING NOTE
WOCN consulted for a left anterior thigh pressure injury and left anterior groin. Assessed patient and noted that the left anterior groin continues with MASD. It is more irritated, moist red with scant amount of bleeding noted within the fold; it remains blanchable. Scant amount of serosanguineous drainage noted. Continue with current wound care orders.    The left anterior thigh pressure injury is charted as a stage 2; however, it appears to be a DTPI measuring 1.3x0.8x0cm maroon/purple, nonblanchable, moist with a alonso wound skin that is blanchable pink/red warm and moist. Order to assess every shift, gently cleanse with foam cleanser and pat dry, apply Z guard BID and prn; place a pad between purwick and thigh for protection.     Adan score 17. PI prevention orders initiated.      07/15/25 0825   Wound 07/10/25 1401 Left anterior groin Other (Comments)   Placement Date/Time: 07/10/25 1401   Present on Original Admission: Yes  Side: Left  Orientation: anterior  Location: groin  Primary Wound Type: Other (Comments)  Secondary Wound Type - Other: (c)    Pressure Injury Stage OTH  (MASD)   Dressing Appearance open to air   Closure None   Base moist;red;bleeding;blanchable   Periwound maroon/purple;moist;warm  (blanchable)   Periwound Temperature warm   Periwound Skin Turgor soft   Drainage Characteristics/Odor bleeding controlled;serosanguineous   Drainage Amount scant   Wound 07/14/25 1913 Left anterior thigh Pressure Injury   Placement Date/Time: 07/14/25 1913   Present on Original Admission: No  Side: Left  Orientation: anterior  Location: thigh  Primary Wound Type: Pressure Injury   Pressure Injury Stage DTPI   Dressing Appearance open to air   Closure None   Base nonblanchable;purple;maroon/purple;moist   Periwound pink;redness;warm;moist   Periwound Temperature warm   Periwound Skin Turgor soft   Edges rolled/closed   Wound Length (cm) 1.3 cm   Wound Width (cm) 0.8 cm   Wound Depth (cm) 0 cm   Wound Surface  Area (cm^2) 0.82 cm^2   Wound Volume (cm^3) 0 cm^3   Drainage Amount none

## 2025-07-15 NOTE — PLAN OF CARE
Goal Outcome Evaluation:               Patient is in bed at this time. VSS on RA. Wound care completed per orders. PRN pain meds given. No acute changes or complaints at this time. Will continue POC.

## 2025-07-15 NOTE — PLAN OF CARE
Goal Outcome Evaluation:            Pt resting in bed at this time with no complaints or concerns. Wound care completed as ordered. Pt refused bath as she had one on day shift. No acute changes this shift.VSS Plan of care on going

## 2025-07-15 NOTE — CASE MANAGEMENT/SOCIAL WORK
Discharge Planning Assessment   [facility]     Patient Name: Monae Chauhan  MRN: [id number]  Today's Date: 7/15/2025    Admit Date: 7/10/2025       Discharge Plan       Row Name 07/15/25 1626       Plan    Plan Pt's peer to peer for  inpatient acute rehab was performed, denial upheld, option for fast appeal has been given. Pt provided permission for SS to speak with her niece Liliya. Pt's niece plans to call and do fast appeal. SS provided niece with contact number [phone]. SS faxed clinicals to fast appeal department fax [phone] for review. SS to follow.                  Continued Care and Services - Admitted Since 7/10/2025       Destination       Service Provider Request Status Services Address Phone Fax Patient Preferred     [facility] -- [address] [place] [address] [phone] --       Internal Comment last updated by Alejandra Be BSW 7/15/2025 1622    Fast Appeal to be completed                [facility] Declined  Insurance Denial -- [address] [facility] [address] [phone] --    [facility] Declined  PT rec'd IPR instead -- [address] [place] [address] [facility] [address] [phone] --                    TAIWO Levine

## 2025-07-15 NOTE — DISCHARGE PLACEMENT REQUEST
"Monae Chauhan (69 y.o. Female)       Date of Birth   1955    Social Security Number       Address   [address] [place] [address]    Home Phone       MRN   [id number]       Mormonism   Jewish    Marital Status                               Admission Date   7/10/2025    Admission Type   Elective    Admitting Provider   Taran Harding MD    Attending Provider   Taran Harding MD    Department, Room/Bed   [address], 3339/1P       Discharge Date       Discharge Disposition       Discharge Destination                                 Attending Provider: Taran Harding MD    Allergies: Amoxicillin, Penicillins    Isolation: None   Infection: None   Code Status: CPR    Ht: 160 cm (63\")   Wt: 77.2 kg (170 lb 3.1 oz)    Admission Cmt: None   Principal Problem: 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 [L97.522]                   Active Insurance as of 7/10/2025       Primary Coverage       Payor Plan Insurance Group Employer/Plan Group    HUMANA MEDICARE REPLACEMENT HU[employer]NA MEDICARE ADVANTAGE [facility] [id number]       Payor Plan Address Payor Plan Phone Number Payor Plan Fax Number Effective Dates    [address] [address] [phone]  3/1/2025 - None Entered    [facility] [id number]         Subscriber Name Subscriber Birth Date Member ID       MONAE CHAUHAN 1955 [id number]                     Emergency Contacts        (Rel.) Home Phone Work Phone Mobile Phone    kathya jarquin (Relative) [phone] -- [phone]    OLENA CRUZ (Son) [phone] -- [phone]                 History & Physical        Taran Harding MD at 07/10/25 0813            H&P reviewed. The patient was examined and there are no changes to the H&P.          Electronically signed by Taran Harding MD at 07/10/25 0814   Source Note: H&P (View-Only)          Subjective   Monae Chauhan is a 69 y.o. female.     Chief Complaint: " ischemic foot ulcers    History of Present Illness She is a 67 yo smoker who refused to stop until just recently, and has PAD. She apparently has had more than one angioplasty/stent and was recommended for another but refused initially but has had a couple of procedures that now have failed.  She had develop a red spot on the dorsal lateral mid left foot  that has evolved in to a large necrotic ulcer as well as one on the anterior left lower leg. She had been on antibiotics but it has not improved. She had a MRI that is read as possible osteomyelitis.     The following portions of the patient's history were reviewed and updated as appropriate: current medications, past family history, past medical history, past social history, past surgical history and problem list.    Review of Systems   Constitutional:  Negative for activity change, appetite change, chills, fever and unexpected weight change.   HENT:  Negative for congestion, facial swelling and sore throat.    Eyes:  Negative for photophobia and visual disturbance.   Respiratory:  Negative for chest tightness, shortness of breath and wheezing.    Cardiovascular:  Negative for chest pain, palpitations and leg swelling.   Gastrointestinal:  Negative for abdominal distention, abdominal pain, anal bleeding, blood in stool, constipation, diarrhea, nausea, rectal pain and vomiting.   Endocrine: Negative for cold intolerance, heat intolerance, polydipsia and polyuria.   Genitourinary:  Negative for difficulty urinating, dysuria, flank pain and urgency.   Musculoskeletal:  Negative for back pain and myalgias.   Skin:  Positive for color change and wound. Negative for rash.   Allergic/Immunologic: Negative for immunocompromised state.   Neurological:  Positive for weakness. Negative for dizziness, seizures, syncope, light-headedness, numbness and headaches.   Hematological:  Negative for adenopathy. Does not bruise/bleed easily.   Psychiatric/Behavioral:  Negative for  behavioral problems and confusion. The patient is not nervous/anxious.        Objective   Physical Exam  Vitals reviewed.   Constitutional:       General: She is not in acute distress.     Appearance: She is well-developed. She is ill-appearing.      Comments: 5 cm necrotic ulcer on the dorsum of the left lateral foot, and 6 cm one on the anterior left lower leg about half way up   HENT:      Head: Normocephalic. No laceration. Hair is normal.      Right Ear: Hearing and ear canal normal.      Left Ear: Hearing and ear canal normal.      Nose: Nose normal.      Right Sinus: No maxillary sinus tenderness or frontal sinus tenderness.      Left Sinus: No maxillary sinus tenderness or frontal sinus tenderness.   Eyes:      General: Lids are normal.      Conjunctiva/sclera: Conjunctivae normal.      Pupils: Pupils are equal, round, and reactive to light.   Neck:      Thyroid: No thyroid mass or thyromegaly.      Vascular: No JVD.      Trachea: No tracheal tenderness or tracheal deviation.   Cardiovascular:      Rate and Rhythm: Normal rate and regular rhythm.      Heart sounds: No murmur heard.     No gallop.   Pulmonary:      Effort: Pulmonary effort is normal.      Breath sounds: No stridor. Wheezing present.   Chest:      Chest wall: No tenderness.   Abdominal:      General: Bowel sounds are normal. There is no distension.      Palpations: Abdomen is soft. There is no mass.      Tenderness: There is no abdominal tenderness. There is no guarding or rebound.      Hernia: No hernia is present.   Musculoskeletal:         General: No deformity.      Cervical back: Normal range of motion.   Lymphadenopathy:      Cervical: No cervical adenopathy.      Upper Body:      Right upper body: No supraclavicular adenopathy.      Left upper body: No supraclavicular adenopathy.   Skin:     General: Skin is warm and dry.      Coloration: Skin is not pale.      Findings: Erythema and lesion present. No rash.   Neurological:      Mental  Status: She is alert and oriented to person, place, and time.      Motor: No abnormal muscle tone.   Psychiatric:         Behavior: Behavior normal.         Thought Content: Thought content normal.         Past Medical History:   Diagnosis Date    Arthritis     Coronary artery disease     Depression     Diabetes mellitus     Elevated cholesterol     Full dentures     GERD (gastroesophageal reflux disease)     Hyperlipidemia     Hypertension     Myocardial infarction     Peripheral vascular disease     Psoriasis        Family History   Problem Relation Age of Onset    Breast cancer Maternal Grandmother 70    Cancer Mother     Diabetes Mother     Cancer Father     Diabetes Father        Social History     Tobacco Use    Smoking status: Former     Current packs/day: 1.00     Average packs/day: 1 pack/day for 44.0 years (44.0 ttl pk-yrs)     Types: Cigarettes     Start date: 6/25/1981    Smokeless tobacco: Never    Tobacco comments:     2-4 PPD off and on for the last 40 years   Vaping Use    Vaping status: Former    Substances: Nicotine, Flavoring    Devices: Refillable tank   Substance Use Topics    Alcohol use: No    Drug use: No       Past Surgical History:   Procedure Laterality Date    APPENDECTOMY      CARDIAC CATHETERIZATION N/A 01/09/2017    Procedure: Left Heart Cath;  Surgeon: David Diane MD;  Location: [facility] CATH INVASIVE LOCATION;  Service:     CARDIAC CATHETERIZATION Left 5/20/2024    Procedure: Peripheral angiography;  Surgeon: Jaiden Doyle MD;  Location: [facility] CATH INVASIVE LOCATION;  Service: Cardiovascular;  Laterality: Left;  Left Venogram -    CARDIAC CATHETERIZATION N/A 2/6/2025    Procedure: Peripheral angiography;  Surgeon: Fran Estevez MD;  [facility] CATH INVASIVE LOCATION;  Service: Cardiovascular;  Laterality: N/A;    CARDIAC CATHETERIZATION N/A 2/26/2025    Procedure: Peripheral angiography;  Surgeon: Jaiden Doyle MD;  Location: [facility] CATH INVASIVE LOCATION;   Service: Cardiovascular;  Laterality: N/A;    CARDIAC CATHETERIZATION N/A 2/26/2025    Procedure: Atherectomy-peripheral;  Surgeon: Jaiden Doyle MD;  Location: [facility] CATH INVASIVE LOCATION;  Service: Cardiovascular;  Laterality: N/A;    CARDIAC CATHETERIZATION N/A 2/26/2025    Procedure: Angioplasty-peripheral;  Surgeon: Jaiden Doyle MD;  Location:  [facility] CATH INVASIVE LOCATION;  Service: Cardiovascular;  Laterality: N/A;    CARDIAC CATHETERIZATION N/A 3/24/2025    Procedure: Peripheral angiography;  Surgeon: Jaiden Doyle MD;  Location:  [facility] CATH INVASIVE LOCATION;  Service: Cardiovascular;  Laterality: N/A;    CARDIAC CATHETERIZATION N/A 3/24/2025    Procedure: Atherectomy-peripheral;  Surgeon: Jaiden Doyle MD;  Location: [facility] CATH INVASIVE LOCATION;  Service: Cardiovascular;  Laterality: N/A;    CORONARY STENT PLACEMENT      CYSTOSCOPY BOTOX INJECTION OF BLADDER N/A 10/20/2021    Procedure: Cystoscopy Botox injection of bladder;  Surgeon: Say Robles MD;  Location: [facility];  Service: Urology;  Laterality: N/A;    FOOT SURGERY Left     GALLBLADDER SURGERY      INTRAVASCULAR ULTRASOUND N/A 2/26/2025    Procedure: Intravascular Ultrasound;  Surgeon: Jaiden Doyle MD;  Location: [facility] CATH INVASIVE LOCATION;  Service: Cardiovascular;  Laterality: N/A;    INTRAVASCULAR ULTRASOUND N/A 3/24/2025    Procedure: Intravascular Ultrasound;  Surgeon: Jaiden Dolye MD;  Location: [facility] CATH INVASIVE LOCATION;  Service: Cardiovascular;  Laterality: N/A;    LEG SURGERY Left     TUBAL ABDOMINAL LIGATION      VASCULAR SURGERY Left     Lower Extremity Vascular Stent-[facility]       Current Outpatient Medications   Medication Instructions    ALPRAZolam (XANAX) 0.25 mg, 2 Times Daily PRN    amLODIPine (NORVASC) 10 mg, Oral, Daily    apixaban (ELIQUIS) 5 mg, 2 Times Daily    carvedilol (COREG) 3.125 mg, 2 Times Daily With Meals    clopidogrel (PLAVIX) 75 mg, Oral, Daily    dicyclomine (BENTYL) 20 mg, 3 Times  Daily PRN    furosemide (LASIX) 20 MG tablet 1 tablet, Daily    gabapentin (NEURONTIN) 800 mg, Every 6 Hours PRN    HYDROcodone-acetaminophen (NORCO)  MG per tablet 1 tablet, Every 8 Hours PRN    insulin glargine (LANTUS, SEMGLEE) 30 Units, Nightly    Insulin Lispro (1 Unit Dial) (HUMALOG) 10 Units, Nightly    lidocaine (LIDODERM) 5 % 1 patch, Transdermal, Every 24 Hours, Remove & Discard patch within 12 hours or as directed by MD    potassium chloride (MICRO-K) 10 MEQ CR capsule Take 1 capsule by mouth Daily.    promethazine (PHENERGAN) 25 MG tablet 1 tablet, Every 6 Hours PRN    rosuvastatin (CRESTOR) 20 mg, Daily    Tirzepatide 5 mg, Weekly    traMADol (ULTRAM) 50 MG tablet Take 1 tablet by mouth 2 (Two) Times a Day As Needed for Moderate Pain.    valsartan (DIOVAN) 320 MG tablet 1 tablet, Daily         Assessment & Plan   Diagnoses and all orders for this visit:    1. Tobacco use (Primary)    2. 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 female.     Chief Complaint: ischemic foot ulcers    History of Present Illness She is a 67 yo smoker who refused to stop until just recently, and has PAD. She apparently has had more than one angioplasty/stent and was recommended for another but refused initially but has had a couple of procedures that now have failed.  She had develop a red spot on the dorsal lateral mid left foot  that has evolved in to a large necrotic ulcer as well as one on the anterior left lower leg. She had been on antibiotics but it has not improved. She had a MRI that is read as possible osteomyelitis.     The following portions of the patient's history were reviewed  and updated as appropriate: current medications, past family history, past medical history, past social history, past surgical history and problem list.    Review of Systems   Constitutional:  Negative for activity change, appetite change, chills, fever and unexpected weight change.   HENT:  Negative for congestion, facial swelling and sore throat.    Eyes:  Negative for photophobia and visual disturbance.   Respiratory:  Negative for chest tightness, shortness of breath and wheezing.    Cardiovascular:  Negative for chest pain, palpitations and leg swelling.   Gastrointestinal:  Negative for abdominal distention, abdominal pain, anal bleeding, blood in stool, constipation, diarrhea, nausea, rectal pain and vomiting.   Endocrine: Negative for cold intolerance, heat intolerance, polydipsia and polyuria.   Genitourinary:  Negative for difficulty urinating, dysuria, flank pain and urgency.   Musculoskeletal:  Negative for back pain and myalgias.   Skin:  Positive for color change and wound. Negative for rash.   Allergic/Immunologic: Negative for immunocompromised state.   Neurological:  Positive for weakness. Negative for dizziness, seizures, syncope, light-headedness, numbness and headaches.   Hematological:  Negative for adenopathy. Does not bruise/bleed easily.   Psychiatric/Behavioral:  Negative for behavioral problems and confusion. The patient is not nervous/anxious.        Objective   Physical Exam  Vitals reviewed.   Constitutional:       General: She is not in acute distress.     Appearance: She is well-developed. She is ill-appearing.      Comments: 5 cm necrotic ulcer on the dorsum of the left lateral foot, and 6 cm one on the anterior left lower leg about half way up   HENT:      Head: Normocephalic. No laceration. Hair is normal.      Right Ear: Hearing and ear canal normal.      Left Ear: Hearing and ear canal normal.      Nose: Nose normal.      Right Sinus: No maxillary sinus tenderness or frontal sinus  tenderness.      Left Sinus: No maxillary sinus tenderness or frontal sinus tenderness.   Eyes:      General: Lids are normal.      Conjunctiva/sclera: Conjunctivae normal.      Pupils: Pupils are equal, round, and reactive to light.   Neck:      Thyroid: No thyroid mass or thyromegaly.      Vascular: No JVD.      Trachea: No tracheal tenderness or tracheal deviation.   Cardiovascular:      Rate and Rhythm: Normal rate and regular rhythm.      Heart sounds: No murmur heard.     No gallop.   Pulmonary:      Effort: Pulmonary effort is normal.      Breath sounds: No stridor. Wheezing present.   Chest:      Chest wall: No tenderness.   Abdominal:      General: Bowel sounds are normal. There is no distension.      Palpations: Abdomen is soft. There is no mass.      Tenderness: There is no abdominal tenderness. There is no guarding or rebound.      Hernia: No hernia is present.   Musculoskeletal:         General: No deformity.      Cervical back: Normal range of motion.   Lymphadenopathy:      Cervical: No cervical adenopathy.      Upper Body:      Right upper body: No supraclavicular adenopathy.      Left upper body: No supraclavicular adenopathy.   Skin:     General: Skin is warm and dry.      Coloration: Skin is not pale.      Findings: Erythema and lesion present. No rash.   Neurological:      Mental Status: She is alert and oriented to person, place, and time.      Motor: No abnormal muscle tone.   Psychiatric:         Behavior: Behavior normal.         Thought Content: Thought content normal.         Past Medical History:   Diagnosis Date    Arthritis     Coronary artery disease     Depression     Diabetes mellitus     Elevated cholesterol     Full dentures     GERD (gastroesophageal reflux disease)     Hyperlipidemia     Hypertension     Myocardial infarction     Peripheral vascular disease     Psoriasis        Family History   Problem Relation Age of Onset    Breast cancer Maternal Grandmother 70    Cancer Mother      Diabetes Mother     Cancer Father     Diabetes Father        Social History     Tobacco Use    Smoking status: Former     Current packs/day: 1.00     Average packs/day: 1 pack/day for 44.0 years (44.0 ttl pk-yrs)     Types: Cigarettes     Start date: 6/25/1981    Smokeless tobacco: Never    Tobacco comments:     2-4 PPD off and on for the last 40 years   Vaping Use    Vaping status: Former    Substances: Nicotine, Flavoring    Devices: Refillable tank   Substance Use Topics    Alcohol use: No    Drug use: No       Past Surgical History:   Procedure Laterality Date    APPENDECTOMY      CARDIAC CATHETERIZATION N/A 01/09/2017    Procedure: Left Heart Cath;  Surgeon: David Diane MD;  Location:  [facility] CATH INVASIVE LOCATION;  Service:     CARDIAC CATHETERIZATION Left 5/20/2024    Procedure: Peripheral angiography;  Surgeon: Jaiden Doyle MD;  Location: [facility] CATH INVASIVE LOCATION;  Service: Cardiovascular;  Laterality: Left;  Left Venogram -    CARDIAC CATHETERIZATION N/A 2/6/2025    Procedure: Peripheral angiography;  Surgeon: Fran Estevez MD;  Location: [facility] CATH INVASIVE LOCATION;  Service: Cardiovascular;  Laterality: N/A;    CARDIAC CATHETERIZATION N/A 2/26/2025    Procedure: Peripheral angiography;  Surgeon: Jaiden Doyle MD;  Location:  [facility] CATH INVASIVE LOCATION;  Service: Cardiovascular;  Laterality: N/A;    CARDIAC CATHETERIZATION N/A 2/26/2025    Procedure: Atherectomy-peripheral;  Surgeon: Jaiden Doyle MD;  Location:  [facility] CATH INVASIVE LOCATION;  Service: Cardiovascular;  Laterality: N/A;    CARDIAC CATHETERIZATION N/A 2/26/2025    Procedure: Angioplasty-peripheral;  Surgeon: Jaiden Doyle MD;  Location:  [facility] CATH INVASIVE LOCATION;  Service: Cardiovascular;  Laterality: N/A;    CARDIAC CATHETERIZATION N/A 3/24/2025    Procedure: Peripheral angiography;  Surgeon: Jaiden Doyle MD;  Location:  [facility] CATH INVASIVE LOCATION;  Service: Cardiovascular;  Laterality: N/A;    CARDIAC  CATHETERIZATION N/A 3/24/2025    Procedure: Atherectomy-peripheral;  Surgeon: Jaiden Doyle MD;  Location:  [facility] CATH INVASIVE LOCATION;  Service: Cardiovascular;  Laterality: N/A;    CORONARY STENT PLACEMENT      CYSTOSCOPY BOTOX INJECTION OF BLADDER N/A 10/20/2021    Procedure: Cystoscopy Botox injection of bladder;  Surgeon: Say Robles MD;  Location:  [facility];  Service: Urology;  Laterality: N/A;    FOOT SURGERY Left     GALLBLADDER SURGERY      INTRAVASCULAR ULTRASOUND N/A 2/26/2025    Procedure: Intravascular Ultrasound;  Surgeon: Jaiden Doyle MD;  Location:  [facility] CATH INVASIVE LOCATION;  Service: Cardiovascular;  Laterality: N/A;    INTRAVASCULAR ULTRASOUND N/A 3/24/2025    Procedure: Intravascular Ultrasound;  Surgeon: Jaiden Doyle MD;  Location:  [facility] CATH INVASIVE LOCATION;  Service: Cardiovascular;  Laterality: N/A;    LEG SURGERY Left     TUBAL ABDOMINAL LIGATION      VASCULAR SURGERY Left     Lower Extremity [facility]       Current Outpatient Medications   Medication Instructions    ALPRAZolam (XANAX) 0.25 mg, 2 Times Daily PRN    amLODIPine (NORVASC) 10 mg, Oral, Daily    apixaban (ELIQUIS) 5 mg, 2 Times Daily    carvedilol (COREG) 3.125 mg, 2 Times Daily With Meals    clopidogrel (PLAVIX) 75 mg, Oral, Daily    dicyclomine (BENTYL) 20 mg, 3 Times Daily PRN    furosemide (LASIX) 20 MG tablet 1 tablet, Daily    gabapentin (NEURONTIN) 800 mg, Every 6 Hours PRN    HYDROcodone-acetaminophen (NORCO)  MG per tablet 1 tablet, Every 8 Hours PRN    insulin glargine (LANTUS, SEMGLEE) 30 Units, Nightly    Insulin Lispro (1 Unit Dial) (HUMALOG) 10 Units, Nightly    lidocaine (LIDODERM) 5 % 1 patch, Transdermal, Every 24 Hours, Remove & Discard patch within 12 hours or as directed by MD    potassium chloride (MICRO-K) 10 MEQ CR capsule Take 1 capsule by mouth Daily.    promethazine (PHENERGAN) 25 MG tablet 1 tablet, Every 6 Hours PRN    rosuvastatin (CRESTOR) 20 mg, Daily     Tirzepatide 5 mg, Weekly    traMADol (ULTRAM) 50 MG tablet Take 1 tablet by mouth 2 (Two) Times a Day As Needed for Moderate Pain.    valsartan (DIOVAN) 320 MG tablet 1 tablet, Daily         Assessment & Plan   Diagnoses and all orders for this visit:    1. Tobacco use (Primary)    2. 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    Schedule left AKA             This document has been electronically signed by Taran Harding MD   June 19, 2025 13:17 EDT    Electronically signed by Taran Harding MD at 06/19/25 1333       Vital Signs (last day)       Date/Time Temp Temp src Pulse Resp BP Patient Position SpO2    07/15/25 1400 98.7 (37.1) Oral 59 18 114/56 Lying --    07/15/25 1000 98.3 (36.8) Oral 71 18 146/66 Lying --    07/15/25 0700 98.1 (36.7) Oral 67 16 125/56 Lying --    07/15/25 0500 -- -- 69 -- 148/97 -- --    07/15/25 0209 -- -- 65 -- -- -- 91    07/15/25 0200 98.6 (37) Oral -- 18 129/55 Lying --    07/14/25 2200 98.5 (36.9) Oral 66 18 152/56 Sitting 93    07/14/25 1800 97 (36.1) Oral 70 20 156/75 Sitting 96    07/14/25 1400 97.8 (36.6) Oral 62 18 133/62 Lying 95    07/14/25 1000 98.3 (36.8) Oral 53 18 113/51 Lying --    07/14/25 0700 98.3 (36.8) Oral 61 18 146/62 Lying --    07/14/25 0200 98.9 (37.2) Oral -- 18 144/65 Lying --          Current Facility-Administered Medications   Medication Dose Route Frequency Provider Last Rate Last Admin    ALPRAZolam (XANAX) tablet 0.25 mg  0.25 mg Oral BID Marco Rasheed MD   0.25 mg at 07/15/25 0843    amLODIPine (NORVASC) tablet 10 mg  10 mg Oral Daily Marco Rasheed MD   10 mg at 07/15/25 0844    apixaban (ELIQUIS) tablet 5 mg  5 mg Oral BID Taran Harding MD   5 mg at 07/15/25 0844    sennosides-docusate (PERICOLACE) 8.6-50 MG per tablet 2 tablet  2 tablet Oral BID Taran Harding MD   2 tablet at 07/14/25 0833    And    polyethylene glycol (MIRALAX) packet 17 g  17 g Oral Daily PRN Taran Harding MD        And     bisacodyl (DULCOLAX) EC tablet 5 mg  5 mg Oral Daily PRN Taran Harding MD        And    bisacodyl (DULCOLAX) suppository 10 mg  10 mg Rectal Daily PRN Taran Harding MD        Calcium Replacement - Follow Nurse / BPA Driven Protocol   Not Applicable PRN Marco Rasheed MD        [Held by provider] carvedilol (COREG) tablet 3.125 mg  3.125 mg Oral BID With Meals Taran Harding MD   3.125 mg at 07/11/25 1729    fentaNYL (DURAGESIC) 12 MCG/HR 1 patch  1 patch Transdermal Q72H Taran Harding MD   1 patch at 07/14/25 1036    And    Check Fentanyl Patch Placement  1 each Not Applicable Q12H Taran Harding MD        clopidogrel (PLAVIX) tablet 75 mg  75 mg Oral Daily Taran Harding MD   75 mg at 07/15/25 0844    dextrose (D50W) (25 g/50 mL) IV injection 25 g  25 g Intravenous Q15 Min PRN Marco Rasheed MD        dextrose (GLUTOSE) oral gel 15 g  15 g Oral Q15 Min PRN Marco Rasheed MD        [Held by provider] furosemide (LASIX) tablet 20 mg  20 mg Oral Daily Taran Harding MD   20 mg at 07/10/25 1117    gabapentin (NEURONTIN) capsule 800 mg  800 mg Oral Q8H Marco Rasheed MD   800 mg at 07/15/25 0521    glucagon HCl (Diagnostic) injection 1 mg  1 mg Intramuscular Q15 Min PRN Marco Rahseed MD        hydrOXYzine (ATARAX) tablet 25 mg  25 mg Oral Q6H PRN Taran Harding MD   25 mg at 07/11/25 2210    insulin glargine (LANTUS, SEMGLEE) injection 10 Units  10 Units Subcutaneous Nightly Marco Rasheed MD   10 Units at 07/14/25 2155    Insulin Lispro (humaLOG) injection 2 Units  2 Units Subcutaneous TID With Meals Marco Rasheed MD   2 Units at 07/15/25 1202    Insulin Lispro (humaLOG) injection 2-9 Units  2-9 Units Subcutaneous 4x Daily AC & at Bedtime Marco Rasheed MD   2 Units at 07/14/25 0729    Magnesium Cardiology Dose Replacement - Follow Nurse / BPA Driven Protocol   Not Applicable PRN Marco Rasheed MD        methocarbamol (ROBAXIN) tablet 750 mg  750 mg Oral 4x Daily  Rosa Salgado MD   750 mg at 07/15/25 1202    miconazole (MICOTIN) 2 % powder 1 Application  1 Application Topical Q12H Marco Rasheed MD   1 Application at 07/15/25 0845    nicotine (NICODERM CQ) 21 MG/24HR patch 1 patch  1 patch Transdermal Q24H Marco Rasheed MD   1 patch at 07/15/25 0845    ondansetron ODT (ZOFRAN-ODT) disintegrating tablet 4 mg  4 mg Oral Q6H PRN Taran Harding MD   4 mg at 07/14/25 1217    oxyCODONE (ROXICODONE) immediate release tablet 10 mg  10 mg Oral Q4H PRN Rosa Salgado MD   10 mg at 07/15/25 1050    Pharmacy Consult   Not Applicable Continuous PRN Marco Rasheed MD        Phosphorus Replacement - Follow Nurse / BPA Driven Protocol   Not Applicable PRN Marco Rasheed MD        Potassium Replacement - Follow Nurse / BPA Driven Protocol   Not Applicable PRN Marco Rasheed MD        promethazine (PHENERGAN) tablet 25 mg  25 mg Oral Q6H PRN Taran Harding MD        rosuvastatin (CRESTOR) tablet 20 mg  20 mg Oral Daily Taran Harding MD   20 mg at 07/15/25 0844    sodium chloride 0.9 % flush 10 mL  10 mL Intravenous Q12H Taran Harding MD   10 mL at 07/15/25 0845    sodium chloride 0.9 % flush 10 mL  10 mL Intravenous PRN Taran Harding MD        sodium chloride 0.9 % infusion 40 mL  40 mL Intravenous PRN Taran Harding MD        [Held by provider] valsartan (DIOVAN) tablet 320 mg  320 mg Oral Daily Taran Harding MD   320 mg at 07/10/25 1117        Operative/Procedure Notes (most recent note)        Taran Harding MD at 07/10/25 0913          AMPUTATION ABOVE KNEE  Procedure Note    Monae Chauhan  7/10/2025    Pre-op Diagnosis:   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    Staff:   Circulator: Basilia Henderson RN  Scrub Person:  Lorena Simons  Assistant: Taran Andrade    Estimated Blood Loss: 200ml    Specimens:                Order Name Source Comment Collection Info Order Time   TISSUE EXAM, [facility] ([facility]) Leg, Left  Collected By: Taran Harding MD 7/10/2025  9:15 AM     Release to patient   Routine Release              Drains: * No LDAs found *    Procedure: The left leg was prepped and draped. The incision was made above the knee with the scalpel  and cautery used to go down through the subcutaneous tissue, fascia, and muscle. Vicryl sutures and ties were used on bleeders. The bone was cleared off proximally and powered saw used to divide the bone. The wound was irrigated and the fascia and muscle closed over the bone. The skin was closed with nylon sutures. The wound was closed with local and a dressing applied.     Findings:             Complications: none   Grafts / Implants N/A    Taran Harding MD     Date: 7/10/2025  Time: 09:58 EDT    Electronically signed by Taran Harding MD at 07/10/25 1001          Physician Progress Notes (most recent note)        Taran Harding MD at 07/15/25 1230          Post left AKA    She is complaining of pain in spite of a fentanyl patch and percocet 10 q4 h prn. She apparently cannot go to rehab or SNF with IV pain meds. She is also wondering if a a muscle relaxer could be changed. Her wound looks fine and she is afeb and vss. Check with hospitalist on muscle relaxer.    Electronically signed by Taran Harding MD at 07/15/25 1232          Consult Notes (most recent note)        Carlos Valles MD at 07/12/25 1420        Consult Orders    1. Inpatient Psychiatrist Consult [[id number]] ordered by Rosa Salgado MD at 07/12/25 0858                                                                         Psychiatry Consult     Clinician: Carlos Valles MD  14:20 EDT 07/12/25    Reason for Consult: SI    HPI: 69 y.o. female post op AKA for chronic wound.  After consulted  for suicidal thoughts.  I spoke to the patient and her sister who was in the room with her.  Her  was also briefly in the room.  Patient reports that she has been adjusting to the reality of the recent surgery and the life changes that will occur.  She has had some passive [facility] but is not actively suicidal.  She names numerous reasons to continue living including her kerri, her family, and no desire to kill herself.  She denies any prior history of suicide attempts and her sister confirms.  She reports that her mental health has been generally okay until a week ago when she started worrying about her surgery.  She has never seen a psychiatrist before and does not feel that she has been persistently and significantly depressed for an extended period of time.  She has been feeling down and distressed with the stress of the recent surgery and the accompanying changes but feels that this is a natural reaction to the circumstances.  Patient has a good social support system including her family and her Confucianist and feels like she is going to be okay.      Available medical/psychiatric records reviewed and incorporated into the current document.     Past Psychiatric History: Denies prior psychiatric diagnoses or encounters.  No prior history of suicide attempts.      Substance Abuse History: Denies    Social History: Patient lives alone.  She has 2 children.  Her sister is with her in the room today and is supportive.  She is connected with her Confucianist and has a good source of social support.  He understands that she is going to have to make some changes to her living situation and is accepting of that reality.    Family History   Problem Relation Age of Onset    Breast cancer Maternal Grandmother 70    Cancer Mother     Diabetes Mother     Cancer Father     Diabetes Father         Allergies   Allergen Reactions    Amoxicillin Rash    Penicillins Rash     Beta lactam  allergy details  Antibiotic reaction: (!) shortness of breath  Age at reaction: adult  Dose to reaction time: (!) hours  Reason for antibiotic: unknown  Epinephrine required for reaction?: unknown  Tolerated antibiotics: unknown           Past Medical History:   Diagnosis Date    Arthritis     Coronary artery disease     Depression     Diabetes mellitus     Elevated cholesterol     Full dentures     GERD (gastroesophageal reflux disease)     Hyperlipidemia     Hypertension     Myocardial infarction     Peripheral vascular disease     Psoriasis        Prior to Admission medications    Medication Sig Start Date End Date Taking? Authorizing Provider   ALPRAZolam (XANAX) 0.25 MG tablet Take 1 tablet by mouth 2 (Two) Times a Day As Needed for Anxiety.   Yes Sal Burdick MD   amLODIPine (NORVASC) 10 MG tablet Take 1 tablet by mouth Daily. 2/8/25  Yes Ofe Winslow APRN   carvedilol (COREG) 3.125 MG tablet Take 1 tablet by mouth 2 (Two) Times a Day With Meals.   Yes Sal Burdick MD   furosemide (LASIX) 20 MG tablet Take 1 tablet by mouth Daily. 4/16/24  Yes Sal Burdick MD   gabapentin (NEURONTIN) 800 MG tablet Take 1 tablet by mouth Every 6 (Six) Hours As Needed (nerve pain). 5/7/20  Yes Sal Burdick MD   HYDROcodone-acetaminophen (NORCO)  MG per tablet Take 1 tablet by mouth Every 6 (Six) Hours As Needed for Moderate Pain.   Yes Sal Burdick MD   hydrOXYzine pamoate (VISTARIL) 25 MG capsule Take 1 capsule by mouth Every 6 (Six) Hours As Needed for Anxiety.   Yes Sal Burdick MD   Insulin Glargine (LANTUS SOLOSTAR) 100 UNIT/ML injection pen Inject 30 Units under the skin into the appropriate area as directed Every Night.   Yes Sal Burdick MD   Insulin Lispro, 1 Unit Dial, (HUMALOG) 100 UNIT/ML solution pen-injector Inject 12 Units under the skin into the appropriate area as directed Every Night.   Yes Sal Burdick MD   lidocaine (LIDODERM) 5  % Place 1 patch on the skin as directed by provider Daily. Remove & Discard patch within 12 hours or as directed by MD 2/1/24  Yes Judith Hutson APRN   lidocaine (XYLOCAINE) 2 % jelly Apply 1 g topically to the appropriate area as directed 2 (Two) Times a Day.   Yes Sal Burdick MD   potassium chloride (MICRO-K) 10 MEQ CR capsule Take 1 capsule by mouth Daily. 4/16/24  Yes Sal Burdick MD   promethazine (PHENERGAN) 25 MG tablet Take 1 tablet by mouth Every 6 (Six) Hours As Needed for Nausea or Vomiting. 5/16/24  Yes Sal Burdick MD   rosuvastatin (CRESTOR) 20 MG tablet Take 1 tablet by mouth Daily.   Yes Sal Burdick MD   traMADol (ULTRAM) 50 MG tablet Take 1 tablet by mouth 2 (Two) Times a Day As Needed for Moderate Pain.   Yes Sal Burdick MD   valsartan (DIOVAN) 320 MG tablet Take 1 tablet by mouth Daily. 4/16/24  Yes Sal Burdick MD   apixaban (ELIQUIS) 5 MG tablet tablet Take 1 tablet by mouth 2 (Two) Times a Day.    ProviderSal MD   clopidogrel (PLAVIX) 75 MG tablet Take 1 tablet by mouth Daily. 1/10/17   David Diane MD   Tirzepatide 5 MG/0.5ML solution auto-injector Inject 5 mg under the skin into the appropriate area as directed 1 (One) Time Per Week. 6/12/24   Sal Burdick MD        Temp:  [98 °F (36.7 °C)-98.4 °F (36.9 °C)] 98.4 °F (36.9 °C)  Heart Rate:  [65-69] 69  Resp:  [16-22] 20  BP: (133-156)/(52-70) 156/67    Mental Status Exam  Mood: euthymic  Affect: mood congruent  Thought Processes: linear  Thought Content: No Delusions voiced  Hallucinations: Denies  Suicidal Thoughts: Denies  Suicidal Plan/Intent: Denies      Interval Review of Systems:   General ROS: negative for - fever or malaise  Endocrine ROS: negative for - palpitations  Respiratory ROS: no cough, shortness of breath, or wheezing  Cardiovascular ROS: no chest pain or dyspnea on exertion  Gastrointestinal ROS: no abdominal pain,no black or bloody  stools  Neuro: negative  Skin: negative      Physical Exam:  Not indicated for a psychiatric consult      Diagnosis:  Adjustment Disorder with Depressed Mood    Recommendations:  Patient is not suicidal at this time.  She may follow up with the outpatient clinic at the [facility].  Call for appointment when the office opens on Monday.              Electronically signed by Carlos Valles MD at 25 1430          Physical Therapy Notes (most recent note)        Sujey Small, PT at 25 1030  Version 2 of 2         Acute Care - Physical Therapy Treatment Note  ASHLEY Elaine     Patient Name: Monae Chauhan  : 1955  MRN: [id number]  Today's Date: 2025   Onset of Illness/Injury or Date of Surgery: 07/10/25 (admission date)  Visit Dx:     ICD-10-CM ICD-9-CM   1. 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  L97.522 707.15     Patient Active Problem List   Diagnosis    CAD (coronary artery disease)    Essential hypertension    Mixed hyperlipidemia    Type 2 diabetes mellitus    PAD (peripheral artery disease)    Smoker    Atherosclerosis of native arteries of extremities with rest pain, bilateral legs    Detrusor instability    Open wound    Diabetes mellitus    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    Critical limb ischemia of both lower extremities with rest pain    Tobacco use    Femoral artery occlusion, left    Rest pain of both lower extremities due to atherosclerosis    Status post amputation     Past Medical History:   Diagnosis Date    Arthritis     Coronary artery disease     Depression     Diabetes mellitus     Elevated cholesterol     Full dentures     GERD (gastroesophageal reflux disease)     Hyperlipidemia     Hypertension     Myocardial infarction     Peripheral vascular disease     Psoriasis      Past Surgical History:   Procedure Laterality Date    ABOVE KNEE AMPUTATION Left 7/10/2025    Procedure:  AMPUTATION ABOVE KNEE;  Surgeon: Taran Harding MD;  Location: [facility] OR;  Service: General;  Laterality: Left;    APPENDECTOMY      CARDIAC CATHETERIZATION N/A 01/09/2017    Procedure: Left Heart Cath;  Surgeon: David Diane MD;  [facility]:  [facility];  Service:     CARDIAC CATHETERIZATION Left 05/20/2024    Procedure: Peripheral angiography;  Surgeon: Jaiden Doyle MD;  [facility]:  [facility];  Service: Cardiovascular;  Laterality: Left;  Left Venogram -    CARDIAC CATHETERIZATION N/A 02/06/2025    Procedure: Peripheral angiography;  Surgeon: Fran Estevez MD;  Location:  [facility];  Service: Cardiovascular;  Laterality: N/A;    CARDIAC CATHETERIZATION N/A 02/26/2025    Procedure: Peripheral angiography;  Surgeon: Jaiden Doyle MD;  Location:  [facility];  Service: Cardiovascular;  Laterality: N/A;    CARDIAC CATHETERIZATION N/A 02/26/2025    Procedure: Atherectomy-peripheral;  Surgeon: Jaiden Doyle MD;  Location:  [facility];  Service: Cardiovascular;  Laterality: N/A;    CARDIAC CATHETERIZATION N/A 02/26/2025    Procedure: Angioplasty-peripheral;  Surgeon: Jaiden Doyle MD;  Location:  [facility];  Service: Cardiovascular;  Laterality: N/A;    CARDIAC CATHETERIZATION N/A 03/24/2025    Procedure: Peripheral angiography;  Surgeon: Jaiden Doyle MD;  Location:  [facility];  Service: Cardiovascular;  Laterality: N/A;    CARDIAC CATHETERIZATION N/A 03/24/2025    Procedure: Atherectomy-peripheral;  Surgeon: Jaiden Doyle MD;  Location:  [facility];  Service: Cardiovascular;  Laterality: N/A;    COLONOSCOPY      CORONARY STENT PLACEMENT      CYSTOSCOPY BOTOX INJECTION OF BLADDER N/A 10/20/2021    Procedure: Cystoscopy Botox injection of bladder;  Surgeon: Say Robles MD;  Location: [facility] OR;  Service: Urology;  Laterality: N/A;    ENDOSCOPY       FOOT SURGERY Left     GALLBLADDER SURGERY      INTRAVASCULAR ULTRASOUND N/A 02/26/2025    Procedure: Intravascular Ultrasound;  Surgeon: Jaiden Doyle MD;  Location:  [facility];  Service: Cardiovascular;  Laterality: N/A;    INTRAVASCULAR ULTRASOUND N/A 03/24/2025    Procedure: Intravascular Ultrasound;  Surgeon: Jaiden Doyle MD;  Location:  [facility];  Service: Cardiovascular;  Laterality: N/A;    LEG SURGERY Left     TUBAL ABDOMINAL LIGATION      VASCULAR SURGERY Left     Lower Extremity [facility]ascular Stent-[facility]     PT Assessment (Last 12 Hours)       PT Evaluation and Treatment       Row Name 07/14/25 1030          Physical Therapy Time and Intention    Subjective Information complains of;pain  -KH (r) DH (t) KH (c)     Document Type therapy note (daily note)  -KH (r) DH (t) KH (c)     Mode of Treatment physical therapy  -KH (r) DH (t) KH (c)     Patient Effort good  -KH (r) DH (t) KH (c)     Symptoms Noted During/After Treatment fatigue;increased pain  -KH (r) DH (t) KH (c)       Row Name 07/14/25 1030          General Information    Equipment Issued to Patient gait belt  -KH (r) DH (t) KH (c)       Row Name 07/14/25 1030          Pain Scale: FACES Pre/Post-Treatment    Pain: FACES Scale, Pretreatment 2-->hurts little bit  -KH (r) DH (t) KH (c)     Posttreatment Pain Rating 4-->hurts little more  -KH (r) DH (t) KH (c)     Pre/Posttreatment Pain Comment per observation  -KH (r) DH (t) KH (c)       Row Name 07/14/25 1030          Cognition    Orientation Status (Cognition) oriented x 4  -KH (r) DH (t) KH (c)     Follows Commands (Cognition) WNL  -KH (r) DH (t) KH (c)     Personal Safety Interventions supervised activity;nonskid shoes/slippers when out of bed;gait belt  -KH (r) DH (t) KH (c)       Row Name 07/14/25 1030          Bed Mobility    Bed Mobility supine-sit;rolling left;sit-supine  -KH     Rolling Left [place] (Bed Mobility) minimum assist (75% patient effort);1  person assist  -KH (r) DH (t) KH (c)     Supine-Sit [place] (Bed Mobility) moderate assist (50% patient effort);minimum assist (75% patient effort);1 person assist  -KH (r) DH (t) KH (c)     Sit-Supine [place] (Bed Mobility) standby assist;supervision;set up  -       Row Name 07/14/25 1030          Transfers    Transfers wheelchair transfer;chair-bed transfer  -       Row Name 07/14/25 1030          Chair-Bed Transfer    Chair-Bed [place] (Transfers) contact guard  -     Assistive Device (Chair-Bed Transfers) other (see comments)  transfer chair, bed  -       Row Name 07/14/25 1030          Wheelchair Transfer    Type (Wheelchair Transfer) stand pivot/stand step  -KH (r) DH (t) KH (c)     [place] Level (Wheelchair Transfer) modified independence;contact guard  -KH (r) DH (t) KH (c)     Assistive Device (Wheelchair Transfer) walker, front-wheeled  -KH (r) DH (t) KH (c)       Row Name 07/14/25 1030          Motor Skills    Therapeutic Exercise hip;knee  -KH (r) DH (t) KH (c)       Row Name 07/14/25 1030          Hip (Therapeutic Exercise)    Hip (Therapeutic Exercise) strengthening exercise  -KH (r) DH (t) KH (c)     Hip Strengthening (Therapeutic Exercise) aBduction;aDduction;left;10 repetitions;3 sets  -KH (r) DH (t) KH (c)       Row Name 07/14/25 1030          Knee (Therapeutic Exercise)    Knee (Therapeutic Exercise) strengthening exercise  -KH (r) DH (t) KH (c)     Knee Strengthening (Therapeutic Exercise) SAQ (short arc quad);10 repetitions;3 sets;left  -KH (r) DH (t) KH (c)       Row Name             Wound 07/10/25 0919 Left anterior thigh Surgical Open Surgical Incision    Wound - Properties Group Placement Date: 07/10/25  -JG Placement Time: 0919 -JG Side: Left  -JG Orientation: anterior  -JG Location: thigh  -JG Primary Wound Type: Surgical  -JG Secondary Wound Type - Surgical: Open Surg  -JG    Retired Wound - Properties Group Placement Date: 07/10/25  -JG Placement Time: 0919   -JG Side: Left  -JG Orientation: anterior  -JG Location: thigh  -JG    Retired Wound - Properties Group Placement Date: 07/10/25  -JG Placement Time: 0919  -JG Side: Left  -JG Orientation: anterior  -JG Location: thigh  -JG    Retired Wound - Properties Group Date first assessed: 07/10/25  -JG Time first assessed: 0919  -JG Side: Left  -JG Location: thigh  -JG      Row Name             Wound 07/10/25 1401 Left anterior groin Other (Comments)    Wound - Properties Group Placement Date: 07/10/25  -MH Placement Time: 1401  -MH Present on Original Admission: Y  -MH Side: Left  -MH Orientation: anterior  -MH Location: groin  -MH Primary Wound Type: Other  -MH Secondary Wound Type - Other: --  -MH, Cardiac Cath site     Retired Wound - Properties Group Placement Date: 07/10/25  -MH Placement Time: 1401  -MH Present on Original Admission: Y  -MH Side: Left  -MH Orientation: anterior  -MH Location: groin  -MH    Retired Wound - Properties Group Placement Date: 07/10/25  -MH Placement Time: 1401  -MH Present on Original Admission: Y  -MH Side: Left  -MH Orientation: anterior  -MH Location: groin  -MH    Retired Wound - Properties Group Date first assessed: 07/10/25  -MH Time first assessed: 1401  -MH Present on Original Admission: Y  -MH Side: Left  -MH Location: groin  -MH      Row Name             Wound 07/13/25 1432 Left distal arm Other (Comments)    Wound - Properties Group Placement Date: 07/13/25  -TP Placement Time: 1432  -TP Present on Original Admission: N  -TP Side: Left  -TP Orientation: distal  -TP Location: arm  -TP Primary Wound Type: Other  -TP Secondary Wound Type - Other: --  -TP, skin tear     Retired Wound - Properties Group Placement Date: 07/13/25  -TP Placement Time: 1432  -TP Present on Original Admission: N  -TP Side: Left  -TP Orientation: distal  -TP Location: arm  -TP    Retired Wound - Properties Group Placement Date: 07/13/25  -TP Placement Time: 1432  -TP Present on Original Admission: N  -TP  Side: Left  -TP Orientation: distal  -TP Location: arm  -TP    Retired Wound - Properties Group Date first assessed: 07/13/25  -TP Time first assessed: 1432  -TP Present on Original Admission: N  -TP Side: Left  -TP Location: arm  -TP      Row Name 07/14/25 1030          Plan of Care Review    Plan of Care Reviewed With patient  -JULIA (r) SATISH (t) KH (c)     Progress improving  -KH (r) DH (t) KH (c)       Row Name 07/14/25 1030          Positioning and Restraints    Pre-Treatment Position in bed  -KH (r) DH (t) KH (c)     Post Treatment Position wheelchair  -KH (r) DH (t) KH (c)     In Bed call light within reach;encouraged to call for assist  -KH (r) DH (t) KH (c)     In Wheelchair sitting;notified nsg  Patient was transfered to wheelchair and left with staff  -KH (r) SATISH (t) KH (c)       Row Name 07/14/25 1030          Progress Summary (PT)    Daily Progress Summary (PT) Patient tolerated session well, with mild pain / fatigue and short rest breaks between sets. Patient was able to transfer from supine-to-sit with min-mod assistance from therapist and min assistance to stand pivot to wheelchair. Patient was left in wheelchair with staff. She is continuing to progress well.  -KH (r) DH (t) KH (c)               User Key  (r) = Recorded By, (t) = Taken By, (c) = Cosigned By      Initials Name Provider Type    JENNIFER Basilia Henderson, RN Registered Nurse    Sujey Ovalle, PT Physical Therapist    Sara Godoy RN Registered Nurse    Jolie Lisa, RN Registered Nurse    Neva Camejo, PT Student PT Student                      PT Recommendation and Plan  Anticipated Discharge Disposition (PT): inpatient rehabilitation facility  Planned Therapy Interventions (PT): gait training, bed mobility training, balance training, transfer training, strengthening, other (see comments) (add interventions PRN, as appropriate)  Therapy Frequency (PT): 3 times/wk (add interventions PRN)  Outcome Evaluation: Pt seen for  therapy evaluation this date, pleasant and cooperative with therapy. Pt demonstrates mobility and recent amputation that may benefit from therapy interventions for stability and independence with functional mobility. Pt seen by PT and PT student this PM. Prognosis fair to good. Recommend IPR.       Time Calculation:    PT Charges       Row Name 25 1148             Time Calculation    Start Time 1030  -KH (r) DH (t) KH (c)      PT Received On 25  -KH (r) DH (t) KH (c)         Time Calculation- PT    Total Timed Code Minutes- PT 38 minute(s)  -                User Key  (r) = Recorded By, (t) = Taken By, (c) = Cosigned By      Initials Name Provider Type    Sujey Ovalle, PT Physical Therapist     Neva Gonzalez, PT Student PT Student                  Therapy Charges for Today       Code Description Service Date Service Provider Modifiers Qty    [id number]  PT THERAPEUTIC ACT EA 15 MIN 2025 Sujey Small, PT GP 1            PT G-Codes  AM-PAC 6 Clicks Score (PT): 10    Sujey Small PT  2025      Electronically signed by Sujey Small, PT at 25 1220       Neva Gonzalez PT Student at 25 1030  Version 1 of 2         Acute Care - Physical Therapy Treatment Note  ASHLEY Elaine     Patient Name: Monae Chauhan  : 1955  MRN: [id number]  Today's Date: 2025   Onset of Illness/Injury or Date of Surgery: 07/10/25 (admission date)  Visit Dx:     ICD-10-CM ICD-9-CM   1. 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  L97.522 707.15     Patient Active Problem List   Diagnosis    CAD (coronary artery disease)    Essential hypertension    Mixed hyperlipidemia    Type 2 diabetes mellitus    PAD (peripheral artery disease)    Smoker    Atherosclerosis of native arteries of extremities with rest pain, bilateral legs    Detrusor instability    Open wound    Diabetes mellitus    Non-pressure chronic ulcer of other part of left  foot with fat layer exposed    Critical limb ischemia of both lower extremities with rest pain    Tobacco use    Femoral artery occlusion, left    Rest pain of both lower extremities due to atherosclerosis    Status post amputation     Past Medical History:   Diagnosis Date    Arthritis     Coronary artery disease     Depression     Diabetes mellitus     Elevated cholesterol     Full dentures     GERD (gastroesophageal reflux disease)     Hyperlipidemia     Hypertension     Myocardial infarction     Peripheral vascular disease     Psoriasis      Past Surgical History:   Procedure Laterality Date    APPENDECTOMY      CARDIAC CATHETERIZATION N/A 01/09/2017    Procedure: Left Heart Cath;  Surgeon: David Diane MD;  Location:  [facility];  Service:     CARDIAC CATHETERIZATION Left 05/20/2024    Procedure: Peripheral angiography;  Surgeon: Jaiden Doyle MD;  Location:  [facility];  Service: Cardiovascular;  Laterality: Left;  Left Venogram -    CARDIAC CATHETERIZATION N/A 02/06/2025    Procedure: Peripheral angiography;  Surgeon: Fran Estevez MD;  Location:  [facility];  Service: Cardiovascular;  Laterality: N/A;    CARDIAC CATHETERIZATION N/A 02/26/2025    Procedure: Peripheral angiography;  Surgeon: Jaiden Doyle MD;  Location:  [facility];  Service: Cardiovascular;  Laterality: N/A;    CARDIAC CATHETERIZATION N/A 02/26/2025    Procedure: Atherectomy-peripheral;  Surgeon: Jaiden Doyle MD;  Location:  [facility];  Service: Cardiovascular;  Laterality: N/A;    CARDIAC CATHETERIZATION N/A 02/26/2025    Procedure: Angioplasty-peripheral;  Surgeon: Jaiden Doyle MD;  [facility]  [facility];  Service: Cardiovascular;  Laterality: N/A;    CARDIAC CATHETERIZATION N/A 03/24/2025    Procedure: Peripheral angiography;  Surgeon: Jaiden Doyle MD;  [facility]:  [facility];  Service:  Cardiovascular;  Laterality: N/A;    CARDIAC CATHETERIZATION N/A 03/24/2025    Procedure: Atherectomy-peripheral;  Surgeon: Jaiden Doyle MD;  [facility]:  [facility];  Service: Cardiovascular;  Laterality: N/A;    COLONOSCOPY      CORONARY STENT PLACEMENT      CYSTOSCOPY BOTOX INJECTION OF BLADDER N/A 10/20/2021    Procedure: Cystoscopy Botox injection of bladder;  Surgeon: Say Robles MD;  Location: [facility] OR;  Service: Urology;  Laterality: N/A;    ENDOSCOPY      FOOT SURGERY Left     GALLBLADDER SURGERY      INTRAVASCULAR ULTRASOUND N/A 02/26/2025    Procedure: Intravascular Ultrasound;  Surgeon: Jaiden Doyle MD;  [facility]:  [facility];  Service: Cardiovascular;  Laterality: N/A;    INTRAVASCULAR ULTRASOUND N/A 03/24/2025    Procedure: Intravascular Ultrasound;  Surgeon: Jaiden Doyle MD;  [facility]:  [facility];  Service: Cardiovascular;  Laterality: N/A;    LEG SURGERY Left     TUBAL ABDOMINAL LIGATION      VASCULAR SURGERY Left     Lower Extremity [facility] St[facility]     PT Assessment (Last 12 Hours)       PT Evaluation and Treatment       Row Name 07/14/25 1030          Physical Therapy Time and Intention    Subjective Information complains of;pain (P)   -     Document Type therapy note (daily note) (P)   -     Mode of Treatment physical therapy (P)   -     Patient Effort good (P)   -     Symptoms Noted During/After Treatment fatigue;increased pain (P)   -       Row Name 07/14/25 1030          General Information    Equipment Issued to Patient gait belt (P)   -       Row Name 07/14/25 1030          Pain Scale: FACES Pre/Post-Treatment    Pain: FACES Scale, Pretreatment 2-->hurts little bit (P)   -     Posttreatment Pain Rating 4-->hurts little more (P)   -     Pre/Posttreatment Pain Comment per observation (P)   -       Row Name 07/14/25 1030          Cognition    Orientation Status (Cognition) oriented x 4 (P)   -      Follows Commands (Cognition) WNL (P)   -     Personal Safety Interventions supervised activity;nonskid shoes/slippers when out of bed;gait belt (P)   -       Row Name 07/14/25 1030          Bed Mobility    Bed Mobility supine-sit;rolling left (P)   -     Rolling Left [place] (Bed Mobility) minimum assist (75% patient effort);1 person assist (P)   -     Supine-Sit [place] (Bed Mobility) moderate assist (50% patient effort);minimum assist (75% patient effort);1 person assist (P)   -       Row Name 07/14/25 1030          Transfers    Transfers wheelchair transfer (P)   -       Row Name 07/14/25 1030          Wheelchair Transfer    Type (Wheelchair Transfer) stand pivot/stand step (P)   -     [place] Level (Wheelchair Transfer) modified independence;contact guard (P)   -     Assistive Device (Wheelchair Transfer) walker, front-wheeled (P)   -       Row Name 07/14/25 1030          Motor Skills    Therapeutic Exercise hip;knee (P)   -       Row Name 07/14/25 1030          Hip (Therapeutic Exercise)    Hip (Therapeutic Exercise) strengthening exercise (P)   -     Hip Strengthening (Therapeutic Exercise) aBduction;aDduction;left;10 repetitions;3 sets (P)   -       Row Name 07/14/25 1030          Knee (Therapeutic Exercise)    Knee (Therapeutic Exercise) strengthening exercise (P)   -     Knee Strengthening (Therapeutic Exercise) SAQ (short arc quad);10 repetitions;3 sets;left (P)   -       Row Name             Wound 07/10/25 0919 Left anterior thigh Surgical Open Surgical Incision    Wound - Properties Group Placement Date: 07/10/25  -JG Placement Time: 0919 -JG Side: Left  -JG Orientation: anterior  -JG Location: thigh  -JG Primary Wound Type: Surgical  -JG Secondary Wound Type - Surgical: Open Surg  -JG    Retired Wound - Properties Group Placement Date: 07/10/25  -JG Placement Time: 0919 -JG Side: Left  -JG Orientation: anterior  -JG Location: thigh  -JG    Retired Wound -  Properties Group Placement Date: 07/10/25  -JG Placement Time: 0919 -JG Side: Left  -JG Orientation: anterior  -JG Location: thigh  -JG    Retired Wound - Properties Group Date first assessed: 07/10/25  -JG Time first assessed: 0919 -JG Side: Left  -JG Location: thigh  -JG      Row Name             Wound 07/10/25 1401 Left anterior groin Other (Comments)    Wound - Properties Group Placement Date: 07/10/25  -MH Placement Time: 1401  -MH Present on Original Admission: Y  -MH Side: Left  -MH Orientation: anterior  -MH Location: groin  -MH Primary Wound Type: Other  -MH Secondary Wound Type - Other: --  -MH, Cardiac Cath site     Retired Wound - Properties Group Placement Date: 07/10/25  -MH Placement Time: 1401  -MH Present on Original Admission: Y  -MH Side: Left  -MH Orientation: anterior  -MH Location: groin  -MH    Retired Wound - Properties Group Placement Date: 07/10/25  -MH Placement Time: 1401  -MH Present on Original Admission: Y  -MH Side: Left  -MH Orientation: anterior  -MH Location: groin  -MH    Retired Wound - Properties Group Date first assessed: 07/10/25  -MH Time first assessed: 1401  -MH Present on Original Admission: Y  -MH Side: Left  -MH Location: groin  -MH      Row Name             Wound 07/13/25 1432 Left distal arm Other (Comments)    Wound - Properties Group Placement Date: 07/13/25  -TP Placement Time: 1432  -TP Present on Original Admission: N  -TP Side: Left  -TP Orientation: distal  -TP Location: arm  -TP Primary Wound Type: Other  -TP Secondary Wound Type - Other: --  -TP, skin tear     Retired Wound - Properties Group Placement Date: 07/13/25  -TP Placement Time: 1432  -TP Present on Original Admission: N  -TP Side: Left  -TP Orientation: distal  -TP Location: arm  -TP    Retired Wound - Properties Group Placement Date: 07/13/25  -TP Placement Time: 1432  -TP Present on Original Admission: N  -TP Side: Left  -TP Orientation: distal  -TP Location: arm  -TP    Retired Wound -  Properties Group Date first assessed: 07/13/25  -TP Time first assessed: 1432  -TP Present on Original Admission: N  -TP Side: Left  -TP Location: arm  -TP      Row Name 07/14/25 1030          Plan of Care Review    Plan of Care Reviewed With patient (P)   -     Progress improving (P)   -       Row Name 07/14/25 1030          Positioning and Restraints    Pre-Treatment Position in bed (P)   -     Post Treatment Position wheelchair (P)   -     In Bed call light within reach;encouraged to call for assist (P)   -     In Wheelchair sitting;notified nsg (P)   Patient was transfered to wheelchair and left with staff  -       Row Name 07/14/25 1030          Progress Summary (PT)    Daily Progress Summary (PT) Patient tolerated session well, with mild pain / fatigue and short rest breaks between sets. Patient was able to transfer from supine-to-sit with min-mod assistance from therapist and min assistance to stand pivot to wheelchair. Patient was left in wheelchair with staff. She is continuing to progress well. (P)   -               User Key  (r) = Recorded By, (t) = Taken By, (c) = Cosigned By      Initials Name Provider Type    Basilia Johnson, RN Registered Nurse    Sara Godoy RN Registered Nurse    Jolie Lisa, RN Registered Nurse    Neva Camejo, RASHMI Student PT Student                      PT Recommendation and Plan     Progress Summary (PT)  Daily Progress Summary (PT): (P) Patient tolerated session well, with mild pain / fatigue and short rest breaks between sets. Patient was able to transfer from supine-to-sit with min-mod assistance from therapist and min assistance to stand pivot to wheelchair. Patient was left in wheelchair with staff. She is continuing to progress well.  Plan of Care Reviewed With: (P) patient  Progress: (P) improving       Time Calculation:    PT Charges       Row Name 07/14/25 1148             Time Calculation    Start Time 1030 (P)   -      PT  Received On 25 (P)   -         Time Calculation- PT    Total Timed Code Minutes- PT 23 minute(s) (P)   -                User Key  (r) = Recorded By, (t) = Taken By, (c) = Cosigned By      Initials Name Provider Type     Neva Gonzalez, PT Student PT Student                  Therapy Charges for Today       Code Description Service Date Service Provider Modifiers Qty    [id number]  PT THER PROC EA 15 MIN 2025 Neva Gonzalez, PT Student GP 1    40684143578 HC PT THERAPEUTIC ACT EA 15 MIN 2025 Neva Gonzalez, PT Student GP 1            PT G-Codes  AM-PAC 6 Clicks Score (PT): 10    Neva Gonzalez PT Student  2025      Electronically signed by Neva Gonzalez PT Student at 25 1150          Occupational Therapy Notes (most recent note)        Kaleb Harding, OT at 25 1032          Acute Care - Occupational Therapy Treatment Note  ASHLEY Elaine     Patient Name: Monae Chauhan  : 1955  MRN: [id number]  Today's Date: 2025  Onset of Illness/Injury or Date of Surgery: 07/10/25 (admission date)          Admit Date: 7/10/2025       ICD-10-CM ICD-9-CM   1. 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  L97.522 707.15     Patient Active Problem List   Diagnosis    CAD (coronary artery disease)    Essential hypertension    Mixed hyperlipidemia    Type 2 diabetes mellitus    PAD (peripheral artery disease)    Smoker    Atherosclerosis of native arteries of extremities with rest pain, bilateral legs    Detrusor instability    Open wound    Diabetes mellitus    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    Critical limb ischemia of both lower extremities with rest pain    Tobacco use    Femoral artery occlusion, left    Rest pain of both lower extremities due to atherosclerosis    Status post amputation     Past Medical History:   Diagnosis Date    Arthritis     Coronary artery disease      Depression     Diabetes mellitus     Elevated cholesterol     Full dentures     GERD (gastroesophageal reflux disease)     Hyperlipidemia     Hypertension     Myocardial infarction     Peripheral vascular disease     Psoriasis      Past Surgical History:   Procedure Laterality Date    APPENDECTOMY      CARDIAC CATHETERIZATION N/A 01/09/2017    Procedure: Left Heart Cath;  Surgeon: David Diane MD;  Location:  [facility];  Service:     CARDIAC CATHETERIZATION Left 05/20/2024    Procedure: Peripheral angiography;  Surgeon: Jaiden Doyle MD;  [facility]:  [facility];  Service: Cardiovascular;  Laterality: Left;  Left Venogram -    CARDIAC CATHETERIZATION N/A 02/06/2025    Procedure: Peripheral angiography;  Surgeon: Fran Estevez MD;  [facility]:  [facility];  Service: Cardiovascular;  Laterality: N/A;    CARDIAC CATHETERIZATION N/A 02/26/2025    Procedure: Peripheral angiography;  Surgeon: Jaiden Doyle MD;  [facility]:  [facility];  Service: Cardiovascular;  Laterality: N/A;    CARDIAC CATHETERIZATION N/A 02/26/2025    Procedure: Atherectomy-peripheral;  Surgeon: Jaiden Doyle MD;  [facility]:  [facility];  Service: Cardiovascular;  Laterality: N/A;    CARDIAC CATHETERIZATION N/A 02/26/2025    Procedure: Angioplasty-peripheral;  Surgeon: Jaiden Doyle MD;  [facility]:  [facility];  Service: Cardiovascular;  Laterality: N/A;    CARDIAC CATHETERIZATION N/A 03/24/2025    Procedure: Peripheral angiography;  Surgeon: Jaiden Doyle MD;  [facility]:  [facility];  Service: Cardiovascular;  Laterality: N/A;    CARDIAC CATHETERIZATION N/A 03/24/2025    Procedure: Atherectomy-peripheral;  Surgeon: Jaiden Doyle MD;  [facility]:  [facility];  Service: Cardiovascular;  Laterality: N/A;    COLONOSCOPY      CORONARY STENT PLACEMENT      CYSTOSCOPY BOTOX INJECTION OF BLADDER N/A  10/20/2021    Procedure: Cystoscopy Botox injection of bladder;  Surgeon: Say Robles MD;  Location:  [facility];  Service: Urology;  Laterality: N/A;    ENDOSCOPY      FOOT SURGERY Left     GALLBLADDER SURGERY      INTRAVASCULAR ULTRASOUND N/A 02/26/2025    Procedure: Intravascular Ultrasound;  Surgeon: Jaiden Doyle MD;  [facility]:  [facility];  Service: Cardiovascular;  Laterality: N/A;    INTRAVASCULAR ULTRASOUND N/A 03/24/2025    Procedure: Intravascular Ultrasound;  Surgeon: Jaiden Doyle MD;  [facility]:  [facility];  Service: Cardiovascular;  Laterality: N/A;    LEG SURGERY Left     TUBAL ABDOMINAL LIGATION      VASCULAR SURGERY Left     Lower Extremity Vascular Stent-[facility]         OT ASSESSMENT FLOWSHEET (Last 12 Hours)       OT Evaluation and Treatment       Row Name 07/14/25 1029                   OT Time and Intention    Document Type therapy note (daily note)  -KR        Mode of Treatment occupational therapy  -KR        Patient Effort adequate  -KR           Cognition    Affect/Mental Status (Cognition) flat/blunted affect  -KR        Behavioral Issues (Cognition) overwhelmed easily  -KR        Follows Commands (Cognition) WFL  -KR           Bathing Assessment/Intervention    [place] Level (Bathing) moderate assist (50% patient effort)  -KR           Upper Body Dressing Assessment/Training    [place] Level (Upper Body Dressing) moderate assist (50% patient effort)  -KR           Lower Body Dressing Assessment/Training    [place] Level (Lower Body Dressing) maximum assist (25% patient effort)  -KR           Grooming Assessment/Training    [place] Level (Grooming) minimum assist (75% patient effort)  -KR           Self-Feeding Assessment/Training    [place] Level (Feeding) set up  -KR           Toileting Assessment/Training    [place] Level (Toileting) maximum assist (25% patient effort)  -KR           Motor Skills    Therapeutic  Exercise shoulder;elbow/forearm;hand  -KR           Shoulder (Therapeutic Exercise)    Shoulder (Therapeutic Exercise) AROM (active range of motion)  -KR        Shoulder AROM (Therapeutic Exercise) bilateral;flexion;extension  -KR           Elbow/Forearm (Therapeutic Exercise)    Elbow/Forearm (Therapeutic Exercise) AROM (active range of motion)  -KR        Elbow/Forearm AROM (Therapeutic Exercise) bilateral;flexion;extension  -KR           Hand (Therapeutic Exercise)    Hand (Therapeutic Exercise) AROM (active range of motion)  -KR        Hand AROM/AAROM (Therapeutic Exercise) bilateral;finger flexion;finger extension  -KR           Wound 07/10/25 0919 Left anterior thigh Surgical Open Surgical Incision    Wound - Properties Group Placement Date: 07/10/25  -JG Placement Time: 0919 -JG Side: Left  -JG Orientation: anterior  -JG Location: thigh  -JG Primary Wound Type: Surgical  -JG Secondary Wound Type - Surgical: Open Surg  -JG    Retired Wound - Properties Group Placement Date: 07/10/25  -JG Placement Time: 0919 -JG Side: Left  -JG Orientation: anterior  -JG Location: thigh  -JG    Retired Wound - Properties Group Placement Date: 07/10/25  -JG Placement Time: 0919 -JG Side: Left  -JG Orientation: anterior  -JG Location: thigh  -JG    Retired Wound - Properties Group Date first assessed: 07/10/25  -JG Time first assessed: 0919 -JG Side: Left  -JG Location: thigh  -JG       Wound 07/10/25 1401 Left anterior groin Other (Comments)    Wound - Properties Group Placement Date: 07/10/25  -MH Placement Time: 1401  -MH Present on Original Admission: Y  -MH Side: Left  -MH Orientation: anterior  -MH Location: groin  -MH Primary Wound Type: Other  -MH Secondary Wound Type - Other: --  -MH, Cardiac Cath site     Retired Wound - Properties Group Placement Date: 07/10/25  -MH Placement Time: 1401  -MH Present on Original Admission: Y  -MH Side: Left  -MH Orientation: anterior  -MH Location: groin  -MH    Retired Wound -  Properties Group Placement Date: 07/10/25  -MH Placement Time: 1401  -MH Present on Original Admission: Y  -MH Side: Left  -MH Orientation: anterior  -MH Location: groin  -MH    Retired Wound - Properties Group Date first assessed: 07/10/25  -MH Time first assessed: 1401  -MH Present on Original Admission: Y  -MH Side: Left  -MH Location: groin  -MH       Wound 07/13/25 1432 Left distal arm Other (Comments)    Wound - Properties Group Placement Date: 07/13/25  -TP Placement Time: 1432  -TP Present on Original Admission: N  -TP Side: Left  -TP Orientation: distal  -TP Location: arm  -TP Primary Wound Type: Other  -TP Secondary Wound Type - Other: --  -TP, skin tear     Retired Wound - Properties Group Placement Date: 07/13/25  -TP Placement Time: 1432  -TP Present on Original Admission: N  -TP Side: Left  -TP Orientation: distal  -TP Location: arm  -TP    Retired Wound - Properties Group Placement Date: 07/13/25  -TP Placement Time: 1432  -TP Present on Original Admission: N  -TP Side: Left  -TP Orientation: distal  -TP Location: arm  -TP    Retired Wound - Properties Group Date first assessed: 07/13/25  -TP Time first assessed: 1432  -TP Present on Original Admission: N  -TP Side: Left  -TP Location: arm  -TP       Plan of Care Review    Plan of Care Reviewed With patient  -KR           Dressing Goal 1 (OT)    Progress/Outcome (Dressing Goal 1, OT) continuing progress toward goal  -KR            Activity Tolerance Goal 1 (OT)    Progress/Outcome (Activity Tolerance Goal 1, OT) continuing progress toward goal  -KR           Patient Education Goal (OT)    Progress/Outcome (Patient Education Goal, OT) continuing progress toward goal  -KR                  User Key  (r) = Recorded By, (t) = Taken By, (c) = Cosigned By      Initials Name Effective Dates    Basilia Johnson, TONY 08/29/23 -     Kaleb Abreu OT 06/16/21 -     Sara Godoy RN 06/10/24 -     TP Jolie Bernardo RN 01/15/25 -                             OT Recommendation and Plan  Planned Therapy Interventions (OT): activity tolerance training, adaptive equipment training, BADL retraining  Plan of Care Review  Plan of Care Reviewed With: patient  Plan of Care Reviewed With: patient        Time Calculation:     Therapy Charges for Today       Code Description Service Date Service Provider Modifiers Qty    [id number]  OT THERAPEUTIC ACT EA 15 MIN 7/14/2025 Kaleb Harding OT GO 1                 Kaleb Harding OT  7/14/2025    Electronically signed by Kaleb Harding OT at 07/14/25 1032

## 2025-07-15 NOTE — PROGRESS NOTES
Post left AKA    She is complaining of pain in spite of a fentanyl patch and percocet 10 q4 h prn. She apparently cannot go to rehab or SNF with IV pain meds. She is also wondering if a a muscle relaxer could be changed. Her wound looks fine and she is afeb and vss. Check with hospitalist on muscle relaxer.

## 2025-07-16 LAB
DEPRECATED RDW RBC AUTO: 57.5 FL (ref 37–54)
ERYTHROCYTE [DISTWIDTH] IN BLOOD BY AUTOMATED COUNT: 17.8 % (ref 12.3–15.4)
GLUCOSE BLDC GLUCOMTR-MCNC: 130 MG/DL (ref 70–130)
GLUCOSE BLDC GLUCOMTR-MCNC: 135 MG/DL (ref 70–130)
GLUCOSE BLDC GLUCOMTR-MCNC: 180 MG/DL (ref 70–130)
GLUCOSE BLDC GLUCOMTR-MCNC: 187 MG/DL (ref 70–130)
HCT VFR BLD AUTO: 29.3 % (ref 34–46.6)
HGB BLD-MCNC: 8.7 G/DL (ref 12–15.9)
MCH RBC QN AUTO: 26 PG (ref 26.6–33)
MCHC RBC AUTO-ENTMCNC: 29.7 G/DL (ref 31.5–35.7)
MCV RBC AUTO: 87.5 FL (ref 79–97)
PLATELET # BLD AUTO: 380 10*3/MM3 (ref 140–450)
PMV BLD AUTO: 8.5 FL (ref 6–12)
RBC # BLD AUTO: 3.35 10*6/MM3 (ref 3.77–5.28)
WBC NRBC COR # BLD AUTO: 7.61 10*3/MM3 (ref 3.4–10.8)

## 2025-07-16 PROCEDURE — 82948 REAGENT STRIP/BLOOD GLUCOSE: CPT

## 2025-07-16 PROCEDURE — 63710000001 INSULIN GLARGINE PER 5 UNITS: Performed by: INTERNAL MEDICINE

## 2025-07-16 PROCEDURE — 99232 SBSQ HOSP IP/OBS MODERATE 35: CPT | Performed by: STUDENT IN AN ORGANIZED HEALTH CARE EDUCATION/TRAINING PROGRAM

## 2025-07-16 PROCEDURE — 93005 ELECTROCARDIOGRAM TRACING: CPT | Performed by: STUDENT IN AN ORGANIZED HEALTH CARE EDUCATION/TRAINING PROGRAM

## 2025-07-16 PROCEDURE — 97110 THERAPEUTIC EXERCISES: CPT

## 2025-07-16 PROCEDURE — 85027 COMPLETE CBC AUTOMATED: CPT | Performed by: STUDENT IN AN ORGANIZED HEALTH CARE EDUCATION/TRAINING PROGRAM

## 2025-07-16 PROCEDURE — 63710000001 INSULIN LISPRO (HUMAN) PER 5 UNITS: Performed by: INTERNAL MEDICINE

## 2025-07-16 PROCEDURE — 97530 THERAPEUTIC ACTIVITIES: CPT

## 2025-07-16 PROCEDURE — 97116 GAIT TRAINING THERAPY: CPT

## 2025-07-16 PROCEDURE — 93010 ELECTROCARDIOGRAM REPORT: CPT | Performed by: INTERNAL MEDICINE

## 2025-07-16 RX ADMIN — APIXABAN 5 MG: 5 TABLET, FILM COATED ORAL at 08:33

## 2025-07-16 RX ADMIN — OXYCODONE HYDROCHLORIDE 10 MG: 10 TABLET ORAL at 05:15

## 2025-07-16 RX ADMIN — APIXABAN 5 MG: 5 TABLET, FILM COATED ORAL at 21:19

## 2025-07-16 RX ADMIN — METHOCARBAMOL TABLETS 750 MG: 750 TABLET, COATED ORAL at 11:44

## 2025-07-16 RX ADMIN — NICOTINE TRANSDERMAL SYSTEM 1 PATCH: 21 PATCH, EXTENDED RELEASE TRANSDERMAL at 08:34

## 2025-07-16 RX ADMIN — METHOCARBAMOL TABLETS 750 MG: 750 TABLET, COATED ORAL at 08:33

## 2025-07-16 RX ADMIN — INSULIN LISPRO 2 UNITS: 100 INJECTION, SOLUTION INTRAVENOUS; SUBCUTANEOUS at 08:33

## 2025-07-16 RX ADMIN — CLOPIDOGREL BISULFATE 75 MG: 75 TABLET, FILM COATED ORAL at 08:33

## 2025-07-16 RX ADMIN — DOCUSATE SODIUM 50 MG AND SENNOSIDES 8.6 MG 2 TABLET: 8.6; 5 TABLET, FILM COATED ORAL at 21:19

## 2025-07-16 RX ADMIN — INSULIN GLARGINE 10 UNITS: 100 INJECTION, SOLUTION SUBCUTANEOUS at 21:19

## 2025-07-16 RX ADMIN — METHOCARBAMOL TABLETS 750 MG: 750 TABLET, COATED ORAL at 16:41

## 2025-07-16 RX ADMIN — GABAPENTIN 800 MG: 400 CAPSULE ORAL at 14:05

## 2025-07-16 RX ADMIN — METHOCARBAMOL TABLETS 750 MG: 750 TABLET, COATED ORAL at 21:19

## 2025-07-16 RX ADMIN — INSULIN LISPRO 2 UNITS: 100 INJECTION, SOLUTION INTRAVENOUS; SUBCUTANEOUS at 21:18

## 2025-07-16 RX ADMIN — ROSUVASTATIN CALCIUM 20 MG: 20 TABLET, FILM COATED ORAL at 08:33

## 2025-07-16 RX ADMIN — Medication 10 ML: at 21:20

## 2025-07-16 RX ADMIN — ALPRAZOLAM 0.25 MG: 0.25 TABLET ORAL at 08:33

## 2025-07-16 RX ADMIN — GABAPENTIN 800 MG: 400 CAPSULE ORAL at 05:16

## 2025-07-16 RX ADMIN — INSULIN LISPRO 2 UNITS: 100 INJECTION, SOLUTION INTRAVENOUS; SUBCUTANEOUS at 11:43

## 2025-07-16 RX ADMIN — MICONAZOLE NITRATE 1 APPLICATION: 2 POWDER TOPICAL at 22:07

## 2025-07-16 RX ADMIN — Medication 10 ML: at 08:34

## 2025-07-16 RX ADMIN — AMLODIPINE BESYLATE 10 MG: 5 TABLET ORAL at 08:33

## 2025-07-16 RX ADMIN — INSULIN LISPRO 2 UNITS: 100 INJECTION, SOLUTION INTRAVENOUS; SUBCUTANEOUS at 16:40

## 2025-07-16 RX ADMIN — ALPRAZOLAM 0.25 MG: 0.25 TABLET ORAL at 21:19

## 2025-07-16 RX ADMIN — MICONAZOLE NITRATE 1 APPLICATION: 2 POWDER TOPICAL at 08:34

## 2025-07-16 RX ADMIN — GABAPENTIN 800 MG: 400 CAPSULE ORAL at 21:19

## 2025-07-16 NOTE — PROGRESS NOTES
[facility] HOSPITALIST PROGRESS NOTE     Patient Identification:  Name:  Monae Chauhan  Age:  69 y.o.  Sex:  female  :  1955  MRN:  [id number]  Visit Number:  [id number]  ROOM: [address] [place] [address]     Primary Care Provider:  Zachary Sullivan MD    Length of stay in inpatient status:  5    Subjective     History of Presenting Illness:    Patient was much more interactive with exam today. She reported her pain was severe early this morning but was much better now after receiving doses of pain medication. She expressed sadness over her current situation but was hopeful that she would be able to participate with therapy and be able to get a prosthetic in the future.    Objective     Current Hospital Meds:ALPRAZolam, 0.25 mg, Oral, BID  amLODIPine, 10 mg, Oral, Daily  apixaban, 5 mg, Oral, BID  [Held by provider] carvedilol, 3.125 mg, Oral, BID With Meals  fentaNYL, 1 patch, Transdermal, Q72H   And  Check Fentanyl Patch Placement, 1 each, Not Applicable, Q12H  clopidogrel, 75 mg, Oral, Daily  [Held by provider] furosemide, 20 mg, Oral, Daily  gabapentin, 800 mg, Oral, Q8H  insulin glargine, 10 Units, Subcutaneous, Nightly  insulin lispro, 2 Units, Subcutaneous, TID With Meals  insulin lispro, 2-9 Units, Subcutaneous, 4x Daily AC & at Bedtime  methocarbamol, 750 mg, Oral, 4x Daily  miconazole, 1 Application, Topical, Q12H  nicotine, 1 patch, Transdermal, Q24H  rosuvastatin, 20 mg, Oral, Daily  senna-docusate sodium, 2 tablet, Oral, BID  sodium chloride, 10 mL, Intravenous, Q12H  [Held by provider] valsartan, 320 mg, Oral, Daily    Pharmacy Consult,         Current Antimicrobial Therapy:  Anti-Infectives (From admission, onward)      None          Current Diuretic Therapy:  Diuretics (From admission, onward)      Ordered     Dose/Rate Route Frequency Start Stop    07/10/25 1047  [Held by provider]  furosemide (LASIX) tablet 20 mg        (On hold since Thu 7/10/2025 at 1222 until manually unheld; held by  Marco Rasheed MDHold Reason: Abnormal Vitals)   Ordering Provider: Taran Harding MD    20 mg Oral Daily 07/10/25 1200            ----------------------------------------------------------------------------------------------------------------------  Vital Signs:  Temp:  [98.1 °F (36.7 °C)-98.7 °F (37.1 °C)] 98.6 °F (37 °C)  Heart Rate:  [59-71] 63  Resp:  [16-18] 18  BP: (114-148)/(55-97) 126/68  SpO2:  [91 %-95 %] 95 %  on   ;   Device (Oxygen Therapy): room air  Body mass index is 30.15 kg/m².    Wt Readings from Last 3 Encounters:   07/10/25 77.2 kg (170 lb 3.1 oz)   06/19/25 78.6 kg (173 lb 3.2 oz)   05/29/25 78.5 kg (173 lb)     Intake & Output (last 3 days)         07/13 0701 07/14 0700 07/14 0701  07/15 0700 07/15 0701  07/16 0700    P.O. 600 945 345    I.V. (mL/kg)  0 (0)     Total Intake(mL/kg) 600 (7.8) 945 (12.2) 345 (4.5)    Urine (mL/kg/hr) 1000 (0.5) 1350 (0.7) 400 (0.3)    Total Output 1000 1350 400    Net -400 -405 -55           Urine Unmeasured Occurrence   1 x          Diet: Diabetic; Consistent Carbohydrate; Fluid Consistency: Thin (IDDSI 0)  ----------------------------------------------------------------------------------------------------------------------  Physical exam:   Constitutional:  Well-developed and well-nourished.  No acute distress.      HENT:  Head:  Normocephalic and atraumatic.    Pulmonary/Chest:  Normal rate and effort  Musculoskeletal: s/p left AKA  Neurological: Awake, alert, no focal deficit on gross examination. No slurred speech or facial droop.   Skin:  Skin is warm and dry. Amputation site is covered with bandage and appears clean and dry, dressing had reportedly just been changed.  Peripheral vascular:  No cyanosis, no edema.  Psychiatric: Dysphoric mood, but alert and interactive  Edited by: Katarina Becker DO at 7/15/2025 2219  ----------------------------------------------------------------------------------------------------------------------  Results from  "last 7 days   Lab Units 07/14/25 0109 07/13/25 0116 07/12/25 0150 07/11/25  1737 07/11/25 0309   WBC 10*3/mm3 8.02  --  9.24  --  10.00   HEMOGLOBIN g/dL 8.8* 8.6* 8.9*   < > 7.0*   HEMATOCRIT % 29.1* 28.8* 29.3*   < > 23.7*   MCV fL 85.6  --  84.9  --  86.8   MCHC g/dL 30.2*  --  30.4*  --  29.5*   PLATELETS 10*3/mm3 363  --  370  --  334    < > = values in this interval not displayed.         Results from last 7 days   Lab Units 07/14/25  0109 07/13/25  0116 07/12/25  0150 07/11/25  0309 07/10/25  1248   SODIUM mmol/L 139 139 139   < > 135*   POTASSIUM mmol/L 3.8 3.7 3.8   < > 4.6   MAGNESIUM mg/dL 2.4 1.9 1.9   < > 1.9   CHLORIDE mmol/L 105 105 107   < > 104   CO2 mmol/L 25.3 24.9 22.1   < > 21.3*   BUN mg/dL 12.6 9.5 9.8   < > 13.8   CREATININE mg/dL 0.81 0.76 0.85   < > 1.00   CALCIUM mg/dL 8.0* 8.0* 8.0*   < > 8.3*   IONIZED CALCIUM mmol/L  --  1.16  --   --   --    PHOSPHORUS mg/dL 3.1 2.9 2.6   < > 2.5   GLUCOSE mg/dL 161* 104* 45*   < > 220*   ALBUMIN g/dL 2.5*  --  2.5*  --  2.7*   BILIRUBIN mg/dL <0.2  --  0.2  --  0.2   ALK PHOS U/L 97  --  89  --  97   AST (SGOT) U/L 13  --  20  --  13   ALT (SGPT) U/L 6  --  6  --  <5    < > = values in this interval not displayed.   Estimated Creatinine Clearance: 64.5 mL/min (by C-G formula based on SCr of 0.81 mg/dL).  No results found for: \"AMMONIA\"              Glucose   Date/Time Value Ref Range Status   07/15/2025 2115 138 (H) 70 - 130 mg/dL Final     Comment:     Serial Number: [id number]:  [id number]   07/15/2025 2005 159 (H) 70 - 130 mg/dL Final     Comment:     Serial Number: [id number]:  [id number]   07/15/2025 1644 163 (H) 70 - 130 mg/dL Final     Comment:     Serial Number: [id number]:  [id number]   07/15/2025 1054 114 70 - 130 mg/dL Final     Comment:     Serial Number: [id number]:  [id number]   07/15/2025 0631 135 (H) 70 - 130 mg/dL Final     Comment:     Serial Number: [id number]:  [id number]   07/14/2025 2131 197 (H) " "70 - 130 mg/dL Final     Comment:     Serial Number: [id number]:  [id number]   2025 1644 146 (H) 70 - 130 mg/dL Final     Comment:     Serial Number: [id number]:  [id number]   2025 1044 186 (H) 70 - 130 mg/dL Final     Comment:     Serial Number: [id number]:  [id number]     Lab Results   Component Value Date    TSH 1.520 07/10/2025     No results found for: \"PREGTESTUR\", \"PREGSERUM\", \"HCG\", \"HCGQUANT\"  Pain Management Panel           No data to display              Brief Urine Lab Results       None            I have personally looked at the labs and they are summarized above.  ----------------------------------------------------------------------------------------------------------------------  Detailed radiology reports for the last 24 hours:  Imaging Results (Last 24 Hours)       ** No results found for the last 24 hours. **          Assessment & Plan      #PAD with previous stenting and reocclusion of SFA  #Left lower extremity ulcer s/p AKA  - Management per primary team, General Surgery  - Pain control with fentanyl patch (25mcg) and oxycodone 10mg q4h prn. Her pain meds had  overnight and so pain increased significantly, but she reported it was much better controlled at time of my exam after receiving doses of the scheduled/prn medications.  - Noted request by patient and surgeon to consider muscle relaxant. I have ordered low dose flexeril 5mg tid prn. Monitor for oversedation. Obtain EKG in a.m. to monitor QT interval.  - Continue PT/OT  - Dressing changes per surgery  - Patient's insurance declined inpatient rehab but family is filing an appeal.    #Afib   #Hyperlipidemia  #Coronary artery disease  #Hypertension  - Continue amlodipine, eliquis, plavix, statin  - Valsartan, lasix, and coreg have been held due to low BP/HR. BP trended up slightly yesterday but was mostly well controlled today. Consider restarting valsartan if BP is staying above goal of SBP<140, but it is " not necessary at this time.. HR has been in the 50s-60s so continue holding carvedilol for now. Monitor VS per protocol.     #Adjustment disorder with depressed mood  - Appreciate Psychiatry consult. Recommendation was to follow up outpatient as she denied active suicidality.     Edited by: Katarina Becker DO at 7/15/2025 3612    Active VTE Prophylaxis  Pharmacologic:        Start     Dose Route Frequency Stop    07/11/25 0900  apixaban (ELIQUIS) tablet 5 mg         5 mg PO 2 Times Daily --                    Dispo: pending appeal of insurance denial for inpatient rehab    Katarina Becker DO  [facility]  07/15/25  22:20 EDT

## 2025-07-16 NOTE — PLAN OF CARE
Goal Outcome Evaluation:           Progress: improving  Outcome Evaluation: Patient is in bed at this time. VSS on RA. Pt refused dressing change today, educated on importance. Will continue POC.

## 2025-07-16 NOTE — THERAPY TREATMENT NOTE
Acute Care - Occupational Therapy Treatment Note  [facility]     Patient Name: Monae Chauhan  : 1955  MRN: [id number]  Today's Date: 2025  Onset of Illness/Injury or Date of Surgery: 07/10/25 (admission date)          Admit Date: 7/10/2025       ICD-10-CM ICD-9-CM   1. 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  L97.522 707.15     Patient Active Problem List   Diagnosis    CAD (coronary artery disease)    Essential hypertension    Mixed hyperlipidemia    Type 2 diabetes mellitus    PAD (peripheral artery disease)    Smoker    Atherosclerosis of native arteries of extremities with rest pain, bilateral legs    Detrusor instability    Open wound    Diabetes mellitus    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    Critical limb ischemia of both lower extremities with rest pain    Tobacco use    Femoral artery occlusion, left    Rest pain of both lower extremities due to atherosclerosis    Status post amputation     Past Medical History:   Diagnosis Date    Arthritis     Coronary artery disease     Depression     Diabetes mellitus     Elevated cholesterol     Full dentures     GERD (gastroesophageal reflux disease)     Hyperlipidemia     Hypertension     Myocardial infarction     Peripheral vascular disease     Psoriasis      Past Surgical History:   Procedure Laterality Date    ABOVE KNEE AMPUTATION Left 7/10/2025    Procedure: AMPUTATION ABOVE KNEE;  Surgeon: Taran Harding MD;  Location: [facility];  Service: General;  Laterality: Left;    APPENDECTOMY      CARDIAC CATHETERIZATION N/A 2017    Procedure: Left Heart Cath;  Surgeon: David Diane MD;  Location: [facility] CATH INVASIVE LOCATION;  Service:     CARDIAC CATHETERIZATION Left 2024    Procedure: Peripheral angiography;  Surgeon: Jaiden Doyle MD;  Location: [facility] CATH INVASIVE LOCATION;  Service: Cardiovascular;  Laterality: Left;  Left Venogram -    CARDIAC  CATHETERIZATION N/A 02/06/2025    Procedure: Peripheral angiography;  Surgeon: Fran Estevez MD;  Location:  [facility] CATH INVASIVE LOCATION;  Service: Cardiovascular;  Laterality: N/A;    CARDIAC CATHETERIZATION N/A 02/26/2025    Procedure: Peripheral angiography;  Surgeon: Jaiden Doyle MD;  Location:  [facility] CATH INVASIVE LOCATION;  Service: Cardiovascular;  Laterality: N/A;    CARDIAC CATHETERIZATION N/A 02/26/2025    Procedure: Atherectomy-peripheral;  Surgeon: Jaiden Doyle MD;  Location:  [facility] CATH INVASIVE LOCATION;  Service: Cardiovascular;  Laterality: N/A;    CARDIAC CATHETERIZATION N/A 02/26/2025    Procedure: Angioplasty-peripheral;  Surgeon: Jaiden Doyle MD;  Location:  [facility] CATH INVASIVE LOCATION;  Service: Cardiovascular;  Laterality: N/A;    CARDIAC CATHETERIZATION N/A 03/24/2025    Procedure: Peripheral angiography;  Surgeon: Jaiden Doyle MD;  Location: [facility] CATH INVASIVE LOCATION;  Service: Cardiovascular;  Laterality: N/A;    CARDIAC CATHETERIZATION N/A 03/24/2025    Procedure: Atherectomy-peripheral;  Surgeon: Jaiden Doyle MD;  Location:  [facility] CATH INVASIVE LOCATION;  Service: Cardiovascular;  Laterality: N/A;    COLONOSCOPY      CORONARY STENT PLACEMENT      CYSTOSCOPY BOTOX INJECTION OF BLADDER N/A 10/20/2021    Procedure: Cystoscopy Botox injection of bladder;  Surgeon: Say Robles MD;  Location: [facility];  Service: Urology;  Laterality: N/A;    ENDOSCOPY      FOOT SURGERY Left     GALLBLADDER SURGERY      INTRAVASCULAR ULTRASOUND N/A 02/26/2025    Procedure: Intravascular Ultrasound;  Surgeon: Jaiden Doyle MD;  Location:  [facility] CATH INVASIVE LOCATION;  Service: Cardiovascular;  Laterality: N/A;    INTRAVASCULAR ULTRASOUND N/A 03/24/2025    Procedure: Intravascular Ultrasound;  Surgeon: Jaiden Doyle MD;  Location: [facility] CATH INVASIVE LOCATION;  Service: Cardiovascular;  Laterality: N/A;    LEG SURGERY Left     TUBAL ABDOMINAL LIGATION      VASCULAR SURGERY  Left     Lower Extremity Vascular Stent-[facility]         OT ASSESSMENT FLOWSHEET (Last 12 Hours)       OT Evaluation and Treatment       Row Name 07/16/25 1119                   OT Time and Intention    Document Type therapy note (daily note)  -KR        Mode of Treatment occupational therapy  -KR        Patient Effort adequate  -KR           General Information    General Observations of Patient cooperative  -KR           Cognition    Affect/Mental Status (Cognition) WFL  -KR        Follows Commands (Cognition) WFL  -KR           Bathing Assessment/Intervention    Rogers Level (Bathing) moderate assist (50% patient effort)  -KR           Upper Body Dressing Assessment/Training    Rogers Level (Upper Body Dressing) moderate assist (50% patient effort)  -KR           Lower Body Dressing Assessment/Training    Rogers Level (Lower Body Dressing) moderate assist (50% patient effort);maximum assist (25% patient effort)  -KR           Grooming Assessment/Training    Rogers Level (Grooming) grooming skills;set up  -KR           Self-Feeding Assessment/Training    Rogers Level (Feeding) set up  -KR           Toileting Assessment/Training    Rogers Level (Toileting) maximum assist (25% patient effort)  -KR           Motor Skills    Motor Skills --  BUE fxl activity for use of device/utensils at table top. BUE observed WFL  -KR           Wound 07/10/25 0919 Left anterior thigh Surgical Open Surgical Incision    Wound - Properties Group Placement Date: 07/10/25  -JG Placement Time: 0919 -JG Side: Left  -JG Orientation: anterior  -JG Location: thigh  -JG Primary Wound Type: Surgical  -JG Secondary Wound Type - Surgical: Open Surg  -JG    Retired Wound - Properties Group Placement Date: 07/10/25  -JG Placement Time: 0919 -JG Side: Left  -JG Orientation: anterior  -JG Location: thigh  -JG    Retired Wound - Properties Group Placement Date: 07/10/25  -JG Placement Time: 0919 -JG Side: Left  -JG  Orientation: anterior  -JG Location: thigh  -JG    Retired Wound - Properties Group Date first assessed: 07/10/25  -JG Time first assessed: 0919  -JG Side: Left  -JG Location: thigh  -JG       Wound 07/10/25 1401 Left anterior groin Other (Comments)    Wound - Properties Group Placement Date: 07/10/25  -MH Placement Time: 1401  -MH Present on Original Admission: Y  -MH Side: Left  -MH Orientation: anterior  -MH Location: groin  -MH Primary Wound Type: Other  -MH Secondary Wound Type - Other: --  -MH, Cardiac Cath site     Retired Wound - Properties Group Placement Date: 07/10/25  -MH Placement Time: 1401  -MH Present on Original Admission: Y  -MH Side: Left  -MH Orientation: anterior  -MH Location: groin  -MH    Retired Wound - Properties Group Placement Date: 07/10/25  -MH Placement Time: 1401  -MH Present on Original Admission: Y  -MH Side: Left  -MH Orientation: anterior  -MH Location: groin  -MH    Retired Wound - Properties Group Date first assessed: 07/10/25  -MH Time first assessed: 1401  -MH Present on Original Admission: Y  -MH Side: Left  -MH Location: groin  -MH       Wound 07/13/25 1432 Left distal arm Other (Comments)    Wound - Properties Group Placement Date: 07/13/25  -TP Placement Time: 1432  -TP Present on Original Admission: N  -TP Side: Left  -TP Orientation: distal  -TP Location: arm  -TP Primary Wound Type: Other  -TP Secondary Wound Type - Other: --  -TP, skin tear     Retired Wound - Properties Group Placement Date: 07/13/25  -TP Placement Time: 1432  -TP Present on Original Admission: N  -TP Side: Left  -TP Orientation: distal  -TP Location: arm  -TP    Retired Wound - Properties Group Placement Date: 07/13/25  -TP Placement Time: 1432  -TP Present on Original Admission: N  -TP Side: Left  -TP Orientation: distal  -TP Location: arm  -TP    Retired Wound - Properties Group Date first assessed: 07/13/25  -TP Time first assessed: 1432  -TP Present on Original Admission: N  -TP Side: Left  -TP  Location: arm  -TP       Wound 07/14/25 1913 Left anterior thigh Pressure Injury    Wound - Properties Group Placement Date: 07/14/25  -TP Placement Time: 1913 -TP Present on Original Admission: N  -TP Side: Left  -TP Orientation: anterior  -TP Location: thigh  -TP Primary Wound Type: Pressure Inj  -TW    Retired Wound - Properties Group Placement Date: 07/14/25  -TP Placement Time: 1913 -TP Present on Original Admission: N  -TP Side: Left  -TP Orientation: anterior  -TP Location: thigh  -TP    Retired Wound - Properties Group Placement Date: 07/14/25  -TP Placement Time: 1913  -TP Present on Original Admission: N  -TP Side: Left  -TP Orientation: anterior  -TP Location: thigh  -TP    Retired Wound - Properties Group Date first assessed: 07/14/25  -TP Time first assessed: 1913 -TP Present on Original Admission: N  -TP Side: Left  -TP Location: thigh  -TP       Plan of Care Review    Plan of Care Reviewed With patient  -KR        Progress improving  -KR           Dressing Goal 1 (OT)    Progress/Outcome (Dressing Goal 1, OT) continuing progress toward goal  -KR            Activity Tolerance Goal 1 (OT)    Progress/Outcome (Activity Tolerance Goal 1, OT) continuing progress toward goal  -KR                  User Key  (r) = Recorded By, (t) = Taken By, (c) = Cosigned By      Initials Name Effective Dates    Basilia Johnson RN 08/29/23 -     Taylor Del Rio, TONY 06/16/21 -     Kaleb Abreu OT 06/16/21 -     Sara Godoy RN 06/10/24 -     Jolie Lisa RN 01/15/25 -                            OT Recommendation and Plan  Planned Therapy Interventions (OT): activity tolerance training, adaptive equipment training, BADL retraining  Plan of Care Review  Plan of Care Reviewed With: patient  Progress: improving  Plan of Care Reviewed With: patient        Time Calculation:     Therapy Charges for Today       Code Description Service Date Service Provider Modifiers Qty    [id number] HC OT  THERAPEUTIC ACT EA 15 MIN 7/16/2025 Kaleb Harding, OT GO 1                 Kaleb Harding OT  7/16/2025

## 2025-07-16 NOTE — NURSING NOTE
Patient refusing wound care dressing change at this time, educated on importance. Pt refused some turns this shift. Educated on importance

## 2025-07-16 NOTE — PLAN OF CARE
Goal Outcome Evaluation:            Pt is resting no acute changes at this time will continue to monitor and follow current plan of care.

## 2025-07-16 NOTE — PAYOR COMM NOTE
"Clinical update    Monae Chauhan (69 y.o. Female)       Date of Birth   1955    Social Security Number       Address   [address] [place] [address]    Home Phone       MRN   [id number]       [place]   Anglican    Marital Status                               Admission Date   7/10/2025    Admission Type   Elective    Admitting Provider   Taran Harding MD    Attending Provider   Taran Harding MD    Department, Room/Bed   [address], 3339/1P       Discharge Date       Discharge Disposition       Discharge Destination                                 Attending Provider: Taran Harding MD    Allergies: Amoxicillin, Penicillins    Isolation: None   Infection: None   Code Status: CPR    Ht: 160 cm (63\")   Wt: 77.2 kg (170 lb 3.1 oz)    Admission Cmt: None   Principal Problem: 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 [L97.522]                   Active Insurance as of 7/10/2025       Primary Coverage       Payor Plan Insurance Group Employer/Plan Group    HUMANA MEDICARE REPLACEMENT HUMANA MEDICARE ADVANTAGE [facility] [id number]       Payor Plan Address Payor Plan Phone Number Payor Plan Fax Number Effective Dates    [address] [address] [phone]  3/1/2025 - None Entered    [facility] [id number]         Subscriber Name Subscriber Birth Date Member ID       MONAE CHAUHAN 1955 [id number]                     Emergency Contacts        (Rel.) Home Phone Work Phone Mobile Phone    kathya jarquin (Relative) [phone] -- [phone]    OLENA CRUZ (Son) [phone] -- [phone]              Vital Signs (last 3 days)       Date/Time Temp Temp src Pulse Resp BP Patient Position SpO2    07/16/25 0200 98.9 (37.2) Oral 62 16 146/58 Sitting 91    07/15/25 1800 98.6 (37) Oral 63 18 126/68 Sitting 95    07/15/25 1400 98.7 (37.1) Oral 59 18 114/56 Lying --    07/15/25 1000 98.3 (36.8) Oral 71 18 146/66 Lying --    " 07/15/25 0700 98.1 (36.7) Oral 67 16 125/56 Lying --    07/15/25 0500 -- -- 69 -- 148/97 -- --    07/15/25 0209 -- -- 65 -- -- -- 91    07/15/25 0200 98.6 (37) Oral -- 18 129/55 Lying --    07/14/25 2200 98.5 (36.9) Oral 66 18 152/56 Sitting 93    07/14/25 1800 97 (36.1) Oral 70 20 156/75 Sitting 96    07/14/25 1400 97.8 (36.6) Oral 62 18 133/62 Lying 95    07/14/25 1000 98.3 (36.8) Oral 53 18 113/51 Lying --    07/14/25 0700 98.3 (36.8) Oral 61 18 146/62 Lying --    07/14/25 0200 98.9 (37.2) Oral -- 18 144/65 Lying --    07/13/25 2200 98.6 (37) Oral -- 18 122/55 Lying --    07/13/25 1800 98.8 (37.1) Oral -- 16 109/50 Lying --    07/13/25 1400 98.1 (36.7) Oral 78 20 142/59 Lying --    07/13/25 1042 -- -- -- -- 134/58 -- --    07/13/25 0942 97.9 (36.6) -- 61 -- 147/57 -- 94    07/13/25 0840 -- -- -- -- 144/65 -- --    07/13/25 0700 98.2 (36.8) Oral 64 20 164/75 Lying --    07/13/25 0235 98.8 (37.1) Oral 63 20 155/66  Lying --    BP: rn aware at 07/13/25 0235          Intake & Output (last 3 days)         07/13 0701 07/14 0700 07/14 0701  07/15 0700 07/15 0701  07/16 0700    P.O. 600 945 465    I.V. (mL/kg)  0 (0)     Total Intake(mL/kg) 600 (7.8) 945 (12.2) 465 (6)    Urine (mL/kg/hr) 1000 (0.5) 1350 (0.7) 900 (0.5)    Total Output 1000 1350 900    Net -885 -528 -435           Urine Unmeasured Occurrence   1 x           Medication Administration Report for Monae Chauhan as of 7/13/25 through 7/16/25     Legend:    Given Hold Not Given Due Canceled Entry Other Actions    Time Time (Time) Time Time-Action         Discontinued     Completed     Future     MAR Hold     Linked             Medications 07/13/25 07/14/25 07/15/25 07/16/25     ALPRAZolam (XANAX) tablet 0.25 mg  Dose: 0.25 mg  Freq: 2 Times Daily Route: PO  Start: 07/12/25 2100     Admin Instructions:   ([facility]) Caution: Look alike/sound alike drug alert.   Avoid grapefruit juice      0841-Given     2102-Given        0833-Given     2128-Given         0843-Given     2126-Given        0900     2100          amLODIPine (NORVASC) tablet 10 mg  Dose: 10 mg  Freq: Daily Route: PO  Start: 07/13/25 0915     Admin Instructions:   Hold for SBP less than 100, DBP less than 50.  Caution: Look alike/sound alike drug alert. Avoid grapefruit juice.      0945-Given         0832-Given         0844-Given         0900           apixaban (ELIQUIS) tablet 5 mg  Dose: 5 mg  Freq: 2 Times Daily Route: PO  Indications of Use: ATRIAL FIBRILLATION  Start: 07/11/25 0900     Admin Instructions:   Tablet may be crushed and suspended in 60 mL of water or D5W and immediately delivered via NG tube.      0841-Given     2102-Given        0833-Given     2128-Given        0844-Given     2126-Given        0900     2100           sennosides-docusate (PERICOLACE) 8.6-50 MG per tablet 2 tablet  Dose: 2 tablet  Freq: 2 Times Daily Route: PO  Start: 07/10/25 1200     Admin Instructions:   HOLD MEDICATION IF PATIENT HAS HAD BOWEL MOVEMENT. Start bowel management regimen if patient has not had a bowel movement after 12 hours.      0841-Given     2102-Given        0833-Given     (2128)-Not Given        (0844)-Not Given     2126-Given        0900     2100          And   polyethylene glycol (MIRALAX) packet 17 g  Dose: 17 g  Freq: Daily PRN Route: PO  PRN Reason: Constipation  PRN Comment: Use if senna-docusate is ineffective  Start: 07/10/25 1047     Admin Instructions:   Use if no bowel movement after 12 hours. Mix in 6-8 ounces of water.  Use 4-8 ounces of water, tea, or juice for each 17 gram dose.            And   bisacodyl (DULCOLAX) EC tablet 5 mg  Dose: 5 mg  Freq: Daily PRN Route: PO  PRN Reason: Constipation  PRN Comment: Use if polyethylene glycol is ineffective  Start: 07/10/25 1047     Admin Instructions:   Use if no bowel movement after 12 hours.  Swallow whole. Do not crush, split, or chew tablet.            And   bisacodyl (DULCOLAX) suppository 10 mg  Dose: 10 mg  Freq: Daily PRN Route:  "RE  PRN Reason: Constipation  PRN Comment: Use if bisacodyl oral is ineffective  Start: 07/10/25 1047     Admin Instructions:   Use if no bowel movement after 12 hours.  Hold for diarrhea            Calcium Replacement - Follow Nurse / BPA Driven Protocol  Freq: As Needed Route: XX  PRN Reason: Other  Start: 07/12/25 1811     Admin Instructions:   Open Order & Select \"BHS Electrolyte Replacement Protocol Algorithm\" to View Details            carvedilol (COREG) tablet 3.125 mg  Dose: 3.125 mg  Freq: 2 Times Daily With Meals Route: PO  Start: 07/10/25 1200     Admin Instructions:   Hold for SBP less than 100, DBP less than 60, or heart rate less than 50. If a dose is held, please contact the provider.  Give with food.      0800-ProviderHeld     1800-ProviderHeld        0800-ProviderHeld     1800-ProviderHeld        0800-ProviderHeld     1800-ProviderHeld        0800-ProviderHeld     1800-ProviderHeld           fentaNYL (DURAGESIC) 12 MCG/HR 1 patch  Dose: 1 patch  Freq: Every 72 Hours Route: TD  Start: 07/14/25 0945 End: 07/20/25 0944     Admin Instructions:   ([facility])    Caution: Look alike/sound alike drug alert    Remove old patch before applying new patch. Dispose of patch by folding in half and placing in controlled substances waste receptacle.       1036-Medication Applied [C]             And   Check Fentanyl Patch Placement  Dose: 1 each  Freq: Every 12 Hours Scheduled Route: XX  Start: 07/15/25 0900        0845-Check Medication Patch     2130-Check Medication Patch        0900     2100          clopidogrel (PLAVIX) tablet 75 mg  Dose: 75 mg  Freq: Daily Route: PO  Start: 07/10/25 1200      0841-Given         0833-Given         0844-Given         0900           cyclobenzaprine (FLEXERIL) tablet 5 mg  Dose: 5 mg  Freq: 3 Times Daily PRN Route: PO  PRN Reason: Muscle Spasms  Start: 07/15/25 2215        2341-Given            dextrose (D50W) (25 g/50 mL) IV injection 25 g  Dose: 25 g  Freq: Every 15 Minutes PRN Route: " IV  PRN Reason: Low Blood Sugar  PRN Comment: Blood Sugar Less Than 70  Start: 07/10/25 1223     Admin Instructions:   Blood sugar less than 70; patient has IV access - Unresponsive, NPO or Unable To Safely Swallow            dextrose (GLUTOSE) oral gel 15 g  Dose: 15 g  Freq: Every 15 Minutes PRN Route: PO  PRN Reason: Low Blood Sugar  PRN Comment: Blood sugar less than 70  Start: 07/10/25 1223     Admin Instructions:   BS<70, Patient Alert, Is not NPO, Can safely swallow.            furosemide (LASIX) tablet 20 mg  Dose: 20 mg  Freq: Daily Route: PO  Start: 07/10/25 1200      0900-ProviderHeld         0900-ProviderHeld         0900-ProviderHeld         0900-ProviderHeld           gabapentin (NEURONTIN) capsule 800 mg  Dose: 800 mg  Freq: Every 8 Hours Scheduled Route: PO  Start: 07/12/25 2200     Admin Instructions:   ([facility])      0515-Given     1512-Given       2102-Given         0542-Given     1454-Given       2155-Given         0521-Given     (1443)-Not Given       2126-Given         0516-Given     1400       2200           glucagon HCl (Diagnostic) injection 1 mg  Dose: 1 mg  Freq: Every 15 Minutes PRN Route: IM  PRN Reason: Low Blood Sugar  PRN Comment: Blood Glucose Less Than 70  Start: 07/10/25 1223     Admin Instructions:   Blood Glucose Less Than 70 - Patient Without IV Access - Unresponsive, NPO or Unable To Safely Swallow  Reconstitute powder for injection by adding 1 mL of -supplied sterile diluent or sterile water for injection to a vial containing 1 mg of the drug, to provide solutions containing 1 mg/mL. Shake vial gently to dissolve.            hydrOXYzine (ATARAX) tablet 25 mg  Dose: 25 mg  Freq: Every 6 Hours PRN Route: PO  PRN Reason: Anxiety  Start: 07/10/25 2300     Admin Instructions:   Caution: Look alike/sound alike drug alert        2126-Given            insulin glargine (LANTUS, SEMGLEE) injection 10 Units  Dose: 10 Units  Freq: Nightly Route: SC  Start: 07/13/25 2100     Admin  "Instructions:   ([facility])      2103-Given         2155-Given         2129-Given         2100           Insulin Lispro (humaLOG) injection 2 Units  Dose: 2 Units  Freq: 3 Times Daily With Meals Route: SC  Start: 07/13/25 0915     Admin Instructions:    Solution should be clear. If cloudy, contact pharmacy for a new vial. Scheduled mealtime doses of this medication should be held if patient NPO.  ([facility]) Caution: Look alike/sound alike drug alert([facility])      0938-Given     1155-Given       1712-Given         0833-Given     1218-Given       1722-Given         0844-Given     1202-Given       1711-Given         0800     1200       1800           Insulin Lispro (humaLOG) injection 2-9 Units  Dose: 2-9 Units  Freq: 4 Times Daily Before Meals & Nightly Route: SC  Start: 07/12/25 0915     Admin Instructions:   Correction Insulin - Moderate Dose (Total Insulin Dose 40-60 units/day, Average Weight Patient, Patient Taking Oral Hypoglycemic)    Blood Glucose 150-199 mg/dL - 2 units  Blood Glucose 200-249 mg/dL - 4 units  Blood Glucose 250-299 mg/dL - 6 units  Blood Glucose 300-349 mg/dL - 7 units  Blood Glucose 350-400 mg/dL - 8 units  Blood Glucose greater than 400 mg/dL - 9 units & Call Provider  ([facility]) Caution: Look alike/sound alike drug alert([facility])      (0840)-Not Given     (1042)-Not Given       1710-Given     2103-Given        (0833)-Not Given     1217-Given       (1712)-Not Given     2156-Given        (0648)-Not Given [C]     (1057)-Not Given [C]       1711-Given     (2130)-Not Given        0730     1130       1730     2100          Magnesium Cardiology Dose Replacement - Follow Nurse / BPA Driven Protocol  Freq: As Needed Route: XX  PRN Reason: Other  Start: 07/12/25 1811     Admin Instructions:   Open Order & Select \"[facility]HS Electrolyte Replacement Protocol Algorithm\" to View Details            methocarbamol (ROBAXIN) tablet 750 mg  Dose: 750 mg  Freq: 4 Times Daily Route: PO  Start: 07/12/25 1200      0841-Given     1155-Given     "   1716-Given     2102-Given        0832-Given     1217-Given       1722-Given     2128-Given        0844-Given     1202-Given       1711-Given     2126-Given        0800     1200       1800     2100          miconazole (MICOTIN) 2 % powder 1 Application  Dose: 1 Application  Freq: Every 12 Hours Scheduled Route: TOP  Start: 07/11/25 1230     Admin Instructions:   Lightly apply to bilateral groin beneath abdominal folds      0843-Given     2103-Given        0834-Given     2129-Given        0845-Given     2128-Given        0900     2100          nicotine (NICODERM CQ) 21 MG/24HR patch 1 patch  Dose: 1 patch  Freq: Every 24 Hours Scheduled Route: TD  Start: 07/10/25 1530     Admin Instructions:   Apply to clean, dry, nonhairy area of skin (typically upper arm or shoulder)  Dispose of nicotine replacement therapies and their wrappers in non-hazardous pharmaceutical waste or in regular trash.      0841-Medication Removed     0842-Medication Applied        0831-Medication Removed     0832-Medication Applied        0844-Medication Removed     0845-Medication Applied        0845 (Medication Removed)     0900          ondansetron ODT (ZOFRAN-ODT) disintegrating tablet 4 mg  Dose: 4 mg  Freq: Every 6 Hours PRN Route: PO  PRN Reasons: Nausea,Vomiting  Start: 07/10/25 1047     Admin Instructions:   If BOTH ondansetron (ZOFRAN) and promethazine (PHENERGAN) are ordered use ondansetron first and THEN promethazine IF ondansetron is ineffective.  Place on tongue and allow to dissolve.       1217-Given             oxyCODONE (ROXICODONE) immediate release tablet 10 mg  Dose: 10 mg  Freq: Every 4 Hours PRN Route: PO  PRN Reason: Severe Pain  Start: 07/12/25 0818 End: 07/19/25 0817     Admin Instructions:   Based on patient request - if ordered for moderate or severe pain, provider allows for administration of a medication prescribed for a lower pain scale.  If given for pain, use the following pain scale:  Mild Pain = Pain Score of  "1-3, CPOT 1-2  Moderate Pain = Pain Score of 4-6, CPOT 3-4  Severe Pain = Pain Score of 7-10, CPOT 5-8      0307-Given     1041-Given       2102-Given         0542-Given     1721-Given        0516-Return to Cabinet [C]     0549-Given       1050-Given     1936-Given       2341-Given         0515-Given           Pharmacy Consult  Freq: Continuous PRN Route: XX  PRN Reason: Consult  Start: 07/12/25 2009     Order specific questions:   Consult for Please time the Neurontin and the Norco so that she can receive an oral pain medication every 4 hours.            Phosphorus Replacement - Follow Nurse / BPA Driven Protocol  Freq: As Needed Route: XX  PRN Reason: Other  Start: 07/12/25 1811     Admin Instructions:   Open Order & Select \"[facility]HS Electrolyte Replacement Protocol Algorithm\" to View Details            Potassium Replacement - Follow Nurse / BPA Driven Protocol  Freq: As Needed Route: XX  PRN Reason: Other  Start: 07/12/25 1811     Admin Instructions:   Open Order & Select \"[facility]HS Electrolyte Replacement Protocol Algorithm\" to View Details            promethazine (PHENERGAN) tablet 25 mg  Dose: 25 mg  Freq: Every 6 Hours PRN Route: PO  PRN Reasons: Nausea,Vomiting  Start: 07/10/25 1047     Admin Instructions:   \"If multiple N/V medications ordered, use in the following order: Ondansetron, Prochlorperazine, Promethazine. Use PO unless patient refuses or patient unable to swallow.\"  ([facility])            rosuvastatin (CRESTOR) tablet 20 mg  Dose: 20 mg  Freq: Daily Route: PO  Start: 07/10/25 1200     Admin Instructions:   Avoid grapefruit juice.      0842-Given         0833-Given         0844-Given         0900           sodium chloride 0.9 % flush 10 mL  Dose: 10 mL  Freq: As Needed Route: IV  PRN Reason: Line Care  Start: 07/10/25 1047            sodium chloride 0.9 % flush 10 mL  Dose: 10 mL  Freq: Every 12 Hours Scheduled Route: IV  Start: 07/10/25 1145      0843-Given     2103-Given        0834-Given     2129-Given        " 0845-Given     2135-Given        0900 2100          sodium chloride 0.9 % infusion 40 mL  Dose: 40 mL  Freq: As Needed Route: IV  PRN Reason: Line Care  Start: 07/10/25 1047     Admin Instructions:   Following administration of an IV intermittent medication, flush line with 40mL NS at 100mL/hr.            valsartan (DIOVAN) tablet 320 mg  Dose: 320 mg  Freq: Daily Route: PO  Start: 07/10/25 1200     Admin Instructions:   Hold for SBP less than 100, DBP less than 60, or heart rate less than 50. If a dose is held, please contact the provider.      0900-ProviderHeld         0900-ProviderHeld         0900-ProviderHeld         0900-ProviderHeld           Completed Medications  Medications 07/13/25 07/14/25 07/15/25 07/16/25      HYDROcodone-acetaminophen (NORCO)  MG per tablet 1 tablet  Dose: 1 tablet  Freq: Once Route: PO  Start: 07/12/25 2100 End: 07/12/25 2059     Admin Instructions:   Give this first dose of Norco if patient request it. Otherwise just omit this and give the next dose at 01:00 Thanks  [[facility]]    Do not exceed 4 grams of acetaminophen in a 24 hr period. Max dose of 2gm for AST/ALT greater than 120 units/L        If given for pain, use the following pain scale:   Mild Pain = Pain Score of 1-3, CPOT 1-2  Moderate Pain = Pain Score of 4-6, CPOT 3-4  Severe Pain = Pain Score of 7-10, CPOT 5-8            lactated ringers infusion  Dose: 125 mL/hr  Freq: Once Route: IV  Start: 07/10/25 0850 End: 07/10/25 0859            magnesium sulfate 4g/100mL (PREMIX) infusion  Dose: 4 g  Freq: Once Route: IV  Start: 07/13/25 0300 End: 07/13/25 0707      0307-New Bag              Discontinued Medications  Medications 07/13/25 07/14/25 07/15/25 07/16/25      HYDROcodone-acetaminophen (NORCO)  MG per tablet 1 tablet  Dose: 1 tablet  Freq: Every 8 Hours Route: PO  Start: 07/13/25 0100 End: 07/14/25 0856     Admin Instructions:   Based on patient request - if ordered for moderate or severe pain, provider allows  for administration of a medication prescribed for a lower pain scale.  [[facility]]    Do not exceed 4 grams of acetaminophen in a 24 hr period. Max dose of 2gm for AST/ALT greater than 120 units/L        If given for pain, use the following pain scale:   Mild Pain = Pain Score of 1-3, CPOT 1-2  Moderate Pain = Pain Score of 4-6, CPOT 3-4  Severe Pain = Pain Score of 7-10, CPOT 5-8      0114-Given     0841-Given       1710-Given         0200-Given     0833-Given                        Lab Results (last 72 hours)       Procedure Component Value Units Date/Time    POC Glucose Once [[id number]]  (Abnormal) Collected: 07/16/25 0636    Specimen: Blood Updated: 07/16/25 0638     Glucose 135 mg/dL      Comment: Serial Number: [id number]:  [id number]       CBC (No Diff) [[id number]]  (Abnormal) Collected: 07/16/25 0042    Specimen: Blood Updated: 07/16/25 0126     WBC 7.61 10*3/mm3      RBC 3.35 10*6/mm3      Hemoglobin 8.7 g/dL      Hematocrit 29.3 %      MCV 87.5 fL      MCH 26.0 pg      MCHC 29.7 g/dL      RDW 17.8 %      RDW-SD 57.5 fl      MPV 8.5 fL      Platelets 380 10*3/mm3     POC Glucose Once [[id number]]  (Abnormal) Collected: 07/15/25 2115    Specimen: Blood Updated: 07/15/25 2117     Glucose 138 mg/dL      Comment: Serial Number: [id number]:  [id number]       POC Glucose Once [[id number]]  (Abnormal) Collected: 07/15/25 2005    Specimen: Blood Updated: 07/15/25 2010     Glucose 159 mg/dL      Comment: Serial Number: [id number]:  [id number]       POC Glucose Once [[id number]]  (Abnormal) Collected: 07/15/25 1644    Specimen: Blood Updated: 07/15/25 1648     Glucose 163 mg/dL      Comment: Serial Number: [id number]:  [id number]       POC Glucose Once [[id number]]  (Normal) Collected: 07/15/25 1054    Specimen: Blood Updated: 07/15/25 1056     Glucose 114 mg/dL      Comment: Serial Number: [id number]:  [id number]       POC Glucose Once [[id number]]  (Abnormal) Collected: 07/15/25 0631    Specimen:  Blood Updated: 07/15/25 0634     Glucose 135 mg/dL      Comment: Serial Number: [id number]:  [id number]       POC Glucose Once [[id number]]  (Abnormal) Collected: 25 2131    Specimen: Blood Updated: 25 2134     Glucose 197 mg/dL      Comment: Serial Number: [id number]:  [id number]       POC Glucose Once [[id number]]  (Abnormal) Collected: 25 1644    Specimen: Blood Updated: 25 1646     Glucose 146 mg/dL      Comment: Serial Number: [id number]:  [id number]       TISSUE EXAM, [facility] ([facility]) [[id number]] Collected: 07/10/25 0915    Specimen: Tissue from Leg, Left Updated: 25 1223     Reference Lab Report --     [facility]  [address] [place] [address]  Phone: [phone] or [phone]  Fax: [phone]  Zachary Chou M.D., Medical Director    PATIENT NAME                           LABORATORY NO.  [id number]  OUMAR HERNANDEZ                     [id number]  [id number]                         AGE              SEX  SSN           CLIENT REF #  [facility] JENNIFER              69      1955  F    [id number]   [id number]    [address]                     REQUESTING M.D.     ATTENDING 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 See  diagnosis for  "details.    Professional interpretation rendered by Vivi Cox M.D., RACHEL at  [facility], [address] [place] [address].    GROSS DESCRIPTION:  Specimen received unfixed in a biohazard bag labeled per requisition \"AKA left  leg\" and consists of a left above-the-knee amputation measuring 8 cm from the  proximal margin to the knee, 39 cm from knee to lateral malleolus, 20 cm from  lateral malleolus to tip of great toe.  There is a portion of femur protruding from  the proximal end (7 cm in length with an average diameter of 3.0 cm).  There are  5 attached toes each having attached nails.  The skin surface is tan and wrinkled  with dusky areas.  There are ulcerated areas with the largest on the distal anterior  leg (8.0 x 5.0 cm).  There is an additional ulcer on the dorsal lateral foot (4.5 x  4.0 cm) with separate smaller ulcers on the dorsal fifth toe and lateral malleolus  ranging in size from 1.0 to 1.3 cm respectively.  The remainder of the skin  surface is otherwise grossly unremarkable.  Representative sections are  submitted in 6 cassettes as follows: A1-skin and vessels at proximal margin A2-  marrow at proximal bone A3-dorsal foot ulcer including underlying bone A4-  lateral malleolus ulcer including underlying bone A5-anterior distal leg ulcer  A6-dorsalis pedis and posterior tibial arteries.  Sections in A2-A4 are submitted  after decalcification.  JTM/RLL    REVIEWED, DIAGNOSED AND ELECTRONICALLY  SIGNED BY:    Vivi Cxo M.D., JEROMY.  CPT CODES:  50498, 71991      POC Glucose Once [[id number]]  (Abnormal) Collected: 07/14/25 1044    Specimen: Blood Updated: 07/14/25 1046     Glucose 186 mg/dL      Comment: Serial Number: [id number]:  [id number]       POC Glucose 4x Daily Before Meals & at Bedtime [[id number]]  (Normal) Collected: 07/14/25 0711    Specimen: Blood Updated: 07/14/25 0713     Glucose 130 mg/dL      Comment: Serial Number: [id number]:  [id number]       " Manual Differential [[id number]]  (Abnormal) Collected: 07/14/25 0109    Specimen: Blood Updated: 07/14/25 0218     Neutrophil % 67.0 %      Lymphocyte % 29.0 %      Monocyte % 2.0 %      Eosinophil % 1.0 %      Atypical Lymphocyte % 1.0 %      Neutrophils Absolute 5.37 10*3/mm3      Lymphocytes Absolute 2.41 10*3/mm3      Monocytes Absolute 0.16 10*3/mm3      Eosinophils Absolute 0.08 10*3/mm3      Anisocytosis Slight/1+     Platelet Estimate Adequate    Magnesium [[id number]]  (Normal) Collected: 07/14/25 0109    Specimen: Blood Updated: 07/14/25 0204     Magnesium 2.4 mg/dL     Phosphorus [[id number]]  (Normal) Collected: 07/14/25 0109    Specimen: Blood Updated: 07/14/25 0146     Phosphorus 3.1 mg/dL     Comprehensive Metabolic Panel [[id number]]  (Abnormal) Collected: 07/14/25 0109    Specimen: Blood Updated: 07/14/25 0146     Glucose 161 mg/dL      BUN 12.6 mg/dL      Creatinine 0.81 mg/dL      Sodium 139 mmol/L      Potassium 3.8 mmol/L      Chloride 105 mmol/L      CO2 25.3 mmol/L      Calcium 8.0 mg/dL      Total Protein 6.0 g/dL      Albumin 2.5 g/dL      ALT (SGPT) 6 U/L      AST (SGOT) 13 U/L      Alkaline Phosphatase 97 U/L      Total Bilirubin <0.2 mg/dL      Globulin 3.5 gm/dL      A/G Ratio 0.7 g/dL      BUN/Creatinine Ratio 15.6     Anion Gap 8.7 mmol/L      eGFR 78.7 mL/min/1.73     CBC Auto Differential [[id number]]  (Abnormal) Collected: 07/14/25 0109    Specimen: Blood Updated: 07/14/25 0127     WBC 8.02 10*3/mm3      RBC 3.40 10*6/mm3      Hemoglobin 8.8 g/dL      Hematocrit 29.1 %      MCV 85.6 fL      MCH 25.9 pg      MCHC 30.2 g/dL      RDW 18.0 %      RDW-SD 56.3 fl      MPV 8.4 fL      Platelets 363 10*3/mm3     Narrative:      Instrument flags present, additional testing may be recommended.    POC Glucose Once [[id number]]  (Abnormal) Collected: 07/13/25 1959    Specimen: Blood Updated: 07/13/25 2001     Glucose 154 mg/dL      Comment: Serial Number: [id number]:  [id number]       POC  Glucose Once [[id number]]  (Abnormal) Collected: 07/13/25 1623    Specimen: Blood Updated: 07/13/25 1625     Glucose 208 mg/dL      Comment: Serial Number: [id number]:  [id number]       Vitamin D,25-Hydroxy [[id number]]  (Normal) Collected: 07/13/25 0116    Specimen: Blood Updated: 07/13/25 1249     25 Hydroxy, Vitamin D 32.2 ng/ml     Narrative:      Reference Range for Total Vitamin D 25(OH)     Deficiency <20.0 ng/mL   Insufficiency 21-29 ng/mL   Sufficiency  ng/mL  Toxicity >100 ng/ml      Folate [[id number]]  (Abnormal) Collected: 07/13/25 0116    Specimen: Blood Updated: 07/13/25 1249     Folate 4.21 ng/mL     Narrative:      Results may be falsely increased if patient taking Biotin.      Vitamin B12 [[id number]]  (Normal) Collected: 07/13/25 0116    Specimen: Blood Updated: 07/13/25 1249     Vitamin B-12 342 pg/mL     Narrative:      Results may be falsely increased if patient taking Biotin.      POC Glucose Once [[id number]]  (Abnormal) Collected: 07/13/25 1038    Specimen: Blood Updated: 07/13/25 1040     Glucose 146 mg/dL      Comment: Serial Number: [id number]:  [id number]       POC Glucose Once [[id number]]  (Normal) Collected: 07/13/25 0652    Specimen: Blood Updated: 07/13/25 0654     Glucose 87 mg/dL      Comment: Serial Number: [id number]:  [id number]             Imaging Results (Last 72 Hours)       ** No results found for the last 72 hours. **          Orders (last 72 hrs)        Start     Ordered    07/15/25 1104  Use Repositioning Wedge to Position Patient  Continuous         07/15/25 1103    07/15/25 1104  Use Seat Cushion When Up In Chair  Continuous         07/15/25 1103    07/15/25 1104  Do NOT Rub or Massage Any Bony Prominence  Continuous         07/15/25 1103    07/15/25 1103  Turn Patient  Now Then Every 2 Hours         07/15/25 1103                     Physician Progress Notes (last 72 hours)        Katarina Becker DO at 07/15/25 2199              [facility]  HOSPITALIST PROGRESS NOTE     Patient Identification:  Name:  Monae Chauhan  Age:  69 y.o.  Sex:  female  :  1955  MRN:  [id number]  Visit Number:  [id number]  ROOM: [address] [place] [address]     Primary Care Provider:  Zachary Sullivan MD    Length of stay in inpatient status:  5    Subjective     History of Presenting Illness:    Patient was much more interactive with exam today. She reported her pain was severe early this morning but was much better now after receiving doses of pain medication. She expressed sadness over her current situation but was hopeful that she would be able to participate with therapy and be able to get a prosthetic in the future.    Objective   ----------------------------------------------------------------------------------------------------------------------  Vital Signs:  Temp:  [98.1 °F (36.7 °C)-98.7 °F (37.1 °C)] 98.6 °F (37 °C)  Heart Rate:  [59-71] 63  Resp:  [16-18] 18  BP: (114-148)/(55-97) 126/68  SpO2:  [91 %-95 %] 95 %  on   ;   Device (Oxygen Therapy): room air  Body mass index is 30.15 kg/m².    Wt Readings from Last 3 Encounters:   07/10/25 77.2 kg (170 lb 3.1 oz)   25 78.6 kg (173 lb 3.2 oz)   25 78.5 kg (173 lb)     Intake & Output (last 3 days)         07/13 0701  07/14 0700 07/14 0701  07/15 0700 07/15 07 07    P.O. 600 945 345    I.V. (mL/kg)  0 (0)     Total Intake(mL/kg) 600 (7.8) 945 (12.2) 345 (4.5)    Urine (mL/kg/hr) 1000 (0.5) 1350 (0.7) 400 (0.3)    Total Output 1000 1350 400    Net -400 -405 -55           Urine Unmeasured Occurrence   1 x          Diet: Diabetic; Consistent Carbohydrate; Fluid Consistency: Thin (IDDSI 0)  ----------------------------------------------------------------------------------------------------------------------  Physical exam:   Constitutional:  Well-developed and well-nourished.  No acute distress.      HENT:  Head:  Normocephalic and atraumatic.    Pulmonary/Chest:  Normal rate and effort  Musculoskeletal:  s/p left AKA  Neurological: Awake, alert, no focal deficit on gross examination. No slurred speech or facial droop.   Skin:  Skin is warm and dry. Amputation site is covered with bandage and appears clean and dry, dressing had reportedly just been changed.  Peripheral vascular:  No cyanosis, no edema.  Psychiatric: Dysphoric mood, but alert and interactive  Edited by: Katarina Becker DO at 7/15/2025 6713  ----------------------------------------------------------------------------------------------------------------------      Assessment & Plan      #PAD with previous stenting and reocclusion of SFA  #Left lower extremity ulcer s/p AKA  - Management per primary team, General Surgery  - Pain control with fentanyl patch (25mcg) and oxycodone 10mg q4h prn. Her pain meds had  overnight and so pain increased significantly, but she reported it was much better controlled at time of my exam after receiving doses of the scheduled/prn medications.  - Noted request by patient and surgeon to consider muscle relaxant. I have ordered low dose flexeril 5mg tid prn. Monitor for oversedation. Obtain EKG in a.m. to monitor QT interval.  - Continue PT/OT  - Dressing changes per surgery  - Patient's insurance declined inpatient rehab but family is filing an appeal.    #Afib   #Hyperlipidemia  #Coronary artery disease  #Hypertension  - Continue amlodipine, eliquis, plavix, statin  - Valsartan, lasix, and coreg have been held due to low BP/HR. BP trended up slightly yesterday but was mostly well controlled today. Consider restarting valsartan if BP is staying above goal of SBP<140, but it is not necessary at this time.. HR has been in the 50s-60s so continue holding carvedilol for now. Monitor VS per protocol.     #Adjustment disorder with depressed mood  - Appreciate Psychiatry consult. Recommendation was to follow up outpatient as she denied active suicidality.     Edited by: Katarina Becker DO at 7/15/2025 0228    Active VTE  Prophylaxis  Pharmacologic:        Start     Dose Route Frequency Stop    25 0900  apixaban (ELIQUIS) tablet 5 mg         5 mg PO 2 Times Daily --                    Dispo: pending appeal of insurance denial for inpatient rehab    Katarina Becker DO  [facility] Hospitalist  07/15/25  22:20 EDT     Electronically signed by Katarina Becker DO at 07/15/25 2222       Taran Harding MD at 07/15/25 1230          Post left AKA    She is complaining of pain in spite of a fentanyl patch and percocet 10 q4 h prn. She apparently cannot go to rehab or SNF with IV pain meds. She is also wondering if a a muscle relaxer could be changed. Her wound looks fine and she is afeb and vss. Check with hospitalist on muscle relaxer.    Electronically signed by Taran Harding MD at 07/15/25 1232       Katarina Becker DO at 25 2106              [facility] HOSPITALIST PROGRESS NOTE     Patient Identification:  Name:  Monae Chauhan  Age:  69 y.o.  Sex:  female  :  1955  MRN:  [id number]  Visit Number:  [id number]  ROOM: [address] [place] [address]     Primary Care Provider:  Zachary Sullivan MD    Length of stay in inpatient status:  4    Subjective     History of Presenting Illness:    Patient seen and examined today, no family present at bedside. Per nursing staff patient was cooperative with PT/OT but has been withdrawn most of the time. Patient denied shortness of breath or pain anywhere at time of my exam, and her only complaint was feeling tired.    Objective       ----------------------------------------------------------------------------------------------------------------------  Vital Signs:  Temp:  [97 °F (36.1 °C)-98.9 °F (37.2 °C)] 97 °F (36.1 °C)  Heart Rate:  [53-70] 70  Resp:  [18-20] 20  BP: (113-156)/(51-75) 156/75  SpO2:  [95 %-96 %] 96 %  on   ;   Device (Oxygen Therapy): room air  Body mass index is 30.15 kg/m².    Wt Readings from Last 3 Encounters:   07/10/25 77.2 kg (170 lb 3.1 oz)    06/19/25 78.6 kg (173 lb 3.2 oz)   05/29/25 78.5 kg (173 lb)     Intake & Output (last 3 days)         07/12 0701 07/13 0700 07/13 0701 07/14 0700 07/14 0701  07/15 0700    P.O. 1320 600 705    Blood       Total Intake(mL/kg) 1320 (17.1) 600 (7.8) 705 (9.1)    Urine (mL/kg/hr) 1250 (0.7) 1000 (0.5) 600 (0.6)    Total Output 1250 1000 600    Net +70 -400 +105                 Diet: Diabetic; Consistent Carbohydrate; Fluid Consistency: Thin (IDDSI 0)  ----------------------------------------------------------------------------------------------------------------------  Physical exam:   Constitutional:  Well-developed and well-nourished.  No acute distress.      HENT:  Head:  Normocephalic and atraumatic.    Cardiovascular:  Normal rate, regular rhythm   Pulmonary/Chest:  No respiratory distress, minimal wheezing bilaterally  Musculoskeletal: s/p left AKA  Neurological: Awake, alert, no focal deficit on gross examination. No slurred speech or facial droop.   Skin:  Skin is warm and dry. Amputation site is covered with bandage and appears clean and dry.  Peripheral vascular:  No cyanosis, no edema.  Psychiatric: Depressed mood, withdrawn  Edited by: Katarina Becker DO at 7/14/2025 2100  ----------------------------------------------------------------------------------------------------------------------      Assessment & Plan      #PAD with previous stenting and reocclusion of SFA  #Left lower extremity ulcer s/p AKA  - Management per primary team, General Surgery  - Continue PT/OT  - Dressing changes per surgery    #Afib   #Hyperlipidemia  #Coronary artery disease  #Hypertension  - Continue amlodipine, eliquis, plavix, statin  - Valsartan, lasix, and coreg have been held due to low BP/HR. BP has now begun trending up. Consider restarting valsartan if BP is staying above goal of SBP<140. HR has been in the 50s-60s so continue holding carvedilol for now. Monitor VS per protocol.     #Adjustment disorder with depressed  mood  - Appreciate Psychiatry consult. Recommendation was to follow up outpatient as she denied active suicidality.    Edited by: Katarina Becker DO at 2025    Active VTE Prophylaxis  Pharmacologic:        Start     Dose Route Frequency Stop    25 0900  apixaban (ELIQUIS) tablet 5 mg         5 mg PO 2 Times Daily --                    Dispo: pending inpatient rehab     Katarina Becker DO  [facility] Hospitalist  25  21:06 EDT     Electronically signed by Katarina Becker DO at 25       Taran Harding MD at 25          POD#4 left AKA    She is having a little less pain now. Afeb and vss. Her dressing was changed and the wound looks  very good. Follow up on PT/rehab. Psych felt she could follow up outpatient.     Electronically signed by Taran Harding MD at 25       Rosa Salgado MD at 25          Diagnosis: s/p AKA    Resting well on examination today. Psychiatry consulted with no indication for intervention.     Awaiting placement.     Rosa Ballard MD       General Surgeon  [facility]    Electronically signed by Rosa Salgado MD at 25       Marco Rasheed MD at 25              [facility]    Patient Identification:  Name:  Monae Chauhan  Age:  69 y.o.  Sex:  female  :  1955  MRN:  [id number]   Visit Number:  [id number]  Admit Date: 7/10/2025   Room number:  3339/1P  Primary Care Physician:  Zachary Sullivan MD    Date of Admission: 7/10/2025    Today, the patient's pain is under better control.  She currently denies chest pain, denies shortness of air, denies coughing, denies nausea.  On exam, she is alert, oriented x 4, can follow all commands, does not have crackles, does not have wheezing, no murmur, heart with regular rate and rhythm.  No right leg edema.  Left leg stump is covered with an occlusive dressing and there is  no drainage on it.      Since the systolic is increasing, will restart the home Norvasc.  Will continue to hold the home Coreg as the heart rates are in the 60s.  Will continue to hold the home Lasix and valsartan.  Creatinine improving.  The glucose levels are not trending steadily; thus, will decrease the Lantus to 10 units at night and will add 2 units of Humalog with meals; will continue with the current dose of sliding scale Humalog.  Will continue with the same pain medication, as her pain is under better control.   ----------------------------------------------------------------------------------------------------------------------    ADMISSION DIAGNOSES  -PAD with previous stenting and reocclusion of SFA  -Left lower extremity ulcer s/p AKA     CHRONIC MEDICAL ISSUES  -Atrial fibrillation   -Hyperlipidemia   -CAD  -Essential hypertension      VTE Prophylaxis:  On Eliquis, per primary team     The patient is high risk due to the following diagnoses/reasons: Left lower extremity AKA     Disposition: Per primary team, PT and OT have been consulted.  ----------------------------------------------------------------------------------------------------------------------  Vital Signs:  Temp:  [98 °F (36.7 °C)-99.3 °F (37.4 °C)] 98.2 °F (36.8 °C)  Heart Rate:  [63-71] 64  Resp:  [20] 20  BP: (136-164)/(59-75) 164/75    Device (Oxygen Therapy): room air  Body mass index is 30.15 kg/m².  --------------------------------------------------------------------------------------------------------------------      ----------------------------------------------------------------------------------------------------------------------    Marco Rasheed MD  [facility]  07/13/25  08:17 EDT    Electronically signed by Marco Rasheed MD at 07/13/25 1537

## 2025-07-16 NOTE — THERAPY TREATMENT NOTE
Acute Care - Physical Therapy Treatment Note  [facility]     Patient Name: Monae Chauhan  : 1955  MRN: [id number]  Today's Date: 2025   Onset of Illness/Injury or Date of Surgery: 07/10/25 (admission date)  Visit Dx:     ICD-10-CM ICD-9-CM   1. 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  L97.522 707.15     Patient Active Problem List   Diagnosis    CAD (coronary artery disease)    Essential hypertension    Mixed hyperlipidemia    Type 2 diabetes mellitus    PAD (peripheral artery disease)    Smoker    Atherosclerosis of native arteries of extremities with rest pain, bilateral legs    Detrusor instability    Open wound    Diabetes mellitus    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    Critical limb ischemia of both lower extremities with rest pain    Tobacco use    Femoral artery occlusion, left    Rest pain of both lower extremities due to atherosclerosis    Status post amputation     Past Medical History:   Diagnosis Date    Arthritis     Coronary artery disease     Depression     Diabetes mellitus     Elevated cholesterol     Full dentures     GERD (gastroesophageal reflux disease)     Hyperlipidemia     Hypertension     Myocardial infarction     Peripheral vascular disease     Psoriasis      Past Surgical History:   Procedure Laterality Date    ABOVE KNEE AMPUTATION Left 7/10/2025    Procedure: AMPUTATION ABOVE KNEE;  Surgeon: Taran Harding MD;  Location: [facility];  Service: General;  Laterality: Left;    APPENDECTOMY      CARDIAC CATHETERIZATION N/A 2017    Procedure: Left Heart Cath;  Surgeon: David Diane MD;  Location: [facility] CATH INVASIVE LOCATION;  Service:     CARDIAC CATHETERIZATION Left 2024    Procedure: Peripheral angiography;  Surgeon: Jaiden Doyle MD;  Location: [facility] CATH INVASIVE LOCATION;  Service: Cardiovascular;  Laterality: Left;  Left Venogram -    CARDIAC CATHETERIZATION N/A  02/06/2025    Procedure: Peripheral angiography;  Surgeon: Fran Estevez MD;  [facility] CATH INVASIVE LOCATION;  Service: Cardiovascular;  Laterality: N/A;    CARDIAC CATHETERIZATION N/A 02/26/2025    Procedure: Peripheral angiography;  Surgeon: Jaiden Doyle MD;  Location:  [facility] CATH INVASIVE LOC[facility];  Service: Cardiovascular;  Laterality: N/A;    CARDIAC CATHETERIZATION N/A 02/26/2025    Procedure: Atherectomy-peripheral;  Surgeon: Jaiden Doyle MD;  Location: [facility] CATH INVASIVE LOCATION;  Service: Cardiovascular;  Laterality: N/A;    CARDIAC CATHETERIZATION N/A 02/26/2025    Procedure: Angioplasty-peripheral;  Surgeon: Jaiden Doyle MD;  Location: [facility] CATH INVASIVE LOCATION;  Service: Cardiovascular;  Laterality: N/A;    CARDIAC CATHETERIZATION N/A 03/24/2025    Procedure: Peripheral angiography;  Surgeon: Jaiden Doyle MD;  Location: [facility] CATH INVASIVE LOCATION;  Service: Cardiovascular;  Laterality: N/A;    CARDIAC CATHETERIZATION N/A 03/24/2025    Procedure: Atherectomy-peripheral;  Surgeon: Jaiden Doyle MD;  Location: [facility] CATH INVASIVE LOCATION;  Service: Cardiovascular;  Laterality: N/A;    COLONOSCOPY      CORONARY STENT PLACEMENT      CYSTOSCOPY BOTOX INJECTION OF BLADDER N/A 10/20/2021    Procedure: Cystoscopy Botox injection of bladder;  Surgeon: Say Robles MD;  Location: [facility];  Service: Urology;  Laterality: N/A;    ENDOSCOPY      FOOT SURGERY Left     GALLBLADDER SURGERY      INTRAVASCULAR ULTRASOUND N/A 02/26/2025    Procedure: Intravascular Ultrasound;  Surgeon: Jaiden Doyle MD;  Location: [facility] CATH INVASIVE LOCATION;  Service: Cardiovascular;  Laterality: N/A;    INTRAVASCULAR ULTRASOUND N/A 03/24/2025    Procedure: Intravascular Ultrasound;  Surgeon: Jaiden Doyle MD;  Location: [facility] CATH INVASIVE LOCATION;  Service: Cardiovascular;  Laterality: N/A;    LEG SURGERY Left     TUBAL ABDOMINAL LIGATION      VASCULAR SURGERY Left     Lower  Extremity Vascular Stent-[facility]     PT Assessment (Last 12 Hours)       PT Evaluation and Treatment       [facility] Name 07/16/25 0938          Physical Therapy Time and Intention    Subjective Information complains of;pain;nausea/vomiting;weakness (P)   -     Document Type therapy note (daily note) (P)   -     Mode of Treatment physical therapy (P)   -     Patient Effort good (P)   -     Symptoms Noted During/After Treatment nausea;increased pain (P)   -[facility] Name 07/16/25 0938          General Information    Patient Observations cooperative;agree to therapy;alert (P)   -[facility] Name 07/16/25 0938          Pain Scale: FACES Pre/Post-Treatment    Pain: FACES Scale, Pretreatment 2-->hurts little bit (P)   -     Posttreatment Pain Rating 4-->hurts little more (P)   -     Pre/Posttreatment Pain Comment per observation and patient statements (P)   -DH       Row Name 07/16/25 0938          Cognition    Affect/Mental Status (Cognition) WNL (P)   -     Orientation Status (Cognition) oriented x 4 (P)   -     Follows Commands (Cognition) WNL (P)   -     Cognitive Function (Cognition) WNL (P)   -     Personal Safety Interventions gait belt;nonskid shoes/slippers when out of bed;supervised activity (P)   -[facility] Name 07/16/25 0938          Bed Mobility    Bed Mobility scooting/bridging (P)   -     Rolling Left Empire (Bed Mobility) modified independence;supervision (P)   -     Scooting/Bridging Empire (Bed Mobility) supervision;modified independence (P)   -     Supine-Sit Empire (Bed Mobility) 1 person assist;minimum assist (75% patient effort) (P)   -     Sit-Supine Empire (Bed Mobility) 1 person assist;minimum assist (75% patient effort) (P)   -     Assistive Device (Bed Mobility) overhead trapeze (P)   -[facility] Name 07/16/25 0938          Gait/Stairs (Locomotion)    Empire Level (Gait) standby assist;contact guard;1 person assist;2 person assist (P)    -     Assistive Device (Gait) walker, front-wheeled (P)   -     Patient was able to Ambulate yes (P)   -     Comment, (Gait/Stairs) Patient was able to stand up with walker and pivot step laterally, but too weak in UE to lift foot off ground to take formal step on this day. (P)   -       Row Name 07/16/25 0938          Hip (Therapeutic Exercise)    Hip (Therapeutic Exercise) AROM (active range of motion);strengthening exercise (P)   -     Hip AROM (Therapeutic Exercise) left;flexion;5 second hold;5 repetitions;supine (P)   -DH     Hip Strengthening (Therapeutic Exercise) left;aBduction;aDduction;flexion;supine;10 repetitions;3 sets (P)   -       Row Name 07/16/25 0938          Knee (Therapeutic Exercise)    Knee (Therapeutic Exercise) strengthening exercise (P)   -     Knee Strengthening (Therapeutic Exercise) left;SLR (straight leg raise);10 repetitions;3 sets (P)   -       Row Name             Wound 07/10/25 0919 Left anterior thigh Surgical Open Surgical Incision    Wound - Properties Group Placement Date: 07/10/25  -JG Placement Time: 0919 -JG Side: Left  -JG Orientation: anterior  -JG Location: thigh  -JG Primary Wound Type: Surgical  -JG Secondary Wound Type - Surgical: Open Surg  -JG    Retired Wound - Properties Group Placement Date: 07/10/25  -JG Placement Time: 0919 -JG Side: Left  -JG Orientation: anterior  -JG Location: thigh  -JG    Retired Wound - Properties Group Placement Date: 07/10/25  -JG Placement Time: 0919 -JG Side: Left  -JG Orientation: anterior  -JG Location: thigh  -JG    Retired Wound - Properties Group Date first assessed: 07/10/25  -JG Time first assessed: 0919  -JG Side: Left  -JG Location: thigh  -JG      Row Name             Wound 07/10/25 1401 Left anterior groin Other (Comments)    Wound - Properties Group Placement Date: 07/10/25  -MH Placement Time: 1401  -MH Present on Original Admission: Y  -MH Side: Left  -MH Orientation: anterior  -MH Location: groin  -MH  Primary Wound Type: Other  -MH Secondary Wound Type - Other: --  -MH, Cardiac Cath site     Retired Wound - Properties Group Placement Date: 07/10/25  -MH Placement Time: 1401  -MH Present on Original Admission: Y  -MH Side: Left  -MH Orientation: anterior  -MH Location: groin  -MH    Retired Wound - Properties Group Placement Date: 07/10/25  -MH Placement Time: 1401  -MH Present on Original Admission: Y  -MH Side: Left  -MH Orientation: anterior  -MH Location: groin  -MH    Retired Wound - Properties Group Date first assessed: 07/10/25  -MH Time first assessed: 1401  -MH Present on Original Admission: Y  -MH Side: Left  -MH Location: groin  -MH      Row Name             Wound 07/13/25 1432 Left distal arm Other (Comments)    Wound - Properties Group Placement Date: 07/13/25  -TP Placement Time: 1432  -TP Present on Original Admission: N  -TP Side: Left  -TP Orientation: distal  -TP Location: arm  -TP Primary Wound Type: Other  -TP Secondary Wound Type - Other: --  -TP, skin tear     Retired Wound - Properties Group Placement Date: 07/13/25  -TP Placement Time: 1432  -TP Present on Original Admission: N  -TP Side: Left  -TP Orientation: distal  -TP Location: arm  -TP    Retired Wound - Properties Group Placement Date: 07/13/25  -TP Placement Time: 1432  -TP Present on Original Admission: N  -TP Side: Left  -TP Orientation: distal  -TP Location: arm  -TP    Retired Wound - Properties Group Date first assessed: 07/13/25  -TP Time first assessed: 1432  -TP Present on Original Admission: N  -TP Side: Left  -TP Location: arm  -TP      Row Name             Wound 07/14/25 1913 Left anterior thigh Pressure Injury    Wound - Properties Group Placement Date: 07/14/25  -TP Placement Time: 1913  -TP Present on Original Admission: N  -TP Side: Left  -TP Orientation: anterior  -TP Location: thigh  -TP Primary Wound Type: Pressure Inj  -TW    Retired Wound - Properties Group Placement Date: 07/14/25  -TP Placement Time: 1913  -TP  Present on Original Admission: N  -TP Side: Left  -TP Orientation: anterior  -TP Location: thigh  -TP    Retired Wound - Properties Group Placement Date: 07/14/25  -TP Placement Time: 1913 -TP Present on Original Admission: N  -TP Side: Left  -TP Orientation: anterior  -TP Location: thigh  -TP    Retired Wound - Properties Group Date first assessed: 07/14/25  -TP Time first assessed: 1913  -TP Present on Original Admission: N  -TP Side: Left  -TP Location: thigh  -TP      Row Name 07/16/25 0938          Coping    Observed Emotional State sad;frustrated (P)   -     Trust Relationship/Rapport care explained (P)   -       Row Name 07/16/25 0938          Plan of Care Review    Progress improving (P)   -       Row Name 07/16/25 0938          Positioning and Restraints    Pre-Treatment Position in bed (P)   -     Post Treatment Position bed (P)   -     In Bed supine;call light within reach;encouraged to call for assist;RLE elevated;side rails up x3 (P)   -       Row Name 07/16/25 0938          Progress Summary (PT)    Daily Progress Summary (PT) Patient tolerated session well with rest breaks due to fatigue and slight increase in pain. Patient was able to complete bed mobility exercises using trapeze bar and standing exercises with walking and min-mod assist. She was able to pivot step but was unable to clear right foot due to UE strength weakness. Therapist took time to wrap patients surgical limb to help with healing / compression component. (P)   -               User Key  (r) = Recorded By, (t) = Taken By, (c) = Cosigned By      Initials Name Provider Type    Basilia Johnson, RN Registered Nurse    Taylor Del Rio, RN Registered Nurse    aSra Godoy RN Registered Nurse    Jolie Lisa, RN Registered Nurse    Neva Camejo, PT Student PT Student                      PT Recommendation and Plan     Progress Summary (PT)  Daily Progress Summary (PT): (P) Patient tolerated  session well with rest breaks due to fatigue and slight increase in pain. Patient was able to complete bed mobility exercises using trapeze bar and standing exercises with walking and min-mod assist. She was able to pivot step but was unable to clear right foot due to UE strength weakness. Therapist took time to wrap patients surgical limb to help with healing / compression component.  Plan of Care Reviewed With: patient  Progress: (P) improving       Time Calculation:    PT Charges       Row Name 07/16/25 1058             Time Calculation    Start Time 0938 (P)   -      PT Received On 07/16/25 (P)   -         Time Calculation- PT    Total Timed Code Minutes- PT 53 minute(s) (P)   -                User Key  (r) = Recorded By, (t) = Taken By, (c) = Cosigned By      Initials Name Provider Type    Neva Camejo, PT Student PT Student                  Therapy Charges for Today       Code Description Service Date Service Provider Modifiers Qty    84730144201  PT THERAPEUTIC ACT EA 15 MIN 7/16/2025 Neva Gonzalez, PT Student GP 2    [id number]  PT THER PROC EA 15 MIN 7/16/2025 Neva Gonzalez, PT Student GP 1    [id number]  GAIT TRAINING EA 15 MIN 7/16/2025 Neva Gonzalez, PT Student GP 1            PT G-Codes  AM-PAC 6 Clicks Score (PT): 8    Neva Gonzalez PT Student  7/16/2025

## 2025-07-16 NOTE — CASE MANAGEMENT/SOCIAL WORK
Discharge Planning Assessment  [facility]     Patient Name: Monae Chauhan  MRN: [id number]  Today's Date: 7/16/2025    Admit Date: 7/10/2025         Discharge Plan       Row Name 07/16/25 1115       Plan    Plan SS contacted [employer] to check on status of fast appeal [phone] per Giselle who states appeal was completed. Case # [id number]) Fast appeal remains pending. Per Giselle, insurance has until 07/18/25 to make a decision about possible admit to [facility]. SS notified Provider and  IPR per Janae that appeal is pending. SS to follow.                  Continued Care and Services - Admitted Since 7/10/2025       Destination       Service Provider Request Status Services Address Phone Fax Patient Preferred    [facility] Considering -- [address] [place] [address] [phone] --       Internal Comment last updated by Alejandra Be BSW 7/16/2025 1139    Fast Appeal pending               [facility] Declined  Insurance Denial -- [address] [address] [phone] --    [facility]ED UN[facility] Declined  PT rec'd IPR instead -- [address] [place] [id number] [phone] --                    TAIWO Levine

## 2025-07-17 LAB
GLUCOSE BLDC GLUCOMTR-MCNC: 112 MG/DL (ref 70–130)
GLUCOSE BLDC GLUCOMTR-MCNC: 166 MG/DL (ref 70–130)
GLUCOSE BLDC GLUCOMTR-MCNC: 176 MG/DL (ref 70–130)
GLUCOSE BLDC GLUCOMTR-MCNC: 191 MG/DL (ref 70–130)
QT INTERVAL: 418 MS
QTC INTERVAL: 431 MS

## 2025-07-17 PROCEDURE — 63710000001 INSULIN LISPRO (HUMAN) PER 5 UNITS: Performed by: INTERNAL MEDICINE

## 2025-07-17 PROCEDURE — 63710000001 INSULIN GLARGINE PER 5 UNITS: Performed by: INTERNAL MEDICINE

## 2025-07-17 PROCEDURE — 99024 POSTOP FOLLOW-UP VISIT: CPT | Performed by: SURGERY

## 2025-07-17 PROCEDURE — 99232 SBSQ HOSP IP/OBS MODERATE 35: CPT | Performed by: STUDENT IN AN ORGANIZED HEALTH CARE EDUCATION/TRAINING PROGRAM

## 2025-07-17 PROCEDURE — 82948 REAGENT STRIP/BLOOD GLUCOSE: CPT

## 2025-07-17 RX ADMIN — INSULIN GLARGINE 10 UNITS: 100 INJECTION, SOLUTION SUBCUTANEOUS at 21:12

## 2025-07-17 RX ADMIN — APIXABAN 5 MG: 5 TABLET, FILM COATED ORAL at 08:34

## 2025-07-17 RX ADMIN — FENTANYL 1 PATCH: 12 PATCH TRANSDERMAL at 08:31

## 2025-07-17 RX ADMIN — METHOCARBAMOL TABLETS 750 MG: 750 TABLET, COATED ORAL at 17:10

## 2025-07-17 RX ADMIN — INSULIN LISPRO 2 UNITS: 100 INJECTION, SOLUTION INTRAVENOUS; SUBCUTANEOUS at 17:10

## 2025-07-17 RX ADMIN — METHOCARBAMOL TABLETS 750 MG: 750 TABLET, COATED ORAL at 11:48

## 2025-07-17 RX ADMIN — METHOCARBAMOL TABLETS 750 MG: 750 TABLET, COATED ORAL at 08:33

## 2025-07-17 RX ADMIN — Medication 10 ML: at 21:13

## 2025-07-17 RX ADMIN — GABAPENTIN 800 MG: 400 CAPSULE ORAL at 13:50

## 2025-07-17 RX ADMIN — INSULIN LISPRO 2 UNITS: 100 INJECTION, SOLUTION INTRAVENOUS; SUBCUTANEOUS at 11:48

## 2025-07-17 RX ADMIN — GABAPENTIN 800 MG: 400 CAPSULE ORAL at 21:12

## 2025-07-17 RX ADMIN — INSULIN LISPRO 2 UNITS: 100 INJECTION, SOLUTION INTRAVENOUS; SUBCUTANEOUS at 21:12

## 2025-07-17 RX ADMIN — ALPRAZOLAM 0.25 MG: 0.25 TABLET ORAL at 08:33

## 2025-07-17 RX ADMIN — METHOCARBAMOL TABLETS 750 MG: 750 TABLET, COATED ORAL at 21:13

## 2025-07-17 RX ADMIN — MICONAZOLE NITRATE 1 APPLICATION: 2 POWDER TOPICAL at 21:14

## 2025-07-17 RX ADMIN — Medication 10 ML: at 08:32

## 2025-07-17 RX ADMIN — DOCUSATE SODIUM 50 MG AND SENNOSIDES 8.6 MG 2 TABLET: 8.6; 5 TABLET, FILM COATED ORAL at 21:12

## 2025-07-17 RX ADMIN — HYDROXYZINE HYDROCHLORIDE 25 MG: 25 TABLET, FILM COATED ORAL at 13:53

## 2025-07-17 RX ADMIN — OXYCODONE HYDROCHLORIDE 10 MG: 10 TABLET ORAL at 01:58

## 2025-07-17 RX ADMIN — ROSUVASTATIN CALCIUM 20 MG: 20 TABLET, FILM COATED ORAL at 08:33

## 2025-07-17 RX ADMIN — AMLODIPINE BESYLATE 10 MG: 5 TABLET ORAL at 08:34

## 2025-07-17 RX ADMIN — CLOPIDOGREL BISULFATE 75 MG: 75 TABLET, FILM COATED ORAL at 08:33

## 2025-07-17 RX ADMIN — GABAPENTIN 800 MG: 400 CAPSULE ORAL at 05:51

## 2025-07-17 RX ADMIN — INSULIN LISPRO 2 UNITS: 100 INJECTION, SOLUTION INTRAVENOUS; SUBCUTANEOUS at 08:33

## 2025-07-17 RX ADMIN — INSULIN LISPRO 2 UNITS: 100 INJECTION, SOLUTION INTRAVENOUS; SUBCUTANEOUS at 08:34

## 2025-07-17 RX ADMIN — NICOTINE TRANSDERMAL SYSTEM 1 PATCH: 21 PATCH, EXTENDED RELEASE TRANSDERMAL at 08:35

## 2025-07-17 RX ADMIN — ALPRAZOLAM 0.25 MG: 0.25 TABLET ORAL at 21:12

## 2025-07-17 NOTE — PLAN OF CARE
Goal Outcome Evaluation:   Pt has rested well,unable to find fentnyl patch on assessment,wound care done,vss,nadn,wctm

## 2025-07-17 NOTE — PROGRESS NOTES
[facility] HOSPITALIST PROGRESS NOTE     Patient Identification:  Name:  Monae Chauhan  Age:  69 y.o.  Sex:  female  :  1955  MRN:  [id number]  Visit Number:  [id number]  ROOM: [address] [place] [address]     Primary Care Provider:  Zachary Sullivan MD    Length of stay in inpatient status:  6    Subjective     History of Presenting Illness:    Patient reported her pain was well controlled today and that she tolerated physical therapy well, but was very tired afterward. No new complaints or needs noted at time of my exam.    Objective     Current Hospital Meds:ALPRAZolam, 0.25 mg, Oral, BID  amLODIPine, 10 mg, Oral, Daily  apixaban, 5 mg, Oral, BID  [Held by provider] carvedilol, 3.125 mg, Oral, BID With Meals  fentaNYL, 1 patch, Transdermal, Q72H   And  Check Fentanyl Patch Placement, 1 each, Not Applicable, Q12H  clopidogrel, 75 mg, Oral, Daily  [Held by provider] furosemide, 20 mg, Oral, Daily  gabapentin, 800 mg, Oral, Q8H  insulin glargine, 10 Units, Subcutaneous, Nightly  insulin lispro, 2 Units, Subcutaneous, TID With Meals  insulin lispro, 2-9 Units, Subcutaneous, 4x Daily AC & at Bedtime  methocarbamol, 750 mg, Oral, 4x Daily  miconazole, 1 Application, Topical, Q12H  nicotine, 1 patch, Transdermal, Q24H  rosuvastatin, 20 mg, Oral, Daily  senna-docusate sodium, 2 tablet, Oral, BID  sodium chloride, 10 mL, Intravenous, Q12H  [Held by provider] valsartan, 320 mg, Oral, Daily    Pharmacy Consult,         Current Antimicrobial Therapy:  Anti-Infectives (From admission, onward)      None          Current Diuretic Therapy:  Diuretics (From admission, onward)      Ordered     Dose/Rate Route Frequency Start Stop    07/10/25 1047  [Held by provider]  furosemide (LASIX) tablet 20 mg        (On hold since Thu 7/10/2025 at 1222 until manually unheld; held by Marco Rasheed MDHold Reason: Abnormal Vitals)   Ordering Provider: Taran Harding MD    20 mg Oral Daily 07/10/25 1200             ----------------------------------------------------------------------------------------------------------------------  Vital Signs:  Temp:  [97.6 °F (36.4 °C)-98.9 °F (37.2 °C)] 97.6 °F (36.4 °C)  Heart Rate:  [61-68] 68  Resp:  [16-18] 18  BP: (113-146)/(50-61) 144/58  SpO2:  [91 %-97 %] 97 %  on   ;   Device (Oxygen Therapy): room air  Body mass index is 30.15 kg/m².    Wt Readings from Last 3 Encounters:   07/10/25 77.2 kg (170 lb 3.1 oz)   06/19/25 78.6 kg (173 lb 3.2 oz)   05/29/25 78.5 kg (173 lb)     Intake & Output (last 3 days)         07/14 0701  07/15 0700 07/15 0701  07/16 0700 07/16 0701 07/17 0700    P.O. 945 465 840    I.V. (mL/kg) 0 (0)      Total Intake(mL/kg) 945 (12.2) 465 (6) 840 (10.9)    Urine (mL/kg/hr) 1350 (0.7) 900 (0.5) 400 (0.4)    Total Output 1350 900 400    Net -405 -435 +440           Urine Unmeasured Occurrence  1 x 1 x          Diet: Diabetic; Consistent Carbohydrate; Fluid Consistency: Thin (IDDSI 0)  ----------------------------------------------------------------------------------------------------------------------  Physical exam:   Constitutional:  Well-developed and well-nourished.  No acute distress.      HENT:  Head:  Normocephalic and atraumatic.    Pulmonary/Chest:  Normal rate and effort, able to speak in complete sentences  Musculoskeletal: s/p left AKA  Neurological: Awake, alert, no focal deficit on gross examination. No slurred speech or facial droop.   Skin:  Skin is warm and dry. Amputation site is covered with bandage and appears clean and dry.  Peripheral vascular:  No cyanosis, no edema.  Psychiatric: Appropriate mood and affect  Edited by: Katarina Becker DO at 7/16/2025 2044  ----------------------------------------------------------------------------------------------------------------------  Results from last 7 days   Lab Units 07/16/25  0042 07/14/25  0109 07/13/25  0116 07/12/25  0150   WBC 10*3/mm3 7.61 8.02  --  9.24   HEMOGLOBIN g/dL 8.7* 8.8* 8.6*  "8.9*   HEMATOCRIT % 29.3* 29.1* 28.8* 29.3*   MCV fL 87.5 85.6  --  84.9   MCHC g/dL 29.7* 30.2*  --  30.4*   PLATELETS 10*3/mm3 380 363  --  370         Results from last 7 days   Lab Units 07/14/25  0109 07/13/25  0116 07/12/25  0150 07/11/25  0309 07/10/25  1248   SODIUM mmol/L 139 139 139   < > 135*   POTASSIUM mmol/L 3.8 3.7 3.8   < > 4.6   MAGNESIUM mg/dL 2.4 1.9 1.9   < > 1.9   CHLORIDE mmol/L 105 105 107   < > 104   CO2 mmol/L 25.3 24.9 22.1   < > 21.3*   BUN mg/dL 12.6 9.5 9.8   < > 13.8   CREATININE mg/dL 0.81 0.76 0.85   < > 1.00   CALCIUM mg/dL 8.0* 8.0* 8.0*   < > 8.3*   IONIZED CALCIUM mmol/L  --  1.16  --   --   --    PHOSPHORUS mg/dL 3.1 2.9 2.6   < > 2.5   GLUCOSE mg/dL 161* 104* 45*   < > 220*   ALBUMIN g/dL 2.5*  --  2.5*  --  2.7*   BILIRUBIN mg/dL <0.2  --  0.2  --  0.2   ALK PHOS U/L 97  --  89  --  97   AST (SGOT) U/L 13  --  20  --  13   ALT (SGPT) U/L 6  --  6  --  <5    < > = values in this interval not displayed.   Estimated Creatinine Clearance: 64.5 mL/min (by C-G formula based on SCr of 0.81 mg/dL).  No results found for: \"AMMONIA\"              Glucose   Date/Time Value Ref Range Status   07/16/2025 2005 187 (H) 70 - 130 mg/dL Final     Comment:     Serial Number: [id number]:  [id number]   07/16/2025 1624 180 (H) 70 - 130 mg/dL Final     Comment:     Serial Number: [id number]:  [id number]   07/16/2025 1058 130 70 - 130 mg/dL Final     Comment:     Serial Number: [id number]:  [id number]   07/16/2025 0636 135 (H) 70 - 130 mg/dL Final     Comment:     Serial Number: [id number]:  [id number]   07/15/2025 2115 138 (H) 70 - 130 mg/dL Final     Comment:     Serial Number: [id number]:  [id number]   07/15/2025 2005 159 (H) 70 - 130 mg/dL Final     Comment:     Serial Number: [id number]:  [id number]   07/15/2025 1644 163 (H) 70 - 130 mg/dL Final     Comment:     Serial Number: [id number]:  [id number]   07/15/2025 1054 114 70 - 130 mg/dL Final     Comment: " "    Serial Number: [id number]:  [id number]     Lab Results   Component Value Date    TSH 1.520 07/10/2025     No results found for: \"PREGTESTUR\", \"PREGSERUM\", \"HCG\", \"HCGQUANT\"  Pain Management Panel           No data to display              Brief Urine Lab Results       None          No results found for: \"BLOODCX\"  No results found for: \"URINECX\"  No results found for: \"WOUNDCX\"  No results found for: \"STOOLCX\"  No results found for: \"RESPCX\"  No results found for: \"AFBCX\"        I have personally looked at the labs and they are summarized above.  ----------------------------------------------------------------------------------------------------------------------  Detailed radiology reports for the last 24 hours:  Imaging Results (Last 24 Hours)       ** No results found for the last 24 hours. **          Assessment & Plan      #PAD with previous stenting and reocclusion of SFA  #Left lower extremity ulcer s/p AKA  - Management per primary team, General Surgery  - Pain appears to be well controlled with fentanyl patch (25mcg) and oxycodone 10mg q4h prn. Continue same.   - Noted request by patient and surgeon to consider muscle relaxant. I have ordered low dose flexeril 5mg tid prn. Monitor for oversedation and hold if this occurs. EKG showed appropriate QT interval.  - Continue PT/OT  - Dressing changes per surgery  - Patient's insurance declined inpatient rehab but family is filing an appeal. This is still pending.    #Afib   #Hyperlipidemia  #Coronary artery disease  #Hypertension  - Continue amlodipine, eliquis, plavix, statin  - Valsartan, lasix, and coreg have been held due to low BP/HR. BP was fairly well controlled today. HR was in the 60s today so continue holding carvedilol for now. Monitor VS per protocol.     #Adjustment disorder with depressed mood  - Appreciate Psychiatry consult. Recommendation was to follow up outpatient as she denied active suicidality.     Edited by: Katarina Becker,  at " 7/16/2025 2044    Active VTE Prophylaxis  Pharmacologic:        Start     Dose Route Frequency Stop    07/11/25 0900  apixaban (ELIQUIS) tablet 5 mg         5 mg PO 2 Times Daily --                    Dispo: pending inpatient rehab appeal decision    Katarina Becker DO  [facility] Hospitalist  07/16/25  20:44 EDT

## 2025-07-17 NOTE — CASE MANAGEMENT/SOCIAL WORK
Discharge Planning Assessment  [facility]     Patient Name: Monae Chauhan  MRN: [id number]  Today's Date: 7/17/2025    Admit Date: 7/10/2025       Discharge Plan       Row Name 07/17/25 1113       Plan    Plan SS contacted [facility] Medicare to check on status of fast appeal [phone] per Eugenia (Case # [id number]) fast appeal remains pending. Per Eugenia, insurance has until 07/18/25 to make a decision about possible admit to [facility]. Insurance requested an appointment of representative form to be compleed to further process appeal. Appointment of representative form was signed and faxed back to [facility] fax [phone] for review. SS to follow.                  Continued Care and Services - Admitted Since 7/10/2025       Destination       Service Provider Request Status Services Address Phone Fax Patient Preferred    [facility] -- [address] [facility] [address] [phone] --       Internal Comment last updated by Alejandra Be BSW 7/16/2025 1131    Fast Appeal pending               [facility] Declined  Insurance Denial -- [address] [facility] [address] [phone] --    [facility] Declined  PT rec'd IPR instead -- [address] [facility] [address] [phone] --                    TAIWO Levine

## 2025-07-17 NOTE — PROGRESS NOTES
Post left AKA    She is still waiting on appeal for rehab from insurance. Afeb and vss. She had a little oozing from the incision medial end controlled with pressure. No sign of infection. Hold eliquis and recheck CBC in AM.

## 2025-07-17 NOTE — PLAN OF CARE
Goal Outcome Evaluation:           Progress: improving  Outcome Evaluation: Patient is in bed at this time. VSS on RA. Small amount of blood on dressing, changed it per order. No acute changes or complaints at this time. PRN anxiety meds given per MAR. Will continue POC.

## 2025-07-17 NOTE — PROGRESS NOTES
[facility] HOSPITALIST PROGRESS NOTE     Patient Identification:  Name:  Monae Chauhan  Age:  69 y.o.  Sex:  female  :  1955  MRN:  [id number]  Visit Number:  [id number]  ROOM: [address] [place] [address]     Primary Care Provider:  Zachary Sullivan MD    Length of stay in inpatient status:  7    Subjective     History of Presenting Illness:    Patient reported that she was able to work with PT today and was feeling tired afterward. Nursing and patient report some bleeding from her surgical site last night that has been controlled with pressure and dressing changes. No other acute events noted overnight.     Objective     Current Hospital Meds:ALPRAZolam, 0.25 mg, Oral, BID  amLODIPine, 10 mg, Oral, Daily  [Held by provider] apixaban, 5 mg, Oral, BID  [Held by provider] carvedilol, 3.125 mg, Oral, BID With Meals  fentaNYL, 1 patch, Transdermal, Q72H   And  Check Fentanyl Patch Placement, 1 each, Not Applicable, Q12H  clopidogrel, 75 mg, Oral, Daily  [Held by provider] furosemide, 20 mg, Oral, Daily  gabapentin, 800 mg, Oral, Q8H  insulin glargine, 10 Units, Subcutaneous, Nightly  insulin lispro, 2 Units, Subcutaneous, TID With Meals  insulin lispro, 2-9 Units, Subcutaneous, 4x Daily AC & at Bedtime  methocarbamol, 750 mg, Oral, 4x Daily  miconazole, 1 Application, Topical, Q12H  nicotine, 1 patch, Transdermal, Q24H  rosuvastatin, 20 mg, Oral, Daily  senna-docusate sodium, 2 tablet, Oral, BID  sodium chloride, 10 mL, Intravenous, Q12H  [Held by provider] valsartan, 320 mg, Oral, Daily    Pharmacy Consult,         Current Antimicrobial Therapy:  Anti-Infectives (From admission, onward)      None          Current Diuretic Therapy:  Diuretics (From admission, onward)      Ordered     Dose/Rate Route Frequency Start Stop    07/10/25 1047  [Held by provider]  furosemide (LASIX) tablet 20 mg        (On hold since Thu 7/10/2025 at 1222 until manually unheld; held by Marco Rasheed MDHold Reason: Abnormal  Vitals)   Ordering Provider: Taran Harding MD    20 mg Oral Daily 07/10/25 1200            ----------------------------------------------------------------------------------------------------------------------  Vital Signs:  Temp:  [97 °F (36.1 °C)-98.6 °F (37 °C)] 98.1 °F (36.7 °C)  Heart Rate:  [65-67] 65  Resp:  [18-20] 18  BP: (111-150)/(43-78) 130/43  SpO2:  [96 %] 96 %  on   ;   Device (Oxygen Therapy): room air  Body mass index is 30.15 kg/m².    Wt Readings from Last 3 Encounters:   07/10/25 77.2 kg (170 lb 3.1 oz)   06/19/25 78.6 kg (173 lb 3.2 oz)   05/29/25 78.5 kg (173 lb)     Intake & Output (last 3 days)         07/14 0701  07/15 0700 07/15 0701  07/16 0700 07/16 0701  07/17 0700 07/17 0701 07/18 0700    P.O.  280    I.V. (mL/kg) 0 (0)       Total Intake(mL/kg) 945 (12.2) 465 (6) 1320 (17.1) 280 (3.6)    Urine (mL/kg/hr) 1350 (0.7) 900 (0.5) 1000 (0.5) 100 (0.1)    Stool   0 0    Total Output 0252 675 5590 100    Net -405 -435 +320 +180            Urine Unmeasured Occurrence  1 x 3 x     Stool Unmeasured Occurrence   1 x 1 x          Diet: Diabetic; Consistent Carbohydrate; Fluid Consistency: Thin (IDDSI 0)  ----------------------------------------------------------------------------------------------------------------------  Physical exam:   Constitutional:  Well-developed and well-nourished.  No acute distress.      HENT:  Head:  Normocephalic and atraumatic.    Pulmonary/Chest:  Normal rate and effort, able to speak in complete sentences  Musculoskeletal: s/p left AKA  Neurological: Awake, alert, no focal deficit on gross examination. No slurred speech or facial droop.   Skin:  Skin is warm and dry. Amputation site is covered with bandage and appears clean and dry. No strikethrough noted.  Peripheral vascular:  No cyanosis, no edema.  Psychiatric: Appropriate mood and affect  Edited by: Katarina Becker DO at 7/17/2025  "1858  ----------------------------------------------------------------------------------------------------------------------  Results from last 7 days   Lab Units 07/16/25  0042 07/14/25  0109 07/13/25  0116 07/12/25  0150   WBC 10*3/mm3 7.61 8.02  --  9.24   HEMOGLOBIN g/dL 8.7* 8.8* 8.6* 8.9*   HEMATOCRIT % 29.3* 29.1* 28.8* 29.3*   MCV fL 87.5 85.6  --  84.9   MCHC g/dL 29.7* 30.2*  --  30.4*   PLATELETS 10*3/mm3 380 363  --  370         Results from last 7 days   Lab Units 07/14/25  0109 07/13/25  0116 07/12/25  0150   SODIUM mmol/L 139 139 139   POTASSIUM mmol/L 3.8 3.7 3.8   MAGNESIUM mg/dL 2.4 1.9 1.9   CHLORIDE mmol/L 105 105 107   CO2 mmol/L 25.3 24.9 22.1   BUN mg/dL 12.6 9.5 9.8   CREATININE mg/dL 0.81 0.76 0.85   CALCIUM mg/dL 8.0* 8.0* 8.0*   IONIZED CALCIUM mmol/L  --  1.16  --    PHOSPHORUS mg/dL 3.1 2.9 2.6   GLUCOSE mg/dL 161* 104* 45*   ALBUMIN g/dL 2.5*  --  2.5*   BILIRUBIN mg/dL <0.2  --  0.2   ALK PHOS U/L 97  --  89   AST (SGOT) U/L 13  --  20   ALT (SGPT) U/L 6  --  6   Estimated Creatinine Clearance: 64.5 mL/min (by C-G formula based on SCr of 0.81 mg/dL).  No results found for: \"AMMONIA\"              Glucose   Date/Time Value Ref Range Status   07/17/2025 1640 191 (H) 70 - 130 mg/dL Final     Comment:     Serial Number: [id number]:  [id number]   07/17/2025 1044 112 70 - 130 mg/dL Final     Comment:     Serial Number: [id number]:  [id number]   07/17/2025 0648 176 (H) 70 - 130 mg/dL Final     Comment:     Serial Number: [id number]:  [id number]   07/16/2025 2005 187 (H) 70 - 130 mg/dL Final     Comment:     Serial Number: [id number]:  [id number]   07/16/2025 1624 180 (H) 70 - 130 mg/dL Final     Comment:     Serial Number: [id number]:  [id number]   07/16/2025 1058 130 70 - 130 mg/dL Final     Comment:     Serial Number: [id number]:  [id number]   07/16/2025 0636 135 (H) 70 - 130 mg/dL Final     Comment:     Serial Number: [id number]:  [id number] " "  07/15/2025 2115 138 (H) 70 - 130 mg/dL Final     Comment:     Serial Number: [id number]:  [id number]     Lab Results   Component Value Date    TSH 1.520 07/10/2025     No results found for: \"PREGTESTUR\", \"PREGSERUM\", \"HCG\", \"HCGQUANT\"  Pain Management Panel           No data to display              Brief Urine Lab Results       None          No results found for: \"BLOODCX\"  No results found for: \"URINECX\"  No results found for: \"WOUNDCX\"  No results found for: \"STOOLCX\"  No results found for: \"RESPCX\"  No results found for: \"AFBCX\"        I have personally looked at the labs and they are summarized above.  ----------------------------------------------------------------------------------------------------------------------  Detailed radiology reports for the last 24 hours:  Imaging Results (Last 24 Hours)       ** No results found for the last 24 hours. **          Assessment & Plan      #PAD with previous stenting and reocclusion of SFA  #Left lower extremity ulcer s/p AKA  - Management per primary team, General Surgery  - Pain appears to be well controlled with fentanyl patch (25mcg) and oxycodone 10mg q4h prn. Continue same. Also continue flexeril prn muscle spasm.  - Continue PT/OT  - Dressing changes per surgery  - Patient's insurance declined inpatient rehab but family is filing an appeal. This is still pending.  - Patient had some bleeding from her surgical wound overnight. Hold eliquis and repeat CBC in a.m.     #Afib   #Hyperlipidemia  #Coronary artery disease  #Hypertension  - Continue amlodipine, eliquis, plavix, statin  - Valsartan, lasix, and coreg have been held due to low BP/HR. BP continues to be fairly well controlled with the current regimen. HR was again in the 60s today so continue holding carvedilol for now. Monitor VS per protocol.     #Adjustment disorder with depressed mood  - Appreciate Psychiatry consult. Recommendation was to follow up outpatient as she denied active suicidality. "     Edited by: Katarina Becker DO at 7/17/2025 9974    Active VTE Prophylaxis  Pharmacologic:        Start     Dose Route Frequency Stop    07/11/25 0900  [Held by provider]  apixaban (ELIQUIS) tablet 5 mg        (On hold since today at 1150 until manually unheld; held by Katarina Becker DOHold Reason: Other (Comment Required)Hold Comment: Surgical site bleeding)   On hold since today at 1150 until manually unheld   Hold reason: Other (Comment Required), Hold comment: Surgical site bleeding    5 mg PO 2 Times Daily --                  Mechanical:        Start        07/10/25 0818  Maintain Sequential Compression Device  Continuous                              Dispo: pending inpatient rehab appeal    Katarina Becker DO  [facility]  07/17/25  18:58 EDT

## 2025-07-18 LAB
ANION GAP SERPL CALCULATED.3IONS-SCNC: 11 MMOL/L (ref 5–15)
ANISOCYTOSIS BLD QL: ABNORMAL
BASOPHILS # BLD MANUAL: 0.08 10*3/MM3 (ref 0–0.2)
BASOPHILS NFR BLD MANUAL: 1 % (ref 0–1.5)
BUN SERPL-MCNC: 14.3 MG/DL (ref 8–23)
BUN/CREAT SERPL: 18.1 (ref 7–25)
CALCIUM SPEC-SCNC: 8.4 MG/DL (ref 8.6–10.5)
CHLORIDE SERPL-SCNC: 105 MMOL/L (ref 98–107)
CO2 SERPL-SCNC: 20 MMOL/L (ref 22–29)
CREAT SERPL-MCNC: 0.79 MG/DL (ref 0.57–1)
DEPRECATED RDW RBC AUTO: 58.3 FL (ref 37–54)
EGFRCR SERPLBLD CKD-EPI 2021: 81.1 ML/MIN/1.73
ELLIPTOCYTES BLD QL SMEAR: ABNORMAL
EOSINOPHIL # BLD MANUAL: 0.38 10*3/MM3 (ref 0–0.4)
EOSINOPHIL NFR BLD MANUAL: 5 % (ref 0.3–6.2)
ERYTHROCYTE [DISTWIDTH] IN BLOOD BY AUTOMATED COUNT: 18 % (ref 12.3–15.4)
GLUCOSE BLDC GLUCOMTR-MCNC: 134 MG/DL (ref 70–130)
GLUCOSE BLDC GLUCOMTR-MCNC: 170 MG/DL (ref 70–130)
GLUCOSE BLDC GLUCOMTR-MCNC: 186 MG/DL (ref 70–130)
GLUCOSE BLDC GLUCOMTR-MCNC: 237 MG/DL (ref 70–130)
GLUCOSE SERPL-MCNC: 145 MG/DL (ref 65–99)
HCT VFR BLD AUTO: 29.4 % (ref 34–46.6)
HGB BLD-MCNC: 8.6 G/DL (ref 12–15.9)
HYPOCHROMIA BLD QL: ABNORMAL
LYMPHOCYTES # BLD MANUAL: 2.41 10*3/MM3 (ref 0.7–3.1)
LYMPHOCYTES NFR BLD MANUAL: 1 % (ref 5–12)
MCH RBC QN AUTO: 26.2 PG (ref 26.6–33)
MCHC RBC AUTO-ENTMCNC: 29.3 G/DL (ref 31.5–35.7)
MCV RBC AUTO: 89.6 FL (ref 79–97)
METAMYELOCYTES NFR BLD MANUAL: 1 % (ref 0–0)
MONOCYTES # BLD: 0.08 10*3/MM3 (ref 0.1–0.9)
NEUTROPHILS # BLD AUTO: 4.51 10*3/MM3 (ref 1.7–7)
NEUTROPHILS NFR BLD MANUAL: 60 % (ref 42.7–76)
PLAT MORPH BLD: NORMAL
PLATELET # BLD AUTO: 400 10*3/MM3 (ref 140–450)
PMV BLD AUTO: 8.9 FL (ref 6–12)
POLYCHROMASIA BLD QL SMEAR: ABNORMAL
POTASSIUM SERPL-SCNC: 3.5 MMOL/L (ref 3.5–5.2)
POTASSIUM SERPL-SCNC: 4.5 MMOL/L (ref 3.5–5.2)
RBC # BLD AUTO: 3.28 10*6/MM3 (ref 3.77–5.28)
SODIUM SERPL-SCNC: 136 MMOL/L (ref 136–145)
VARIANT LYMPHS NFR BLD MANUAL: 32 % (ref 19.6–45.3)
WBC NRBC COR # BLD AUTO: 7.52 10*3/MM3 (ref 3.4–10.8)

## 2025-07-18 PROCEDURE — 85025 COMPLETE CBC W/AUTO DIFF WBC: CPT | Performed by: STUDENT IN AN ORGANIZED HEALTH CARE EDUCATION/TRAINING PROGRAM

## 2025-07-18 PROCEDURE — 84132 ASSAY OF SERUM POTASSIUM: CPT | Performed by: SURGERY

## 2025-07-18 PROCEDURE — 85007 BL SMEAR W/DIFF WBC COUNT: CPT | Performed by: STUDENT IN AN ORGANIZED HEALTH CARE EDUCATION/TRAINING PROGRAM

## 2025-07-18 PROCEDURE — 97530 THERAPEUTIC ACTIVITIES: CPT

## 2025-07-18 PROCEDURE — 80048 BASIC METABOLIC PNL TOTAL CA: CPT | Performed by: STUDENT IN AN ORGANIZED HEALTH CARE EDUCATION/TRAINING PROGRAM

## 2025-07-18 PROCEDURE — 82948 REAGENT STRIP/BLOOD GLUCOSE: CPT

## 2025-07-18 PROCEDURE — 82948 REAGENT STRIP/BLOOD GLUCOSE: CPT | Performed by: INTERNAL MEDICINE

## 2025-07-18 PROCEDURE — 99232 SBSQ HOSP IP/OBS MODERATE 35: CPT | Performed by: STUDENT IN AN ORGANIZED HEALTH CARE EDUCATION/TRAINING PROGRAM

## 2025-07-18 PROCEDURE — 63710000001 INSULIN LISPRO (HUMAN) PER 5 UNITS: Performed by: INTERNAL MEDICINE

## 2025-07-18 PROCEDURE — 63710000001 INSULIN GLARGINE PER 5 UNITS: Performed by: INTERNAL MEDICINE

## 2025-07-18 RX ORDER — FOLIC ACID 1 MG/1
1 TABLET ORAL DAILY
Status: DISCONTINUED | OUTPATIENT
Start: 2025-07-18 | End: 2025-07-22 | Stop reason: HOSPADM

## 2025-07-18 RX ORDER — POTASSIUM CHLORIDE 1500 MG/1
40 TABLET, EXTENDED RELEASE ORAL EVERY 4 HOURS
Status: COMPLETED | OUTPATIENT
Start: 2025-07-18 | End: 2025-07-18

## 2025-07-18 RX ADMIN — MICONAZOLE NITRATE 1 APPLICATION: 2 POWDER TOPICAL at 21:12

## 2025-07-18 RX ADMIN — Medication 10 ML: at 21:12

## 2025-07-18 RX ADMIN — METHOCARBAMOL TABLETS 750 MG: 750 TABLET, COATED ORAL at 21:11

## 2025-07-18 RX ADMIN — GABAPENTIN 800 MG: 400 CAPSULE ORAL at 05:46

## 2025-07-18 RX ADMIN — FOLIC ACID 1 MG: 1 TABLET ORAL at 18:37

## 2025-07-18 RX ADMIN — AMLODIPINE BESYLATE 10 MG: 5 TABLET ORAL at 09:36

## 2025-07-18 RX ADMIN — POTASSIUM CHLORIDE 40 MEQ: 1500 TABLET, EXTENDED RELEASE ORAL at 06:03

## 2025-07-18 RX ADMIN — INSULIN LISPRO 2 UNITS: 100 INJECTION, SOLUTION INTRAVENOUS; SUBCUTANEOUS at 12:00

## 2025-07-18 RX ADMIN — DOCUSATE SODIUM 50 MG AND SENNOSIDES 8.6 MG 2 TABLET: 8.6; 5 TABLET, FILM COATED ORAL at 21:11

## 2025-07-18 RX ADMIN — GABAPENTIN 800 MG: 400 CAPSULE ORAL at 21:11

## 2025-07-18 RX ADMIN — ALPRAZOLAM 0.25 MG: 0.25 TABLET ORAL at 09:36

## 2025-07-18 RX ADMIN — INSULIN LISPRO 2 UNITS: 100 INJECTION, SOLUTION INTRAVENOUS; SUBCUTANEOUS at 17:49

## 2025-07-18 RX ADMIN — METHOCARBAMOL TABLETS 750 MG: 750 TABLET, COATED ORAL at 17:49

## 2025-07-18 RX ADMIN — CLOPIDOGREL BISULFATE 75 MG: 75 TABLET, FILM COATED ORAL at 09:36

## 2025-07-18 RX ADMIN — OXYCODONE HYDROCHLORIDE 10 MG: 10 TABLET ORAL at 05:46

## 2025-07-18 RX ADMIN — POTASSIUM CHLORIDE 40 MEQ: 1500 TABLET, EXTENDED RELEASE ORAL at 10:09

## 2025-07-18 RX ADMIN — METHOCARBAMOL TABLETS 750 MG: 750 TABLET, COATED ORAL at 09:36

## 2025-07-18 RX ADMIN — ALPRAZOLAM 0.25 MG: 0.25 TABLET ORAL at 21:11

## 2025-07-18 RX ADMIN — CYCLOBENZAPRINE 5 MG: 10 TABLET, FILM COATED ORAL at 23:59

## 2025-07-18 RX ADMIN — INSULIN LISPRO 2 UNITS: 100 INJECTION, SOLUTION INTRAVENOUS; SUBCUTANEOUS at 12:01

## 2025-07-18 RX ADMIN — INSULIN GLARGINE 10 UNITS: 100 INJECTION, SOLUTION SUBCUTANEOUS at 21:11

## 2025-07-18 RX ADMIN — INSULIN LISPRO 2 UNITS: 100 INJECTION, SOLUTION INTRAVENOUS; SUBCUTANEOUS at 09:35

## 2025-07-18 RX ADMIN — INSULIN LISPRO 4 UNITS: 100 INJECTION, SOLUTION INTRAVENOUS; SUBCUTANEOUS at 21:11

## 2025-07-18 RX ADMIN — MICONAZOLE NITRATE 1 APPLICATION: 2 POWDER TOPICAL at 09:37

## 2025-07-18 RX ADMIN — METHOCARBAMOL TABLETS 750 MG: 750 TABLET, COATED ORAL at 12:01

## 2025-07-18 RX ADMIN — ROSUVASTATIN CALCIUM 20 MG: 20 TABLET, FILM COATED ORAL at 09:34

## 2025-07-18 RX ADMIN — Medication 10 ML: at 09:36

## 2025-07-18 RX ADMIN — GABAPENTIN 800 MG: 400 CAPSULE ORAL at 13:26

## 2025-07-18 NOTE — PROGRESS NOTES
[facility] HOSPITALIST PROGRESS NOTE     Patient Identification:  Name:  Monae Chauhan  Age:  69 y.o.  Sex:  female  :  1955  MRN:  [id number]  Visit Number:  [id number]  ROOM: [address] [place] [address]     Primary Care Provider:  Zachary Sullivan MD    Length of stay in inpatient status:  8    Subjective     Chief Compliant:  No chief complaint on file.      History of Presenting Illness:    Patient reported feeling fairly well today and had no complaints at time of my exam. She says her pain is well controlled and she has been trying to go outside in the wheelchair with family assistance at least once per day. Nursing reports she requires significant assistance with transferring from bed to wheelchair or bedside recliner, but is able to pivot to transfer with assistance.    Objective     Current Hospital Meds:ALPRAZolam, 0.25 mg, Oral, BID  amLODIPine, 10 mg, Oral, Daily  [Held by provider] apixaban, 5 mg, Oral, BID  [Held by provider] carvedilol, 3.125 mg, Oral, BID With Meals  fentaNYL, 1 patch, Transdermal, Q72H   And  Check Fentanyl Patch Placement, 1 each, Not Applicable, Q12H  clopidogrel, 75 mg, Oral, Daily  folic acid, 1 mg, Oral, Daily  [Held by provider] furosemide, 20 mg, Oral, Daily  gabapentin, 800 mg, Oral, Q8H  insulin glargine, 10 Units, Subcutaneous, Nightly  insulin lispro, 2 Units, Subcutaneous, TID With Meals  insulin lispro, 2-9 Units, Subcutaneous, 4x Daily AC & at Bedtime  methocarbamol, 750 mg, Oral, 4x Daily  miconazole, 1 Application, Topical, Q12H  nicotine, 1 patch, Transdermal, Q24H  rosuvastatin, 20 mg, Oral, Daily  senna-docusate sodium, 2 tablet, Oral, BID  sodium chloride, 10 mL, Intravenous, Q12H  [Held by provider] valsartan, 320 mg, Oral, Daily    Pharmacy Consult,         Current Antimicrobial Therapy:  Anti-Infectives (From admission, onward)      None          Current Diuretic Therapy:  Diuretics (From admission, onward)      Ordered     Dose/Rate Route Frequency  Start Stop    07/10/25 1047  [Held by provider]  furosemide (LASIX) tablet 20 mg        (On hold since Thu 7/10/2025 at 1222 until manually unheld; held by Marco Rasheed MDHold Reason: Abnormal Vitals)   Ordering Provider: Taran Harding MD    20 mg Oral Daily 07/10/25 1200            ----------------------------------------------------------------------------------------------------------------------  Vital Signs:  Temp:  [97 °F (36.1 °C)-98.6 °F (37 °C)] 97.9 °F (36.6 °C)  Heart Rate:  [61-68] 61  Resp:  [16-20] 18  BP: (110-158)/(46-73) 118/46  SpO2:  [95 %-96 %] 95 %  on   ;   Device (Oxygen Therapy): room air  Body mass index is 30.15 kg/m².    Wt Readings from Last 3 Encounters:   07/10/25 77.2 kg (170 lb 3.1 oz)   06/19/25 78.6 kg (173 lb 3.2 oz)   05/29/25 78.5 kg (173 lb)     Intake & Output (last 3 days)         07/15 0701  07/16 0700 07/16 0701 07/17 0700 07/17 0701 07/18 0700 07/18 0701 07/19 0700    P.O. 465 1320 520 120    I.V. (mL/kg)        Total Intake(mL/kg) 465 (6) 1320 (17.1) 520 (6.7) 120 (1.6)    Urine (mL/kg/hr) 900 (0.5) 1000 (0.5) 100 (0.1) 400 (0.5)    Stool  0 0     Total Output 900 1000 100 400    Net -435 +320 +420 -280            Urine Unmeasured Occurrence 1 x 3 x 2 x     Stool Unmeasured Occurrence  1 x 2 x           Diet: Diabetic; Consistent Carbohydrate; Fluid Consistency: Thin (IDDSI 0)  ----------------------------------------------------------------------------------------------------------------------  Physical exam:   Constitutional:  Well-developed and well-nourished.  No acute distress.      HENT:  Head:  Normocephalic and atraumatic.    Pulmonary/Chest:  Normal rate and effort, able to speak in complete sentences  Musculoskeletal: s/p left AKA  Neurological: Awake, alert, no focal deficit on gross examination. No slurred speech or facial droop.   Skin:  Skin is warm and dry. Amputation site is covered with bandage which appears clean and dry. No  "strikethrough.  Peripheral vascular:  No cyanosis, no edema.  Psychiatric: Appropriate mood and affect  Edited by: Katarina Becker DO at 7/18/2025 1812  ----------------------------------------------------------------------------------------------------------------------  Results from last 7 days   Lab Units 07/18/25  0152 07/16/25  0042 07/14/25  0109   WBC 10*3/mm3 7.52 7.61 8.02   HEMOGLOBIN g/dL 8.6* 8.7* 8.8*   HEMATOCRIT % 29.4* 29.3* 29.1*   MCV fL 89.6 87.5 85.6   MCHC g/dL 29.3* 29.7* 30.2*   PLATELETS 10*3/mm3 400 380 363         Results from last 7 days   Lab Units 07/18/25  1436 07/18/25  0152 07/14/25  0109 07/13/25  0116 07/12/25  0150   SODIUM mmol/L  --  136 139 139 139   POTASSIUM mmol/L 4.5 3.5 3.8 3.7 3.8   MAGNESIUM mg/dL  --   --  2.4 1.9 1.9   CHLORIDE mmol/L  --  105 105 105 107   CO2 mmol/L  --  20.0* 25.3 24.9 22.1   BUN mg/dL  --  14.3 12.6 9.5 9.8   CREATININE mg/dL  --  0.79 0.81 0.76 0.85   CALCIUM mg/dL  --  8.4* 8.0* 8.0* 8.0*   IONIZED CALCIUM mmol/L  --   --   --  1.16  --    PHOSPHORUS mg/dL  --   --  3.1 2.9 2.6   GLUCOSE mg/dL  --  145* 161* 104* 45*   ALBUMIN g/dL  --   --  2.5*  --  2.5*   BILIRUBIN mg/dL  --   --  <0.2  --  0.2   ALK PHOS U/L  --   --  97  --  89   AST (SGOT) U/L  --   --  13  --  20   ALT (SGPT) U/L  --   --  6  --  6   Estimated Creatinine Clearance: 66.1 mL/min (by C-G formula based on SCr of 0.79 mg/dL).  No results found for: \"AMMONIA\"              Glucose   Date/Time Value Ref Range Status   07/18/2025 1642 186 (H) 70 - 130 mg/dL Final     Comment:     Serial Number: [id number]:  [id number]   07/18/2025 1106 170 (H) 70 - 130 mg/dL Final     Comment:     Serial Number: [id number]:  [id number]   07/18/2025 0632 134 (H) 70 - 130 mg/dL Final     Comment:     Serial Number: [id number]:  [id number]   07/17/2025 2026 166 (H) 70 - 130 mg/dL Final     Comment:     Serial Number: [id number]:  [id number]   07/17/2025 1640 191 (H) 70 - 130 " "mg/dL Final     Comment:     Serial Number: [id number]:  [id number]   07/17/2025 1044 112 70 - 130 mg/dL Final     Comment:     Serial Number: [id number]:  [id number]   07/17/2025 0648 176 (H) 70 - 130 mg/dL Final     Comment:     Serial Number: [id number]:  [id number]   07/16/2025 2005 187 (H) 70 - 130 mg/dL Final     Comment:     Serial Number: [id number]:  [id number]     Lab Results   Component Value Date    TSH 1.520 07/10/2025     No results found for: \"PREGTESTUR\", \"PREGSERUM\", \"HCG\", \"HCGQUANT\"  Pain Management Panel           No data to display              Brief Urine Lab Results       None          No results found for: \"BLOODCX\"  No results found for: \"URINECX\"  No results found for: \"WOUNDCX\"  No results found for: \"STOOLCX\"  No results found for: \"RESPCX\"  No results found for: \"AFBCX\"        I have personally looked at the labs and they are summarized above.  ----------------------------------------------------------------------------------------------------------------------  Detailed radiology reports for the last 24 hours:  Imaging Results (Last 24 Hours)       ** No results found for the last 24 hours. **          Assessment & Plan      #PAD with previous stenting and reocclusion of SFA  #Left lower extremity ulcer s/p AKA  - Management per primary team, General Surgery  - Pain appears to be well controlled with fentanyl patch (25mcg) and oxycodone 10mg q4h prn. Continue same. Also continue flexeril prn muscle spasm.  - Continue PT/OT  - Dressing changes per surgery  - Patient's insurance declined inpatient rehab but family is filing an appeal. This is still pending.  - Patient had some bleeding from her surgical wound which has improved today per patient report. Hold eliquis and repeat CBC in a.m. So far hemoglobin is stable around 8-9. If bleeding remains controlled and general surgery agrees then we can possibly restart eliquis tomorrow morning.     #Acute on chronic " normocytic anemia  - obtain iron panel  - B12 and folate levels were checked early in admission. B12 was appropriate but folate was low at 4.21. Start folic acid supplement.  - cbc in a.m.    #Afib   #Hyperlipidemia  #Coronary artery disease  #Hypertension  - Continue amlodipine, eliquis, plavix, statin  - Valsartan, lasix, and coreg were held due to low BP/HR. BP has been well controlled today with the current regimen. HR remains in the 60s today so continue holding carvedilol. Monitor VS per protocol.     #Adjustment disorder with depressed mood  - Appreciate Psychiatry consult. Recommendation was to follow up outpatient as she denied active suicidality.     Edited by: Katarina Becker DO at 7/18/2025 1811    Active VTE Prophylaxis  Pharmacologic:        Start     Dose Route Frequency Stop    07/11/25 0900  [Held by provider]  apixaban (ELIQUIS) tablet 5 mg        (On hold since yesterday at 1150 until manually unheld; held by Katarina Becker DOHold Reason: Other (Comment Required)Hold Comment: Surgical site bleeding)   On hold since yesterday at 1150 until manually unheld   Hold reason: Other (Comment Required), Hold comment: Surgical site bleeding    5 mg PO 2 Times Daily --                  Mechanical:        Start        07/10/25 0818  Maintain Sequential Compression Device  Continuous                              Dispo: pending inpatient rehab appeal    Katarina Becker DO  [facility]  07/18/25  18:12 EDT

## 2025-07-18 NOTE — THERAPY TREATMENT NOTE
Acute Care - Physical Therapy Treatment Note  [facility]     Patient Name: Monae Chauhan  : 1955  MRN: [id number]  Today's Date: 2025   Onset of Illness/Injury or Date of Surgery: 07/10/25 (admission date)  Visit Dx:     ICD-10-CM ICD-9-CM   1. 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  L97.522 707.15     Patient Active Problem List   Diagnosis    CAD (coronary artery disease)    Essential hypertension    Mixed hyperlipidemia    Type 2 diabetes mellitus    PAD (peripheral artery disease)    Smoker    Atherosclerosis of native arteries of extremities with rest pain, bilateral legs    Detrusor instability    Open wound    Diabetes mellitus    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    Critical limb ischemia of both lower extremities with rest pain    Tobacco use    Femoral artery occlusion, left    Rest pain of both lower extremities due to atherosclerosis    Status post amputation     Past Medical History:   Diagnosis Date    Arthritis     Coronary artery disease     Depression     Diabetes mellitus     Elevated cholesterol     Full dentures     GERD (gastroesophageal reflux disease)     Hyperlipidemia     Hypertension     Myocardial infarction     Peripheral vascular disease     Psoriasis      Past Surgical History:   Procedure Laterality Date    ABOVE KNEE AMPUTATION Left 7/10/2025    Procedure: AMPUTATION ABOVE KNEE;  Surgeon: Taran Harding MD;  Location: [facility];  Service: General;  Laterality: Left;    APPENDECTOMY      CARDIAC CATHETERIZATION N/A 2017    Procedure: Left Heart Cath;  Surgeon: David Diane MD;  Location: [facility] CATH INVASIVE LOCATION;  Service:     CARDIAC CATHETERIZATION Left 2024    Procedure: Peripheral angiography;  Surgeon: Jaiden Doyle MD;  Location: [facility] CATH INVASIVE LOCATION;  Service: Cardiovascular;  Laterality: Left;  Left Venogram -    CARDIAC CATHETERIZATION N/A  02/06/2025    Procedure: Peripheral angiography;  Surgeon: Fran Estevez MD;  Location:  [facility] CATH INVASIVE LOCATION;  Service: Cardiovascular;  Laterality: N/A;    CARDIAC CATHETERIZATION N/A 02/26/2025    Procedure: Peripheral angiography;  Surgeon: Jaiden Doyle MD;  Location:  [facility] CATH INVASIVE LOCATION;  Service: Cardiovascular;  Laterality: N/A;    CARDIAC CATHETERIZATION N/A 02/26/2025    Procedure: Atherectomy-peripheral;  Surgeon: Jaiden Doyle MD;  Location: [facility] CATH INVASIVE LOCATION;  Service: Cardiovascular;  Laterality: N/A;    CARDIAC CATHETERIZATION N/A 02/26/2025    Procedure: Angioplasty-peripheral;  Surgeon: Jaiden Doyle MD;  Location: [facility] CATH INVASIVE LOCATION;  Service: Cardiovascular;  Laterality: N/A;    CARDIAC CATHETERIZATION N/A 03/24/2025    Procedure: Peripheral angiography;  Surgeon: Jaiden Doyle MD;  Location: [facility] CATH INVASIVE LOCATION;  Service: Cardiovascular;  Laterality: N/A;    CARDIAC CATHETERIZATION N/A 03/24/2025    Procedure: Atherectomy-peripheral;  Surgeon: Jaiden Doyle MD;  Location: [facility] CATH INVASIVE LOCATION;  Service: Cardiovascular;  Laterality: N/A;    COLONOSCOPY      CORONARY STENT PLACEMENT      CYSTOSCOPY BOTOX INJECTION OF BLADDER N/A 10/20/2021    Procedure: Cystoscopy Botox injection of bladder;  Surgeon: Say Robles MD;  Location: [facility];  Service: Urology;  Laterality: N/A;    ENDOSCOPY      FOOT SURGERY Left     GALLBLADDER SURGERY      INTRAVASCULAR ULTRASOUND N/A 02/26/2025    Procedure: Intravascular Ultrasound;  Surgeon: Jaiden Doyle MD;  Location: [facility] CATH INVASIVE LOCATION;  Service: Cardiovascular;  Laterality: N/A;    INTRAVASCULAR ULTRASOUND N/A 03/24/2025    Procedure: Intravascular Ultrasound;  Surgeon: Jaiden Doyle MD;  Location: [facility] CATH INVASIVE LOCATION;  Service: Cardiovascular;  Laterality: N/A;    LEG SURGERY Left     TUBAL ABDOMINAL LIGATION      VASCULAR SURGERY Left     Lower  Extremity Vascular Stent-[facility]     PT Assessment (Last 12 Hours)       PT Evaluation and Treatment       Row Name 07/18/25 0959          Physical Therapy Time and Intention    Subjective Information complains of;weakness;pain  -KH (r) DH (t) KH (c)     Document Type therapy note (daily note)  -KH (r) DH (t) KH (c)     Mode of Treatment physical therapy  -KH (r) DH (t) KH (c)     Patient Effort good  -KH (r) DH (t) KH (c)     Symptoms Noted During/After Treatment fatigue  -KH (r) DH (t) KH (c)       Row Name 07/18/25 0993          Pain Scale: FACES Pre/Post-Treatment    Pain: FACES Scale, Pretreatment 2-->hurts little bit  -KH (r) DH (t) KH (c)     Posttreatment Pain Rating 4-->hurts little more  -KH (r) DH (t) KH (c)     Pre/Posttreatment Pain Comment per observation of patient  -KH (r) DH (t) KH (c)       Row Name 07/18/25 0959          Cognition    Affect/Mental Status (Cognition) sad/depressed affect;WFL  -KH (r) DH (t) KH (c)     Orientation Status (Cognition) oriented x 4  -KH (r) DH (t) KH (c)     Follows Commands (Cognition) WNL  -KH (r) DH (t) KH (c)     Cognitive Function (Cognition) WNL  -KH (r) DH (t) KH (c)     Personal Safety Interventions supervised activity  -KH (r) DH (t) KH (c)       Row Name 07/18/25 0947          Bed Mobility    Bed Mobility scooting/bridging;rolling left  -KH (r) DH (t) KH (c)     Rolling Left [place] (Bed Mobility) modified independence  -KH (r) DH (t) KH (c)     Scooting/Bridging [place] (Bed Mobility) modified independence  -KH (r) DH (t) KH (c)     Supine-Sit [place] (Bed Mobility) modified independence  -KH (r) DH (t) KH (c)     Assistive Device (Bed Mobility) overhead trapeze  -KH (r) DH (t) KH (c)       Row Name 07/18/25 4280          Gait/Stairs (Locomotion)    Comment, (Gait/Stairs) Not performed due to patients fatigue and needing her limb rewrapped.  -KH (r) DH (t) JULIA (c)       Row Name 07/18/25 0940          Motor Skills    Therapeutic Exercise hip;core  strength  -KH (r) DH (t) KH (c)       Row Name 07/18/25 0940          Hip (Therapeutic Exercise)    Hip Strengthening (Therapeutic Exercise) aBduction;aDduction;left;flexion;supine;3 repetitions;10 repetitions  -KH (r) DH (t) KH (c)       Row Name 07/18/25 0940          Core Strength (Therapeutic Exercise)    Core Strength (Therapeutic Exercise) bridging, single lower extremity;5 repetitions;2 sets  -       Row Name             Wound 07/10/25 0919 Left anterior thigh Surgical Open Surgical Incision    Wound - Properties Group Placement Date: 07/10/25  -JG Placement Time: 0919 -JG Side: Left  -JG Orientation: anterior  -JG Location: thigh  -JG Primary Wound Type: Surgical  -JG Secondary Wound Type - Surgical: Open Surg  -JG    Retired Wound - Properties Group Placement Date: 07/10/25  -JG Placement Time: 0919 -JG Side: Left  -JG Orientation: anterior  -JG Location: thigh  -JG    Retired Wound - Properties Group Placement Date: 07/10/25  -JG Placement Time: 0919 -JG Side: Left  -JG Orientation: anterior  -JG Location: thigh  -JG    Retired Wound - Properties Group Date first assessed: 07/10/25  -JG Time first assessed: 0919 -JG Side: Left  -JG Location: thigh  -JG      Row Name             Wound 07/10/25 1401 Left anterior groin Other (Comments)    Wound - Properties Group Placement Date: 07/10/25  -MH Placement Time: 1401  -MH Present on Original Admission: Y  -MH Side: Left  -MH Orientation: anterior  -MH Location: groin  -MH Primary Wound Type: Other  -MH Secondary Wound Type - Other: --  -MH, Cardiac Cath site     Retired Wound - Properties Group Placement Date: 07/10/25  - Placement Time: 1401  -MH Present on Original Admission: Y  -MH Side: Left  -MH Orientation: anterior  -MH Location: groin  -MH    Retired Wound - Properties Group Placement Date: 07/10/25  - Placement Time: 1401  -MH Present on Original Admission: Y  -MH Side: Left  -MH Orientation: anterior  -MH Location: groin  -MH    Retired Wound  - Properties Group Date first assessed: 07/10/25  -MH Time first assessed: 1401  -MH Present on Original Admission: Y  -MH Side: Left  -MH Location: groin  -MH      Row Name             Wound 07/13/25 1432 Left distal arm Other (Comments)    Wound - Properties Group Placement Date: 07/13/25  -TP Placement Time: 1432  -TP Present on Original Admission: N  -TP Side: Left  -TP Orientation: distal  -TP Location: arm  -TP Primary Wound Type: Other  -TP Secondary Wound Type - Other: --  -TP, skin tear     Retired Wound - Properties Group Placement Date: 07/13/25  -TP Placement Time: 1432  -TP Present on Original Admission: N  -TP Side: Left  -TP Orientation: distal  -TP Location: arm  -TP    Retired Wound - Properties Group Placement Date: 07/13/25  -TP Placement Time: 1432  -TP Present on Original Admission: N  -TP Side: Left  -TP Orientation: distal  -TP Location: arm  -TP    Retired Wound - Properties Group Date first assessed: 07/13/25  -TP Time first assessed: 1432  -TP Present on Original Admission: N  -TP Side: Left  -TP Location: arm  -TP      Row Name             Wound 07/14/25 1913 Left anterior thigh Pressure Injury    Wound - Properties Group Placement Date: 07/14/25  -TP Placement Time: 1913  -TP Present on Original Admission: N  -TP Side: Left  -TP Orientation: anterior  -TP Location: thigh  -TP Primary Wound Type: Pressure Inj  -TW    Retired Wound - Properties Group Placement Date: 07/14/25  -TP Placement Time: 1913  -TP Present on Original Admission: N  -TP Side: Left  -TP Orientation: anterior  -TP Location: thigh  -TP    Retired Wound - Properties Group Placement Date: 07/14/25  -TP Placement Time: 1913  -TP Present on Original Admission: N  -TP Side: Left  -TP Orientation: anterior  -TP Location: thigh  -TP    Retired Wound - Properties Group Date first assessed: 07/14/25  -TP Time first assessed: 1913  -TP Present on Original Admission: N  -TP Side: Left  -TP Location: thigh  -TP      Row Name  07/18/25 0940          Positioning and Restraints    Pre-Treatment Position in bed  -JULIA (r) SATISH (t) JULIA (c)     Post Treatment Position bed  -KH (r) DH (t) JULIA (c)     In Bed encouraged to call for assist;call light within reach;side rails up x3;side lying left  -       Row Name 07/18/25 0940          Progress Summary (PT)    Daily Progress Summary (PT) Patient able to tolerate therapy session. Bed activity was perfromed on this day due to dressing needing to be redressed and patient feeling weak. She was able to complete flexion, abd, and add exercises on the LLE and some bridging / core stability exercises with short rest breaks. Patient states she has been continuing to perform bed exercises throughout the day without PT. No change to POC, prognosis fair.  -               User Key  (r) = Recorded By, (t) = Taken By, (c) = Cosigned By      Initials Name Provider Type    Basilia Johnson RN Registered Nurse    Taylor Del Rio, RN Registered Nurse    Sujey Ovalle, PT Physical Therapist    Sara Godoy RN Registered Nurse    Jolie Lisa RN Registered Nurse    Neva Camejo, PT Student PT Student                      PT Recommendation and Plan     Progress Summary (PT)  Daily Progress Summary (PT): Patient tolerated session well with rest breaks due to fatigue and slight increase in pain. Patient was able to complete bed mobility exercises using trapeze bar and standing exercises with walking and min-mod assist. She was able to pivot step but was unable to clear right foot due to UE strength weakness. Therapist took time to wrap patients surgical limb to help with healing / compression component.  Plan of Care Reviewed With: patient  Progress: improving       Time Calculation:    PT Charges       Row Name 07/18/25 1050 07/18/25 1051          Time Calculation    Start Time 0940  -KH (r) SATISH (t) JULIA (c) --  -KH (r) SATISH (t) JULIA (c)     PT Received On 07/18/25  -JULIA (r) SATISH (t) JULIA (c) --   -JULIA (r) SATISH (t) JULIA (c)        Time Calculation- PT    Total Timed Code Minutes- PT 15 minute(s)  -KH (r) SATISH (t) JULIA (c) --  -KH (r) SATISH (t) JULIA (c)               User Key  (r) = Recorded By, (t) = Taken By, (c) = Cosigned By      Initials Name Provider Type    Sujey Ovalle, PT Physical Therapist     Neva Gonzalez, PT Student PT Student                  Therapy Charges for Today       Code Description Service Date Service Provider Modifiers Qty    [id number]  PT THERAPEUTIC ACT EA 15 MIN 7/18/2025 Neva Gonzalez, PT Student GP 1            PT G-Codes  AM-PAC 6 Clicks Score (PT): 8    Neva Gonzalez PT Student  7/18/2025

## 2025-07-18 NOTE — CASE MANAGEMENT/SOCIAL WORK
Discharge Planning Assessment   [place]     Patient Name: Monae Chauhan  MRN: [id number]  Today's Date: 7/18/2025    Admit Date: 7/10/2025       Discharge Plan       Row Name 07/18/25 1229       Plan    Plan SS manager attempted contact with [facility] Medicare to inquire about pending fast appeal for IRF without success. SS to follow.    Addendum @ 16:54: Appeal for IRF is still pending. SS to follow.                   Continued Care and Services - Admitted Since 7/10/2025       Destination       Service Provider Request Status Services Address Phone Fax Patient Preferred     [facility] -- [address] [place] [id number] [phone] --       Internal Comment last updated by Alejandra Be BSW 7/16/2025 1131    Fast Appeal pending               [facility] Declined  Insurance Denial -- [address] [facility] [address] [phone] --    [facility]ING [facility]ED UNIT Declined  PT rec'd IPR instead --  [facility] [address] [phone] --                    Expected Discharge Date and Time       Expected Discharge Date Expected Discharge Time    Jul 18, 2025      BENEDICTO Swanson

## 2025-07-18 NOTE — PROGRESS NOTES
Post amputation    She is still waiting on hearing from insurance on appeal for rehab. She is struggling with transfers. Afeb and vss. Wound is intact and no bleeding currently.

## 2025-07-18 NOTE — PLAN OF CARE
Goal Outcome Evaluation:   Pt rested fairly well,upset because of surgical incision with a small amount of bloody drainage and dressing wont stay on,dressing changed and taped to leg,vss, nadn,wctm

## 2025-07-19 LAB
DEPRECATED RDW RBC AUTO: 57.5 FL (ref 37–54)
ERYTHROCYTE [DISTWIDTH] IN BLOOD BY AUTOMATED COUNT: 18 % (ref 12.3–15.4)
GLUCOSE BLDC GLUCOMTR-MCNC: 103 MG/DL (ref 70–130)
GLUCOSE BLDC GLUCOMTR-MCNC: 196 MG/DL (ref 70–130)
GLUCOSE BLDC GLUCOMTR-MCNC: 245 MG/DL (ref 70–130)
GLUCOSE BLDC GLUCOMTR-MCNC: 267 MG/DL (ref 70–130)
HCT VFR BLD AUTO: 28.6 % (ref 34–46.6)
HGB BLD-MCNC: 8.5 G/DL (ref 12–15.9)
IRON 24H UR-MRATE: 20 MCG/DL (ref 37–145)
IRON SATN MFR SERPL: 7 % (ref 20–50)
MCH RBC QN AUTO: 25.9 PG (ref 26.6–33)
MCHC RBC AUTO-ENTMCNC: 29.7 G/DL (ref 31.5–35.7)
MCV RBC AUTO: 87.2 FL (ref 79–97)
PLATELET # BLD AUTO: 427 10*3/MM3 (ref 140–450)
PMV BLD AUTO: 8.7 FL (ref 6–12)
RBC # BLD AUTO: 3.28 10*6/MM3 (ref 3.77–5.28)
TIBC SERPL-MCNC: 274 MCG/DL (ref 298–536)
TRANSFERRIN SERPL-MCNC: 184 MG/DL (ref 200–360)
WBC NRBC COR # BLD AUTO: 8.15 10*3/MM3 (ref 3.4–10.8)

## 2025-07-19 PROCEDURE — 84466 ASSAY OF TRANSFERRIN: CPT | Performed by: STUDENT IN AN ORGANIZED HEALTH CARE EDUCATION/TRAINING PROGRAM

## 2025-07-19 PROCEDURE — 99024 POSTOP FOLLOW-UP VISIT: CPT | Performed by: SURGERY

## 2025-07-19 PROCEDURE — 83540 ASSAY OF IRON: CPT | Performed by: STUDENT IN AN ORGANIZED HEALTH CARE EDUCATION/TRAINING PROGRAM

## 2025-07-19 PROCEDURE — 63710000001 INSULIN LISPRO (HUMAN) PER 5 UNITS: Performed by: INTERNAL MEDICINE

## 2025-07-19 PROCEDURE — 85027 COMPLETE CBC AUTOMATED: CPT | Performed by: STUDENT IN AN ORGANIZED HEALTH CARE EDUCATION/TRAINING PROGRAM

## 2025-07-19 PROCEDURE — 63710000001 INSULIN GLARGINE PER 5 UNITS: Performed by: INTERNAL MEDICINE

## 2025-07-19 PROCEDURE — 82948 REAGENT STRIP/BLOOD GLUCOSE: CPT

## 2025-07-19 RX ORDER — VALSARTAN 80 MG/1
40 TABLET ORAL
Status: DISCONTINUED | OUTPATIENT
Start: 2025-07-20 | End: 2025-07-22 | Stop reason: HOSPADM

## 2025-07-19 RX ADMIN — METHOCARBAMOL TABLETS 750 MG: 750 TABLET, COATED ORAL at 09:19

## 2025-07-19 RX ADMIN — APIXABAN 5 MG: 5 TABLET, FILM COATED ORAL at 21:16

## 2025-07-19 RX ADMIN — GABAPENTIN 800 MG: 400 CAPSULE ORAL at 15:04

## 2025-07-19 RX ADMIN — MICONAZOLE NITRATE 1 APPLICATION: 2 POWDER TOPICAL at 09:24

## 2025-07-19 RX ADMIN — INSULIN LISPRO 6 UNITS: 100 INJECTION, SOLUTION INTRAVENOUS; SUBCUTANEOUS at 17:39

## 2025-07-19 RX ADMIN — INSULIN LISPRO 4 UNITS: 100 INJECTION, SOLUTION INTRAVENOUS; SUBCUTANEOUS at 11:33

## 2025-07-19 RX ADMIN — AMLODIPINE BESYLATE 10 MG: 5 TABLET ORAL at 09:19

## 2025-07-19 RX ADMIN — METHOCARBAMOL TABLETS 750 MG: 750 TABLET, COATED ORAL at 11:34

## 2025-07-19 RX ADMIN — ALPRAZOLAM 0.25 MG: 0.25 TABLET ORAL at 09:19

## 2025-07-19 RX ADMIN — INSULIN LISPRO 2 UNITS: 100 INJECTION, SOLUTION INTRAVENOUS; SUBCUTANEOUS at 09:20

## 2025-07-19 RX ADMIN — MICONAZOLE NITRATE 1 APPLICATION: 2 POWDER TOPICAL at 21:18

## 2025-07-19 RX ADMIN — Medication 10 ML: at 09:23

## 2025-07-19 RX ADMIN — ROSUVASTATIN CALCIUM 20 MG: 20 TABLET, FILM COATED ORAL at 09:19

## 2025-07-19 RX ADMIN — ALPRAZOLAM 0.25 MG: 0.25 TABLET ORAL at 21:16

## 2025-07-19 RX ADMIN — GABAPENTIN 800 MG: 400 CAPSULE ORAL at 05:44

## 2025-07-19 RX ADMIN — FOLIC ACID 1 MG: 1 TABLET ORAL at 09:19

## 2025-07-19 RX ADMIN — Medication 10 ML: at 21:18

## 2025-07-19 RX ADMIN — INSULIN LISPRO 2 UNITS: 100 INJECTION, SOLUTION INTRAVENOUS; SUBCUTANEOUS at 17:39

## 2025-07-19 RX ADMIN — INSULIN LISPRO 2 UNITS: 100 INJECTION, SOLUTION INTRAVENOUS; SUBCUTANEOUS at 21:15

## 2025-07-19 RX ADMIN — CLOPIDOGREL BISULFATE 75 MG: 75 TABLET, FILM COATED ORAL at 09:19

## 2025-07-19 RX ADMIN — METHOCARBAMOL TABLETS 750 MG: 750 TABLET, COATED ORAL at 21:16

## 2025-07-19 RX ADMIN — INSULIN GLARGINE 10 UNITS: 100 INJECTION, SOLUTION SUBCUTANEOUS at 21:15

## 2025-07-19 RX ADMIN — METHOCARBAMOL TABLETS 750 MG: 750 TABLET, COATED ORAL at 17:39

## 2025-07-19 RX ADMIN — INSULIN LISPRO 2 UNITS: 100 INJECTION, SOLUTION INTRAVENOUS; SUBCUTANEOUS at 11:33

## 2025-07-19 RX ADMIN — GABAPENTIN 800 MG: 400 CAPSULE ORAL at 21:16

## 2025-07-19 NOTE — PROGRESS NOTES
Chief complaint: Status post left above-the-knee amputation    Patient awaiting rehab acceptance and discharge.  No complaints reported.  Incision healing well.  Patient tolerating diet and was able to transfer to the bathroom today.    69-year-old female status post left above-the-knee amputation    Awaiting rehab placement

## 2025-07-19 NOTE — PROGRESS NOTES
[facility] HOSPITALIST PROGRESS NOTE     Patient Identification:  Name:  Monae Chauhan  Age:  69 y.o.  Sex:  female  :  1955  MRN:  [id number]  Visit Number:  [id number]  ROOM: [address] [place] [address]     Primary Care Provider:  Zachary Sullivan MD    Length of stay in inpatient status:  9    Subjective     History of Presenting Illness:  I attempted to see patient but she was out of her room. Nursing staff noted no concerns or changes today. They report she was able to transfer from bed to wheelchair and then commode to use the bathroom today.     Objective     Current Hospital Meds:ALPRAZolam, 0.25 mg, Oral, BID  amLODIPine, 10 mg, Oral, Daily  [Held by provider] apixaban, 5 mg, Oral, BID  [Held by provider] carvedilol, 3.125 mg, Oral, BID With Meals  fentaNYL, 1 patch, Transdermal, Q72H   And  Check Fentanyl Patch Placement, 1 each, Not Applicable, Q12H  clopidogrel, 75 mg, Oral, Daily  folic acid, 1 mg, Oral, Daily  [Held by provider] furosemide, 20 mg, Oral, Daily  gabapentin, 800 mg, Oral, Q8H  insulin glargine, 10 Units, Subcutaneous, Nightly  insulin lispro, 2 Units, Subcutaneous, TID With Meals  insulin lispro, 2-9 Units, Subcutaneous, 4x Daily AC & at Bedtime  methocarbamol, 750 mg, Oral, 4x Daily  miconazole, 1 Application, Topical, Q12H  nicotine, 1 patch, Transdermal, Q24H  rosuvastatin, 20 mg, Oral, Daily  senna-docusate sodium, 2 tablet, Oral, BID  sodium chloride, 10 mL, Intravenous, Q12H  [Held by provider] valsartan, 320 mg, Oral, Daily  [START ON 2025] valsartan, 40 mg, Oral, Q24H    Pharmacy Consult,         Current Antimicrobial Therapy:  Anti-Infectives (From admission, onward)      None          Current Diuretic Therapy:  Diuretics (From admission, onward)      Ordered     Dose/Rate Route Frequency Start Stop    07/10/25 1047  [Held by provider]  furosemide (LASIX) tablet 20 mg        (On hold since Thu 7/10/2025 at 1222 until manually unheld; held by Marco Rasheed  Ese Reason: Abnormal Vitals)   Ordering Provider: Taran Harding MD    20 mg Oral Daily 07/10/25 1200            ----------------------------------------------------------------------------------------------------------------------  Vital Signs:  Temp:  [97 °F (36.1 °C)-97.6 °F (36.4 °C)] 97 °F (36.1 °C)  Heart Rate:  [64-70] 64  Resp:  [16-20] 18  BP: (111-156)/(54-68) 156/67  SpO2:  [95 %-98 %] 95 %  on   ;   Device (Oxygen Therapy): room air  Body mass index is 30.15 kg/m².    Wt Readings from Last 3 Encounters:   07/10/25 77.2 kg (170 lb 3.1 oz)   06/19/25 78.6 kg (173 lb 3.2 oz)   05/29/25 78.5 kg (173 lb)     Intake & Output (last 3 days)         07/16 0701 07/17 0700 07/17 0701 07/18 0700 07/18 0701 07/19 0700 07/19 0701  07/20 0700    P.O. 1320 520 600     Total Intake(mL/kg) 1320 (17.1) 520 (6.7) 600 (7.8)     Urine (mL/kg/hr) 1000 (0.5) 100 (0.1) 800 (0.4)     Stool 0 0 0     Total Output 1000 100 800     Net +320 +420 -200             Urine Unmeasured Occurrence 3 x 2 x 2 x 1 x    Stool Unmeasured Occurrence 1 x 2 x 0 x           Diet: Diabetic; Consistent Carbohydrate; Fluid Consistency: Thin (IDDSI 0)  ----------------------------------------------------------------------------------------------------------------------  Physical exam:   Unable to assess as patient was not available  ----------------------------------------------------------------------------------------------------------------------  Results from last 7 days   Lab Units 07/19/25  0111 07/18/25 0152 07/16/25  0042   WBC 10*3/mm3 8.15 7.52 7.61   HEMOGLOBIN g/dL 8.5* 8.6* 8.7*   HEMATOCRIT % 28.6* 29.4* 29.3*   MCV fL 87.2 89.6 87.5   MCHC g/dL 29.7* 29.3* 29.7*   PLATELETS 10*3/mm3 427 400 380         Results from last 7 days   Lab Units 07/18/25  1436 07/18/25  0152 07/14/25  0109 07/13/25  0116   SODIUM mmol/L  --  136 139 139   POTASSIUM mmol/L 4.5 3.5 3.8 3.7   MAGNESIUM mg/dL  --   --  2.4 1.9   CHLORIDE mmol/L  --  105 105  "105   CO2 mmol/L  --  20.0* 25.3 24.9   BUN mg/dL  --  14.3 12.6 9.5   CREATININE mg/dL  --  0.79 0.81 0.76   CALCIUM mg/dL  --  8.4* 8.0* 8.0*   IONIZED CALCIUM mmol/L  --   --   --  1.16   PHOSPHORUS mg/dL  --   --  3.1 2.9   GLUCOSE mg/dL  --  145* 161* 104*   ALBUMIN g/dL  --   --  2.5*  --    BILIRUBIN mg/dL  --   --  <0.2  --    ALK PHOS U/L  --   --  97  --    AST (SGOT) U/L  --   --  13  --    ALT (SGPT) U/L  --   --  6  --    Estimated Creatinine Clearance: 66.1 mL/min (by C-G formula based on SCr of 0.79 mg/dL).  No results found for: \"AMMONIA\"              Glucose   Date/Time Value Ref Range Status   07/19/2025 1634 267 (H) 70 - 130 mg/dL Final     Comment:     Serial Number: [id number]:  [id number]   07/19/2025 1056 245 (H) 70 - 130 mg/dL Final     Comment:     Serial Number: [id number]:  [id number]   07/19/2025 0613 103 70 - 130 mg/dL Final     Comment:     Serial Number: [id number]:  [id number]   07/18/2025 1946 237 (H) 70 - 130 mg/dL Final     Comment:     Serial Number: [id number]:  [id number]   07/18/2025 1642 186 (H) 70 - 130 mg/dL Final     Comment:     Serial Number: [id number]:  [id number]   07/18/2025 1106 170 (H) 70 - 130 mg/dL Final     Comment:     Serial Number: [id number]:  [id number]   07/18/2025 0632 134 (H) 70 - 130 mg/dL Final     Comment:     Serial Number: [id number]:  [id number]   07/17/2025 2026 166 (H) 70 - 130 mg/dL Final     Comment:     Serial Number: [id number]:  [id number]     Lab Results   Component Value Date    TSH 1.520 07/10/2025     No results found for: \"PREGTESTUR\", \"PREGSERUM\", \"HCG\", \"HCGQUANT\"  Pain Management Panel           No data to display              Brief Urine Lab Results       None          No results found for: \"BLOODCX\"  No results found for: \"URINECX\"  No results found for: \"WOUNDCX\"  No results found for: \"STOOLCX\"  No results found for: \"RESPCX\"  No results found for: \"AFBCX\"        I have personally " looked at the labs and they are summarized above.  ----------------------------------------------------------------------------------------------------------------------  Detailed radiology reports for the last 24 hours:  Imaging Results (Last 24 Hours)       ** No results found for the last 24 hours. **          Assessment & Plan      #PAD with previous stenting and reocclusion of SFA  #Left lower extremity ulcer s/p AKA  - Management per primary team, General Surgery  - Pain appears to be well controlled with fentanyl patch (25mcg) and oxycodone 10mg q4h prn. Continue same. Also continue flexeril prn muscle spasm.  - Continue PT/OT. She has been able to transfer to wheelchair and the bathroom today.  - Dressing changes per surgery  - Patient's insurance declined inpatient rehab but family is filing an appeal. This remains pending.  - Patient had some bleeding from her surgical wound which has since improved. Hemoglobin has been stable around 8-9, so I will restart her eliquis tonight.     #Acute on chronic normocytic anemia  - iron panel revealed low iron and iron saturation, as well as low transferrin and TIBC. Results are consistent with a combination of iron deficiency and anemia of chronic inflammation. Start iron supplement.  - B12 and folate levels were checked early in admission. B12 was appropriate but folate was low at 4.21. Continue folic acid supplement.  - cbc in a.m.    #Afib   #Hyperlipidemia  #Coronary artery disease  #Hypertension  - Continue amlodipine, eliquis, plavix, statin  - Valsartan, lasix, and coreg were held due to low BP/HR. BP is beginning to trend up and has been in the 140-150 range. Restart low dose valsartan. Continue holding carvedilol due to borderline bradycardia. Monitor VS per protocol.     #Adjustment disorder with depressed mood  - Appreciate Psychiatry consult. Recommendation was to follow up outpatient as she denied active suicidality.     Edited by: Katarina Becker DO at  7/19/2025 1833    Active VTE Prophylaxis  Pharmacologic:        Start     Dose Route Frequency Stop    07/11/25 0900  apixaban (ELIQUIS) tablet 5 mg         5 mg PO 2 Times Daily --                    Dispo: pending appeal of inpatient rehab denial by insurance    Katarina Becker DO  [facility] Hospitalist  07/19/25  18:34 EDT

## 2025-07-19 NOTE — PLAN OF CARE
Goal Outcome Evaluation:           Progress: no change  Outcome Evaluation: pt has had wound care done this shift, pt has transfered to wheelchair and went off floor with family this shift, will continue plan of care.

## 2025-07-19 NOTE — PLAN OF CARE
Goal Outcome Evaluation:  Plan of Care Reviewed With: patient        Progress: no change  Outcome Evaluation: Pt resting in bed at this time. VSS on RA. PRN muscle relaxer given. No concerns or requests at this time. Will continue plan of care.

## 2025-07-20 LAB
GLUCOSE BLDC GLUCOMTR-MCNC: 153 MG/DL (ref 70–130)
GLUCOSE BLDC GLUCOMTR-MCNC: 180 MG/DL (ref 70–130)
GLUCOSE BLDC GLUCOMTR-MCNC: 187 MG/DL (ref 70–130)
GLUCOSE BLDC GLUCOMTR-MCNC: 194 MG/DL (ref 70–130)
HCT VFR BLD AUTO: 30.4 % (ref 34–46.6)
HGB BLD-MCNC: 9.1 G/DL (ref 12–15.9)

## 2025-07-20 PROCEDURE — 82948 REAGENT STRIP/BLOOD GLUCOSE: CPT

## 2025-07-20 PROCEDURE — 99231 SBSQ HOSP IP/OBS SF/LOW 25: CPT | Performed by: STUDENT IN AN ORGANIZED HEALTH CARE EDUCATION/TRAINING PROGRAM

## 2025-07-20 PROCEDURE — 63710000001 INSULIN GLARGINE PER 5 UNITS: Performed by: INTERNAL MEDICINE

## 2025-07-20 PROCEDURE — 85014 HEMATOCRIT: CPT | Performed by: STUDENT IN AN ORGANIZED HEALTH CARE EDUCATION/TRAINING PROGRAM

## 2025-07-20 PROCEDURE — 63710000001 INSULIN LISPRO (HUMAN) PER 5 UNITS: Performed by: INTERNAL MEDICINE

## 2025-07-20 PROCEDURE — 99024 POSTOP FOLLOW-UP VISIT: CPT | Performed by: SURGERY

## 2025-07-20 PROCEDURE — 85018 HEMOGLOBIN: CPT | Performed by: STUDENT IN AN ORGANIZED HEALTH CARE EDUCATION/TRAINING PROGRAM

## 2025-07-20 RX ORDER — HYDROCODONE BITARTRATE AND ACETAMINOPHEN 10; 325 MG/1; MG/1
1 TABLET ORAL EVERY 6 HOURS PRN
Refills: 0 | Status: DISCONTINUED | OUTPATIENT
Start: 2025-07-20 | End: 2025-07-22 | Stop reason: HOSPADM

## 2025-07-20 RX ORDER — OXYCODONE HYDROCHLORIDE 5 MG/1
5 TABLET ORAL ONCE
Refills: 0 | Status: COMPLETED | OUTPATIENT
Start: 2025-07-20 | End: 2025-07-20

## 2025-07-20 RX ADMIN — INSULIN LISPRO 2 UNITS: 100 INJECTION, SOLUTION INTRAVENOUS; SUBCUTANEOUS at 20:37

## 2025-07-20 RX ADMIN — METHOCARBAMOL TABLETS 750 MG: 750 TABLET, COATED ORAL at 18:13

## 2025-07-20 RX ADMIN — GABAPENTIN 800 MG: 400 CAPSULE ORAL at 14:06

## 2025-07-20 RX ADMIN — METHOCARBAMOL TABLETS 750 MG: 750 TABLET, COATED ORAL at 20:37

## 2025-07-20 RX ADMIN — Medication 10 ML: at 20:39

## 2025-07-20 RX ADMIN — GABAPENTIN 800 MG: 400 CAPSULE ORAL at 22:05

## 2025-07-20 RX ADMIN — APIXABAN 5 MG: 5 TABLET, FILM COATED ORAL at 08:20

## 2025-07-20 RX ADMIN — FOLIC ACID 1 MG: 1 TABLET ORAL at 08:20

## 2025-07-20 RX ADMIN — INSULIN GLARGINE 10 UNITS: 100 INJECTION, SOLUTION SUBCUTANEOUS at 20:37

## 2025-07-20 RX ADMIN — ALPRAZOLAM 0.25 MG: 0.25 TABLET ORAL at 08:21

## 2025-07-20 RX ADMIN — Medication 10 ML: at 08:22

## 2025-07-20 RX ADMIN — METHOCARBAMOL TABLETS 750 MG: 750 TABLET, COATED ORAL at 11:48

## 2025-07-20 RX ADMIN — HYDROCODONE BITARTRATE AND ACETAMINOPHEN 1 TABLET: 10; 325 TABLET ORAL at 22:05

## 2025-07-20 RX ADMIN — OXYCODONE 5 MG: 5 TABLET ORAL at 05:29

## 2025-07-20 RX ADMIN — INSULIN LISPRO 2 UNITS: 100 INJECTION, SOLUTION INTRAVENOUS; SUBCUTANEOUS at 11:49

## 2025-07-20 RX ADMIN — METHOCARBAMOL TABLETS 750 MG: 750 TABLET, COATED ORAL at 08:20

## 2025-07-20 RX ADMIN — MICONAZOLE NITRATE 1 APPLICATION: 2 POWDER TOPICAL at 20:38

## 2025-07-20 RX ADMIN — OXYCODONE 5 MG: 5 TABLET ORAL at 11:48

## 2025-07-20 RX ADMIN — INSULIN LISPRO 2 UNITS: 100 INJECTION, SOLUTION INTRAVENOUS; SUBCUTANEOUS at 18:21

## 2025-07-20 RX ADMIN — INSULIN LISPRO 2 UNITS: 100 INJECTION, SOLUTION INTRAVENOUS; SUBCUTANEOUS at 11:48

## 2025-07-20 RX ADMIN — FENTANYL 1 PATCH: 12 PATCH TRANSDERMAL at 09:01

## 2025-07-20 RX ADMIN — ROSUVASTATIN CALCIUM 20 MG: 20 TABLET, FILM COATED ORAL at 08:21

## 2025-07-20 RX ADMIN — AMLODIPINE BESYLATE 10 MG: 5 TABLET ORAL at 08:20

## 2025-07-20 RX ADMIN — CLOPIDOGREL BISULFATE 75 MG: 75 TABLET, FILM COATED ORAL at 08:20

## 2025-07-20 RX ADMIN — VALSARTAN 40 MG: 80 TABLET ORAL at 08:20

## 2025-07-20 RX ADMIN — MICONAZOLE NITRATE 1 APPLICATION: 2 POWDER TOPICAL at 08:22

## 2025-07-20 RX ADMIN — INSULIN LISPRO 2 UNITS: 100 INJECTION, SOLUTION INTRAVENOUS; SUBCUTANEOUS at 08:19

## 2025-07-20 RX ADMIN — INSULIN LISPRO 2 UNITS: 100 INJECTION, SOLUTION INTRAVENOUS; SUBCUTANEOUS at 18:13

## 2025-07-20 RX ADMIN — CYCLOBENZAPRINE 5 MG: 10 TABLET, FILM COATED ORAL at 00:32

## 2025-07-20 RX ADMIN — ALPRAZOLAM 0.25 MG: 0.25 TABLET ORAL at 20:37

## 2025-07-20 RX ADMIN — GABAPENTIN 800 MG: 400 CAPSULE ORAL at 05:29

## 2025-07-20 RX ADMIN — APIXABAN 5 MG: 5 TABLET, FILM COATED ORAL at 20:37

## 2025-07-20 NOTE — PLAN OF CARE
Goal Outcome Evaluation:  Plan of Care Reviewed With: patient        Progress: improving  Outcome Evaluation: Pt resting in bed at this time. VSS on RA. PRN muscle relaxer given for pain. No acute changes. No concerns or requests at this time. Will continue plan of care.                             Problem: Fall Injury Risk  Goal: Absence of Fall and Fall-Related Injury  Outcome: Unable to Meet     Problem: Skin Injury Risk Increased  Goal: Skin Health and Integrity  Outcome: Unable to Meet     Problem: Adult Inpatient Plan of Care  Goal: Plan of Care Review  Outcome: Unable to Meet  Flowsheets (Taken 7/20/2025 0404)  Progress: improving  Outcome Evaluation: Pt resting in bed at this time. VSS on RA. PRN muscle relaxer given for pain. No acute changes. No concerns or requests at this time. Will continue plan of care.  Plan of Care Reviewed With: patient  Goal: Patient-Specific Goal (Individualized)  Outcome: Unable to Meet  Goal: Absence of Hospital-Acquired Illness or Injury  Outcome: Unable to Meet  Goal: Optimal Comfort and Wellbeing  Outcome: Unable to Meet  Goal: Readiness for Transition of Care  Outcome: Unable to Meet

## 2025-07-20 NOTE — PLAN OF CARE
Goal Outcome Evaluation:                     Patient resting in bed at this time. PT c/o pain, PRN pain meds given per MAR. Wound care done per orders. No acute changes or concerns at this time. Will continue with plan of care.

## 2025-07-20 NOTE — NURSING NOTE
"Pt refused bed alarm and bed to be lowered. Educated pt that she was a fall risk. Pt stated \"I don't care I told them on day shift, I wanted it off\". Pt alert x4  " I called pt no answer left a vm.  I have refill her Rx

## 2025-07-21 LAB
GLUCOSE BLDC GLUCOMTR-MCNC: 159 MG/DL (ref 70–130)
GLUCOSE BLDC GLUCOMTR-MCNC: 199 MG/DL (ref 70–130)
GLUCOSE BLDC GLUCOMTR-MCNC: 224 MG/DL (ref 70–130)
GLUCOSE BLDC GLUCOMTR-MCNC: 258 MG/DL (ref 70–130)

## 2025-07-21 PROCEDURE — 63710000001 INSULIN GLARGINE PER 5 UNITS: Performed by: INTERNAL MEDICINE

## 2025-07-21 PROCEDURE — 82948 REAGENT STRIP/BLOOD GLUCOSE: CPT | Performed by: INTERNAL MEDICINE

## 2025-07-21 PROCEDURE — 97530 THERAPEUTIC ACTIVITIES: CPT

## 2025-07-21 PROCEDURE — 97116 GAIT TRAINING THERAPY: CPT

## 2025-07-21 PROCEDURE — 99231 SBSQ HOSP IP/OBS SF/LOW 25: CPT | Performed by: INTERNAL MEDICINE

## 2025-07-21 PROCEDURE — 99024 POSTOP FOLLOW-UP VISIT: CPT | Performed by: SURGERY

## 2025-07-21 PROCEDURE — 82948 REAGENT STRIP/BLOOD GLUCOSE: CPT

## 2025-07-21 PROCEDURE — 63710000001 INSULIN LISPRO (HUMAN) PER 5 UNITS: Performed by: INTERNAL MEDICINE

## 2025-07-21 RX ADMIN — INSULIN LISPRO 2 UNITS: 100 INJECTION, SOLUTION INTRAVENOUS; SUBCUTANEOUS at 08:58

## 2025-07-21 RX ADMIN — INSULIN LISPRO 2 UNITS: 100 INJECTION, SOLUTION INTRAVENOUS; SUBCUTANEOUS at 17:45

## 2025-07-21 RX ADMIN — ALPRAZOLAM 0.25 MG: 0.25 TABLET ORAL at 20:30

## 2025-07-21 RX ADMIN — METHOCARBAMOL TABLETS 750 MG: 750 TABLET, COATED ORAL at 08:56

## 2025-07-21 RX ADMIN — INSULIN LISPRO 6 UNITS: 100 INJECTION, SOLUTION INTRAVENOUS; SUBCUTANEOUS at 20:30

## 2025-07-21 RX ADMIN — GABAPENTIN 800 MG: 400 CAPSULE ORAL at 14:29

## 2025-07-21 RX ADMIN — INSULIN LISPRO 4 UNITS: 100 INJECTION, SOLUTION INTRAVENOUS; SUBCUTANEOUS at 11:31

## 2025-07-21 RX ADMIN — APIXABAN 5 MG: 5 TABLET, FILM COATED ORAL at 20:30

## 2025-07-21 RX ADMIN — INSULIN GLARGINE 10 UNITS: 100 INJECTION, SOLUTION SUBCUTANEOUS at 20:31

## 2025-07-21 RX ADMIN — INSULIN LISPRO 2 UNITS: 100 INJECTION, SOLUTION INTRAVENOUS; SUBCUTANEOUS at 17:46

## 2025-07-21 RX ADMIN — FOLIC ACID 1 MG: 1 TABLET ORAL at 08:56

## 2025-07-21 RX ADMIN — INSULIN LISPRO 2 UNITS: 100 INJECTION, SOLUTION INTRAVENOUS; SUBCUTANEOUS at 11:31

## 2025-07-21 RX ADMIN — DOCUSATE SODIUM 50 MG AND SENNOSIDES 8.6 MG 2 TABLET: 8.6; 5 TABLET, FILM COATED ORAL at 08:56

## 2025-07-21 RX ADMIN — AMLODIPINE BESYLATE 10 MG: 5 TABLET ORAL at 08:56

## 2025-07-21 RX ADMIN — CLOPIDOGREL BISULFATE 75 MG: 75 TABLET, FILM COATED ORAL at 08:56

## 2025-07-21 RX ADMIN — HYDROCODONE BITARTRATE AND ACETAMINOPHEN 1 TABLET: 10; 325 TABLET ORAL at 10:58

## 2025-07-21 RX ADMIN — ROSUVASTATIN CALCIUM 20 MG: 20 TABLET, FILM COATED ORAL at 08:56

## 2025-07-21 RX ADMIN — HYDROCODONE BITARTRATE AND ACETAMINOPHEN 1 TABLET: 10; 325 TABLET ORAL at 22:54

## 2025-07-21 RX ADMIN — GABAPENTIN 800 MG: 400 CAPSULE ORAL at 05:14

## 2025-07-21 RX ADMIN — METHOCARBAMOL TABLETS 750 MG: 750 TABLET, COATED ORAL at 19:20

## 2025-07-21 RX ADMIN — APIXABAN 5 MG: 5 TABLET, FILM COATED ORAL at 08:56

## 2025-07-21 RX ADMIN — ALPRAZOLAM 0.25 MG: 0.25 TABLET ORAL at 08:56

## 2025-07-21 RX ADMIN — METHOCARBAMOL TABLETS 750 MG: 750 TABLET, COATED ORAL at 11:00

## 2025-07-21 RX ADMIN — MICONAZOLE NITRATE 1 APPLICATION: 2 POWDER TOPICAL at 08:57

## 2025-07-21 RX ADMIN — Medication 10 ML: at 08:58

## 2025-07-21 RX ADMIN — GABAPENTIN 800 MG: 400 CAPSULE ORAL at 21:53

## 2025-07-21 NOTE — PLAN OF CARE
Goal Outcome Evaluation:               Pts VSS on RA. No acute changes. Left AKA dressing changed. PRN pain medication given for lower back. Pt voices no concerns. Will cont with POC.

## 2025-07-21 NOTE — PROGRESS NOTES
[facility] HOSPITALIST PROGRESS NOTE     Patient Identification:  Name:  Monae Chauhan  Age:  69 y.o.  Sex:  female  :  1955  MRN:  [id number]  Visit Number:  [id number]  ROOM: [address] [place] [address]     Primary Care Provider:  Zachary Sullivan MD    Length of stay in inpatient status:  10    Subjective     History of Presenting Illness:    Patient seen and examined with family members present this evening. She was irritable. She requested her chronic home pain medication. No other acute events noted during the shift today.     Objective     Current Hospital Meds:ALPRAZolam, 0.25 mg, Oral, BID  amLODIPine, 10 mg, Oral, Daily  apixaban, 5 mg, Oral, BID  [Held by provider] carvedilol, 3.125 mg, Oral, BID With Meals  fentaNYL, 1 patch, Transdermal, Q72H   And  Check Fentanyl Patch Placement, 1 each, Not Applicable, Q12H  clopidogrel, 75 mg, Oral, Daily  folic acid, 1 mg, Oral, Daily  [Held by provider] furosemide, 20 mg, Oral, Daily  gabapentin, 800 mg, Oral, Q8H  insulin glargine, 10 Units, Subcutaneous, Nightly  insulin lispro, 2 Units, Subcutaneous, TID With Meals  insulin lispro, 2-9 Units, Subcutaneous, 4x Daily AC & at Bedtime  methocarbamol, 750 mg, Oral, 4x Daily  miconazole, 1 Application, Topical, Q12H  nicotine, 1 patch, Transdermal, Q24H  rosuvastatin, 20 mg, Oral, Daily  senna-docusate sodium, 2 tablet, Oral, BID  sodium chloride, 10 mL, Intravenous, Q12H  [Held by provider] valsartan, 320 mg, Oral, Daily  valsartan, 40 mg, Oral, Q24H    Pharmacy Consult,         Current Antimicrobial Therapy:  Anti-Infectives (From admission, onward)      None          Current Diuretic Therapy:  Diuretics (From admission, onward)      Ordered     Dose/Rate Route Frequency Start Stop    07/10/25 1047  [Held by provider]  furosemide (LASIX) tablet 20 mg        (On hold since Thu 7/10/2025 at 1222 until manually unheld; held by Marco Rasheed MDHold Reason: Abnormal Vitals)   Ordering Provider: Meaghan  Taran RODRIGUEZ MD    20 mg Oral Daily 07/10/25 1200            ----------------------------------------------------------------------------------------------------------------------  Vital Signs:  Temp:  [98.2 °F (36.8 °C)-99.1 °F (37.3 °C)] 99.1 °F (37.3 °C)  Heart Rate:  [66-70] 70  Resp:  [16-18] 18  BP: (125-163)/(62-77) 134/68  SpO2:  [92 %-95 %] 95 %  on   ;   Device (Oxygen Therapy): room air  Body mass index is 30.15 kg/m².    Wt Readings from Last 3 Encounters:   07/10/25 77.2 kg (170 lb 3.1 oz)   06/19/25 78.6 kg (173 lb 3.2 oz)   05/29/25 78.5 kg (173 lb)     Intake & Output (last 3 days)         07/18 0701 07/19 0700 07/19 0701 07/20 0700 07/20 0701 07/21 0700    P.O. 600 360 720    Total Intake(mL/kg) 600 (7.8) 360 (4.7) 720 (9.3)    Urine (mL/kg/hr) 800 (0.4) 300 (0.2) 400 (0.4)    Stool 0      Total Output 800 300 400    Net -200 +60 +320           Urine Unmeasured Occurrence 2 x 2 x     Stool Unmeasured Occurrence 0 x            Diet: Diabetic; Consistent Carbohydrate; Fluid Consistency: Thin (IDDSI 0)  ----------------------------------------------------------------------------------------------------------------------  Physical exam:   Constitutional:  Well-developed and well-nourished.  No acute distress.      HENT:  Head:  Normocephalic and atraumatic.    Pulmonary/Chest:  Normal rate and effort, able to speak in complete sentences  Musculoskeletal: s/p left AKA  Neurological: Awake, alert, no focal deficit on gross examination. No slurred speech or facial droop.   Skin:  Skin is warm and dry.   Peripheral vascular:  No cyanosis, no edema.  Psychiatric: Irritable  Edited by: Katarina Becker DO at 7/20/2025 2051  ----------------------------------------------------------------------------------------------------------------------  Results from last 7 days   Lab Units 07/20/25  1201 07/19/25  0111 07/18/25  0152 07/16/25  0042   WBC 10*3/mm3  --  8.15 7.52 7.61   HEMOGLOBIN g/dL 9.1* 8.5* 8.6* 8.7*  "  HEMATOCRIT % 30.4* 28.6* 29.4* 29.3*   MCV fL  --  87.2 89.6 87.5   MCHC g/dL  --  29.7* 29.3* 29.7*   PLATELETS 10*3/mm3  --  427 400 380         Results from last 7 days   Lab Units 07/18/25  1436 07/18/25  0152 07/14/25  0109   SODIUM mmol/L  --  136 139   POTASSIUM mmol/L 4.5 3.5 3.8   MAGNESIUM mg/dL  --   --  2.4   CHLORIDE mmol/L  --  105 105   CO2 mmol/L  --  20.0* 25.3   BUN mg/dL  --  14.3 12.6   CREATININE mg/dL  --  0.79 0.81   CALCIUM mg/dL  --  8.4* 8.0*   PHOSPHORUS mg/dL  --   --  3.1   GLUCOSE mg/dL  --  145* 161*   ALBUMIN g/dL  --   --  2.5*   BILIRUBIN mg/dL  --   --  <0.2   ALK PHOS U/L  --   --  97   AST (SGOT) U/L  --   --  13   ALT (SGPT) U/L  --   --  6   Estimated Creatinine Clearance: 66.1 mL/min (by C-G formula based on SCr of 0.79 mg/dL).  No results found for: \"AMMONIA\"              Glucose   Date/Time Value Ref Range Status   07/20/2025 2022 187 (H) 70 - 130 mg/dL Final     Comment:     Serial Number: [id number]:  [id number]   07/20/2025 1710 194 (H) 70 - 130 mg/dL Final     Comment:     Serial Number: [id number]:  [id number]   07/20/2025 1042 180 (H) 70 - 130 mg/dL Final     Comment:     Serial Number: [id number]:  [id number]   07/20/2025 0626 153 (H) 70 - 130 mg/dL Final     Comment:     Serial Number: [id number]:  [id number]   07/19/2025 1931 196 (H) 70 - 130 mg/dL Final     Comment:     Serial Number: [id number]:  [id number]   07/19/2025 1634 267 (H) 70 - 130 mg/dL Final     Comment:     Serial Number: [id number]:  [id number]   07/19/2025 1056 245 (H) 70 - 130 mg/dL Final     Comment:     Serial Number: [id number]:  [id number]   07/19/2025 0613 103 70 - 130 mg/dL Final     Comment:     Serial Number: [id number]:  [id number]     Lab Results   Component Value Date    TSH 1.520 07/10/2025     No results found for: \"PREGTESTUR\", \"PREGSERUM\", \"HCG\", \"HCGQUANT\"  Pain Management Panel           No data to display              Brief Urine " "Lab Results       None          No results found for: \"BLOODCX\"  No results found for: \"URINECX\"  No results found for: \"WOUNDCX\"  No results found for: \"STOOLCX\"  No results found for: \"RESPCX\"  No results found for: \"AFBCX\"        I have personally looked at the labs and they are summarized above.  ----------------------------------------------------------------------------------------------------------------------  Detailed radiology reports for the last 24 hours:  Imaging Results (Last 24 Hours)       ** No results found for the last 24 hours. **          Assessment & Plan      #PAD with previous stenting and reocclusion of SFA  #Left lower extremity ulcer s/p AKA  - Management per primary team, General Surgery  - Pain appears to be well controlled with fentanyl patch (25mcg) and oxycodone 10mg 4x daily prn (the hydrocodone-acetaminophen is her chronic home medication). Continue same. Also continue flexeril prn muscle spasm.  - Continue PT/OT. She is getting stronger and is better able to transfer to wheelchair now.  - Dressing changes per surgery  - Patient's insurance declined inpatient rehab but family is filing an appeal. This remains pending.  - Patient had some bleeding from her surgical wound which has since improved. Hemoglobin has been stable around 8-9 so eliquis has been restarted.    #Acute on chronic normocytic anemia  - iron panel revealed low iron and iron saturation, as well as low transferrin and TIBC. Results are consistent with a combination of iron deficiency and anemia of chronic inflammation. Iron supplement started.  - B12 and folate levels were checked early in admission. B12 was appropriate but folate was low at 4.21. Continue folic acid supplement.  - cbc in a.m.    #Afib   #Hyperlipidemia  #Coronary artery disease  #Hypertension  - Continue amlodipine, eliquis, plavix, statin  - Valsartan, lasix, and coreg were held due to low BP/HR. Low dose valsartan was restarted yesterday due to BP " trending up. Continue holding carvedilol due to borderline bradycardia. Monitor VS per protocol.     #Adjustment disorder with depressed mood  - Appreciate Psychiatry consult. Recommendation was to follow up outpatient as she denied active suicidality.     Edited by: Katairna Becker DO at 7/20/2025 2051    Active VTE Prophylaxis  Pharmacologic:        Start     Dose Route Frequency Stop    07/11/25 0900  apixaban (ELIQUIS) tablet 5 mg         5 mg PO 2 Times Daily --                    Dispo: pending inpatient rehab appeal decision    Katarina Becker DO  [facility]  07/20/25  20:52 EDT

## 2025-07-21 NOTE — CASE MANAGEMENT/SOCIAL WORK
Discharge Planning Assessment   [facility]     Patient Name: Monae Chauhan  MRN: [id number]  Today's Date: 7/21/2025    Admit Date: 7/10/2025       Discharge Plan       Row Name 07/21/25 1548       Plan    Plan Denial upheld for IRF after appeal was filed. SS spoke to pt at bedside on this date. Pt voices speaking with land lord regarding building a wheelchair ramp onto her apartment and she will follow up with her in the am. Pt voices if wc ramp can't be built onto her apartment then she plans to go home with her sister. Pt has a nephew that can build a wc ramp onto her sister's home. Pt has all needed DME at home and denies needing additional. Pt agreeable for home health services and has no preference of agency. Both pt and sister's home are in [facility]. SS to follow.                  Continued Care and Services - Admitted Since 7/10/2025       Destination       Service Provider Request Status Services Address Phone Fax Patient Preferred     [facility] -- [address] [place] [address] [phone] --       Internal Comment last updated by Alejandra Be BSW 7/16/2025 1131    Fast Appeal pending               [facility] Declined  Insurance Denial -- [address] [facility] [address] [phone] --    [facility]ING BED UNIT Declined  PT rec'd IPR instead -- [address] [place] [address] [address] [facility] [address] [phone] --                  Selected Continued Care - Episodes Includes continued care and service providers with selected services from the active episodes listed below      High Risk Care Management Episode start date: 5/8/2025 (Paused)   There are no active outsourced providers for this episode.                 Expected Discharge Date and Time       Expected Discharge Date Expected Discharge Time    Jul 22, 2025         BENEDICTO Swanson

## 2025-07-21 NOTE — THERAPY TREATMENT NOTE
Acute Care - Physical Therapy Treatment Note   [place]     Patient Name: Monae Chauhan  : 1955  MRN: [id number]  Today's Date: 2025   Onset of Illness/Injury or Date of Surgery: 07/10/25 (admission date)  Visit Dx:     ICD-10-CM ICD-9-CM   1. Status post amputation  Z89.9 V49.5   2. 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  L97.522 707.15     Patient Active Problem List   Diagnosis    CAD (coronary artery disease)    Essential hypertension    Mixed hyperlipidemia    Type 2 diabetes mellitus    PAD (peripheral artery disease)    Smoker    Atherosclerosis of native arteries of extremities with rest pain, bilateral legs    Detrusor instability    Open wound    Diabetes mellitus    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    Critical limb ischemia of both lower extremities with rest pain    Tobacco use    Femoral artery occlusion, left    Rest pain of both lower extremities due to atherosclerosis    Status post amputation     Past Medical History:   Diagnosis Date    Arthritis     Coronary artery disease     Depression     Diabetes mellitus     Elevated cholesterol     Full dentures     GERD (gastroesophageal reflux disease)     Hyperlipidemia     Hypertension     Myocardial infarction     Peripheral vascular disease     Psoriasis      Past Surgical History:   Procedure Laterality Date    ABOVE KNEE AMPUTATION Left 7/10/2025    Procedure: AMPUTATION ABOVE KNEE;  Surgeon: Taran Harding MD;  Location: [facility];  Service: General;  Laterality: Left;    APPENDECTOMY      CARDIAC CATHETERIZATION N/A 2017    Procedure: Left Heart Cath;  Surgeon: David Diane MD;  Location: [facility] CATH INVASIVE LOCATION;  Service:     CARDIAC CATHETERIZATION Left 2024    Procedure: Peripheral angiography;  Surgeon: Jaiden Doyle MD;  Location: [facility] CATH INVASIVE LOCATION;  Service: Cardiovascular;  Laterality: Left;  Left Venogram  -    CARDIAC CATHETERIZATION N/A 02/06/2025    Procedure: Peripheral angiography;  Surgeon: Fran Estevez MD;  Location:  [facility] CATH INVASIVE LOCATION;  Service: Cardiovascular;  Laterality: N/A;    CARDIAC CATHETERIZATION N/A 02/26/2025    Procedure: Peripheral angiography;  Surgeon: Jaiden Doyle MD;  Location:  [facility] CATH INVASIVE LOCATION;  Service: Cardiovascular;  Laterality: N/A;    CARDIAC CATHETERIZATION N/A 02/26/2025    Procedure: Atherectomy-peripheral;  Surgeon: Jaiden Doyle MD;  Location:  [facility] CATH INVASIVE LOCATION;  Service: Cardiovascular;  Laterality: N/A;    CARDIAC CATHETERIZATION N/A 02/26/2025    Procedure: Angioplasty-peripheral;  Surgeon: Jaiden Doyle MD;  Location:  [facility] CATH INVASIVE LOCATION;  Service: Cardiovascular;  Laterality: N/A;    CARDIAC CATHETERIZATION N/A 03/24/2025    Procedure: Peripheral angiography;  Surgeon: Jaiden Doyle MD;  Location: [facility] CATH INVASIVE LOCATION;  Service: Cardiovascular;  Laterality: N/A;    CARDIAC CATHETERIZATION N/A 03/24/2025    Procedure: Atherectomy-peripheral;  Surgeon: Jaiden Doyle MD;  Location: [facility] CATH INVASIVE LOCATION;  Service: Cardiovascular;  Laterality: N/A;    COLONOSCOPY      CORONARY STENT PLACEMENT      CYSTOSCOPY BOTOX INJECTION OF BLADDER N/A 10/20/2021    Procedure: Cystoscopy Botox injection of bladder;  Surgeon: Say Robles MD;  Location: [facility];  Service: Urology;  Laterality: N/A;    ENDOSCOPY      FOOT SURGERY Left     GALLBLADDER SURGERY      INTRAVASCULAR ULTRASOUND N/A 02/26/2025    Procedure: Intravascular Ultrasound;  Surgeon: Jaiden Doyle MD;  Location:  [facility] CATH INVASIVE LOCATION;  Service: Cardiovascular;  Laterality: N/A;    INTRAVASCULAR ULTRASOUND N/A 03/24/2025    Procedure: Intravascular Ultrasound;  Surgeon: Jaiden Doyle MD;  Location: [facility] CATH INVASIVE LOCATION;  Service: Cardiovascular;  Laterality: N/A;    LEG SURGERY Left     TUBAL ABDOMINAL LIGATION       VASCULAR SURGERY Left     Lower Extremity Vascular Stent-[facility]     PT Assessment (Last 12 Hours)       PT Evaluation and Treatment       Row Name 07/21/25 1430          Physical Therapy Time and Intention    Subjective Information no complaints (P)   -     Document Type therapy note (daily note) (P)   -     Mode of Treatment physical therapy (P)   -     Patient Effort good (P)   -DH       Row Name 07/21/25 1430          Pain Scale: FACES Pre/Post-Treatment    Pain: FACES Scale, Pretreatment 0-->no hurt (P)   -     Posttreatment Pain Rating 0-->no hurt (P)   -     Pre/Posttreatment Pain Comment per observation (P)   -DH       Row Name 07/21/25 1430          Cognition    Affect/Mental Status (Cognition) WNL (P)   -     Orientation Status (Cognition) oriented x 4 (P)   -     Follows Commands (Cognition) WNL (P)   -     Cognitive Function (Cognition) WNL (P)   -     Personal Safety Interventions supervised activity;gait belt (P)   -DH       Row Name 07/21/25 1430          Bed Mobility    Rolling Left [place] (Bed Mobility) independent (P)   -     Scooting/Bridging [place] (Bed Mobility) independent (P)   -     Supine-Sit [place] (Bed Mobility) independent (P)   -     Sit-Supine [place] (Bed Mobility) independent (P)   -DH       Row Name 07/21/25 1430          Transfers    Transfers wheelchair transfer;sit-stand transfer;stand-sit transfer (P)   -DH       Row Name 07/21/25 1430          Sit-Stand Transfer    Sit-Stand [place] (Transfers) supervision;standby assist (P)   -DH       Row Name 07/21/25 1430          Stand-Sit Transfer    Stand-Sit [place] (Transfers) supervision;standby assist (P)   -DH       Row Name 07/21/25 1430          Wheelchair Transfer    Type (Wheelchair Transfer) stand pivot/stand step (P)   -     [place] Level (Wheelchair Transfer) supervision;standby assist (P)   -     Assistive Device (Wheelchair Transfer) wheelchair (P)   -        Row Name 07/21/25 1430          Gait/Stairs (Locomotion)    Gait/Stairs Locomotion gait/ambulation independence;gait/ambulation assistive device;distance ambulated (P)   -     [place] Level (Gait) contact guard;1 person assist (P)   -     Assistive Device (Gait) walker, front-wheeled (P)   -     Patient was able to Ambulate yes (P)   -DH     Distance in Feet (Gait) 4 (P)   20 smll hops beside the bedside  -     Pattern (Gait) 2-point;step-to (P)   -DH       Row Name             Wound 07/10/25 0919 Left anterior thigh Surgical Open Surgical Incision    Wound - Properties Group Placement Date: 07/10/25  -JG Placement Time: 0919 -JG Side: Left  -JG Orientation: anterior  -JG Location: thigh  -JG Primary Wound Type: Surgical  -JG Secondary Wound Type - Surgical: Open Surg  -JG    Retired Wound - Properties Group Placement Date: 07/10/25  -JG Placement Time: 0919 -JG Side: Left  -JG Orientation: anterior  -JG Location: thigh  -JG    Retired Wound - Properties Group Placement Date: 07/10/25  -JG Placement Time: 0919 -JG Side: Left  -JG Orientation: anterior  -JG Location: thigh  -JG    Retired Wound - Properties Group Date first assessed: 07/10/25  -JG Time first assessed: 0919 -JG Side: Left  -JG Location: thigh  -JG      Row Name             Wound 07/10/25 1401 Left anterior groin Other (Comments)    Wound - Properties Group Placement Date: 07/10/25  -MH Placement Time: 1401  -MH Present on Original Admission: Y  -MH Side: Left  -MH Orientation: anterior  -MH Location: groin  -MH Primary Wound Type: Other  -MH Secondary Wound Type - Other: --  -MH, Cardiac Cath site     Retired Wound - Properties Group Placement Date: 07/10/25  -MH Placement Time: 1401  -MH Present on Original Admission: Y  -MH Side: Left  -MH Orientation: anterior  -MH Location: groin  -MH    Retired Wound - Properties Group Placement Date: 07/10/25  -MH Placement Time: 1401  -MH Present on Original Admission: Y  -MH Side: Left  -MH  Orientation: anterior  -MH Location: groin  -MH    Retired Wound - Properties Group Date first assessed: 07/10/25  -MH Time first assessed: 1401  -MH Present on Original Admission: Y  -MH Side: Left  -MH Location: groin  -MH      Row Name             Wound 07/13/25 1432 Left distal arm Other (Comments)    Wound - Properties Group Placement Date: 07/13/25  -TP Placement Time: 1432  -TP Present on Original Admission: N  -TP Side: Left  -TP Orientation: distal  -TP Location: arm  -TP Primary Wound Type: Other  -TP Secondary Wound Type - Other: --  -TP, skin tear     Retired Wound - Properties Group Placement Date: 07/13/25  -TP Placement Time: 1432  -TP Present on Original Admission: N  -TP Side: Left  -TP Orientation: distal  -TP Location: arm  -TP    Retired Wound - Properties Group Placement Date: 07/13/25  -TP Placement Time: 1432  -TP Present on Original Admission: N  -TP Side: Left  -TP Orientation: distal  -TP Location: arm  -TP    Retired Wound - Properties Group Date first assessed: 07/13/25  -TP Time first assessed: 1432  -TP Present on Original Admission: N  -TP Side: Left  -TP Location: arm  -TP      Row Name             Wound 07/14/25 1913 Left anterior thigh Pressure Injury    Wound - Properties Group Placement Date: 07/14/25  -TP Placement Time: 1913  -TP Present on Original Admission: N  -TP Side: Left  -TP Orientation: anterior  -TP Location: thigh  -TP Primary Wound Type: Pressure Inj  -TW    Retired Wound - Properties Group Placement Date: 07/14/25  -TP Placement Time: 1913  -TP Present on Original Admission: N  -TP Side: Left  -TP Orientation: anterior  -TP Location: thigh  -TP    Retired Wound - Properties Group Placement Date: 07/14/25  -TP Placement Time: 1913  -TP Present on Original Admission: N  -TP Side: Left  -TP Orientation: anterior  -TP Location: thigh  -TP    Retired Wound - Properties Group Date first assessed: 07/14/25  -TP Time first assessed: 1913  -TP Present on Original Admission:  N  -TP Side: Left  -TP Location: thigh  -TP      Row Name 07/21/25 1430          Coping    Observed Emotional State calm;pleasant (P)   -     Verbalized Emotional State acceptance (P)   -       Row Name 07/21/25 1430          Positioning and Restraints    Pre-Treatment Position in bed (P)   -     Post Treatment Position bed (P)   -     In Bed call light within reach;encouraged to call for assist (P)   -       Row Name 07/21/25 1430          Progress Summary (PT)    Daily Progress Summary (PT) Patient was seen this PM in amuch better mindset. Patient was able to transfer to wheel chair and back to bed with supervision. She was able to take little steps with the walker, and continues to improve and build up her confidence with mobility. (P)   -               User Key  (r) = Recorded By, (t) = Taken By, (c) = Cosigned By      Initials Name Provider Type    Basilia Johnson, RN Registered Nurse    Taylor Del Rio, RN Registered Nurse    Sara Godoy RN Registered Nurse    Jolie Lisa RN Registered Nurse    Neva Camejo, PT Student PT Student                      PT Recommendation and Plan     Progress Summary (PT)  Daily Progress Summary (PT): (P) Patient was seen this PM in amuch better mindset. Patient was able to transfer to wheel chair and back to bed with supervision. She was able to take little steps with the walker, and continues to improve and build up her confidence with mobility.  Plan of Care Reviewed With: patient  Progress: improving       Time Calculation:    PT Charges       Row Name 07/21/25 1459             Time Calculation    Start Time 1430 (P)   -      PT Received On 07/21/25 (P)   -         Time Calculation- PT    Total Timed Code Minutes- PT 23 minute(s) (P)   -                User Key  (r) = Recorded By, (t) = Taken By, (c) = Cosigned By      Initials Name Provider Type    Neva Camejo, PT Student PT Student                  Therapy  Charges for Today       Code Description Service Date Service Provider Modifiers Qty    37776072899  GAIT TRAINING EA 15 MIN 7/21/2025 Neva Gonzalez, PT Student GP 1    [id number]  PT THERAPEUTIC ACT EA 15 MIN 7/21/2025 Neva Gonzalez, PT Student GP 1            PT G-Codes  AM-PAC 6 Clicks Score (PT): 8    Neva Gonzalez, PT Student  7/21/2025

## 2025-07-21 NOTE — NURSING NOTE
Pt refused bed alarm and pt refused to let bed be lowered to lowest level. Pt refused wound care, pt educated on importance.

## 2025-07-21 NOTE — PLAN OF CARE
Goal Outcome Evaluation:            Pt resting in bed at this time with no complaints or concerns. Pt asked to leave the floor with family, Dr. Becker at bedside. PRN pain medication given per  MAR. Wound care completed. VSS. Fentanyl patch on right arm. Plan of care on going

## 2025-07-21 NOTE — PROGRESS NOTES
Post left AKA    She is a 64 yo who had a left AKA a little over a week ago and has been waiting on appeals for inpatient rehab. Afeb and vss. She has been giving the nurses a difficult time. He wound is healing well. It did have some oozing last week that stopped after holding her eliquis. She thinks she can transfer and get around in her wheelchair at home if she can get a ramp. She does have some help at home from a sister. Hgb has been stable. Continue to work on discharge arrangements.

## 2025-07-22 VITALS
WEIGHT: 170.19 LBS | SYSTOLIC BLOOD PRESSURE: 127 MMHG | OXYGEN SATURATION: 97 % | HEART RATE: 71 BPM | DIASTOLIC BLOOD PRESSURE: 57 MMHG | TEMPERATURE: 97.8 F | BODY MASS INDEX: 30.16 KG/M2 | RESPIRATION RATE: 18 BRPM | HEIGHT: 63 IN

## 2025-07-22 LAB
GLUCOSE BLDC GLUCOMTR-MCNC: 178 MG/DL (ref 70–130)
GLUCOSE BLDC GLUCOMTR-MCNC: 179 MG/DL (ref 70–130)

## 2025-07-22 PROCEDURE — 63710000001 INSULIN LISPRO (HUMAN) PER 5 UNITS: Performed by: INTERNAL MEDICINE

## 2025-07-22 PROCEDURE — 82948 REAGENT STRIP/BLOOD GLUCOSE: CPT | Performed by: INTERNAL MEDICINE

## 2025-07-22 PROCEDURE — 99024 POSTOP FOLLOW-UP VISIT: CPT | Performed by: SURGERY

## 2025-07-22 RX ORDER — OXYCODONE AND ACETAMINOPHEN 5; 325 MG/1; MG/1
1 TABLET ORAL EVERY 6 HOURS PRN
Qty: 20 TABLET | Refills: 0 | Status: SHIPPED | OUTPATIENT
Start: 2025-07-22

## 2025-07-22 RX ORDER — POTASSIUM CHLORIDE 750 MG/1
10 CAPSULE, EXTENDED RELEASE ORAL DAILY PRN
Qty: 30 CAPSULE | Refills: 0 | Status: SHIPPED | OUTPATIENT
Start: 2025-07-22

## 2025-07-22 RX ORDER — FUROSEMIDE 20 MG/1
20 TABLET ORAL DAILY PRN
Qty: 30 TABLET | Refills: 0 | Status: SHIPPED | OUTPATIENT
Start: 2025-07-22

## 2025-07-22 RX ORDER — FOLIC ACID 1 MG/1
1 TABLET ORAL DAILY
Qty: 30 TABLET | Refills: 0 | Status: SHIPPED | OUTPATIENT
Start: 2025-07-23

## 2025-07-22 RX ORDER — INSULIN LISPRO 100 [IU]/ML
2 INJECTION, SOLUTION INTRAVENOUS; SUBCUTANEOUS
Qty: 180 UNITS | Refills: 0 | Status: SHIPPED | OUTPATIENT
Start: 2025-07-22

## 2025-07-22 RX ORDER — CYCLOBENZAPRINE HCL 5 MG
5 TABLET ORAL 3 TIMES DAILY PRN
Qty: 20 TABLET | Refills: 0 | Status: SHIPPED | OUTPATIENT
Start: 2025-07-22

## 2025-07-22 RX ORDER — VALSARTAN 40 MG/1
40 TABLET ORAL DAILY
Qty: 30 TABLET | Refills: 0 | Status: SHIPPED | OUTPATIENT
Start: 2025-07-22 | End: 2025-08-21

## 2025-07-22 RX ORDER — POLYETHYLENE GLYCOL 3350 17 G/17G
17 POWDER, FOR SOLUTION ORAL DAILY PRN
Qty: 30 EACH | Refills: 0 | Status: SHIPPED | OUTPATIENT
Start: 2025-07-22

## 2025-07-22 RX ORDER — AMOXICILLIN 250 MG
1 CAPSULE ORAL 2 TIMES DAILY
Qty: 60 TABLET | Refills: 0 | Status: SHIPPED | OUTPATIENT
Start: 2025-07-22 | End: 2025-08-21

## 2025-07-22 RX ORDER — ONDANSETRON 4 MG/1
4 TABLET, ORALLY DISINTEGRATING ORAL EVERY 6 HOURS PRN
Qty: 30 TABLET | Refills: 0 | Status: SHIPPED | OUTPATIENT
Start: 2025-07-22

## 2025-07-22 RX ORDER — INSULIN LISPRO 100 [IU]/ML
2-9 INJECTION, SOLUTION INTRAVENOUS; SUBCUTANEOUS
Qty: 1080 UNITS | Refills: 0 | Status: SHIPPED | OUTPATIENT
Start: 2025-07-22 | End: 2025-08-21

## 2025-07-22 RX ADMIN — AMLODIPINE BESYLATE 10 MG: 5 TABLET ORAL at 08:43

## 2025-07-22 RX ADMIN — FOLIC ACID 1 MG: 1 TABLET ORAL at 08:43

## 2025-07-22 RX ADMIN — INSULIN LISPRO 2 UNITS: 100 INJECTION, SOLUTION INTRAVENOUS; SUBCUTANEOUS at 11:32

## 2025-07-22 RX ADMIN — GABAPENTIN 800 MG: 400 CAPSULE ORAL at 05:25

## 2025-07-22 RX ADMIN — VALSARTAN 40 MG: 80 TABLET ORAL at 08:42

## 2025-07-22 RX ADMIN — METHOCARBAMOL TABLETS 750 MG: 750 TABLET, COATED ORAL at 11:32

## 2025-07-22 RX ADMIN — CYCLOBENZAPRINE 5 MG: 10 TABLET, FILM COATED ORAL at 03:08

## 2025-07-22 RX ADMIN — INSULIN LISPRO 2 UNITS: 100 INJECTION, SOLUTION INTRAVENOUS; SUBCUTANEOUS at 08:46

## 2025-07-22 RX ADMIN — APIXABAN 5 MG: 5 TABLET, FILM COATED ORAL at 08:43

## 2025-07-22 RX ADMIN — ROSUVASTATIN CALCIUM 20 MG: 20 TABLET, FILM COATED ORAL at 08:43

## 2025-07-22 RX ADMIN — METHOCARBAMOL TABLETS 750 MG: 750 TABLET, COATED ORAL at 08:42

## 2025-07-22 RX ADMIN — ALPRAZOLAM 0.25 MG: 0.25 TABLET ORAL at 08:43

## 2025-07-22 RX ADMIN — CLOPIDOGREL BISULFATE 75 MG: 75 TABLET, FILM COATED ORAL at 08:43

## 2025-07-22 NOTE — PROGRESS NOTES
[facility]     Patient Identification:  Name:  Monae Chauhan  Age:  69 y.o.  Sex:  female  :  1955  MRN:  [id number]  Visit Number:  [id number]  Primary Care Provider:  Zachary Sullivan MD    Length of stay:  11    Chief complaint: None    Subjective:    Patient seen and examined at bedside with therapy present.  Patient was ambulating throughout the room with standby assistance while on a walker.  Patient stated she felt well with no complaints.  Per nursing staff, no new events overnight.  ----------------------------------------------------------------------------------------------------------------------  Current [facility] Meds:  ALPRAZolam, 0.25 mg, Oral, BID  amLODIPine, 10 mg, Oral, Daily  apixaban, 5 mg, Oral, BID  [Held by provider] carvedilol, 3.125 mg, Oral, BID With Meals  fentaNYL, 1 patch, Transdermal, Q72H   And  Check Fentanyl Patch Placement, 1 each, Not Applicable, Q12H  clopidogrel, 75 mg, Oral, Daily  folic acid, 1 mg, Oral, Daily  [Held by provider] furosemide, 20 mg, Oral, Daily  gabapentin, 800 mg, Oral, Q8H  insulin glargine, 10 Units, Subcutaneous, Nightly  insulin lispro, 2 Units, Subcutaneous, TID With Meals  insulin lispro, 2-9 Units, Subcutaneous, 4x Daily AC & at Bedtime  methocarbamol, 750 mg, Oral, 4x Daily  miconazole, 1 Application, Topical, Q12H  nicotine, 1 patch, Transdermal, Q24H  rosuvastatin, 20 mg, Oral, Daily  senna-docusate sodium, 2 tablet, Oral, BID  sodium chloride, 10 mL, Intravenous, Q12H  [Held by provider] valsartan, 320 mg, Oral, Daily  valsartan, 40 mg, Oral, Q24H      Pharmacy Consult,       ----------------------------------------------------------------------------------------------------------------------  Vital Signs:  Temp:  [98 °F (36.7 °C)-98.7 °F (37.1 °C)] 98.6 °F (37 °C)  Heart Rate:  [68-73] 69  Resp:  [18-20] 20  BP: (107-155)/(59-73) 128/60      07/10/25  0844 07/10/25  1045   Weight: 78.5 kg (173  lb) 77.2 kg (170 lb 3.1 oz)     Body mass index is 30.15 kg/m².    Intake/Output Summary (Last 24 hours) at 7/21/2025 2008  Last data filed at 7/21/2025 1500  Gross per 24 hour   Intake 720 ml   Output 600 ml   Net 120 ml     Diet: Diabetic; Consistent Carbohydrate; Fluid Consistency: Thin (IDDSI 0)  ----------------------------------------------------------------------------------------------------------------------  Physical exam:  Constitutional: Well-nourished  female in no apparent distress.     HENT:  Head:  Normocephalic and atraumatic.  Mouth:  Moist mucous membranes.    Eyes:  Conjunctivae and EOM are normal.  Pupils are equal, round, and reactive to light.  No scleral icterus.    Neck:  Neck supple. No thyromegaly.  No JVD present.    Cardiovascular:  Regular rate and rhythm with no murmurs, rubs, clicks or gallops appreciated.  Pulmonary/Chest:  Clear to auscultation bilaterally with no crackles, wheezes or rhonchi appreciated.  Abdominal:  Soft. Nontender. Nondistended  Bowel sounds are normal in all four quadrants. No organomegally appreciated.   Musculoskeletal: Patient with left AKA with stump wrapped in Ace bandage.  Neurological:  Alert, Cranial nerves II-XII intact with no focal deficits.  No facial droop.  No slurred speech.   Skin:  Warm and dry to palpation with no rashes or lesions appreciated.  Peripheral vascular:  2+ radial and pedal pulses in bilateral upper and lower extremities.  Psychiatric:  Alert and oriented x3, demonstrates appropriate judgment and insight.  ---------------------------------------------------------------------------------------  ----------------------------------------------------------------------------------------------------------------------      Results from last 7 days   Lab Units 07/20/25  1201 07/19/25  0111 07/18/25  0152 07/16/25  0042   WBC 10*3/mm3  --  8.15 7.52 7.61   HEMOGLOBIN g/dL 9.1* 8.5* 8.6* 8.7*   HEMATOCRIT % 30.4* 28.6* 29.4* 29.3*  "  MCV fL  --  87.2 89.6 87.5   MCHC g/dL  --  29.7* 29.3* 29.7*   PLATELETS 10*3/mm3  --  427 400 380         Results from last 7 days   Lab Units 07/18/25  1436 07/18/25  0152   SODIUM mmol/L  --  136   POTASSIUM mmol/L 4.5 3.5   CHLORIDE mmol/L  --  105   CO2 mmol/L  --  20.0*   BUN mg/dL  --  14.3   CREATININE mg/dL  --  0.79   CALCIUM mg/dL  --  8.4*   GLUCOSE mg/dL  --  145*   Estimated Creatinine Clearance: 66.1 mL/min (by C-G formula based on SCr of 0.79 mg/dL).    No results found for: \"AMMONIA\"      No results found for: \"BLOODCX\"  No results found for: \"URINECX\"  No results found for: \"WOUNDCX\"  No results found for: \"STOOLCX\"    I have personally looked at the labs and they are summarized above.  ----------------------------------------------------------------------------------------------------------------------  Imaging Results (Last 24 Hours)       ** No results found for the last 24 hours. **          ----------------------------------------------------------------------------------------------------------------------  Assessment and Plan:    Left AKA -defer management to primary attending  Acute on chronic normocytic anemia -continue folic acid and iron replacement therapy.  H&H obtained yesterday appears stable  A-fib  Hyperlipidemia  Hypertension    Disposition we will continue to follow along while hospitalized.  Patient appears medically stable for discharge.    Ruddy Flanagan DO   07/21/25   20:08 EDT     "

## 2025-07-22 NOTE — PLAN OF CARE
Goal Outcome Evaluation:                    Pt resting in bed at this time with no complaints or concerns. Pt refused turns, bath and wound care, educated pt. PRN medication given per MAR. VSS. Plan of care on going

## 2025-07-22 NOTE — CASE MANAGEMENT/SOCIAL WORK
Discharge Planning Assessment   [place]     Patient Name: Monae Chauhan  MRN: [id number]  Today's Date: 7/22/2025    Admit Date: 7/10/2025       Discharge Plan       Row Name 07/22/25 0910       Plan    Final Note Pt is being discharged home today. SS will follow up with pt about plan. SS received home health order. Pt voices she lives in [place] and sister also lives in [place]. Pt has no preference of home health agency. SS sent referral to [facility]. SS to follow.    Addendum @ 12:04: SS received a call from [facility] per Uzma stating insurance will not pay for wound care supplies. SS spoke to pt and pt voices having wound care supplies at home. Pt is agreeable for [facility]. Pt voices plans to return to her address tomorrow. SS notified [facility] per Uzma. [facility] request to be contacted upon arrival to her address. RN to call report to [facility]. No other needs identified.                   Continued Care and Services - Admitted Since 7/10/2025       Destination       Service Provider Request Status Services Address Phone Fax Patient Preferred    [facility] Considering -- [address] [place] [address] [phone] --       Internal Comment last updated by Alejandra Be BSW 7/16/2025 1131    Fast Appeal [facility] Declined  Insurance Denial -- [address] [facility] [address] [phone] --    [facility] SWING BED UNIT Declined  PT rec'd IPR instead -- [address] [facility] [address] [phone] --              Home Medical Care       Service Provider Request Status Services Address Phone Fax Patient Preferred    [facility] [place] Pending - Request Sent -- [address] [place] [address] [phone] --                    Expected  Discharge Date and Time       Expected Discharge Date Expected Discharge Time    Jul 22, 2025         BENEDICTO Swanson

## 2025-07-22 NOTE — PAYOR COMM NOTE
"CONTACT: RANDY DUFFY RN  UTILIZATION MANAGEMENT DEPT.  [facility]  [address] [place] [address]  PHONE: [phone]  FAX: [phone]        DISCHARGE NOTIFICATION  DC DATE: 7/22/25 TO HOME    REF#[id number]     Monae Chauhan (69 y.o. Female)       Date of Birth   1955    Social Security Number       Address   [address] [place] [address]    Home Phone       MRN   [id number]       [facility]    Marital Status                               Admission Date   7/10/2025    Admission Type   Elective    Admitting Provider   Taran Harding MD    Attending Provider       Department, Room/Bed   [address]/       Discharge Date   7/22/2025    Discharge Disposition   Home or Self Care    Discharge Destination                                 Attending Provider: (none)   Allergies: Amoxicillin, Penicillins    Isolation: None   Infection: None   Code Status: CPR    Ht: 160 cm (63\")   Wt: 77.2 kg (170 lb 3.1 oz)    Admission Cmt: None   Principal Problem: 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 [L97.522]                   Active Insurance as of 7/10/2025       Primary Coverage       Payor Plan Insurance Group Employer/Plan Group    HUMANA MEDICARE REPLACEMENT HU[employer]NA MEDICARE ADVANTAGE [facility] [id number]       Payor Plan Address Payor Plan Phone Number Payor Plan Fax Number Effective Dates    [address] [address] [phone]  3/1/2025 - None Entered    [facility] [address]         Subscriber Name Subscriber Birth Date Member ID       MONAE CHAUHAN 1955 [id number]                     Emergency Contacts        (Rel.) Home Phone Work Phone Mobile Phone    kathya jarquin (Relative) [phone] -- [phone]    OLENA CRUZ (Son) [phone] -- [phone]              Discharge Summary    No notes of this type exist for this encounter.       Discharge Order (From admission, onward)      "  Start     Ordered    07/22/25 0845  Discharge patient  Once        Expected Discharge Date: 07/22/25   Discharge Disposition: Home or Self Care   Physician of Record for Attribution - Please select from Treatment Team: MIKALA YEE [[id number]]   Review needed by CMO to determine Physician of Record: No      Question Answer Comment   Physician of Record for Attribution - Please select from Treatment Team MIKALA YEE    Review needed by CMO to determine Physician of Record No        07/22/25 0847

## 2025-07-22 NOTE — DISCHARGE PLACEMENT REQUEST
"Monae Chauhan (69 y.o. Female)       Date of Birth   1955    Social Security Number       Address   [address] [id number]    Home Phone       MRN   [id number]       [facility]    Marital Status                               Admission Date   7/10/2025    Admission Type   Elective    Admitting Provider   Taran Harding MD    Attending Provider   Taran Harding MD    Department, Room/Bed   [address], 3339/       Discharge Date       Discharge Disposition   Home or Self Care    Discharge Destination                                 Attending Provider: Taran Harding MD    Allergies: Amoxicillin, Penicillins    Isolation: None   Infection: None   Code Status: CPR    Ht: 160 cm (63\")   Wt: 77.2 kg (170 lb 3.1 oz)    Admission Cmt: None   Principal Problem: 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 [L97.522]                   Active Insurance as of 7/10/2025       Primary Coverage       Payor Plan Insurance Group Employer/Plan Group    HUMANA MEDICARE REPLACEMENT HUMANA MEDICARE ADVANTAGE [facility] [id number]       Payor Plan Address Payor Plan Phone Number Payor Plan Fax Number Effective Dates    [address] [address] [phone]  3/1/2025 - None Entered    [facility] [address]         Subscriber Name Subscriber Birth Date Member ID       MONAE CHAUHAN 1955 [id number]                     Emergency Contacts        (Rel.) Home Phone Work Phone Mobile Phone    kathya jarquin (Relative) [phone] -- [phone]    ANTHONYOLENA STEELE (Son) [phone] -- [phone]          [address] [address]  Phone:  [phone]  Fax:  [phone]        Patient:     Monae Chauhan MRN:  [id number]   [address] [facility] [address] :  1955  SSN:    Phone: [phone] Sex:  F      INSURANCE PAYOR PLAN GROUP # SUBSCRIBER ID   Primary:   "  HUMANA MEDICARE REPLACEMENT [id number]   Admitting Diagnosis: 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 [L97.522]  Order Date:  2025        Remove dressing in 48 hours       (Order ID: [id number])     Diagnosis:         Priority:  Routine Expected Date:   Expiration Date:        Interval:   Count:          Specimen Type: ,   Specimen Source:   Specimen Taken Date:   Specimen Taken Time:                  Authorizing Provider:Taran Harding MD  Authorizing Provider's NPI: [id number]  Order Entered By: Taran Harding MD 2025  8:47 AM     Electronically signed by: Taran Harding MD 2025  8:47 AM       [address] [address]  Phone:  [phone]  Fax:  [phone] Date: 2025      Ambulatory Referral to Home Health     Patient:  Monae Chauhan MRN:  [id number]   [address] [place], [place] [id number] :  1955  SSN:    Phone: [phone] Sex:  F      INSURANCE PAYOR PLAN GROUP # SUBSCRIBER ID   Primary:    HUMANA MEDICARE REPLACEMENT [id number]      Referring Provider Information:  RHONDA PALMER Phone: [phone] Fax: [phone]       Referral Information:   # Visits:  999 Referral Type: Home Health [42]   Urgency:  Routine Referral Reason: Specialty Services Required   Start Date: 2025 End Date:  To be determined by Insurer   Diagnosis: Status post amputation (Z89.9)      Refer to Dept:   Refer to Provider:   Refer to Provider Phone:   Refer to Facility:       Face to Face Visit Date: 2025  Follow-up provider for Plan of Care? I treated the patient in an acute care facility and will not continue treatment after discharge.  Follow-up provider: JASS WARD [[id number]]  Reason/Clinical Findings: left bka  Describe mobility limitations that make leaving home difficult: left bka  Nursing/Therapeutic Services Requested: Physical  Therapy  Nursing/Therapeutic Services Requested: Skilled Nursing  Skilled nursing orders: Wound care dressing/changes  PT orders: Strengthening  PT orders: Gait Training  PT orders: Transfer training  Weight Bearing Status: Non-Weight Bearing  Frequency: 1 Week 1     This document serves as a request of services and does not constitute Insurance authorization or approval of services.  To determine eligibility, please contact the members Insurance carrier to verify and review coverage.     If you have medical questions regarding this request for services. Please contact [address] at [phone] during normal business hours.        Authorizing Provider:Ruddy Flanagan DO  Authorizing Provider's NPI: [id number]  Order Entered By: Ruddy Flanagan DO 7/21/2025  2:42 PM     Electronically signed by: Ruddy Flanagan DO 7/21/2025  2:42 PM          History & Physical        Taran Harding MD at 07/10/25 0813            H&P reviewed. The patient was examined and there are no changes to the H&P.          Electronically signed by Taran Harding MD at 07/10/25 3587   Source Note: H&P (View-Only)          Elysia Chauhan is a 69 y.o. female.     Chief Complaint: ischemic foot ulcers    History of Present Illness She is a 69 yo smoker who refused to stop until just recently, and has PAD. She apparently has had more than one angioplasty/stent and was recommended for another but refused initially but has had a couple of procedures that now have failed.  She had develop a red spot on the dorsal lateral mid left foot  that has evolved in to a large necrotic ulcer as well as one on the anterior left lower leg. She had been on antibiotics but it has not improved. She had a MRI that is read as possible osteomyelitis.     The following portions of the patient's history were reviewed and updated as appropriate: current medications, past family history, past medical history,  past social history, past surgical history and problem list.    Review of Systems   Constitutional:  Negative for activity change, appetite change, chills, fever and unexpected weight change.   HENT:  Negative for congestion, facial swelling and sore throat.    Eyes:  Negative for photophobia and visual disturbance.   Respiratory:  Negative for chest tightness, shortness of breath and wheezing.    Cardiovascular:  Negative for chest pain, palpitations and leg swelling.   Gastrointestinal:  Negative for abdominal distention, abdominal pain, anal bleeding, blood in stool, constipation, diarrhea, nausea, rectal pain and vomiting.   Endocrine: Negative for cold intolerance, heat intolerance, polydipsia and polyuria.   Genitourinary:  Negative for difficulty urinating, dysuria, flank pain and urgency.   Musculoskeletal:  Negative for back pain and myalgias.   Skin:  Positive for color change and wound. Negative for rash.   Allergic/Immunologic: Negative for immunocompromised state.   Neurological:  Positive for weakness. Negative for dizziness, seizures, syncope, light-headedness, numbness and headaches.   Hematological:  Negative for adenopathy. Does not bruise/bleed easily.   Psychiatric/Behavioral:  Negative for behavioral problems and confusion. The patient is not nervous/anxious.        Objective   Physical Exam  Vitals reviewed.   Constitutional:       General: She is not in acute distress.     Appearance: She is well-developed. She is ill-appearing.      Comments: 5 cm necrotic ulcer on the dorsum of the left lateral foot, and 6 cm one on the anterior left lower leg about half way up   HENT:      Head: Normocephalic. No laceration. Hair is normal.      Right Ear: Hearing and ear canal normal.      Left Ear: Hearing and ear canal normal.      Nose: Nose normal.      Right Sinus: No maxillary sinus tenderness or frontal sinus tenderness.      Left Sinus: No maxillary sinus tenderness or frontal sinus tenderness.    Eyes:      General: Lids are normal.      Conjunctiva/sclera: Conjunctivae normal.      Pupils: Pupils are equal, round, and reactive to light.   Neck:      Thyroid: No thyroid mass or thyromegaly.      Vascular: No JVD.      Trachea: No tracheal tenderness or tracheal deviation.   Cardiovascular:      Rate and Rhythm: Normal rate and regular rhythm.      Heart sounds: No murmur heard.     No gallop.   Pulmonary:      Effort: Pulmonary effort is normal.      Breath sounds: No stridor. Wheezing present.   Chest:      Chest wall: No tenderness.   Abdominal:      General: Bowel sounds are normal. There is no distension.      Palpations: Abdomen is soft. There is no mass.      Tenderness: There is no abdominal tenderness. There is no guarding or rebound.      Hernia: No hernia is present.   Musculoskeletal:         General: No deformity.      Cervical back: Normal range of motion.   Lymphadenopathy:      Cervical: No cervical adenopathy.      Upper Body:      Right upper body: No supraclavicular adenopathy.      Left upper body: No supraclavicular adenopathy.   Skin:     General: Skin is warm and dry.      Coloration: Skin is not pale.      Findings: Erythema and lesion present. No rash.   Neurological:      Mental Status: She is alert and oriented to person, place, and time.      Motor: No abnormal muscle tone.   Psychiatric:         Behavior: Behavior normal.         Thought Content: Thought content normal.         Past Medical History:   Diagnosis Date    Arthritis     Coronary artery disease     Depression     Diabetes mellitus     Elevated cholesterol     Full dentures     GERD (gastroesophageal reflux disease)     Hyperlipidemia     Hypertension     Myocardial infarction     Peripheral vascular disease     Psoriasis        Family History   Problem Relation Age of Onset    Breast cancer Maternal Grandmother 70    Cancer Mother     Diabetes Mother     Cancer Father     Diabetes Father        Social History      Tobacco Use    Smoking status: Former     Current packs/day: 1.00     Average packs/day: 1 pack/day for 44.0 years (44.0 ttl pk-yrs)     Types: Cigarettes     Start date: 6/25/1981    Smokeless tobacco: Never    Tobacco comments:     2-4 PPD off and on for the last 40 years   Vaping Use    Vaping status: Former    Substances: Nicotine, Flavoring    Devices: Refillable tank   Substance Use Topics    Alcohol use: No    Drug use: No       Past Surgical History:   Procedure Laterality Date    APPENDECTOMY      CARDIAC CATHETERIZATION N/A 01/09/2017    Procedure: Left Heart Cath;  Surgeon: David Diane MD;  Location:  [facility];  Service:     CARDIAC CATHETERIZATION Left 5/20/2024    Procedure: Peripheral angiography;  Surgeon: Jaiden Doyle MD;  Location:  [facility];  Service: Cardiovascular;  Laterality: Left;  Left Venogram -    CARDIAC CATHETERIZATION N/A 2/6/2025    Procedure: Peripheral angiography;  Surgeon: Fran Estevez MD;  Location:  [facility] LOC[facility];  Service: Cardiovascular;  Laterality: N/A;    CARDIAC CATHETERIZATION N/A 2/26/2025    Procedure: Peripheral angiography;  Surgeon: Jaiden Doyle MD;  Location:  [facility] LOC[facility];  Service: Cardiovascular;  Laterality: N/A;    CARDIAC CATHETERIZATION N/A 2/26/2025    Procedure: Atherectomy-peripheral;  Surgeon: Jaiden Doyle MD;  Location:  [facility] INVAS[facility] LOCATION;  Service: Cardiovascular;  Laterality: N/A;    CARDIAC CATHETERIZATION N/A 2/26/2025    Procedure: Angioplasty-peripheral;  Surgeon: Jaiden Doyle MD;  Location:  [facility];  Service: Cardiovascular;  Laterality: N/A;    CARDIAC CATHETERIZATION N/A 3/24/2025    Procedure: Peripheral angiography;  Surgeon: Jaiden Doyle MD;  Location:  [facility] LOC[facility];  Service: Cardiovascular;  Laterality: N/A;    CARDIAC CATHETERIZATION N/A 3/24/2025    Procedure: Atherectomy-peripheral;  Surgeon:  Jaiden Doyle MD;  Location:  [facility] LOC[facility];  Service: Cardiovascular;  Laterality: N/A;    CORONARY STENT PLACEMENT      CYSTOSCOPY BOTOX INJECTION OF BLADDER N/A 10/20/2021    Procedure: Cystoscopy Botox injection of bladder;  Surgeon: Say Robles MD;  Location:  [facility] OR;  Service: Urology;  Laterality: N/A;    FOOT SURGERY Left     GALLBLADDER SURGERY      INTRAVASCULAR ULTRASOUND N/A 2/26/2025    Procedure: Intravascular Ultrasound;  Surgeon: Jaiden Doyle MD;  Location:  [facility] LOC[facility];  Service: Cardiovascular;  Laterality: N/A;    INTRAVASCULAR ULTRASOUND N/A 3/24/2025    Procedure: Intravascular Ultrasound;  Surgeon: Jaiden Doyle MD;  Location:  [facility] INVAS[facility] LOC[facility];  Service: Cardiovascular;  Laterality: N/A;    LEG SURGERY Left     TUBAL ABDOMINAL LIGATION      VASCULAR SURGERY Left     Lower Extremity Vascular Stent-[facility]       Current Outpatient Medications   Medication Instructions    ALPRAZolam (XANAX) 0.25 mg, 2 Times Daily PRN    amLODIPine (NORVASC) 10 mg, Oral, Daily    apixaban (ELIQUIS) 5 mg, 2 Times Daily    carvedilol (COREG) 3.125 mg, 2 Times Daily With Meals    clopidogrel (PLAVIX) 75 mg, Oral, Daily    dicyclomine (BENTYL) 20 mg, 3 Times Daily PRN    furosemide (LASIX) 20 MG tablet 1 tablet, Daily    gabapentin (NEURONTIN) 800 mg, Every 6 Hours PRN    HYDROcodone-acetaminophen (NORCO)  MG per tablet 1 tablet, Every 8 Hours PRN    insulin glargine (LANTUS, SEMGLEE) 30 Units, Nightly    Insulin Lispro (1 Unit Dial) (HUMALOG) 10 Units, Nightly    lidocaine (LIDODERM) 5 % 1 patch, Transdermal, Every 24 Hours, Remove & Discard patch within 12 hours or as directed by MD    potassium chloride (MICRO-K) 10 MEQ CR capsule Take 1 capsule by mouth Daily.    promethazine (PHENERGAN) 25 MG tablet 1 tablet, Every 6 Hours PRN    rosuvastatin (CRESTOR) 20 mg, Daily    Tirzepatide 5 mg, Weekly    traMADol (ULTRAM) 50 MG tablet Take 1 tablet by mouth  2 (Two) Times a Day As Needed for Moderate Pain.    valsartan (DIOVAN) 320 MG tablet 1 tablet, Daily         Assessment & Plan   Diagnoses and all orders for this visit:    1. Tobacco use (Primary)    2. 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 female.     Chief Complaint: ischemic foot ulcers    History of Present Illness She is a 67 yo smoker who refused to stop until just recently, and has PAD. She apparently has had more than one angioplasty/stent and was recommended for another but refused initially but has had a couple of procedures that now have failed.  She had develop a red spot on the dorsal lateral mid left foot  that has evolved in to a large necrotic ulcer as well as one on the anterior left lower leg. She had been on antibiotics but it has not improved. She had a MRI that is read as possible osteomyelitis.     The following portions of the patient's history were reviewed and updated as appropriate: current medications, past family history, past medical history, past social history, past surgical history and problem list.    Review of Systems   Constitutional:  Negative for activity change, appetite change, chills, fever and unexpected weight change.   HENT:  Negative for congestion, facial swelling and sore throat.    Eyes:  Negative for photophobia and visual disturbance.   Respiratory:  Negative for chest tightness, shortness of breath and wheezing.    Cardiovascular:  Negative for chest pain, palpitations and leg swelling.   Gastrointestinal:  Negative for abdominal distention, abdominal pain, anal bleeding, blood in stool, constipation, diarrhea, nausea, rectal pain and  vomiting.   Endocrine: Negative for cold intolerance, heat intolerance, polydipsia and polyuria.   Genitourinary:  Negative for difficulty urinating, dysuria, flank pain and urgency.   Musculoskeletal:  Negative for back pain and myalgias.   Skin:  Positive for color change and wound. Negative for rash.   Allergic/Immunologic: Negative for immunocompromised state.   Neurological:  Positive for weakness. Negative for dizziness, seizures, syncope, light-headedness, numbness and headaches.   Hematological:  Negative for adenopathy. Does not bruise/bleed easily.   Psychiatric/Behavioral:  Negative for behavioral problems and confusion. The patient is not nervous/anxious.        Objective   Physical Exam  Vitals reviewed.   Constitutional:       General: She is not in acute distress.     Appearance: She is well-developed. She is ill-appearing.      Comments: 5 cm necrotic ulcer on the dorsum of the left lateral foot, and 6 cm one on the anterior left lower leg about half way up   HENT:      Head: Normocephalic. No laceration. Hair is normal.      Right Ear: Hearing and ear canal normal.      Left Ear: Hearing and ear canal normal.      Nose: Nose normal.      Right Sinus: No maxillary sinus tenderness or frontal sinus tenderness.      Left Sinus: No maxillary sinus tenderness or frontal sinus tenderness.   Eyes:      General: Lids are normal.      Conjunctiva/sclera: Conjunctivae normal.      Pupils: Pupils are equal, round, and reactive to light.   Neck:      Thyroid: No thyroid mass or thyromegaly.      Vascular: No JVD.      Trachea: No tracheal tenderness or tracheal deviation.   Cardiovascular:      Rate and Rhythm: Normal rate and regular rhythm.      Heart sounds: No murmur heard.     No gallop.   Pulmonary:      Effort: Pulmonary effort is normal.      Breath sounds: No stridor. Wheezing present.   Chest:      Chest wall: No tenderness.   Abdominal:      General: Bowel sounds are normal. There is no distension.       Palpations: Abdomen is soft. There is no mass.      Tenderness: There is no abdominal tenderness. There is no guarding or rebound.      Hernia: No hernia is present.   Musculoskeletal:         General: No deformity.      Cervical back: Normal range of motion.   Lymphadenopathy:      Cervical: No cervical adenopathy.      Upper Body:      Right upper body: No supraclavicular adenopathy.      Left upper body: No supraclavicular adenopathy.   Skin:     General: Skin is warm and dry.      Coloration: Skin is not pale.      Findings: Erythema and lesion present. No rash.   Neurological:      Mental Status: She is alert and oriented to person, place, and time.      Motor: No abnormal muscle tone.   Psychiatric:         Behavior: Behavior normal.         Thought Content: Thought content normal.         Past Medical History:   Diagnosis Date    Arthritis     Coronary artery disease     Depression     Diabetes mellitus     Elevated cholesterol     Full dentures     GERD (gastroesophageal reflux disease)     Hyperlipidemia     Hypertension     Myocardial infarction     Peripheral vascular disease     Psoriasis        Family History   Problem Relation Age of Onset    Breast cancer Maternal Grandmother 70    Cancer Mother     Diabetes Mother     Cancer Father     Diabetes Father        Social History     Tobacco Use    Smoking status: Former     Current packs/day: 1.00     Average packs/day: 1 pack/day for 44.0 years (44.0 ttl pk-yrs)     Types: Cigarettes     Start date: 6/25/1981    Smokeless tobacco: Never    Tobacco comments:     2-4 PPD off and on for the last 40 years   Vaping Use    Vaping status: Former    Substances: Nicotine, Flavoring    Devices: Refillable tank   Substance Use Topics    Alcohol use: No    Drug use: No       Past Surgical History:   Procedure Laterality Date    APPENDECTOMY      CARDIAC CATHETERIZATION N/A 01/09/2017    Procedure: Left Heart Cath;  Surgeon: David Diane MD;  Location:   [facility];  Service:     CARDIAC CATHETERIZATION Left 5/20/2024    Procedure: Peripheral angiography;  Surgeon: Jaiden Doyle MD;  Location:  [facility];  Service: Cardiovascular;  Laterality: Left;  Left Venogram -    CARDIAC CATHETERIZATION N/A 2/6/2025    Procedure: Peripheral angiography;  Surgeon: Fran Estevez MD;  Location:  [facility];  Service: Cardiovascular;  Laterality: N/A;    CARDIAC CATHETERIZATION N/A 2/26/2025    Procedure: Peripheral angiography;  Surgeon: Jaiden Doyle MD;  Location:  [facility];  Service: Cardiovascular;  Laterality: N/A;    CARDIAC CATHETERIZATION N/A 2/26/2025    Procedure: Atherectomy-peripheral;  Surgeon: Jaiden Doyle MD;  Location: [facility] CATH INVASIVE LOCATION;  Service: Cardiovascular;  Laterality: N/A;    CARDIAC CATHETERIZATION N/A 2/26/2025    Procedure: Angioplasty-peripheral;  Surgeon: Jaiden Doyle MD;  Location: [facility] CATH INVASIVE LOCATION;  Service: Cardiovascular;  Laterality: N/A;    CARDIAC CATHETERIZATION N/A 3/24/2025    Procedure: Peripheral angiography;  Surgeon: Jaiden Doyle MD;  Location:  [facility];  Service: Cardiovascular;  Laterality: N/A;    CARDIAC CATHETERIZATION N/A 3/24/2025    Procedure: Atherectomy-peripheral;  Surgeon: Jaiden Doyle MD;  Location: [facility] CATH INVASIVE LOCATION;  Service: Cardiovascular;  Laterality: N/A;    CORONARY STENT PLACEMENT      CYSTOSCOPY BOTOX INJECTION OF BLADDER N/A 10/20/2021    Procedure: Cystoscopy Botox injection of bladder;  Surgeon: Say Robles MD;  Location: [facility];  Service: Urology;  Laterality: N/A;    FOOT SURGERY Left     GALLBLADDER SURGERY      INTRAVASCULAR ULTRASOUND N/A 2/26/2025    Procedure: Intravascular Ultrasound;  Surgeon: Jaiden Doyle MD;  Location: [facility] CATH INVASIVE LOCATION;  Service: Cardiovascular;  Laterality: N/A;    INTRAVASCULAR ULTRASOUND N/A 3/24/2025     Procedure: Intravascular Ultrasound;  Surgeon: Jaiden Doyle MD;  Location:  [facility];  Service: Cardiovascular;  Laterality: N/A;    LEG SURGERY Left     TUBAL ABDOMINAL LIGATION      VASCULAR SURGERY Left     Lower Extremity [facility]       Current Outpatient Medications   Medication Instructions    ALPRAZolam (XANAX) 0.25 mg, 2 Times Daily PRN    amLODIPine (NORVASC) 10 mg, Oral, Daily    apixaban (ELIQUIS) 5 mg, 2 Times Daily    carvedilol (COREG) 3.125 mg, 2 Times Daily With Meals    clopidogrel (PLAVIX) 75 mg, Oral, Daily    dicyclomine (BENTYL) 20 mg, 3 Times Daily PRN    furosemide (LASIX) 20 MG tablet 1 tablet, Daily    gabapentin (NEURONTIN) 800 mg, Every 6 Hours PRN    HYDROcodone-acetaminophen (NORCO)  MG per tablet 1 tablet, Every 8 Hours PRN    insulin glargine (LANTUS, SEMGLEE) 30 Units, Nightly    Insulin Lispro (1 Unit Dial) (HUMALOG) 10 Units, Nightly    lidocaine (LIDODERM) 5 % 1 patch, Transdermal, Every 24 Hours, Remove & Discard patch within 12 hours or as directed by MD    potassium chloride (MICRO-K) 10 MEQ CR capsule Take 1 capsule by mouth Daily.    promethazine (PHENERGAN) 25 MG tablet 1 tablet, Every 6 Hours PRN    rosuvastatin (CRESTOR) 20 mg, Daily    Tirzepatide 5 mg, Weekly    traMADol (ULTRAM) 50 MG tablet Take 1 tablet by mouth 2 (Two) Times a Day As Needed for Moderate Pain.    valsartan (DIOVAN) 320 MG tablet 1 tablet, Daily         Assessment & Plan   Diagnoses and all orders for this visit:    1. Tobacco use (Primary)    2. 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    Schedule left AKA             This document has been electronically signed by Taran Harding MD   June 19, 2025 13:17 EDT    Electronically signed by Taran Harding MD at 06/19/25 1333       Vital Signs (last day)       Date/Time Temp Temp src Pulse Resp BP Patient Position SpO2    07/22/25 0713 98.3 (36.8) Oral 73 18 135/61 Lying 96     07/22/25 0253 97 (36.1) Oral 65 16 129/61 Lying 96    07/21/25 2243 97 (36.1) Oral 72 18 131/66 Lying 96    07/21/25 1852 98.6 (37) Oral 69 20 128/60 Lying 96    07/21/25 1441 98.7 (37.1) Oral 73 18 124/59 Lying --    07/21/25 0700 -- -- -- -- 129/62 Lying --    07/21/25 0300 98 (36.7) Oral 68 18 155/73 Lying 93          Intake & Output (last day)         07/21 0701  07/22 0700 07/22 0701 07/23 0700    P.O. 960 360    I.V. (mL/kg) 0 (0)     Total Intake(mL/kg) 960 (12.4) 360 (4.7)    Urine (mL/kg/hr) 1200 (0.6)     Stool 0     Total Output 1200     Net -240 +360          Urine Unmeasured Occurrence 2 x     Stool Unmeasured Occurrence 0 x           Lines, Drains & Airways       Active LDAs       Name Placement date Placement time Site Days    External Urinary Catheter 07/10/25  1140  --  11                  Current Facility-Administered Medications   Medication Dose Route Frequency Provider Last Rate Last Admin    ALPRAZolam (XANAX) tablet 0.25 mg  0.25 mg Oral BID Marco Rasheed MD   0.25 mg at 07/22/25 0843    amLODIPine (NORVASC) tablet 10 mg  10 mg Oral Daily Marco Rasheed MD   10 mg at 07/22/25 0843    apixaban (ELIQUIS) tablet 5 mg  5 mg Oral BID Katarina Becker DO   5 mg at 07/22/25 0843    sennosides-docusate (PERICOLACE) 8.6-50 MG per tablet 2 tablet  2 tablet Oral BID Taran Harding MD   2 tablet at 07/21/25 0856    And    polyethylene glycol (MIRALAX) packet 17 g  17 g Oral Daily PRN Taran Harding MD        And    bisacodyl (DULCOLAX) EC tablet 5 mg  5 mg Oral Daily PRN Taran Harding MD        And    bisacodyl (DULCOLAX) suppository 10 mg  10 mg Rectal Daily PRN Taran Harding MD        Calcium Replacement - Follow Nurse / BPA Driven Protocol   Not Applicable PRN Marco Rasheed MD        [Held by provider] carvedilol (COREG) tablet 3.125 mg  3.125 mg Oral BID With Meals Taran Harding MD   3.125 mg at 07/11/25 1729    fentaNYL (DURAGESIC) 12 MCG/HR 1 patch  1 patch Transdermal  Q72H Katarina Becker DO   1 patch at 07/20/25 0901    And    Check Fentanyl Patch Placement  1 each Not Applicable Q12H Katarina Becker DO        clopidogrel (PLAVIX) tablet 75 mg  75 mg Oral Daily Taran Harding MD   75 mg at 07/22/25 0843    cyclobenzaprine (FLEXERIL) tablet 5 mg  5 mg Oral TID PRN Katarina Becker DO   5 mg at 07/22/25 0308    dextrose (D50W) (25 g/50 mL) IV injection 25 g  25 g Intravenous Q15 Min PRN Marco Rasheed MD        dextrose (GLUTOSE) oral gel 15 g  15 g Oral Q15 Min PRN Marco Rasheed MD        folic acid (FOLVITE) tablet 1 mg  1 mg Oral Daily Katarina Becker DO   1 mg at 07/22/25 0843    [Held by provider] furosemide (LASIX) tablet 20 mg  20 mg Oral Daily Taran Harding MD   20 mg at 07/10/25 1117    gabapentin (NEURONTIN) capsule 800 mg  800 mg Oral Q8H Marco Rasheed MD   800 mg at 07/22/25 0525    glucagon HCl (Diagnostic) injection 1 mg  1 mg Intramuscular Q15 Min PRN Marco Rasheed MD        HYDROcodone-acetaminophen (NORCO)  MG per tablet 1 tablet  1 tablet Oral Q6H PRN Katarina Becker DO   1 tablet at 07/21/25 2254    hydrOXYzine (ATARAX) tablet 25 mg  25 mg Oral Q6H PRN Taran Harding MD   25 mg at 07/17/25 1353    insulin glargine (LANTUS, SEMGLEE) injection 10 Units  10 Units Subcutaneous Nightly Marco Rasheed MD   10 Units at 07/21/25 2031    Insulin Lispro (humaLOG) injection 2 Units  2 Units Subcutaneous TID With Meals Marco Rasheed MD   2 Units at 07/22/25 0846    Insulin Lispro (humaLOG) injection 2-9 Units  2-9 Units Subcutaneous 4x Daily AC & at Bedtime Marco Rasheed MD   2 Units at 07/22/25 0846    Magnesium Cardiology Dose Replacement - Follow Nurse / BPA Driven Protocol   Not Applicable PRN Marco Rasheed MD        methocarbamol (ROBAXIN) tablet 750 mg  750 mg Oral 4x Daily Rosa Salgado MD   750 mg at 07/22/25 0842    miconazole (MICOTIN) 2 % powder 1 Application  1 Application Topical Q12H Marco Rasheed MD    1 Application at 07/21/25 0857    nicotine (NICODERM CQ) 21 MG/24HR patch 1 patch  1 patch Transdermal Q24H Marco Rasheed MD   1 patch at 07/17/25 0835    ondansetron ODT (ZOFRAN-ODT) disintegrating tablet 4 mg  4 mg Oral Q6H PRN Taran Harding MD   4 mg at 07/14/25 1217    Pharmacy Consult   Not Applicable Continuous PRN Marco Rasheed MD        Phosphorus Replacement - Follow Nurse / BPA Driven Protocol   Not Applicable PRN Marco Rasheed MD        Potassium Replacement - Follow Nurse / BPA Driven Protocol   Not Applicable PRN Marco Rasheed MD        promethazine (PHENERGAN) tablet 25 mg  25 mg Oral Q6H PRN Taran Harding MD        rosuvastatin (CRESTOR) tablet 20 mg  20 mg Oral Daily Taran Harding MD   20 mg at 07/22/25 0843    sodium chloride 0.9 % flush 10 mL  10 mL Intravenous Q12H Taran Harding MD   10 mL at 07/21/25 0858    sodium chloride 0.9 % flush 10 mL  10 mL Intravenous PRN Taran Harding MD        sodium chloride 0.9 % infusion 40 mL  40 mL Intravenous PRN Taran Harding MD        [Held by provider] valsartan (DIOVAN) tablet 320 mg  320 mg Oral Daily Taran Harding MD   320 mg at 07/10/25 1117    valsartan (DIOVAN) tablet 40 mg  40 mg Oral Q24H Katarina Becker DO   40 mg at 07/22/25 0842        Operative/Procedure Notes (most recent note)        Taran Harding MD at 07/10/25 0913          AMPUTATION ABOVE KNEE  Procedure Note    Monae Chauhan  7/10/2025    Pre-op Diagnosis:   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    Staff:   Circulator: Basilia Henderson RN  Scrub Person: Lorena Simons  Assistant: Taran Andrade    Estimated Blood Loss: 200ml    Specimens:                Order Name Source Comment Collection Info Order Time   TISSUE EXAM, [facility]  ([facility]) Leg, Left  Collected By: Taran Harding MD 7/10/2025  9:15 AM     Release to patient   Routine Release              Drains: * No LDAs found *    Procedure: The left leg was prepped and draped. The incision was made above the knee with the scalpel  and cautery used to go down through the subcutaneous tissue, fascia, and muscle. Vicryl sutures and ties were used on bleeders. The bone was cleared off proximally and powered saw used to divide the bone. The wound was irrigated and the fascia and muscle closed over the bone. The skin was closed with nylon sutures. The wound was closed with local and a dressing applied.     Findings:             Complications: none   Grafts / Implants N/A    Taran Harding MD     Date: 7/10/2025  Time: 09:58 EDT    Electronically signed by Taran Harding MD at 07/10/25 1001          Physician Progress Notes (most recent note)        Ruddy Flanagan DO at 25              [facility]     Patient Identification:  Name:  Monae Chauhan  Age:  69 y.o.  Sex:  female  :  1955  MRN:  [id number]  Visit Number:  [id number]  Primary Care Provider:  Zachary Sullivan MD    Length of stay:  11    Chief complaint: None    Subjective:    Patient seen and examined at bedside with therapy present.  Patient was ambulating throughout the room with standby assistance while on a walker.  Patient stated she felt well with no complaints.  Per nursing staff, no new events overnight.  ----------------------------------------------------------------------------------------------------------------------  [facility] Meds:  ALPRAZolam, 0.25 mg, Oral, BID  amLODIPine, 10 mg, Oral, Daily  apixaban, 5 mg, Oral, BID  [Held by provider] carvedilol, 3.125 mg, Oral, BID With Meals  fentaNYL, 1 patch, Transdermal, Q72H   And  Check Fentanyl Patch Placement, 1 each, Not Applicable, Q12H  clopidogrel, 75 mg, Oral, Daily  folic acid,  1 mg, Oral, Daily  [Held by provider] furosemide, 20 mg, Oral, Daily  gabapentin, 800 mg, Oral, Q8H  insulin glargine, 10 Units, Subcutaneous, Nightly  insulin lispro, 2 Units, Subcutaneous, TID With Meals  insulin lispro, 2-9 Units, Subcutaneous, 4x Daily AC & at Bedtime  methocarbamol, 750 mg, Oral, 4x Daily  miconazole, 1 Application, Topical, Q12H  nicotine, 1 patch, Transdermal, Q24H  rosuvastatin, 20 mg, Oral, Daily  senna-docusate sodium, 2 tablet, Oral, BID  sodium chloride, 10 mL, Intravenous, Q12H  [Held by provider] valsartan, 320 mg, Oral, Daily  valsartan, 40 mg, Oral, Q24H      Pharmacy Consult,       ----------------------------------------------------------------------------------------------------------------------  Vital Signs:  Temp:  [98 °F (36.7 °C)-98.7 °F (37.1 °C)] 98.6 °F (37 °C)  Heart Rate:  [68-73] 69  Resp:  [18-20] 20  BP: (107-155)/(59-73) 128/60      07/10/25  0844 07/10/25  1045   Weight: 78.5 kg (173 lb) 77.2 kg (170 lb 3.1 oz)     Body mass index is 30.15 kg/m².    Intake/Output Summary (Last 24 hours) at 7/21/2025 2008  Last data filed at 7/21/2025 1500  Gross per 24 hour   Intake 720 ml   Output 600 ml   Net 120 ml     Diet: Diabetic; Consistent Carbohydrate; Fluid Consistency: Thin (IDDSI 0)  ----------------------------------------------------------------------------------------------------------------------  Physical exam:  Constitutional: Well-nourished  female in no apparent distress.     HENT:  Head:  Normocephalic and atraumatic.  Mouth:  Moist mucous membranes.    Eyes:  Conjunctivae and EOM are normal.  Pupils are equal, round, and reactive to light.  No scleral icterus.    Neck:  Neck supple. No thyromegaly.  No JVD present.    Cardiovascular:  Regular rate and rhythm with no murmurs, rubs, clicks or gallops appreciated.  Pulmonary/Chest:  Clear to auscultation bilaterally with no crackles, wheezes or rhonchi appreciated.  Abdominal:  Soft. Nontender.  "Nondistended  Bowel sounds are normal in all four quadrants. No organomegally appreciated.   Musculoskeletal: Patient with left AKA with stump wrapped in Ace bandage.  Neurological:  Alert, Cranial nerves II-XII intact with no focal deficits.  No facial droop.  No slurred speech.   Skin:  Warm and dry to palpation with no rashes or lesions appreciated.  Peripheral vascular:  2+ radial and pedal pulses in bilateral upper and lower extremities.  Psychiatric:  Alert and oriented x3, demonstrates appropriate judgment and insight.  ---------------------------------------------------------------------------------------  ----------------------------------------------------------------------------------------------------------------------      Results from last 7 days   Lab Units 07/20/25  1201 07/19/25  0111 07/18/25  0152 07/16/25  0042   WBC 10*3/mm3  --  8.15 7.52 7.61   HEMOGLOBIN g/dL 9.1* 8.5* 8.6* 8.7*   HEMATOCRIT % 30.4* 28.6* 29.4* 29.3*   MCV fL  --  87.2 89.6 87.5   MCHC g/dL  --  29.7* 29.3* 29.7*   PLATELETS 10*3/mm3  --  427 400 380         Results from last 7 days   Lab Units 07/18/25  1436 07/18/25  0152   SODIUM mmol/L  --  136   POTASSIUM mmol/L 4.5 3.5   CHLORIDE mmol/L  --  105   CO2 mmol/L  --  20.0*   BUN mg/dL  --  14.3   CREATININE mg/dL  --  0.79   CALCIUM mg/dL  --  8.4*   GLUCOSE mg/dL  --  145*   Estimated Creatinine Clearance: 66.1 mL/min (by C-G formula based on SCr of 0.79 mg/dL).    No results found for: \"AMMONIA\"      No results found for: \"BLOODCX\"  No results found for: \"URINECX\"  No results found for: \"WOUNDCX\"  No results found for: \"STOOLCX\"    I have personally looked at the labs and they are summarized above.  ----------------------------------------------------------------------------------------------------------------------  Imaging Results (Last 24 Hours)       ** No results found for the last 24 hours. **      "     ----------------------------------------------------------------------------------------------------------------------  Assessment and Plan:    Left AKA -defer management to primary attending  Acute on chronic normocytic anemia -continue folic acid and iron replacement therapy.  H&H obtained yesterday appears stable  A-fib  Hyperlipidemia  Hypertension    Disposition we will continue to follow along while hospitalized.  Patient appears medically stable for discharge.    Ruddy Flanagan DO   07/21/25   20:08 EDT       Electronically signed by Ruddy Flanagan DO at 07/21/25 2011          Consult Notes (most recent note)        Carlos Valles MD at 07/12/25 1420        Consult Orders    1. Inpatient Psychiatrist Consult [[id number]] ordered by Rosa Salgado MD at 07/12/25 0858                                                                         Psychiatry Consult     Clinician: Carlos Valles MD  14:20 EDT 07/12/25    Reason for Consult: SI    HPI: 69 y.o. female post op AKA for chronic wound.  After consulted for suicidal thoughts.  I spoke to the patient and her sister who was in the room with her.  Her  was also briefly in the room.  Patient reports that she has been adjusting to the reality of the recent surgery and the life changes that will occur.  She has had some passive [facility] but is not actively suicidal.  She names numerous reasons to continue living including her kerri, her family, and no desire to kill herself.  She denies any prior history of suicide attempts and her sister confirms.  She reports that her mental health has been generally okay until a week ago when she started worrying about her surgery.  She has never seen a psychiatrist before and does not feel that she has been persistently and significantly depressed for an extended period of time.  She has been feeling down and distressed with the stress of the  recent surgery and the accompanying changes but feels that this is a natural reaction to the circumstances.  Patient has a good social support system including her family and her [employer]ominational and feels like she is going to be okay.      Available medical/psychiatric records reviewed and incorporated into the current document.     Past Psychiatric History: Denies prior psychiatric diagnoses or encounters.  No prior history of suicide attempts.      Substance Abuse History: Denies    Social History: Patient lives alone.  She has 2 children.  Her sister is with her in the room today and is supportive.  She is connected with her [employer]ational and has a good source of social support.  He understands that she is going to have to make some changes to her living situation and is accepting of that reality.    Family History   Problem Relation Age of Onset    Breast cancer Maternal Grandmother 70    Cancer Mother     Diabetes Mother     Cancer Father     Diabetes Father         Allergies   Allergen Reactions    Amoxicillin Rash    Penicillins Rash     Beta lactam allergy details  Antibiotic reaction: (!) shortness of breath  Age at reaction: adult  Dose to reaction time: (!) hours  Reason for antibiotic: unknown  Epinephrine required for reaction?: unknown  Tolerated antibiotics: unknown           Past Medical History:   Diagnosis Date    Arthritis     Coronary artery disease     Depression     Diabetes mellitus     Elevated cholesterol     Full dentures     GERD (gastroesophageal reflux disease)     Hyperlipidemia     Hypertension     Myocardial infarction     Peripheral vascular disease     Psoriasis        Prior to Admission medications    Medication Sig Start Date End Date Taking? Authorizing Provider   ALPRAZolam (XANAX) 0.25 MG tablet Take 1 tablet by mouth 2 (Two) Times a Day As Needed for Anxiety.   Yes Provider, MD Sal   amLODIPine (NORVASC) 10 MG tablet Take 1 tablet by mouth Daily. 2/8/25  Yes Ofe Winslow APRN    carvedilol (COREG) 3.125 MG tablet Take 1 tablet by mouth 2 (Two) Times a Day With Meals.   Yes Sal Burdick MD   furosemide (LASIX) 20 MG tablet Take 1 tablet by mouth Daily. 4/16/24  Yes Sal Burdick MD   gabapentin (NEURONTIN) 800 MG tablet Take 1 tablet by mouth Every 6 (Six) Hours As Needed (nerve pain). 5/7/20  Yes Sal Burdick MD   HYDROcodone-acetaminophen (NORCO)  MG per tablet Take 1 tablet by mouth Every 6 (Six) Hours As Needed for Moderate Pain.   Yes Sal Burdick MD   hydrOXYzine pamoate (VISTARIL) 25 MG capsule Take 1 capsule by mouth Every 6 (Six) Hours As Needed for Anxiety.   Yes Sal Burdick MD   Insulin Glargine (LANTUS SOLOSTAR) 100 UNIT/ML injection pen Inject 30 Units under the skin into the appropriate area as directed Every Night.   Yes Sal Burdick MD   Insulin Lispro, 1 Unit Dial, (HUMALOG) 100 UNIT/ML solution pen-injector Inject 12 Units under the skin into the appropriate area as directed Every Night.   Yes Sal Burdick MD   lidocaine (LIDODERM) 5 % Place 1 patch on the skin as directed by provider Daily. Remove & Discard patch within 12 hours or as directed by MD 2/1/24  Yes Judith Hutson APRN   lidocaine (XYLOCAINE) 2 % jelly Apply 1 g topically to the appropriate area as directed 2 (Two) Times a Day.   Yes Sal Burdick MD   potassium chloride (MICRO-K) 10 MEQ CR capsule Take 1 capsule by mouth Daily. 4/16/24  Yes Sal Burdick MD   promethazine (PHENERGAN) 25 MG tablet Take 1 tablet by mouth Every 6 (Six) Hours As Needed for Nausea or Vomiting. 5/16/24  Yes Sal Burdick MD   rosuvastatin (CRESTOR) 20 MG tablet Take 1 tablet by mouth Daily.   Yes Sal Burdick MD   traMADol (ULTRAM) 50 MG tablet Take 1 tablet by mouth 2 (Two) Times a Day As Needed for Moderate Pain.   Yes Sal Burdick MD   valsartan (DIOVAN) 320 MG tablet Take 1 tablet by mouth Daily. 4/16/24  Yes  Provider, MD Sal   apixaban (ELIQUIS) 5 MG tablet tablet Take 1 tablet by mouth 2 (Two) Times a Day.    Sal Burdick MD   clopidogrel (PLAVIX) 75 MG tablet Take 1 tablet by mouth Daily. 1/10/17   David Diane MD   Tirzepatide 5 MG/0.5ML solution auto-injector Inject 5 mg under the skin into the appropriate area as directed 1 (One) Time Per Week. 24   Sal Burdick MD        Temp:  [98 °F (36.7 °C)-98.4 °F (36.9 °C)] 98.4 °F (36.9 °C)  Heart Rate:  [65-69] 69  Resp:  [16-22] 20  BP: (133-156)/(52-70) 156/67    Mental Status Exam  Mood: euthymic  Affect: mood congruent  Thought Processes: linear  Thought Content: No Delusions voiced  Hallucinations: Denies  Suicidal Thoughts: Denies  Suicidal Plan/Intent: Denies      Interval Review of Systems:   General ROS: negative for - fever or malaise  Endocrine ROS: negative for - palpitations  Respiratory ROS: no cough, shortness of breath, or wheezing  Cardiovascular ROS: no chest pain or dyspnea on exertion  Gastrointestinal ROS: no abdominal pain,no black or bloody stools  Neuro: negative  Skin: negative      Physical Exam:  Not indicated for a psychiatric consult      Diagnosis:  Adjustment Disorder with Depressed Mood    Recommendations:  Patient is not suicidal at this time.  She may follow up with the outpatient clinic at the [facility].  Call for appointment when the office opens on Monday.              Electronically signed by Carlos Valles MD at 25 1430       Nutrition Notes (most recent note)    No notes exist for this encounter.          Physical Therapy Notes (most recent note)        Neva Gonzalez, PT Student at 25 1430  Version 1 of 1      Attestation signed by Alejandra Guevara, PT at 25 [phone]                             Acute Care - Physical Therapy Treatment Note  ASH[facility]     Patient Name: Monae Chauhan  : 1955  MRN: [id number]  Today's Date: 2025   Onset of Illness/Injury or  Date of Surgery: 07/10/25 (admission date)  Visit Dx:     ICD-10-CM ICD-9-CM   1. Status post amputation  Z89.9 V49.5   2. 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  L97.522 707.15     Patient Active Problem List   Diagnosis    CAD (coronary artery disease)    Essential hypertension    Mixed hyperlipidemia    Type 2 diabetes mellitus    PAD (peripheral artery disease)    Smoker    Atherosclerosis of native arteries of extremities with rest pain, bilateral legs    Detrusor instability    Open wound    Diabetes mellitus    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    Critical limb ischemia of both lower extremities with rest pain    Tobacco use    Femoral artery occlusion, left    Rest pain of both lower extremities due to atherosclerosis    Status post amputation     Past Medical History:   Diagnosis Date    Arthritis     Coronary artery disease     Depression     Diabetes mellitus     Elevated cholesterol     Full dentures     GERD (gastroesophageal reflux disease)     Hyperlipidemia     Hypertension     Myocardial infarction     Peripheral vascular disease     Psoriasis      Past Surgical History:   Procedure Laterality Date    ABOVE KNEE AMPUTATION Left 7/10/2025    Procedure: AMPUTATION ABOVE KNEE;  Surgeon: Taran Harding MD;  Location: [facility];  Service: General;  Laterality: Left;    APPENDECTOMY      CARDIAC CATHETERIZATION N/A 01/09/2017    Procedure: Left Heart Cath;  Surgeon: David Diane MD;  Location: [facility] CATH INVASIVE LOCATION;  Service:     CARDIAC CATHETERIZATION Left 05/20/2024    Procedure: Peripheral angiography;  Surgeon: Jaiden Doyle MD;  Location: [facility] CATH INVASIVE LOCATION;  Service: Cardiovascular;  Laterality: Left;  Left Venogram -    CARDIAC CATHETERIZATION N/A 02/06/2025    Procedure: Peripheral angiography;  Surgeon: Fran Estevez MD;  Location: [facility] CATH INVASIVE LOCATION;  Service:  Cardiovascular;  Laterality: N/A;    CARDIAC CATHETERIZATION N/A 02/26/2025    Procedure: Peripheral angiography;  Surgeon: Jaiden Doyle MD;  Location:  [facility] CATH INVASIVE LOCATION;  Service: Cardiovascular;  Laterality: N/A;    CARDIAC CATHETERIZATION N/A 02/26/2025    Procedure: Atherectomy-peripheral;  Surgeon: Jaiden Doyle MD;  Location: [facility] CATH INVASIVE LOCATION;  Service: Cardiovascular;  Laterality: N/A;    CARDIAC CATHETERIZATION N/A 02/26/2025    Procedure: Angioplasty-peripheral;  Surgeon: Jaiden Doyle MD;  Location:  [facility] CATH INVASIVE LOCATION;  Service: Cardiovascular;  Laterality: N/A;    CARDIAC CATHETERIZATION N/A 03/24/2025    Procedure: Peripheral angiography;  Surgeon: Jaiden Doyle MD;  Location: [facility] CATH INVASIVE LOCATION;  Service: Cardiovascular;  Laterality: N/A;    CARDIAC CATHETERIZATION N/A 03/24/2025    Procedure: Atherectomy-peripheral;  Surgeon: Jaiden Doyle MD;  Location: [facility] CATH INVASIVE LOCATION;  Service: Cardiovascular;  Laterality: N/A;    COLONOSCOPY      CORONARY STENT PLACEMENT      CYSTOSCOPY BOTOX INJECTION OF BLADDER N/A 10/20/2021    Procedure: Cystoscopy Botox injection of bladder;  Surgeon: Say Robles MD;  Location: [facility];  Service: Urology;  Laterality: N/A;    ENDOSCOPY      FOOT SURGERY Left     GALLBLADDER SURGERY      INTRAVASCULAR ULTRASOUND N/A 02/26/2025    Procedure: Intravascular Ultrasound;  Surgeon: Jiaden Doyle MD;  Location: [facility] CATH INVASIVE LOCATION;  Service: Cardiovascular;  Laterality: N/A;    INTRAVASCULAR ULTRASOUND N/A 03/24/2025    Procedure: Intravascular Ultrasound;  Surgeon: Jaiden Dolye MD;  Location: [facility] CATH INVASIVE LOCATION;  Service: Cardiovascular;  Laterality: N/A;    LEG SURGERY Left     TUBAL ABDOMINAL LIGATION      VASCULAR SURGERY Left     Lower Extremity Vascular Stent[facility]     PT Assessment (Last 12 Hours)       PT Evaluation and Treatment       Row Name 07/21/25 2987           Physical Therapy Time and Intention    Subjective Information no complaints (P)   -     Document Type therapy note (daily note) (P)   -     Mode of Treatment physical therapy (P)   -     Patient Effort good (P)   -DH       Row Name 07/21/25 1430          Pain Scale: FACES Pre/Post-Treatment    Pain: FACES Scale, Pretreatment 0-->no hurt (P)   -     Posttreatment Pain Rating 0-->no hurt (P)   -     Pre/Posttreatment Pain Comment per observation (P)   -DH       Row Name 07/21/25 1430          Cognition    Affect/Mental Status (Cognition) WNL (P)   -     Orientation Status (Cognition) oriented x 4 (P)   -     Follows Commands (Cognition) WNL (P)   -     Cognitive Function (Cognition) WNL (P)   -     Personal Safety Interventions supervised activity;gait belt (P)   -DH       Row Name 07/21/25 1430          Bed Mobility    Rolling Left [place] (Bed Mobility) independent (P)   -     Scooting/Bridging [place] (Bed Mobility) independent (P)   -     Supine-Sit [place] (Bed Mobility) independent (P)   -     Sit-Supine [place] (Bed Mobility) independent (P)   -DH       Row Name 07/21/25 1430          Transfers    Transfers wheelchair transfer;sit-stand transfer;stand-sit transfer (P)   -DH       Row Name 07/21/25 1430          Sit-Stand Transfer    Sit-Stand [place] (Transfers) supervision;standby assist (P)   -DH       Row Name 07/21/25 1430          Stand-Sit Transfer    Stand-Sit [place] (Transfers) supervision;standby assist (P)   -DH       Row Name 07/21/25 1430          Wheelchair Transfer    Type (Wheelchair Transfer) stand pivot/stand step (P)   -     [place] Level (Wheelchair Transfer) supervision;standby assist (P)   -     Assistive Device (Wheelchair Transfer) wheelchair (P)   -DH       Row Name 07/21/25 1430          Gait/Stairs (Locomotion)    Gait/Stairs Locomotion gait/ambulation independence;gait/ambulation assistive device;distance ambulated (P)    -     [place] Level (Gait) contact guard;1 person assist (P)   -DH     Assistive Device (Gait) walker, front-wheeled (P)   -     Patient was able to Ambulate yes (P)   -DH     Distance in Feet (Gait) 4 (P)   20 smll hops beside the bedside  -     Pattern (Gait) 2-point;step-to (P)   -DH       Row Name             Wound 07/10/25 0919 Left anterior thigh Surgical Open Surgical Incision    Wound - Properties Group Placement Date: 07/10/25  -JG Placement Time: 0919 -JG Side: Left  -JG Orientation: anterior  -JG Location: thigh  -JG Primary Wound Type: Surgical  -JG Secondary Wound Type - Surgical: Open Surg  -JG    Retired Wound - Properties Group Placement Date: 07/10/25  -JG Placement Time: 0919 -JG Side: Left  -JG Orientation: anterior  -JG Location: thigh  -JG    Retired Wound - Properties Group Placement Date: 07/10/25  -JG Placement Time: 0919 -JG Side: Left  -JG Orientation: anterior  -JG Location: thigh  -JG    Retired Wound - Properties Group Date first assessed: 07/10/25  -JG Time first assessed: 0919 -JG Side: Left  -JG Location: thigh  -JG      Row Name             Wound 07/10/25 1401 Left anterior groin Other (Comments)    Wound - Properties Group Placement Date: 07/10/25  -MH Placement Time: 1401  -MH Present on Original Admission: Y  -MH Side: Left  -MH Orientation: anterior  -MH Location: groin  -MH Primary Wound Type: Other  -MH Secondary Wound Type - Other: --  -MH, Cardiac Cath site     Retired Wound - Properties Group Placement Date: 07/10/25  - Placement Time: 1401  -MH Present on Original Admission: Y  -MH Side: Left  -MH Orientation: anterior  -MH Location: groin  -MH    Retired Wound - Properties Group Placement Date: 07/10/25  - Placement Time: 1401  -MH Present on Original Admission: Y  -MH Side: Left  -MH Orientation: anterior  -MH Location: groin  -MH    Retired Wound - Properties Group Date first assessed: 07/10/25  -MH Time first assessed: 1401  -MH Present on Original  Admission: Y  -MH Side: Left  -MH Location: groin  -MH      Row Name             Wound 07/13/25 1432 Left distal arm Other (Comments)    Wound - Properties Group Placement Date: 07/13/25  -TP Placement Time: 1432  -TP Present on Original Admission: N  -TP Side: Left  -TP Orientation: distal  -TP Location: arm  -TP Primary Wound Type: Other  -TP Secondary Wound Type - Other: --  -TP, skin tear     Retired Wound - Properties Group Placement Date: 07/13/25  -TP Placement Time: 1432  -TP Present on Original Admission: N  -TP Side: Left  -TP Orientation: distal  -TP Location: arm  -TP    Retired Wound - Properties Group Placement Date: 07/13/25  -TP Placement Time: 1432  -TP Present on Original Admission: N  -TP Side: Left  -TP Orientation: distal  -TP Location: arm  -TP    Retired Wound - Properties Group Date first assessed: 07/13/25  -TP Time first assessed: 1432  -TP Present on Original Admission: N  -TP Side: Left  -TP Location: arm  -TP      Row Name             Wound 07/14/25 1913 Left anterior thigh Pressure Injury    Wound - Properties Group Placement Date: 07/14/25  -TP Placement Time: 1913  -TP Present on Original Admission: N  -TP Side: Left  -TP Orientation: anterior  -TP Location: thigh  -TP Primary Wound Type: Pressure Inj  -TW    Retired Wound - Properties Group Placement Date: 07/14/25  -TP Placement Time: 1913  -TP Present on Original Admission: N  -TP Side: Left  -TP Orientation: anterior  -TP Location: thigh  -TP    Retired Wound - Properties Group Placement Date: 07/14/25  -TP Placement Time: 1913  -TP Present on Original Admission: N  -TP Side: Left  -TP Orientation: anterior  -TP Location: thigh  -TP    Retired Wound - Properties Group Date first assessed: 07/14/25  -TP Time first assessed: 1913  -TP Present on Original Admission: N  -TP Side: Left  -TP Location: thigh  -TP      Row Name 07/21/25 1430          Coping    Observed Emotional State calm;pleasant (P)   -DH     Verbalized Emotional State  acceptance (P)   -       Row Name 07/21/25 1430          Positioning and Restraints    Pre-Treatment Position in bed (P)   -     Post Treatment Position bed (P)   -     In Bed call light within reach;encouraged to call for assist (P)   -       Row Name 07/21/25 1430          Progress Summary (PT)    Daily Progress Summary (PT) Patient was seen this PM in amuch better mindset. Patient was able to transfer to wheel chair and back to bed with supervision. She was able to take little steps with the walker, and continues to improve and build up her confidence with mobility. (P)   -               User Key  (r) = Recorded By, (t) = Taken By, (c) = Cosigned By      Initials Name Provider Type    Basilia Johnson, RN Registered Nurse    JULIETTE Pizano, Taylor Martinez, RN Registered Nurse    Sara Godoy RN Registered Nurse    Jolie Lisa RN Registered Nurse    Neva Camejo, PT Student PT Student                      PT Recommendation and Plan     Progress Summary (PT)  Daily Progress Summary (PT): (P) Patient was seen this PM in amuch better mindset. Patient was able to transfer to wheel chair and back to bed with supervision. She was able to take little steps with the walker, and continues to improve and build up her confidence with mobility.  Plan of Care Reviewed With: patient  Progress: improving       Time Calculation:    PT Charges       Row Name 07/21/25 1459             Time Calculation    Start Time 1430 (P)   -      PT Received On 07/21/25 (P)   -         Time Calculation- PT    Total Timed Code Minutes- PT 23 minute(s) (P)   -                User Key  (r) = Recorded By, (t) = Taken By, (c) = Cosigned By      Initials Name Provider Type    Neva Camejo, PT Student PT Student                  Therapy Charges for Today       Code Description Service Date Service Provider Modifiers Qty    36363357884 HC GAIT TRAINING EA 15 MIN 7/21/2025 Neva Gonzalez, PT Student GP 1     [id number]  PT THERAPEUTIC ACT EA 15 MIN 2025 Neva Gonzalez, PT Student GP 1            PT G-Codes  AM-PAC 6 Clicks Score (PT): 8    Neva Gonzalez, PT Student  2025      Electronically signed by Alejandra Guevara, PT at 25 1509          Occupational Therapy Notes (most recent note)        Kaleb Harding, OT at 25 1125          Acute Care - Occupational Therapy Treatment Note  Psychiatric     Patient Name: Monae Chauhan  : 1955  MRN: [id number]  Today's Date: 2025  Onset of Illness/Injury or Date of Surgery: 07/10/25 (admission date)          Admit Date: 7/10/2025       ICD-10-CM ICD-9-CM   1. 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  L97.522 707.15     Patient Active Problem List   Diagnosis    CAD (coronary artery disease)    Essential hypertension    Mixed hyperlipidemia    Type 2 diabetes mellitus    PAD (peripheral artery disease)    Smoker    Atherosclerosis of native arteries of extremities with rest pain, bilateral legs    Detrusor instability    Open wound    Diabetes mellitus    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    Critical limb ischemia of both lower extremities with rest pain    Tobacco use    Femoral artery occlusion, left    Rest pain of both lower extremities due to atherosclerosis    Status post amputation     Past Medical History:   Diagnosis Date    Arthritis     Coronary artery disease     Depression     Diabetes mellitus     Elevated cholesterol     Full dentures     GERD (gastroesophageal reflux disease)     Hyperlipidemia     Hypertension     Myocardial infarction     Peripheral vascular disease     Psoriasis      Past Surgical History:   Procedure Laterality Date    ABOVE KNEE AMPUTATION Left 7/10/2025    Procedure: AMPUTATION ABOVE KNEE;  Surgeon: Taran Harding MD;  Location: [facility];  Service: General;  Laterality: Left;    APPENDECTOMY      CARDIAC  CATHETERIZATION N/A 01/09/2017    Procedure: Left Heart Cath;  Surgeon: David Diane MD;  [facility];  Service:     CARDIAC CATHETERIZATION Left 05/20/2024    Procedure: Peripheral angiography;  Surgeon: Jaiden Doyle MD;  [facility];  Service: Cardiovascular;  Laterality: Left;  Left Venogram -    CARDIAC CATHETERIZATION N/A 02/06/2025    Procedure: Peripheral angiography;  Surgeon: Fran Estevez MD;  [facility]  [facility];  Service: Cardiovascular;  Laterality: N/A;    CARDIAC CATHETERIZATION N/A 02/26/2025    Procedure: Peripheral angiography;  Surgeon: Jaiden Doyle MD;  [facility];  Service: Cardiovascular;  Laterality: N/A;    CARDIAC CATHETERIZATION N/A 02/26/2025    Procedure: Atherectomy-peripheral;  Surgeon: Jaiden Doyle MD;  [facility];  Service: Cardiovascular;  Laterality: N/A;    CARDIAC CATHETERIZATION N/A 02/26/2025    Procedure: Angioplasty-peripheral;  Surgeon: Jaiden Doyle MD;  [facility];  Service: Cardiovascular;  Laterality: N/A;    CARDIAC CATHETERIZATION N/A 03/24/2025    Procedure: Peripheral angiography;  Surgeon: Jaiden Doyle MD;  [facility];  Service: Cardiovascular;  Laterality: N/A;    CARDIAC CATHETERIZATION N/A 03/24/2025    Procedure: Atherectomy-peripheral;  Surgeon: Jaiden Doyle MD;  [facility];  Service: Cardiovascular;  Laterality: N/A;    COLONOSCOPY      CORONARY STENT PLACEMENT      CYSTOSCOPY BOTOX INJECTION OF BLADDER N/A 10/20/2021    Procedure: Cystoscopy Botox injection of bladder;  Surgeon: Say Robles MD;  Location: [facility];  Service: Urology;  Laterality: N/A;    ENDOSCOPY      FOOT SURGERY Left     GALLBLADDER SURGERY      INTRAVASCULAR ULTRASOUND N/A 02/26/2025    Procedure: Intravascular Ultrasound;  Surgeon:  Jaiden Doyle MD;  [facility];  Service: Cardiovascular;  Laterality: N/A;    INTRAVASCULAR ULTRASOUND N/A 03/24/2025    Procedure: Intravascular Ultrasound;  Surgeon: Jaiden Doyle MD;  Location:  [facility];  Service: Cardiovascular;  Laterality: N/A;    LEG SURGERY Left     TUBAL ABDOMINAL LIGATION      VASCULAR SURGERY Left     Lower Extremity Vascular Stent[facility]         OT ASSESSMENT FLOWSHEET (Last 12 Hours)       OT Evaluation and Treatment       Row Name 07/16/25 1119                   OT Time and Intention    Document Type therapy note (daily note)  -KR        Mode of Treatment occupational therapy  -KR        Patient Effort adequate  -KR           General Information    General Observations of Patient cooperative  -KR           Cognition    Affect/Mental Status (Cognition) WFL  -KR        Follows Commands (Cognition) WFL  -KR           Bathing Assessment/Intervention    [place] Level (Bathing) moderate assist (50% patient effort)  -KR           Upper Body Dressing Assessment/Training    [place] Level (Upper Body Dressing) moderate assist (50% patient effort)  -KR           Lower Body Dressing Assessment/Training    [place] Level (Lower Body Dressing) moderate assist (50% patient effort);maximum assist (25% patient effort)  -KR           Grooming Assessment/Training    [place] Level (Grooming) grooming skills;set up  -KR           Self-Feeding Assessment/Training    [place] Level (Feeding) set up  -KR           Toileting Assessment/Training    [place] Level (Toileting) maximum assist (25% patient effort)  -KR           Motor Skills    Motor Skills --  BUE fxl activity for use of device/utensils at table top. BUE observed WFL  -KR           Wound 07/10/25 0919 Left anterior thigh Surgical Open Surgical Incision    Wound - Properties Group Placement Date: 07/10/25  -JG Placement Time: 0919 -JG Side: Left  -JG Orientation: anterior   -JG Location: thigh  -JG Primary Wound Type: Surgical  -JG Secondary Wound Type - Surgical: Open Surg  -JG    Retired Wound - Properties Group Placement Date: 07/10/25  -JG Placement Time: 0919 -JG Side: Left  -JG Orientation: anterior  -JG Location: thigh  -JG    Retired Wound - Properties Group Placement Date: 07/10/25  -JG Placement Time: 0919 -JG Side: Left  -JG Orientation: anterior  -JG Location: thigh  -JG    Retired Wound - Properties Group Date first assessed: 07/10/25  -JG Time first assessed: 0919 -JG Side: Left  -JG Location: thigh  -JG       Wound 07/10/25 1401 Left anterior groin Other (Comments)    Wound - Properties Group Placement Date: 07/10/25  -MH Placement Time: 1401  -MH Present on Original Admission: Y  -MH Side: Left  -MH Orientation: anterior  -MH Location: groin  -MH Primary Wound Type: Other  -MH Secondary Wound Type - Other: --  -MH, Cardiac Cath site     Retired Wound - Properties Group Placement Date: 07/10/25  -MH Placement Time: 1401  -MH Present on Original Admission: Y  -MH Side: Left  -MH Orientation: anterior  -MH Location: groin  -MH    Retired Wound - Properties Group Placement Date: 07/10/25  -MH Placement Time: 1401  -MH Present on Original Admission: Y  -MH Side: Left  -MH Orientation: anterior  -MH Location: groin  -MH    Retired Wound - Properties Group Date first assessed: 07/10/25  -MH Time first assessed: 1401  -MH Present on Original Admission: Y  -MH Side: Left  -MH Location: groin  -MH       Wound 07/13/25 1432 Left distal arm Other (Comments)    Wound - Properties Group Placement Date: 07/13/25  -TP Placement Time: 1432  -TP Present on Original Admission: N  -TP Side: Left  -TP Orientation: distal  -TP Location: arm  -TP Primary Wound Type: Other  -TP Secondary Wound Type - Other: --  -TP, skin tear     Retired Wound - Properties Group Placement Date: 07/13/25  -TP Placement Time: 1432  -TP Present on Original Admission: N  -TP Side: Left  -TP Orientation: distal   -TP Location: arm  -TP    Retired Wound - Properties Group Placement Date: 07/13/25  -TP Placement Time: 1432  -TP Present on Original Admission: N  -TP Side: Left  -TP Orientation: distal  -TP Location: arm  -TP    Retired Wound - Properties Group Date first assessed: 07/13/25  -TP Time first assessed: 1432  -TP Present on Original Admission: N  -TP Side: Left  -TP Location: arm  -TP       Wound 07/14/25 1913 Left anterior thigh Pressure Injury    Wound - Properties Group Placement Date: 07/14/25  -TP Placement Time: 1913  -TP Present on Original Admission: N  -TP Side: Left  -TP Orientation: anterior  -TP Location: thigh  -TP Primary Wound Type: Pressure Inj  -TW    Retired Wound - Properties Group Placement Date: 07/14/25  -TP Placement Time: 1913  -TP Present on Original Admission: N  -TP Side: Left  -TP Orientation: anterior  -TP Location: thigh  -TP    Retired Wound - Properties Group Placement Date: 07/14/25  -TP Placement Time: 1913  -TP Present on Original Admission: N  -TP Side: Left  -TP Orientation: anterior  -TP Location: thigh  -TP    Retired Wound - Properties Group Date first assessed: 07/14/25  -TP Time first assessed: 1913  -TP Present on Original Admission: N  -TP Side: Left  -TP Location: thigh  -TP       Plan of Care Review    Plan of Care Reviewed With patient  -KR        Progress improving  -KR           Dressing Goal 1 (OT)    Progress/Outcome (Dressing Goal 1, OT) continuing progress toward goal  -KR            Activity Tolerance Goal 1 (OT)    Progress/Outcome (Activity Tolerance Goal 1, OT) continuing progress toward goal  -KR                  User Key  (r) = Recorded By, (t) = Taken By, (c) = Cosigned By      Initials Name Effective Dates    Basilia Johnson RN 08/29/23 -     Taylor Del Rio, TONY 06/16/21 -     Kaleb Abreu OT 06/16/21 -     Sara Godoy RN 06/10/24 -     TP Jolie Bernardo RN 01/15/25 -                            OT Recommendation and Plan  Planned  Therapy Interventions (OT): activity tolerance training, adaptive equipment training, BADL retraining  Plan of Care Review  Plan of Care Reviewed With: patient  Progress: improving  Plan of Care Reviewed With: patient        Time Calculation:     Therapy Charges for Today       Code Description Service Date Service Provider Modifiers Qty    [id number]  OT THERAPEUTIC ACT EA 15 MIN 7/16/2025 Kaleb Harding OT GO 1                 Kaleb Harding OT  7/16/2025    Electronically signed by Kaleb Harding OT at 07/16/25 1125         ADL Documentation (most recent)      Flowsheet Row Most Recent Value   Transferring 4 - completely dependent   Toileting 3 - assistive equipment and person   Bathing 2 - assistive person   Dressing 0 - independent   Eating 0 - independent   Communication 0 - understands/communicates without difficulty   Swallowing 0 - swallows foods/liquids without difficulty   Equipment Currently Used at Home lift device, power chair,(recliner lift), walker, rolling, cane, straight, glucometer, commode          Discharge Summary    No notes of this type exist for this encounter.       Discharge Order (From admission, onward)       Start     Ordered    07/22/25 0845  Discharge patient  Once        Expected Discharge Date: 07/22/25   Discharge Disposition: Home or Self Care   Physician of Record for Attribution - Please select from Treatment Team: MIKALA YEE [[id number]]   Review needed by CMO to determine Physician of Record: No      Question Answer Comment   Physician of Record for Attribution - Please select from Treatment Team MIKALA YEE    Review needed by CMO to determine Physician of Record No        07/22/25 9965

## 2025-07-22 NOTE — PLAN OF CARE
Goal Outcome Evaluation:      Patient being discharged today

## 2025-07-22 NOTE — DISCHARGE INSTR - APPOINTMENTS
2 Week follow up with Dr. Harding on 8/5/25 at 10:20am.    Follow up appointment with Zachary Sullivan on 8/13/25 at 1:50pm. Patient only wanted to see Dr. Sullivan. The office said they will try to call with first available appointment if any comes available for Dr. Sullivan before 8/13/25.

## 2025-07-23 ENCOUNTER — READMISSION MANAGEMENT (OUTPATIENT)
Dept: CALL CENTER | Facility: HOSPITAL | Age: 70
End: 2025-07-23
Payer: MEDICARE

## 2025-07-23 NOTE — OUTREACH NOTE
Prep Survey      Flowsheet Row Responses   [facility] facility patient discharged from? [facility]   Is LACE score < 7 ? No   Eligibility Readm Mgmt   Discharge diagnosis AMPUTATION ABOVE KNEE   Does the patient have one of the following disease processes/diagnoses(primary or secondary)? General Surgery   Does the patient have Home health ordered? Yes   What is the Home health agency?  [facility]   Is there a DME ordered? No   Prep survey completed? Yes            MAGDIEL KOHLER - Registered Nurse

## 2025-07-25 NOTE — DISCHARGE SUMMARY
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           Hospital Course  Patient is a 69 y.o. female presented with extensive non healing ulcers on the left lower leg and foot. She had PVD with past failed interventions and continues to smoke. She had a left AKA and post op worked on transferring and physical therapy. Insurance denied inpatient rehab. And after about 1.5 wks. She was discharged home with outpatient home health. A ramp was to be placed so she could get out at home. .      Procedures Performed    Procedure(s):  AMPUTATION ABOVE KNEE  -------------------       Consults:   Consults       Date and Time Order Name Status Description    7/12/2025  8:58 AM Inpatient Psychiatrist Consult Completed     7/10/2025 10:47 AM Inpatient Hospitalist Consult Completed             Pertinent Test Results:      Condition on Discharge:  stable    Vital Signs       Physical Exam:     General Appearance:  Alert, cooperative, in no acute distress   Head:  Normocephalic, without obvious abnormality, atraumatic   Eyes:  Lids and lashes normal, conjunctivae and sclerae normal, no icterus, no pallor, corneas clear, PERRLA   Ears:  Ears appear intact with no abnormalities noted   Throat:  No oral lesions, no thrush, oral mucosa moist   Neck:  No adenopathy, supple, trachea midline, no thyromegaly, no carotid bruit, no JVD   Back:  No kyphosis present, no scoliosis present, no skin lesions, erythema or scars, no tenderness to percussion, or palpation, range of motion normal   Lungs:  Clear to auscultation,respirations regular, even and unlabored    Heart:  Regular rhythm and normal rate, normal S1 and S2, no  murmur, no gallop, no rub, no click   Breast Exam:  Deferred   Abdomen:  Normal bowel sounds, no masses, no organomegaly, soft non-tender, non-distended, no guarding, no rebound tenderness   Genitalia:  Deferred   Extremities:      Post left AKA. Wound healing well.   Pulses:  Pulses palpable and equal bilaterally   Skin:  No bleeding, bruising or rash   Lymph nodes:  No palpable adenopathy   Neurologic:  Cranial nerves 2 - 12 grossly intact, sensation intact, DTR present and equal bilaterally       Discharge Disposition  Home or Self Care    Discharge Medications     Discharge Medications        New Medications        Instructions Start Date   cyclobenzaprine 5 MG tablet  Commonly known as: FLEXERIL   5 mg, Oral, 3 Times Daily PRN      folic acid 1 MG tablet  Commonly known as: FOLVITE   1 mg, Oral, Daily      Insulin Lispro 100 UNIT/ML injection  Commonly known as: humaLOG  Replaces: Insulin Lispro (1 Unit Dial) 100 UNIT/ML solution pen-injector   2 Units, Subcutaneous, 3 Times Daily With Meals      Insulin Lispro 100 UNIT/ML injection  Commonly known as: humaLOG  Notes to patient: Blood Glucose 150-199 mg/dL - 2 units  Blood Glucose 200-249 mg/dL - 4 units  Blood Glucose 250-299 mg/dL - 6 units  Blood Glucose 300-349 mg/dL - 7 units  Blood Glucose 350-400 mg/dL - 8 units  Blood Glucose greater than 400 mg/dL - 9 units & Call Provider   2-9 Units, Subcutaneous, 4 Times Daily Before Meals & Nightly      ondansetron ODT 4 MG disintegrating tablet  Commonly known as: ZOFRAN-ODT   4 mg, Oral, Every 6 Hours PRN      oxyCODONE-acetaminophen 5-325 MG per tablet  Commonly known as: PERCOCET  Notes to patient: Don't take at same time as hydrocodone-acetaminophen (Norco)   1 tablet, Oral, Every 6 Hours PRN      polyethylene glycol 17 g packet  Commonly known as: MIRALAX   17 g, Oral, Daily PRN      sennosides-docusate 8.6-50 MG per tablet  Commonly known as: PERICOLACE   1 tablet, Oral, 2 Times Daily             Changes to  Medications        Instructions Start Date   furosemide 20 MG tablet  Commonly known as: LASIX  What changed:   when to take this  reasons to take this   20 mg, Oral, Daily PRN      Insulin Glargine 100 UNIT/ML injection pen  Commonly known as: LANTUS SOLOSTAR  What changed: how much to take   10 Units, Subcutaneous, Nightly      potassium chloride 10 MEQ CR capsule  Commonly known as: MICRO-K  What changed:   when to take this  reasons to take this   10 mEq, Oral, Daily PRN      valsartan 40 MG tablet  Commonly known as: DIOVAN  What changed:   medication strength  how much to take   40 mg, Oral, Daily             Continue These Medications        Instructions Start Date   ALPRAZolam 0.25 MG tablet  Commonly known as: XANAX   0.25 mg, 2 Times Daily PRN      amLODIPine 10 MG tablet  Commonly known as: NORVASC   10 mg, Oral, Daily      apixaban 5 MG tablet tablet  Commonly known as: ELIQUIS   5 mg, 2 Times Daily      clopidogrel 75 MG tablet  Commonly known as: PLAVIX   75 mg, Oral, Daily      gabapentin 800 MG tablet  Commonly known as: NEURONTIN   800 mg, Every 6 Hours PRN      HYDROcodone-acetaminophen  MG per tablet  Commonly known as: NORCO  Notes to patient: Don't take at same time as oxycodone-acetaminophen (Percocet)   1 tablet, Every 6 Hours PRN      hydrOXYzine pamoate 25 MG capsule  Commonly known as: VISTARIL   25 mg, Every 6 Hours PRN      lidocaine 2 % jelly  Commonly known as: XYLOCAINE   1 g, 2 Times Daily      lidocaine 5 %  Commonly known as: LIDODERM   1 patch, Transdermal, Every 24 Hours, Remove & Discard patch within 12 hours or as directed by MD      promethazine 25 MG tablet  Commonly known as: PHENERGAN   1 tablet, Every 6 Hours PRN      rosuvastatin 20 MG tablet  Commonly known as: CRESTOR   20 mg, Daily      Tirzepatide 5 MG/0.5ML solution auto-injector   5 mg, Weekly      traMADol 50 MG tablet  Commonly known as: ULTRAM   Take 1 tablet by mouth 2 (Two) Times a Day As Needed for  Moderate Pain.             Stop These Medications      carvedilol 3.125 MG tablet  Commonly known as: COREG     Insulin Lispro (1 Unit Dial) 100 UNIT/ML solution pen-injector  Commonly known as: HUMALOG  Replaced by: Insulin Lispro 100 UNIT/ML injection              Discharge Diet:   Diet Instructions       Advance Diet As Tolerated -Target Diet: regular      Target Diet: regular            Activity at Discharge:   Activity Instructions       Activity as tolerated              Follow-up Appointments  Future Appointments   Date Time Provider Department Center   8/5/2025 10:20 AM Taran Harding MD [facility]     Additional Instructions for the Follow-ups that You Need to Schedule       Ambulatory Referral to Home Health   As directed      Face to Face Visit Date: 7/21/2025   Follow-up provider for Plan of Care?: I treated the patient in an acute care facility and will not continue treatment after discharge.   Follow-up provider: ZACHARY WARD [[id number]]   Reason/Clinical Findings: left bka   Describe mobility limitations that make leaving home difficult: left bka   Nursing/Therapeutic Services Requested: Physical Therapy Skilled Nursing   Skilled nursing orders: Wound care dressing/changes   PT orders: Strengthening Gait Training Transfer training   Weight Bearing Status: Non-Weight Bearing   Frequency: 1 Week 1        Discharge Follow-up with PCP   As directed       Currently Documented PCP:    Zachary Ward MD    PCP Phone Number:    [phone]     Follow Up Details: 1w hospital fu        Discharge Follow-up with Specialty: 2 Weeks   As directed      Follow Up: 2 Weeks                Test Results Pending at Discharge       Taran Harding MD  07/25/25  12:33 EDT    Time:

## 2025-08-05 ENCOUNTER — OFFICE VISIT (OUTPATIENT)
Dept: SURGERY | Facility: CLINIC | Age: 70
End: 2025-08-05
Payer: MEDICARE

## 2025-08-05 VITALS — BODY MASS INDEX: 30.12 KG/M2 | WEIGHT: 170 LBS | HEIGHT: 63 IN

## 2025-08-05 DIAGNOSIS — Z89.612 STATUS POST ABOVE-KNEE AMPUTATION OF LEFT LOWER EXTREMITY: Primary | ICD-10-CM

## 2025-08-05 PROBLEM — Z89.9 STATUS POST AMPUTATION: Status: RESOLVED | Noted: 2025-07-10 | Resolved: 2025-08-05

## 2025-08-05 PROCEDURE — 99024 POSTOP FOLLOW-UP VISIT: CPT | Performed by: SURGERY

## 2025-08-05 PROCEDURE — 1159F MED LIST DOCD IN RCRD: CPT | Performed by: SURGERY

## 2025-08-05 PROCEDURE — 1160F RVW MEDS BY RX/DR IN RCRD: CPT | Performed by: SURGERY

## 2025-08-06 ENCOUNTER — READMISSION MANAGEMENT (OUTPATIENT)
Dept: CALL CENTER | Facility: HOSPITAL | Age: 70
End: 2025-08-06
Payer: MEDICARE

## 2025-08-13 ENCOUNTER — PRE-ADMISSION TESTING (OUTPATIENT)
Dept: PREADMISSION TESTING | Facility: HOSPITAL | Age: 70
End: 2025-08-13
Payer: MEDICARE

## 2025-08-13 ENCOUNTER — READMISSION MANAGEMENT (OUTPATIENT)
Dept: CALL CENTER | Facility: HOSPITAL | Age: 70
End: 2025-08-13
Payer: MEDICARE

## 2025-08-13 LAB
ANION GAP SERPL CALCULATED.3IONS-SCNC: 11.4 MMOL/L (ref 5–15)
BUN SERPL-MCNC: 13 MG/DL (ref 8–23)
BUN/CREAT SERPL: 13.1 (ref 7–25)
CALCIUM SPEC-SCNC: 8.9 MG/DL (ref 8.6–10.5)
CHLORIDE SERPL-SCNC: 101 MMOL/L (ref 98–107)
CO2 SERPL-SCNC: 26.6 MMOL/L (ref 22–29)
CREAT SERPL-MCNC: 0.99 MG/DL (ref 0.57–1)
DEPRECATED RDW RBC AUTO: 59.7 FL (ref 37–54)
EGFRCR SERPLBLD CKD-EPI 2021: 61.9 ML/MIN/1.73
ERYTHROCYTE [DISTWIDTH] IN BLOOD BY AUTOMATED COUNT: 18.6 % (ref 12.3–15.4)
GLUCOSE SERPL-MCNC: 145 MG/DL (ref 65–99)
HCT VFR BLD AUTO: 33.4 % (ref 34–46.6)
HGB BLD-MCNC: 9.8 G/DL (ref 12–15.9)
MCH RBC QN AUTO: 25.9 PG (ref 26.6–33)
MCHC RBC AUTO-ENTMCNC: 29.3 G/DL (ref 31.5–35.7)
MCV RBC AUTO: 88.1 FL (ref 79–97)
PLATELET # BLD AUTO: 328 10*3/MM3 (ref 140–450)
PMV BLD AUTO: 9 FL (ref 6–12)
POTASSIUM SERPL-SCNC: 4.4 MMOL/L (ref 3.5–5.2)
RBC # BLD AUTO: 3.79 10*6/MM3 (ref 3.77–5.28)
SODIUM SERPL-SCNC: 139 MMOL/L (ref 136–145)
WBC NRBC COR # BLD AUTO: 7.03 10*3/MM3 (ref 3.4–10.8)

## 2025-08-13 PROCEDURE — 80048 BASIC METABOLIC PNL TOTAL CA: CPT

## 2025-08-13 PROCEDURE — 36415 COLL VENOUS BLD VENIPUNCTURE: CPT

## 2025-08-13 PROCEDURE — 85027 COMPLETE CBC AUTOMATED: CPT

## 2025-08-15 ENCOUNTER — READMISSION MANAGEMENT (OUTPATIENT)
Dept: CALL CENTER | Facility: HOSPITAL | Age: 70
End: 2025-08-15
Payer: MEDICARE

## 2025-08-20 DIAGNOSIS — Z89.612 STATUS POST ABOVE-KNEE AMPUTATION OF LEFT LOWER EXTREMITY: Primary | ICD-10-CM

## 2025-08-28 ENCOUNTER — ANESTHESIA (OUTPATIENT)
Dept: PERIOP | Facility: HOSPITAL | Age: 70
End: 2025-08-28
Payer: MEDICARE

## 2025-08-28 ENCOUNTER — ANESTHESIA EVENT (OUTPATIENT)
Dept: PERIOP | Facility: HOSPITAL | Age: 70
End: 2025-08-28
Payer: MEDICARE

## 2025-08-28 ENCOUNTER — HOSPITAL ENCOUNTER (OUTPATIENT)
Facility: HOSPITAL | Age: 70
Setting detail: HOSPITAL OUTPATIENT SURGERY
Discharge: HOME | End: 2025-08-28
Attending: SURGERY | Admitting: SURGERY
Payer: MEDICARE

## 2025-08-28 VITALS
TEMPERATURE: 97.4 F | OXYGEN SATURATION: 100 % | HEART RATE: 64 BPM | BODY MASS INDEX: 30.65 KG/M2 | WEIGHT: 173 LBS | SYSTOLIC BLOOD PRESSURE: 154 MMHG | RESPIRATION RATE: 18 BRPM | HEIGHT: 63 IN | DIASTOLIC BLOOD PRESSURE: 62 MMHG

## 2025-08-28 LAB — GLUCOSE BLDC GLUCOMTR-MCNC: 116 MG/DL (ref 70–130)

## 2025-08-28 PROCEDURE — 25010000002 MIDAZOLAM PER 1 MG: Performed by: NURSE ANESTHETIST, CERTIFIED REGISTERED

## 2025-08-28 PROCEDURE — 25010000002 BUPIVACAINE 0.5 % SOLUTION: Performed by: SURGERY

## 2025-08-28 PROCEDURE — 25010000002 FENTANYL CITRATE (PF) 50 MCG/ML SOLUTION: Performed by: NURSE ANESTHETIST, CERTIFIED REGISTERED

## 2025-08-28 PROCEDURE — 25810000003 LACTATED RINGERS PER 1000 ML: Performed by: ANESTHESIOLOGY

## 2025-08-28 PROCEDURE — 82948 REAGENT STRIP/BLOOD GLUCOSE: CPT | Performed by: ANESTHESIOLOGY

## 2025-08-28 RX ORDER — SODIUM CHLORIDE, SODIUM LACTATE, POTASSIUM CHLORIDE, CALCIUM CHLORIDE 600; 310; 30; 20 MG/100ML; MG/100ML; MG/100ML; MG/100ML
100 INJECTION, SOLUTION INTRAVENOUS ONCE AS NEEDED
Status: DISCONTINUED | OUTPATIENT
Start: 2025-08-28 | End: 2025-08-28 | Stop reason: HOSPADM

## 2025-08-28 RX ORDER — SODIUM CHLORIDE 0.9 % (FLUSH) 0.9 %
10 SYRINGE (ML) INJECTION AS NEEDED
Status: DISCONTINUED | OUTPATIENT
Start: 2025-08-28 | End: 2025-08-28 | Stop reason: HOSPADM

## 2025-08-28 RX ORDER — FENTANYL CITRATE 50 UG/ML
INJECTION, SOLUTION INTRAMUSCULAR; INTRAVENOUS AS NEEDED
Status: DISCONTINUED | OUTPATIENT
Start: 2025-08-28 | End: 2025-08-28 | Stop reason: SURG

## 2025-08-28 RX ORDER — MIDAZOLAM HYDROCHLORIDE 1 MG/ML
0.5 INJECTION, SOLUTION INTRAMUSCULAR; INTRAVENOUS
Status: DISCONTINUED | OUTPATIENT
Start: 2025-08-28 | End: 2025-08-28 | Stop reason: HOSPADM

## 2025-08-28 RX ORDER — ONDANSETRON 2 MG/ML
4 INJECTION INTRAMUSCULAR; INTRAVENOUS AS NEEDED
Status: DISCONTINUED | OUTPATIENT
Start: 2025-08-28 | End: 2025-08-28 | Stop reason: HOSPADM

## 2025-08-28 RX ORDER — MEPERIDINE HYDROCHLORIDE 25 MG/ML
12.5 INJECTION INTRAMUSCULAR; INTRAVENOUS; SUBCUTANEOUS
Status: DISCONTINUED | OUTPATIENT
Start: 2025-08-28 | End: 2025-08-28 | Stop reason: HOSPADM

## 2025-08-28 RX ORDER — SODIUM CHLORIDE, SODIUM LACTATE, POTASSIUM CHLORIDE, CALCIUM CHLORIDE 600; 310; 30; 20 MG/100ML; MG/100ML; MG/100ML; MG/100ML
125 INJECTION, SOLUTION INTRAVENOUS ONCE
Status: COMPLETED | OUTPATIENT
Start: 2025-08-28 | End: 2025-08-28

## 2025-08-28 RX ORDER — FENTANYL CITRATE 50 UG/ML
50 INJECTION, SOLUTION INTRAMUSCULAR; INTRAVENOUS
Status: DISCONTINUED | OUTPATIENT
Start: 2025-08-28 | End: 2025-08-28 | Stop reason: HOSPADM

## 2025-08-28 RX ORDER — MIDAZOLAM HYDROCHLORIDE 1 MG/ML
INJECTION, SOLUTION INTRAMUSCULAR; INTRAVENOUS AS NEEDED
Status: DISCONTINUED | OUTPATIENT
Start: 2025-08-28 | End: 2025-08-28 | Stop reason: SURG

## 2025-08-28 RX ORDER — SODIUM CHLORIDE 0.9 % (FLUSH) 0.9 %
10 SYRINGE (ML) INJECTION EVERY 12 HOURS SCHEDULED
Status: DISCONTINUED | OUTPATIENT
Start: 2025-08-28 | End: 2025-08-28 | Stop reason: HOSPADM

## 2025-08-28 RX ORDER — IPRATROPIUM BROMIDE AND ALBUTEROL SULFATE 2.5; .5 MG/3ML; MG/3ML
3 SOLUTION RESPIRATORY (INHALATION) ONCE AS NEEDED
Status: DISCONTINUED | OUTPATIENT
Start: 2025-08-28 | End: 2025-08-28 | Stop reason: HOSPADM

## 2025-08-28 RX ORDER — SODIUM CHLORIDE 9 MG/ML
40 INJECTION, SOLUTION INTRAVENOUS AS NEEDED
Status: DISCONTINUED | OUTPATIENT
Start: 2025-08-28 | End: 2025-08-28 | Stop reason: HOSPADM

## 2025-08-28 RX ORDER — BUPIVACAINE HYDROCHLORIDE 5 MG/ML
INJECTION, SOLUTION PERINEURAL AS NEEDED
Status: DISCONTINUED | OUTPATIENT
Start: 2025-08-28 | End: 2025-08-28 | Stop reason: HOSPADM

## 2025-08-28 RX ORDER — OXYCODONE AND ACETAMINOPHEN 5; 325 MG/1; MG/1
1 TABLET ORAL ONCE AS NEEDED
Status: DISCONTINUED | OUTPATIENT
Start: 2025-08-28 | End: 2025-08-28 | Stop reason: HOSPADM

## 2025-08-28 RX ADMIN — Medication 50 MG: at 09:43

## 2025-08-28 RX ADMIN — MIDAZOLAM 2 MG: 1 INJECTION INTRAMUSCULAR; INTRAVENOUS at 09:42

## 2025-08-28 RX ADMIN — FENTANYL CITRATE 100 MCG: 50 INJECTION, SOLUTION INTRAMUSCULAR; INTRAVENOUS at 09:42

## 2025-08-28 RX ADMIN — SODIUM CHLORIDE, POTASSIUM CHLORIDE, SODIUM LACTATE AND CALCIUM CHLORIDE 125 ML/HR: 600; 310; 30; 20 INJECTION, SOLUTION INTRAVENOUS at 08:27

## (undated) DEVICE — SHEATH INTRO SUPERSHEATH SHRT .035 4F 11CM

## (undated) DEVICE — DESTINATION RENAL GUIDING SHEATH: Brand: DESTINATION

## (undated) DEVICE — KT MINI ACC MAK COAXL 5F 10CM

## (undated) DEVICE — SUT VIC 2/0 TIES 18IN J111T

## (undated) DEVICE — GLIDESHEATH SLENDER STAINLESS STEEL KIT: Brand: GLIDESHEATH SLENDER

## (undated) DEVICE — ST EXT IV SMARTSITE 2VLV SP M LL 5ML IV1

## (undated) DEVICE — DEV INFL MONARCH 25W

## (undated) DEVICE — LN FLTR ORL/NASL MICROSTREAM NONINTUB A/LNG

## (undated) DEVICE — ST CATH ATHRCTMY ROTAREX ROT EXCISION 6F 135CM

## (undated) DEVICE — MINI TREK CORONARY DILATATION CATHETER 2.0 MM X 20 MM / RAPID-EXCHANGE: Brand: MINI TREK

## (undated) DEVICE — BG PRESS INFSR 500CC

## (undated) DEVICE — DRSNG SURESITE WNDW 4X4.5

## (undated) DEVICE — SHEATH INTRO SUPERSHEATH JWIRE .035 6F 11CM

## (undated) DEVICE — PACLITAXEL-COATED PTA BALLOON CATHETER: Brand: RANGER™

## (undated) DEVICE — RADIFOCUS GLIDEWIRE ADVANTAGE GUIDEWIRE: Brand: GLIDEWIRE ADVANTAGE

## (undated) DEVICE — ANTIBACTERIAL UNDYED BRAIDED (POLYGLACTIN 910), SYNTHETIC ABSORBABLE SUTURE: Brand: COATED VICRYL

## (undated) DEVICE — DRAPE,UTILTY,TAPE,15X26, 4EA/PK: Brand: MEDLINE

## (undated) DEVICE — ST INF PRI SMRTSTE 20DRP 2VLV 24ML 117

## (undated) DEVICE — HOLDER: Brand: DEROYAL

## (undated) DEVICE — PENCL SMOKE/EVAC MEGADYNE TELESCP 10FT

## (undated) DEVICE — RADIFOCUS GLIDEWIRE: Brand: GLIDEWIRE

## (undated) DEVICE — ADULT DISPOSABLE SINGLE-PATIENT USE PULSE OXIMETER SENSOR: Brand: NONIN

## (undated) DEVICE — PAD,ABDOMINAL,8"X10",STERILE,LF,1/PK: Brand: MEDLINE

## (undated) DEVICE — DRAPE,LAPAROTOMY,PED,STERILE: Brand: MEDLINE

## (undated) DEVICE — KT CATH BALN FEM/ART M5/PLS 6F 6X60MM 135CM

## (undated) DEVICE — 500ML,PRESSURE INFUSER W/STOPCOCK: Brand: MEDLINE

## (undated) DEVICE — VIOLET BRAIDED (POLYGLACTIN 910), SYNTHETIC ABSORBABLE SUTURE: Brand: COATED VICRYL

## (undated) DEVICE — PK BASIC 70

## (undated) DEVICE — Device

## (undated) DEVICE — SHEET,DRAPE,70X100,STERILE: Brand: MEDLINE

## (undated) DEVICE — PK CATH CARD 70

## (undated) DEVICE — CATH IMG IVUS VISIONS .035 DIG 8.2F

## (undated) DEVICE — CATH F4 INF JR 4 100CM: Brand: INFINITI

## (undated) DEVICE — CONTAINER,SPECIMEN,OR STERILE,4OZ: Brand: MEDLINE

## (undated) DEVICE — NDL INJ CYSTO BOTOX FELX 22G 4MMX70CM

## (undated) DEVICE — Device: Brand: MEDEX

## (undated) DEVICE — ANGIO-SEAL VIP VASCULAR CLOSURE DEVICE: Brand: ANGIO-SEAL

## (undated) DEVICE — PTA BALLOON DILATATION CATHETER: Brand: CHARGER™

## (undated) DEVICE — BNDG,ELSTC,MATRIX,STRL,6"X5YD,LF,HOOK&LP: Brand: MEDLINE

## (undated) DEVICE — GUIDEWIRE BOWL W/LID: Brand: MEDLINE INDUSTRIES, INC.

## (undated) DEVICE — PATIENT RETURN ELECTRODE, SINGLE-USE, CONTACT QUALITY MONITORING, ADULT, WITH 9FT CORD, FOR PATIENTS WEIGING OVER 33LBS. (15KG): Brand: MEGADYNE

## (undated) DEVICE — KT MINI ACC 5F .18X40CM SS 21G 7CM

## (undated) DEVICE — ASMBL SPK CONTRST CONTRL

## (undated) DEVICE — 2108 SERIES SAGITTAL BLADE (18.6 X 0.8 X 73.8MM)

## (undated) DEVICE — PINNACLE INTRODUCER SHEATH: Brand: PINNACLE

## (undated) DEVICE — CATH SUP CXI DAV/TP 0.035IN 4F 135CM

## (undated) DEVICE — 2108 SERIES SAGITTAL BLADE, OFFSET (18.5 X 0.64 X 63.0MM)

## (undated) DEVICE — CANNULA,OXY,ADULT,SUPER SOFT,W/14'TUB,UC: Brand: MEDLINE INDUSTRIES, INC.

## (undated) DEVICE — GC 5F 056 XB 3.5 STAND TIP: Brand: CORDIS

## (undated) DEVICE — NC TREK CORONARY DILATATION CATHETER 2.5 MM X 8 MM / RAPID-EXCHANGE: Brand: NC TREK

## (undated) DEVICE — PK EXTREM LOWR 70

## (undated) DEVICE — KT NOVA WTRANSD 60 IN

## (undated) DEVICE — A2000 MULTI-USE SYRINGE KIT, P/N 701277-003KIT CONTENTS: 100ML CONTRAST RESERVOIR AND TUBING WITH CONTRAST SPIKE AND CLAMP: Brand: A2000 MULTI-USE SYRINGE KIT

## (undated) DEVICE — GW AMPLTZ SUPERSTIFF SHT/TPR STR .035IN 260CM

## (undated) DEVICE — CATH IMG IVUS VISION PV DIG 0.014P 5F 150CM

## (undated) DEVICE — GUIDEWIRE WITH ICE™ HYDROPHILIC COATING: Brand: SAVION FLX™

## (undated) DEVICE — GAUZE,BORDER,4"X10",(2X8"PAD),STERILE: Brand: MEDLINE

## (undated) DEVICE — SHEATH INTRO SUPERSHEATH JWIRE .035 8F 11CM

## (undated) DEVICE — KT MANIFLD NAMIC HEART/LT INTERGRATED/COMPENSATOR W/SYR/10ML

## (undated) DEVICE — DRESSING,GAUZE,OIL EMLSN,CURAD,3X8",1/PK: Brand: CURAD

## (undated) DEVICE — MODEL AT P65, P/N 701554-001KIT CONTENTS: HAND CONTROLLER, 3-WAY HIGH-PRESSURE STOPCOCK WITH ROTATING END AND PREMIUM HIGH-PRESSURE TUBING: Brand: ANGIOTOUCH® KIT

## (undated) DEVICE — CATH FLSH OMNI SFT 5F 90CM

## (undated) DEVICE — PTA BALLOON DILATATION CATHETER: Brand: COYOTE™

## (undated) DEVICE — BASIN SET PACK: Brand: MEDLINE INDUSTRIES, INC.

## (undated) DEVICE — COVER,MAYO STAND,STERILE: Brand: MEDLINE

## (undated) DEVICE — SUT NLY 2/0 664G

## (undated) DEVICE — GLV SURG PREMIERPRO MIC LTX PF SZ7.5 BRN

## (undated) DEVICE — COR CYSTO: Brand: MEDLINE INDUSTRIES, INC.

## (undated) DEVICE — SYR LUERLOK 5CC

## (undated) DEVICE — LN INJ CONTRST FLXCIL HP F/M LL 1200PSI10

## (undated) DEVICE — HI-TORQUE BALANCE MIDDLEWEIGHT GUIDE WIRE W/HYDROCOAT .014 STRAIGHT TIP 3.0 CM X 190 CM: Brand: HI-TORQUE BALANCE MIDDLEWEIGHT

## (undated) DEVICE — ATLAS®  PTA BALLOON DILATATION CATHETER 18 MM X 40 MM, 75 CM CATHETER: Brand: ATLAS®

## (undated) DEVICE — ST EXT IV SMRTSTE 2VLV FIX M LL 6ML 41

## (undated) DEVICE — SHEATH INTRO SUPERSHEATH JWIRE .035 5F 11CM

## (undated) DEVICE — GLV SURG PREMIERPRO MIC LTX PF SZ8 BRN

## (undated) DEVICE — NAVICROSS SUPPORT CATHETER: Brand: NAVICROSS

## (undated) DEVICE — SHEATH GUIDE FLEXOR ANSEL CHECKFLO HF STR .035 6F 55CM

## (undated) DEVICE — ELECTRD DEFIB M/FUNC STATPADZ PK/2

## (undated) DEVICE — NC TREK CORONARY DILATATION CATHETER 2.5 MM X 15 MM / RAPID-EXCHANGE: Brand: NC TREK

## (undated) DEVICE — GW INQWIRE FC PTFE J/3MM .035 180

## (undated) DEVICE — CATH F5 INF JL 4 100CM: Brand: INFINITI

## (undated) DEVICE — HI-TORQUE COMMAND GUIDE WIRE .014" 300 CM: Brand: HI-TORQUE COMMAND